# Patient Record
Sex: MALE | Race: BLACK OR AFRICAN AMERICAN | NOT HISPANIC OR LATINO | URBAN - METROPOLITAN AREA
[De-identification: names, ages, dates, MRNs, and addresses within clinical notes are randomized per-mention and may not be internally consistent; named-entity substitution may affect disease eponyms.]

---

## 2017-06-15 ENCOUNTER — EMERGENCY (EMERGENCY)
Facility: HOSPITAL | Age: 62
LOS: 0 days | Discharge: HOME | End: 2017-06-15
Attending: EMERGENCY MEDICINE
Payer: COMMERCIAL

## 2017-06-15 VITALS
HEART RATE: 117 BPM | OXYGEN SATURATION: 97 % | TEMPERATURE: 102 F | WEIGHT: 119.93 LBS | DIASTOLIC BLOOD PRESSURE: 89 MMHG | RESPIRATION RATE: 20 BRPM | SYSTOLIC BLOOD PRESSURE: 151 MMHG | HEIGHT: 71 IN

## 2017-06-15 VITALS
TEMPERATURE: 99 F | DIASTOLIC BLOOD PRESSURE: 78 MMHG | OXYGEN SATURATION: 98 % | RESPIRATION RATE: 20 BRPM | HEART RATE: 90 BPM | SYSTOLIC BLOOD PRESSURE: 140 MMHG

## 2017-06-15 DIAGNOSIS — J18.9 PNEUMONIA, UNSPECIFIED ORGANISM: ICD-10-CM

## 2017-06-15 DIAGNOSIS — R53.1 WEAKNESS: ICD-10-CM

## 2017-06-15 DIAGNOSIS — R05 COUGH: ICD-10-CM

## 2017-06-15 LAB
ALBUMIN SERPL ELPH-MCNC: 2.6 G/DL — LOW (ref 3.3–5)
ALP SERPL-CCNC: 75 U/L — SIGNIFICANT CHANGE UP (ref 40–120)
ALT FLD-CCNC: 79 U/L — HIGH (ref 12–78)
ANION GAP SERPL CALC-SCNC: 8 MMOL/L — SIGNIFICANT CHANGE UP (ref 5–17)
APPEARANCE UR: CLEAR — SIGNIFICANT CHANGE UP
APTT BLD: 30.1 SEC — SIGNIFICANT CHANGE UP (ref 27.5–37.4)
AST SERPL-CCNC: 135 U/L — HIGH (ref 15–37)
BASOPHILS # BLD AUTO: 0.1 K/UL — SIGNIFICANT CHANGE UP (ref 0–0.2)
BASOPHILS NFR BLD AUTO: 2.5 % — HIGH (ref 0–2)
BILIRUB SERPL-MCNC: 0.4 MG/DL — SIGNIFICANT CHANGE UP (ref 0.2–1.2)
BILIRUB UR-MCNC: NEGATIVE — SIGNIFICANT CHANGE UP
BUN SERPL-MCNC: 18 MG/DL — SIGNIFICANT CHANGE UP (ref 7–23)
CALCIUM SERPL-MCNC: 8.6 MG/DL — SIGNIFICANT CHANGE UP (ref 8.5–10.1)
CHLORIDE SERPL-SCNC: 98 MMOL/L — SIGNIFICANT CHANGE UP (ref 96–108)
CK MB BLD-MCNC: <0.4 % — SIGNIFICANT CHANGE UP (ref 0–3.5)
CK MB CFR SERPL CALC: <0.5 NG/ML — SIGNIFICANT CHANGE UP (ref 0.5–3.6)
CK SERPL-CCNC: 134 U/L — SIGNIFICANT CHANGE UP (ref 26–308)
CO2 SERPL-SCNC: 29 MMOL/L — SIGNIFICANT CHANGE UP (ref 22–31)
COLOR SPEC: YELLOW — SIGNIFICANT CHANGE UP
CREAT SERPL-MCNC: 1.32 MG/DL — HIGH (ref 0.5–1.3)
DIFF PNL FLD: NEGATIVE — SIGNIFICANT CHANGE UP
EOSINOPHIL # BLD AUTO: 0 K/UL — SIGNIFICANT CHANGE UP (ref 0–0.5)
EOSINOPHIL NFR BLD AUTO: 0.3 % — SIGNIFICANT CHANGE UP (ref 0–6)
GLUCOSE SERPL-MCNC: 105 MG/DL — HIGH (ref 70–99)
GLUCOSE UR QL: NEGATIVE MG/DL — SIGNIFICANT CHANGE UP
HCT VFR BLD CALC: 40 % — SIGNIFICANT CHANGE UP (ref 39–50)
HGB BLD-MCNC: 13.8 G/DL — SIGNIFICANT CHANGE UP (ref 13–17)
INR BLD: 1.11 RATIO — SIGNIFICANT CHANGE UP (ref 0.88–1.16)
KETONES UR-MCNC: NEGATIVE — SIGNIFICANT CHANGE UP
LACTATE SERPL-SCNC: 1.7 MMOL/L — SIGNIFICANT CHANGE UP (ref 0.7–2)
LEUKOCYTE ESTERASE UR-ACNC: NEGATIVE — SIGNIFICANT CHANGE UP
LYMPHOCYTES # BLD AUTO: 0.9 K/UL — LOW (ref 1–3.3)
LYMPHOCYTES # BLD AUTO: 15.4 % — SIGNIFICANT CHANGE UP (ref 13–44)
MCHC RBC-ENTMCNC: 29.6 PG — SIGNIFICANT CHANGE UP (ref 27–34)
MCHC RBC-ENTMCNC: 34.4 GM/DL — SIGNIFICANT CHANGE UP (ref 32–36)
MCV RBC AUTO: 85.9 FL — SIGNIFICANT CHANGE UP (ref 80–100)
MONOCYTES # BLD AUTO: 0.8 K/UL — SIGNIFICANT CHANGE UP (ref 0–0.9)
MONOCYTES NFR BLD AUTO: 13.8 % — SIGNIFICANT CHANGE UP (ref 2–14)
NEUTROPHILS # BLD AUTO: 3.8 K/UL — SIGNIFICANT CHANGE UP (ref 1.8–7.4)
NEUTROPHILS NFR BLD AUTO: 68 % — SIGNIFICANT CHANGE UP (ref 43–77)
NITRITE UR-MCNC: NEGATIVE — SIGNIFICANT CHANGE UP
PH UR: 6 — SIGNIFICANT CHANGE UP (ref 5–8)
PLATELET # BLD AUTO: 224 K/UL — SIGNIFICANT CHANGE UP (ref 150–400)
POTASSIUM SERPL-MCNC: 3.9 MMOL/L — SIGNIFICANT CHANGE UP (ref 3.5–5.3)
POTASSIUM SERPL-SCNC: 3.9 MMOL/L — SIGNIFICANT CHANGE UP (ref 3.5–5.3)
PROT SERPL-MCNC: 7.7 GM/DL — SIGNIFICANT CHANGE UP (ref 6–8.3)
PROT UR-MCNC: 15 MG/DL
PROTHROM AB SERPL-ACNC: 12.1 SEC — SIGNIFICANT CHANGE UP (ref 9.8–12.7)
RBC # BLD: 4.66 M/UL — SIGNIFICANT CHANGE UP (ref 4.2–5.8)
RBC # FLD: 11.4 % — SIGNIFICANT CHANGE UP (ref 11–15)
SODIUM SERPL-SCNC: 135 MMOL/L — SIGNIFICANT CHANGE UP (ref 135–145)
SP GR SPEC: 1 — LOW (ref 1.01–1.02)
TROPONIN I SERPL-MCNC: <.015 NG/ML — SIGNIFICANT CHANGE UP (ref 0.01–0.04)
UROBILINOGEN FLD QL: NEGATIVE MG/DL — SIGNIFICANT CHANGE UP
WBC # BLD: 5.6 K/UL — SIGNIFICANT CHANGE UP (ref 3.8–10.5)
WBC # FLD AUTO: 5.6 K/UL — SIGNIFICANT CHANGE UP (ref 3.8–10.5)
WBC UR QL: SIGNIFICANT CHANGE UP

## 2017-06-15 PROCEDURE — 99285 EMERGENCY DEPT VISIT HI MDM: CPT | Mod: 25

## 2017-06-15 PROCEDURE — 71010: CPT | Mod: 26

## 2017-06-15 PROCEDURE — 70450 CT HEAD/BRAIN W/O DYE: CPT | Mod: 26

## 2017-06-15 RX ORDER — CEFTRIAXONE 500 MG/1
2 INJECTION, POWDER, FOR SOLUTION INTRAMUSCULAR; INTRAVENOUS ONCE
Qty: 0 | Refills: 0 | Status: COMPLETED | OUTPATIENT
Start: 2017-06-15 | End: 2017-06-15

## 2017-06-15 RX ORDER — AZITHROMYCIN 500 MG/1
500 TABLET, FILM COATED ORAL ONCE
Qty: 0 | Refills: 0 | Status: COMPLETED | OUTPATIENT
Start: 2017-06-15 | End: 2017-06-15

## 2017-06-15 RX ORDER — ACETAMINOPHEN 500 MG
975 TABLET ORAL ONCE
Qty: 0 | Refills: 0 | Status: COMPLETED | OUTPATIENT
Start: 2017-06-15 | End: 2017-06-15

## 2017-06-15 RX ORDER — SODIUM CHLORIDE 9 MG/ML
2000 INJECTION INTRAMUSCULAR; INTRAVENOUS; SUBCUTANEOUS ONCE
Qty: 0 | Refills: 0 | Status: COMPLETED | OUTPATIENT
Start: 2017-06-15 | End: 2017-06-15

## 2017-06-15 RX ADMIN — SODIUM CHLORIDE 2000 MILLILITER(S): 9 INJECTION INTRAMUSCULAR; INTRAVENOUS; SUBCUTANEOUS at 01:47

## 2017-06-15 RX ADMIN — AZITHROMYCIN 255 MILLIGRAM(S): 500 TABLET, FILM COATED ORAL at 06:53

## 2017-06-15 RX ADMIN — Medication 975 MILLIGRAM(S): at 02:00

## 2017-06-15 RX ADMIN — CEFTRIAXONE 100 GRAM(S): 500 INJECTION, POWDER, FOR SOLUTION INTRAMUSCULAR; INTRAVENOUS at 04:45

## 2017-06-15 NOTE — ED PROVIDER NOTE - CARE PLAN
Principal Discharge DX:	Pneumonia  Secondary Diagnosis:	Weakness  Secondary Diagnosis:	Left against medical advice

## 2017-06-15 NOTE — ED PROVIDER NOTE - PHYSICAL EXAMINATION
Gen: Alert, speaking in complete sentences;  Head: NC, AT, EOMI, normal lids/conjunctiva;  ENT: normal hearing, patent oropharynx, MMM;  Neck: supple, no tenderness/meningismus/JVD, Trachea midline,FROM;  Pulm: Bilateral clear BS, no wheeze/stridor/retractions;  CV: tachycardic, no M/R/G, +dist pulses;  Abd: soft, NT/ND, +BS, no guarding/rebound tenderness;  Mskel: no edema/erythema/cyanosis;  Skin: no rash/pallor;  Neuro: AAOx3, no gross sensory/motor deficits, CN 2-12 intact

## 2017-06-15 NOTE — ED PROVIDER NOTE - MEDICAL DECISION MAKING DETAILS
Pt w PNA, given abx in ED.  CT notable for mult old infarcts, concerning for cause of weakness.  Discussed w pt need for further eval and admission.  Pt refused.   The patient has decided to leave against medical advice.  The patient is AAOx3, not intoxicated, and displays normal decision making ability. We discussed all risks, benefits, and alternatives to the progression of treatment and the potential dangers of leaving including but not limited to permanent disability, injury, and death.  The patient was instructed that they are welcome to change their decision to leave against medical advice and return to the emergency department at any time and for any reason in order to allow us to render care.

## 2017-06-16 LAB
CULTURE RESULTS: NO GROWTH — SIGNIFICANT CHANGE UP
SPECIMEN SOURCE: SIGNIFICANT CHANGE UP

## 2017-06-20 LAB
CULTURE RESULTS: SIGNIFICANT CHANGE UP
CULTURE RESULTS: SIGNIFICANT CHANGE UP
SPECIMEN SOURCE: SIGNIFICANT CHANGE UP
SPECIMEN SOURCE: SIGNIFICANT CHANGE UP

## 2019-10-17 PROBLEM — Z00.00 ENCOUNTER FOR PREVENTIVE HEALTH EXAMINATION: Status: ACTIVE | Noted: 2019-10-17

## 2019-10-18 ENCOUNTER — APPOINTMENT (OUTPATIENT)
Dept: UROLOGY | Facility: CLINIC | Age: 64
End: 2019-10-18

## 2021-11-29 ENCOUNTER — EMERGENCY (EMERGENCY)
Facility: HOSPITAL | Age: 66
LOS: 0 days | Discharge: HOME | End: 2021-11-29
Attending: EMERGENCY MEDICINE
Payer: MEDICARE

## 2021-11-29 VITALS
RESPIRATION RATE: 19 BRPM | OXYGEN SATURATION: 96 % | SYSTOLIC BLOOD PRESSURE: 115 MMHG | WEIGHT: 139.99 LBS | DIASTOLIC BLOOD PRESSURE: 74 MMHG | TEMPERATURE: 98 F | HEIGHT: 71 IN | HEART RATE: 100 BPM

## 2021-11-29 VITALS
HEART RATE: 97 BPM | OXYGEN SATURATION: 97 % | SYSTOLIC BLOOD PRESSURE: 128 MMHG | TEMPERATURE: 98 F | RESPIRATION RATE: 18 BRPM | DIASTOLIC BLOOD PRESSURE: 85 MMHG

## 2021-11-29 DIAGNOSIS — E78.5 HYPERLIPIDEMIA, UNSPECIFIED: ICD-10-CM

## 2021-11-29 DIAGNOSIS — R42 DIZZINESS AND GIDDINESS: ICD-10-CM

## 2021-11-29 DIAGNOSIS — Z20.822 CONTACT WITH AND (SUSPECTED) EXPOSURE TO COVID-19: ICD-10-CM

## 2021-11-29 DIAGNOSIS — I10 ESSENTIAL (PRIMARY) HYPERTENSION: ICD-10-CM

## 2021-11-29 DIAGNOSIS — F17.210 NICOTINE DEPENDENCE, CIGARETTES, UNCOMPLICATED: ICD-10-CM

## 2021-11-29 DIAGNOSIS — R11.2 NAUSEA WITH VOMITING, UNSPECIFIED: ICD-10-CM
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
## 2021-11-29 LAB
ALBUMIN SERPL ELPH-MCNC: 2.9 G/DL — LOW (ref 3.3–5)
ALP SERPL-CCNC: 83 U/L — SIGNIFICANT CHANGE UP (ref 40–120)
ALT FLD-CCNC: 27 U/L — SIGNIFICANT CHANGE UP (ref 12–78)
ANION GAP SERPL CALC-SCNC: 4 MMOL/L — LOW (ref 5–17)
APTT BLD: 26.9 SEC — LOW (ref 27.5–35.5)
AST SERPL-CCNC: 12 U/L — LOW (ref 15–37)
BASOPHILS # BLD AUTO: 0.02 K/UL — SIGNIFICANT CHANGE UP (ref 0–0.2)
BASOPHILS NFR BLD AUTO: 0.4 % — SIGNIFICANT CHANGE UP (ref 0–2)
BILIRUB SERPL-MCNC: 0.4 MG/DL — SIGNIFICANT CHANGE UP (ref 0.2–1.2)
BUN SERPL-MCNC: 13 MG/DL — SIGNIFICANT CHANGE UP (ref 7–23)
CALCIUM SERPL-MCNC: 8.8 MG/DL — SIGNIFICANT CHANGE UP (ref 8.5–10.1)
CHLORIDE SERPL-SCNC: 111 MMOL/L — HIGH (ref 96–108)
CO2 SERPL-SCNC: 30 MMOL/L — SIGNIFICANT CHANGE UP (ref 22–31)
CREAT SERPL-MCNC: 1.21 MG/DL — SIGNIFICANT CHANGE UP (ref 0.5–1.3)
EOSINOPHIL # BLD AUTO: 0.01 K/UL — SIGNIFICANT CHANGE UP (ref 0–0.5)
EOSINOPHIL NFR BLD AUTO: 0.2 % — SIGNIFICANT CHANGE UP (ref 0–6)
FLUAV AG NPH QL: SIGNIFICANT CHANGE UP
FLUBV AG NPH QL: SIGNIFICANT CHANGE UP
GLUCOSE SERPL-MCNC: 137 MG/DL — HIGH (ref 70–99)
HCT VFR BLD CALC: 39.6 % — SIGNIFICANT CHANGE UP (ref 39–50)
HGB BLD-MCNC: 12.6 G/DL — LOW (ref 13–17)
IMM GRANULOCYTES NFR BLD AUTO: 0.4 % — SIGNIFICANT CHANGE UP (ref 0–1.5)
INR BLD: 1.17 RATIO — HIGH (ref 0.88–1.16)
LYMPHOCYTES # BLD AUTO: 1.24 K/UL — SIGNIFICANT CHANGE UP (ref 1–3.3)
LYMPHOCYTES # BLD AUTO: 22.1 % — SIGNIFICANT CHANGE UP (ref 13–44)
MCHC RBC-ENTMCNC: 27.9 PG — SIGNIFICANT CHANGE UP (ref 27–34)
MCHC RBC-ENTMCNC: 31.8 GM/DL — LOW (ref 32–36)
MCV RBC AUTO: 87.8 FL — SIGNIFICANT CHANGE UP (ref 80–100)
MONOCYTES # BLD AUTO: 0.25 K/UL — SIGNIFICANT CHANGE UP (ref 0–0.9)
MONOCYTES NFR BLD AUTO: 4.5 % — SIGNIFICANT CHANGE UP (ref 2–14)
NEUTROPHILS # BLD AUTO: 4.06 K/UL — SIGNIFICANT CHANGE UP (ref 1.8–7.4)
NEUTROPHILS NFR BLD AUTO: 72.4 % — SIGNIFICANT CHANGE UP (ref 43–77)
NRBC # BLD: 0 /100 WBCS — SIGNIFICANT CHANGE UP (ref 0–0)
PLATELET # BLD AUTO: 215 K/UL — SIGNIFICANT CHANGE UP (ref 150–400)
POTASSIUM SERPL-MCNC: 3.7 MMOL/L — SIGNIFICANT CHANGE UP (ref 3.5–5.3)
POTASSIUM SERPL-SCNC: 3.7 MMOL/L — SIGNIFICANT CHANGE UP (ref 3.5–5.3)
PROT SERPL-MCNC: 6.9 GM/DL — SIGNIFICANT CHANGE UP (ref 6–8.3)
PROTHROM AB SERPL-ACNC: 13.5 SEC — SIGNIFICANT CHANGE UP (ref 10.6–13.6)
RBC # BLD: 4.51 M/UL — SIGNIFICANT CHANGE UP (ref 4.2–5.8)
RBC # FLD: 13.1 % — SIGNIFICANT CHANGE UP (ref 10.3–14.5)
SARS-COV-2 RNA SPEC QL NAA+PROBE: SIGNIFICANT CHANGE UP
SODIUM SERPL-SCNC: 145 MMOL/L — SIGNIFICANT CHANGE UP (ref 135–145)
TROPONIN I, HIGH SENSITIVITY RESULT: 7.5 NG/L — SIGNIFICANT CHANGE UP
WBC # BLD: 5.6 K/UL — SIGNIFICANT CHANGE UP (ref 3.8–10.5)
WBC # FLD AUTO: 5.6 K/UL — SIGNIFICANT CHANGE UP (ref 3.8–10.5)

## 2021-11-29 PROCEDURE — 99285 EMERGENCY DEPT VISIT HI MDM: CPT

## 2021-11-29 PROCEDURE — 70450 CT HEAD/BRAIN W/O DYE: CPT | Mod: 26,MA

## 2021-11-29 PROCEDURE — 93010 ELECTROCARDIOGRAM REPORT: CPT

## 2021-11-29 PROCEDURE — 71045 X-RAY EXAM CHEST 1 VIEW: CPT | Mod: 26

## 2021-11-29 RX ORDER — METOCLOPRAMIDE HCL 10 MG
10 TABLET ORAL ONCE
Refills: 0 | Status: COMPLETED | OUTPATIENT
Start: 2021-11-29 | End: 2021-11-29

## 2021-11-29 RX ORDER — ONDANSETRON 8 MG/1
1 TABLET, FILM COATED ORAL
Qty: 6 | Refills: 0
Start: 2021-11-29 | End: 2021-12-01

## 2021-11-29 RX ORDER — MECLIZINE HCL 12.5 MG
1 TABLET ORAL
Qty: 15 | Refills: 0
Start: 2021-11-29 | End: 2021-12-03

## 2021-11-29 RX ORDER — MECLIZINE HCL 12.5 MG
50 TABLET ORAL ONCE
Refills: 0 | Status: COMPLETED | OUTPATIENT
Start: 2021-11-29 | End: 2021-11-29

## 2021-11-29 RX ADMIN — Medication 10 MILLIGRAM(S): at 13:58

## 2021-11-29 RX ADMIN — Medication 50 MILLIGRAM(S): at 13:58

## 2021-11-29 NOTE — ED PROVIDER NOTE - CARE PROVIDER_API CALL
Vidhi Meraz  FAMILY MEDICINE  [phone] [place] [address]  Phone: [phone]  Fax: [phone]  Follow Up Time: 1-3 Days

## 2021-11-29 NOTE — ED PROVIDER NOTE - MUSCULOSKELETAL, MLM
Spine appears normal, range of motion is not limited, no muscle or joint tenderness, no sign of trauma

## 2021-11-29 NOTE — ED PROVIDER NOTE - NSICDXPASTMEDICALHX_GEN_ALL_CORE_FT
PAST MEDICAL HISTORY:  HLD (hyperlipidemia)     HTN, age 0-18     TBI (traumatic brain injury)

## 2021-11-29 NOTE — ED ADULT NURSE NOTE - CHIEF COMPLAINT
Type of surgery: Laparoscopic right inguinal hernia repair: possible bilateral  Possible open CPT code 67507  Inguinal hernia of right side without obstruction or gangrene [K40.90]  - Primary   Location of surgery: [facility]  Date and time of surgery: 06/28/2019 / 10:00   Surgeon: Feng  Pre-Op Appt Date: 06/20/2019  Post-Op Appt Date: 07/11/2019   Packet sent out: Yes  Pre-cert/Authorization completed:  No prior auth per [employer].   Date: 06/17/2019    Sent to  and was received. 06/18/2019  
The patient is a 66y Male complaining of dizziness.

## 2021-11-29 NOTE — ED PROVIDER NOTE - OBJECTIVE STATEMENT
66 year old male with PMH of TBI, HTN, and HLD presents to ED for an episode of dizziness, nausea, and vomiting this morning. Per daughter patient had breakfast and a cigarette, afterwards pt fell down to the floor. Pt denies LOC. No change of mental status noted, pt with legally blind status bilateral eyes. Pt denies cough, fever, chills, chest pain, and SOB.

## 2021-11-29 NOTE — ED PROVIDER NOTE - NEUROLOGICAL, MLM
AAO x1, limited visual exam, able to follow commands CN 2-12 intact, unable to do finger and nose, but able to perform repetitive hand movements without issue no notable nystagmus.

## 2021-11-29 NOTE — ED PROVIDER NOTE - CLINICAL SUMMARY MEDICAL DECISION MAKING FREE TEXT BOX
Patient with dizziness and fall. Will evaluate for any potential head injury, labs, fluids, Meclizine, and CT scan ordered. If able to ambulate after medication will d/c if not will admit for further care, PMD Dr. Barrios. Patient with dizziness and fall. Will evaluate for any potential head injury, labs, fluids, Meclizine, and CT scan ordered. If able to ambulate after medication will d/c if not will admit for further care, PMD Dr. Barrios will dc with follow up. Otherwise pt able to walk without issue in ED - feeling improved symptomatically.

## 2021-11-29 NOTE — ED PROVIDER NOTE - PATIENT PORTAL LINK FT
You can access the FollowMyHealth Patient Portal offered by [facility] by registering at the following website: [email or web address]. By joining [facility]’s FollowMyHealth portal, you will also be able to view your health information using other applications (apps) compatible with our system.

## 2021-11-29 NOTE — ED ADULT NURSE NOTE - OBJECTIVE STATEMENT
66 year old male hx HTN, HLD, and TBI as per daughter comes in to ER after sustaining fall this morning. As per daughter patient was found on the floor and vomited. Patient denies any pain or discomfort, unsure if he hit head.

## 2023-07-14 ENCOUNTER — INPATIENT (INPATIENT)
Facility: HOSPITAL | Age: 68
LOS: 40 days | Discharge: NURSING, REHAB OR LONG-TERM CARE | End: 2023-08-24
Attending: INTERNAL MEDICINE | Admitting: INTERNAL MEDICINE
Payer: MEDICARE

## 2023-07-14 VITALS
DIASTOLIC BLOOD PRESSURE: 70 MMHG | TEMPERATURE: 98 F | HEIGHT: 71 IN | WEIGHT: 139.99 LBS | SYSTOLIC BLOOD PRESSURE: 122 MMHG

## 2023-07-14 DIAGNOSIS — E87.5 HYPERKALEMIA: ICD-10-CM

## 2023-07-14 DIAGNOSIS — E78.5 HYPERLIPIDEMIA, UNSPECIFIED: ICD-10-CM

## 2023-07-14 DIAGNOSIS — M62.82 RHABDOMYOLYSIS: ICD-10-CM

## 2023-07-14 DIAGNOSIS — N17.9 ACUTE KIDNEY FAILURE, UNSPECIFIED: ICD-10-CM

## 2023-07-14 DIAGNOSIS — F03.90 UNSPECIFIED DEMENTIA, UNSPECIFIED SEVERITY, WITHOUT BEHAVIORAL DISTURBANCE, PSYCHOTIC DISTURBANCE, MOOD DISTURBANCE, AND ANXIETY: ICD-10-CM

## 2023-07-14 DIAGNOSIS — R26.2 DIFFICULTY IN WALKING, NOT ELSEWHERE CLASSIFIED: ICD-10-CM

## 2023-07-14 DIAGNOSIS — I10 ESSENTIAL (PRIMARY) HYPERTENSION: ICD-10-CM

## 2023-07-14 PROBLEM — S06.9X9A UNSPECIFIED INTRACRANIAL INJURY WITH LOSS OF CONSCIOUSNESS OF UNSPECIFIED DURATION, INITIAL ENCOUNTER: Chronic | Status: ACTIVE | Noted: 2021-11-29

## 2023-07-14 LAB
ALBUMIN SERPL ELPH-MCNC: 3.1 G/DL — LOW (ref 3.3–5)
ALP SERPL-CCNC: 61 U/L — SIGNIFICANT CHANGE UP (ref 40–120)
ALT FLD-CCNC: 23 U/L — SIGNIFICANT CHANGE UP (ref 12–78)
ANION GAP SERPL CALC-SCNC: 8 MMOL/L — SIGNIFICANT CHANGE UP (ref 5–17)
ANION GAP SERPL CALC-SCNC: 9 MMOL/L — SIGNIFICANT CHANGE UP (ref 5–17)
APPEARANCE UR: ABNORMAL
AST SERPL-CCNC: 35 U/L — SIGNIFICANT CHANGE UP (ref 15–37)
BACTERIA # UR AUTO: ABNORMAL
BASOPHILS # BLD AUTO: 0.01 K/UL — SIGNIFICANT CHANGE UP (ref 0–0.2)
BASOPHILS NFR BLD AUTO: 0.1 % — SIGNIFICANT CHANGE UP (ref 0–2)
BILIRUB SERPL-MCNC: 0.4 MG/DL — SIGNIFICANT CHANGE UP (ref 0.2–1.2)
BILIRUB UR-MCNC: NEGATIVE — SIGNIFICANT CHANGE UP
BUN SERPL-MCNC: 34 MG/DL — HIGH (ref 7–23)
BUN SERPL-MCNC: 35 MG/DL — HIGH (ref 7–23)
CALCIUM SERPL-MCNC: 8.4 MG/DL — LOW (ref 8.5–10.1)
CALCIUM SERPL-MCNC: 9.4 MG/DL — SIGNIFICANT CHANGE UP (ref 8.5–10.1)
CHLORIDE SERPL-SCNC: 102 MMOL/L — SIGNIFICANT CHANGE UP (ref 96–108)
CHLORIDE SERPL-SCNC: 104 MMOL/L — SIGNIFICANT CHANGE UP (ref 96–108)
CK SERPL-CCNC: 965 U/L — HIGH (ref 26–308)
CO2 SERPL-SCNC: 28 MMOL/L — SIGNIFICANT CHANGE UP (ref 22–31)
CO2 SERPL-SCNC: 28 MMOL/L — SIGNIFICANT CHANGE UP (ref 22–31)
COLOR SPEC: YELLOW — SIGNIFICANT CHANGE UP
CREAT SERPL-MCNC: 2.54 MG/DL — HIGH (ref 0.5–1.3)
CREAT SERPL-MCNC: 2.82 MG/DL — HIGH (ref 0.5–1.3)
DIFF PNL FLD: ABNORMAL
EGFR: 23 ML/MIN/1.73M2 — LOW
EGFR: 27 ML/MIN/1.73M2 — LOW
EOSINOPHIL # BLD AUTO: 0 K/UL — SIGNIFICANT CHANGE UP (ref 0–0.5)
EOSINOPHIL NFR BLD AUTO: 0 % — SIGNIFICANT CHANGE UP (ref 0–6)
EPI CELLS # UR: SIGNIFICANT CHANGE UP
GLUCOSE SERPL-MCNC: 110 MG/DL — HIGH (ref 70–99)
GLUCOSE SERPL-MCNC: 73 MG/DL — SIGNIFICANT CHANGE UP (ref 70–99)
GLUCOSE UR QL: 100 MG/DL
GRAN CASTS # UR COMP ASSIST: ABNORMAL /LPF
HCT VFR BLD CALC: 42.4 % — SIGNIFICANT CHANGE UP (ref 39–50)
HGB BLD-MCNC: 13.5 G/DL — SIGNIFICANT CHANGE UP (ref 13–17)
IMM GRANULOCYTES NFR BLD AUTO: 0.5 % — SIGNIFICANT CHANGE UP (ref 0–0.9)
KETONES UR-MCNC: ABNORMAL
LEUKOCYTE ESTERASE UR-ACNC: NEGATIVE — SIGNIFICANT CHANGE UP
LYMPHOCYTES # BLD AUTO: 1.35 K/UL — SIGNIFICANT CHANGE UP (ref 1–3.3)
LYMPHOCYTES # BLD AUTO: 18.3 % — SIGNIFICANT CHANGE UP (ref 13–44)
MCHC RBC-ENTMCNC: 30.4 PG — SIGNIFICANT CHANGE UP (ref 27–34)
MCHC RBC-ENTMCNC: 31.8 G/DL — LOW (ref 32–36)
MCV RBC AUTO: 95.5 FL — SIGNIFICANT CHANGE UP (ref 80–100)
MONOCYTES # BLD AUTO: 0.53 K/UL — SIGNIFICANT CHANGE UP (ref 0–0.9)
MONOCYTES NFR BLD AUTO: 7.2 % — SIGNIFICANT CHANGE UP (ref 2–14)
NEUTROPHILS # BLD AUTO: 5.46 K/UL — SIGNIFICANT CHANGE UP (ref 1.8–7.4)
NEUTROPHILS NFR BLD AUTO: 73.9 % — SIGNIFICANT CHANGE UP (ref 43–77)
NITRITE UR-MCNC: NEGATIVE — SIGNIFICANT CHANGE UP
NRBC # BLD: 0 /100 WBCS — SIGNIFICANT CHANGE UP (ref 0–0)
PH UR: 5 — SIGNIFICANT CHANGE UP (ref 5–8)
PLATELET # BLD AUTO: 129 K/UL — LOW (ref 150–400)
POTASSIUM SERPL-MCNC: 4 MMOL/L — SIGNIFICANT CHANGE UP (ref 3.5–5.3)
POTASSIUM SERPL-MCNC: 6 MMOL/L — HIGH (ref 3.5–5.3)
POTASSIUM SERPL-SCNC: 4 MMOL/L — SIGNIFICANT CHANGE UP (ref 3.5–5.3)
POTASSIUM SERPL-SCNC: 6 MMOL/L — HIGH (ref 3.5–5.3)
PROT SERPL-MCNC: 7.7 GM/DL — SIGNIFICANT CHANGE UP (ref 6–8.3)
PROT UR-MCNC: 100 MG/DL
RBC # BLD: 4.44 M/UL — SIGNIFICANT CHANGE UP (ref 4.2–5.8)
RBC # FLD: 13.3 % — SIGNIFICANT CHANGE UP (ref 10.3–14.5)
RBC CASTS # UR COMP ASSIST: ABNORMAL /HPF (ref 0–4)
SODIUM SERPL-SCNC: 139 MMOL/L — SIGNIFICANT CHANGE UP (ref 135–145)
SODIUM SERPL-SCNC: 140 MMOL/L — SIGNIFICANT CHANGE UP (ref 135–145)
SP GR SPEC: 1.02 — SIGNIFICANT CHANGE UP (ref 1.01–1.02)
UROBILINOGEN FLD QL: 1 MG/DL
WBC # BLD: 7.39 K/UL — SIGNIFICANT CHANGE UP (ref 3.8–10.5)
WBC # FLD AUTO: 7.39 K/UL — SIGNIFICANT CHANGE UP (ref 3.8–10.5)
WBC UR QL: SIGNIFICANT CHANGE UP

## 2023-07-14 PROCEDURE — 72192 CT PELVIS W/O DYE: CPT | Mod: 26,MA

## 2023-07-14 PROCEDURE — 99285 EMERGENCY DEPT VISIT HI MDM: CPT

## 2023-07-14 PROCEDURE — 99223 1ST HOSP IP/OBS HIGH 75: CPT

## 2023-07-14 PROCEDURE — 70450 CT HEAD/BRAIN W/O DYE: CPT | Mod: 26,MA

## 2023-07-14 PROCEDURE — 72131 CT LUMBAR SPINE W/O DYE: CPT | Mod: 26,MA

## 2023-07-14 PROCEDURE — 72125 CT NECK SPINE W/O DYE: CPT | Mod: 26,MA

## 2023-07-14 PROCEDURE — 71045 X-RAY EXAM CHEST 1 VIEW: CPT | Mod: 26

## 2023-07-14 PROCEDURE — 93010 ELECTROCARDIOGRAM REPORT: CPT

## 2023-07-14 RX ORDER — QUETIAPINE FUMARATE 200 MG/1
100 TABLET, FILM COATED ORAL AT BEDTIME
Refills: 0 | Status: DISCONTINUED | OUTPATIENT
Start: 2023-07-14 | End: 2023-08-24

## 2023-07-14 RX ORDER — TRAZODONE HCL 50 MG
50 TABLET ORAL AT BEDTIME
Refills: 0 | Status: DISCONTINUED | OUTPATIENT
Start: 2023-07-14 | End: 2023-08-24

## 2023-07-14 RX ORDER — AMLODIPINE BESYLATE 2.5 MG/1
5 TABLET ORAL DAILY
Refills: 0 | Status: DISCONTINUED | OUTPATIENT
Start: 2023-07-14 | End: 2023-07-20

## 2023-07-14 RX ORDER — LIDOCAINE 4 G/100G
1 CREAM TOPICAL ONCE
Refills: 0 | Status: COMPLETED | OUTPATIENT
Start: 2023-07-14 | End: 2023-07-14

## 2023-07-14 RX ORDER — SODIUM CHLORIDE 9 MG/ML
1000 INJECTION INTRAMUSCULAR; INTRAVENOUS; SUBCUTANEOUS ONCE
Refills: 0 | Status: COMPLETED | OUTPATIENT
Start: 2023-07-14 | End: 2023-07-14

## 2023-07-14 RX ORDER — SODIUM CHLORIDE 9 MG/ML
1000 INJECTION, SOLUTION INTRAVENOUS
Refills: 0 | Status: DISCONTINUED | OUTPATIENT
Start: 2023-07-14 | End: 2023-07-15

## 2023-07-14 RX ORDER — ACETAMINOPHEN 500 MG
1000 TABLET ORAL ONCE
Refills: 0 | Status: COMPLETED | OUTPATIENT
Start: 2023-07-14 | End: 2023-07-14

## 2023-07-14 RX ORDER — DIVALPROEX SODIUM 500 MG/1
250 TABLET, DELAYED RELEASE ORAL THREE TIMES A DAY
Refills: 0 | Status: DISCONTINUED | OUTPATIENT
Start: 2023-07-14 | End: 2023-07-29

## 2023-07-14 RX ORDER — INSULIN HUMAN 100 [IU]/ML
6 INJECTION, SOLUTION SUBCUTANEOUS ONCE
Refills: 0 | Status: COMPLETED | OUTPATIENT
Start: 2023-07-14 | End: 2023-07-14

## 2023-07-14 RX ORDER — SODIUM ZIRCONIUM CYCLOSILICATE 10 G/10G
5 POWDER, FOR SUSPENSION ORAL ONCE
Refills: 0 | Status: COMPLETED | OUTPATIENT
Start: 2023-07-14 | End: 2023-07-14

## 2023-07-14 RX ORDER — DEXTROSE 50 % IN WATER 50 %
50 SYRINGE (ML) INTRAVENOUS ONCE
Refills: 0 | Status: COMPLETED | OUTPATIENT
Start: 2023-07-14 | End: 2023-07-14

## 2023-07-14 RX ORDER — ATORVASTATIN CALCIUM 80 MG/1
80 TABLET, FILM COATED ORAL AT BEDTIME
Refills: 0 | Status: DISCONTINUED | OUTPATIENT
Start: 2023-07-14 | End: 2023-07-31

## 2023-07-14 RX ORDER — ACETAMINOPHEN 500 MG
650 TABLET ORAL EVERY 6 HOURS
Refills: 0 | Status: DISCONTINUED | OUTPATIENT
Start: 2023-07-14 | End: 2023-08-08

## 2023-07-14 RX ORDER — LANOLIN ALCOHOL/MO/W.PET/CERES
3 CREAM (GRAM) TOPICAL AT BEDTIME
Refills: 0 | Status: DISCONTINUED | OUTPATIENT
Start: 2023-07-14 | End: 2023-08-24

## 2023-07-14 RX ADMIN — LIDOCAINE 1 PATCH: 4 CREAM TOPICAL at 19:30

## 2023-07-14 RX ADMIN — Medication 1000 MILLIGRAM(S): at 14:01

## 2023-07-14 RX ADMIN — QUETIAPINE FUMARATE 100 MILLIGRAM(S): 200 TABLET, FILM COATED ORAL at 22:12

## 2023-07-14 RX ADMIN — SODIUM CHLORIDE 1000 MILLILITER(S): 9 INJECTION INTRAMUSCULAR; INTRAVENOUS; SUBCUTANEOUS at 15:22

## 2023-07-14 RX ADMIN — SODIUM CHLORIDE 100 MILLILITER(S): 9 INJECTION, SOLUTION INTRAVENOUS at 18:51

## 2023-07-14 RX ADMIN — Medication 400 MILLIGRAM(S): at 13:46

## 2023-07-14 RX ADMIN — INSULIN HUMAN 6 UNIT(S): 100 INJECTION, SOLUTION SUBCUTANEOUS at 16:16

## 2023-07-14 RX ADMIN — LIDOCAINE 1 PATCH: 4 CREAM TOPICAL at 13:46

## 2023-07-14 RX ADMIN — SODIUM ZIRCONIUM CYCLOSILICATE 5 GRAM(S): 10 POWDER, FOR SUSPENSION ORAL at 16:17

## 2023-07-14 RX ADMIN — DIVALPROEX SODIUM 250 MILLIGRAM(S): 500 TABLET, DELAYED RELEASE ORAL at 22:12

## 2023-07-14 RX ADMIN — Medication 50 MILLIGRAM(S): at 22:12

## 2023-07-14 RX ADMIN — Medication 50 MILLILITER(S): at 16:18

## 2023-07-14 RX ADMIN — ATORVASTATIN CALCIUM 80 MILLIGRAM(S): 80 TABLET, FILM COATED ORAL at 22:12

## 2023-07-14 NOTE — H&P ADULT - PROBLEM SELECTOR PLAN 2
K 6, no EKG changes. Likely in the setting of VIKAS, rhabdo, ACEI use  Cocktail for hyperkalemia ---> insulin/dextrose, Lokelma  Repeat BMP, if elevated, will give another dose of lokelma  Anticipate improvement  Monitor renal function and serum K  Hold ACEI

## 2023-07-14 NOTE — ED PROVIDER NOTE - CLINICAL SUMMARY MEDICAL DECISION MAKING FREE TEXT BOX
68M pmhx HTN, HLD, dementia and prior hx of TBI, who presents with low back pain and difficulty walking after unwitnessed fall at home -daughter at bedside reports that patient was found on floor next to his bed, awake and alert. Patient reports he was attempting to get up to go to the bathroom when he 'slipped' and fell. He denies head injury or LOC. He reports low back pain.     On exam, low back and sacral TTP and right hip ttp, pt appears uncomfortable, no motor/sensory deficits though somewhat limited motor exam due to pain, will obtain cbc/ cmp to rule out precipitating anemia or metabolic derangements that may have contributed to fall, sinus tachycardia may be pain/anxiety related, cardiac monitor- recheck after analgesia, check UA rule out underlying infcn, check ck level rule out rhabdo, reassess, possible admission if not ambulatory.

## 2023-07-14 NOTE — H&P ADULT - PROBLEM SELECTOR PLAN 1
Cr 2.82, likely VIKAS, recent decrease in oral intake  Hold ACEI  IV fluid  Closely monitor renal function, avoid nephrotoxic substances  Slightly tachycardic--> likely due to dehydration--> IV fluid, check TSH, free thyroxine with AM lab

## 2023-07-14 NOTE — ED ADULT NURSE NOTE - OBJECTIVE STATEMENT
68M PMHx TBI, HTN, Dementia presents to the ED with daughter for multiples falls, daughter states that he

## 2023-07-14 NOTE — ED PROVIDER NOTE - CARE PLAN
1 Principal Discharge DX:	Hyperkalemia  Secondary Diagnosis:	VIKAS (acute kidney injury)  Secondary Diagnosis:	Back pain

## 2023-07-14 NOTE — H&P ADULT - HISTORY OF PRESENT ILLNESS
Talked to patient's daughter Stacy Graves ( [phone] over the phone  68 years old male with h/o HTN, HLD, TBI resulting in dementia present to ED ? fall. As per daughter, patient was found on the floor. Patient is a poor history. Patient reported he felt weak and fell onto the floor. Denied any head trauma or LOC. No fever, chills. Patient denied any pain.  Slightly tachycardic, afebrile, sat well at RA. EKG with sinus tachy. No leukocytosis, plt 129, K 6, Cr 2.82, glucose 110, . CXR with no focal consolidation. CT head with no acute pathology. C/L spine with no fracture. CT pelsis with no acute displace fracture. Questionable mild widening of the bilateral sacroiliac joints with subchondral sclerosis and questionable erosions along the iliac side, right greater than left      SH: former smoker

## 2023-07-14 NOTE — H&P ADULT - PROBLEM SELECTOR PLAN 4
CT head  ( I personally review) with no acute pathology. C/L spine with no fracture. CT pelsis with no acute displace fracture. Questionable mild widening of the bilateral sacroiliac joints with subchondral sclerosis and questionable erosions along the iliac side, right greater than left  PT consult

## 2023-07-14 NOTE — ED PROVIDER NOTE - OBJECTIVE STATEMENT
68M pmhx HTN, HLD, dementia and prior hx of TBI, who presents with low back pain and difficulty walking after unwitnessed fall at home -daughter at bedside reports that patient was found on floor next to his bed, awake and alert. Patient reports he was attempting to get up to go to the bathroom when he 'slipped' and fell. He denies head injury or LOC. He reports low back pain. He denies abd pain, chest pain. Unclear if pt is adequate historian as daughter states he does have significant memory difficulties. He also had a fall 2 weeks ago that was witnessed. No recent illness. Patient typically ambulates by holding onto things, he has walker ordered but has not yet received it. 68M pmhx HTN, HLD, dementia and prior hx of TBI, who presents with low back pain and difficulty walking after unwitnessed fall at home -daughter at bedside reports that patient was found on floor next to his bed, awake and alert. Patient reports he was attempting to get up to go to the bathroom when he 'slipped' and fell. He denies head injury or LOC. He reports low back pain. He denies abd pain, chest pain. Unclear if pt is adequate historian as daughter states he does have significant memory difficulties. He also had a fall 2 weeks ago that was witnessed. No recent illness. Patient typically ambulates by holding onto things, he has walker ordered but has not yet received it.    daughter HCP - Stacy Yang [phone]

## 2023-07-14 NOTE — ED ADULT TRIAGE NOTE - CHIEF COMPLAINT QUOTE
Patient BIB EMS from home with complaints of lower back and r foot post fall . as per daughter she found pt on the floor , unknown of LOC. PMx: TBI. Dementia, stroke,

## 2023-07-14 NOTE — ED PROVIDER NOTE - PHYSICAL EXAMINATION
Gen: AOX2, NAD  Head: NCAT  ENT: Airway patent, moist mucous membranes, nasal passageways clear   Cardiac: mild tachycardia, no murmurs   Respiratory: Lungs CTA B/L  Gastrointestinal: Abdomen soft, nontender, nondistended, no rebound, no guarding  MSK: No gross abnormalities, FROM of all four extremities, mild nonpitting b/l low ankle edema, +TTP low back/sacral area, and + TTP Right hip, pelvis stable, no chest wall ttp, no midline cervical or thoracic ttp.   HEME: Extremities warm and well perfused   Skin: No rashes, no lesions  Neuro: No gross neurologic deficits, CN II-XII intact, no facial asymmetry, no dysarthria,  strength equal in all four extremities, unable to assess gait due to back pain

## 2023-07-14 NOTE — PHYSICAL THERAPY INITIAL EVALUATION ADULT - GAIT PATTERN USED, PT EVAL
[facility] - [facility]    Progress Note    Boy Temi Males Patient Status:  [place]    2019 MRN [id number]   Location [facility]  3SE-N Attending Adrian Mendes MD   Hosp Day # 1 PCP No primary care provider on file.      Subjective: REITCPERCENT    No results found for: CREATSERUM, BUN, NA, K, CL, CO2, GLU, CA, ALB, ALKPHO, TP, AST, ALT, PTT, INR, PTP, T4F, TSH, TSHREFLEX, JENISE, LIP, GGT, PSA, DDIMER, ESRML, ESRPF, CRP, BNP, MG, PHOS, TROP, CK, CKMB, DARIUS, RPR, B12, ETOH, POCGLU    Lab 2-point gait

## 2023-07-14 NOTE — ED ADULT NURSE NOTE - RESPIRATORY ASSESSMENT
POD # 7    Chief Complaint: confused      Subjective:  Mr. Brayden Crespo is a 79y.o. year old * male S/P underwent extended right hemicolectomy . Today he has no fever or chills. No abdominal pain. No new complaints. Passed flatus. & has liquid BM. No blood in stool. Tolerating soft diet. Review of Systems:   Constitutional:  no fever,  no chills,  no sweats, No weakness, No fatigue, No decreased activity. Respiratory: No shortness of breath, No cough, No sputum production, No hemoptysis, No wheezing, No cyanosis. Cardiovascular: No chest pain, No palpitations, No bradycardia, No tachycardia, No peripheral edema, No syncope. Gastrointestinal: No nausea,  No vomiting, No diarrhea, No constipation, No heartburn, No abdominal pain. Genitourinary: No dysuria, No hematuria, No change in urine stream, No urethral discharge, No lesions. Musculoskeletal: No back pain, No neck pain, No joint pain, No muscle pain, No claudication, No decreased range of motion, No trauma. Integumentary: No rash, No pruritus, No abrasions. Neurologic: Alert and oriented X4, No abnormal balance, No headache, No confusion, No numbness, No tingling. Psychiatric: No anxiety, No depression, No rupinder. Physical Exam:     Vitals & Measurements:     Wt Readings from Last 3 Encounters:   09/09/22 47.6 kg (105 lb)   07/27/22 52.2 kg (115 lb)   08/01/22 49 kg (108 lb)     Temp Readings from Last 3 Encounters:   09/22/22 97.7 °F (36.5 °C)   09/09/22 97.7 °F (36.5 °C)   08/04/22 97.5 °F (36.4 °C)     BP Readings from Last 3 Encounters:   09/22/22 (!) 152/73   09/09/22 100/60   08/04/22 (!) 148/77     Pulse Readings from Last 3 Encounters:   09/22/22 82   09/09/22 71   08/04/22 70      Ht Readings from Last 3 Encounters:   09/20/22 5' 7.72\" (1.72 m)   09/09/22 5' 7.72\" (1.72 m)   07/27/22 5' 7\" (1.702 m)      Date 09/21/22 0700 - 09/22/22 0659 09/22/22 0700 - 09/23/22 0659   Shift 5208-1756 5659-0011 24 Hour Total 8417-9726 6947-2855 24 Hour Total   INTAKE   P.O. 420  420        P. O. 420  420      I.V.    650  650     Volume (0.45% sodium chloride infusion)    650  650   Shift Total 420  420 650  650   OUTPUT   Urine 600 200 800        Urine Occurrence(s)    1 x  1 x     Urine Output (mL) (External Urinary Catheter 09/20/22) 600 200 800      Drains 1000 50 1050        Output (ml) (Fecal Management) 1000 50 1050      Shift Total 2096 119 4015      NET -1180 -250 -1430 650  650   Weight (kg)               General: well appearing, no acute distress  Respiratory: normal chest wall expansion, CTA B, no r/r/w, no rubs  Cardiovascular: RRR, no m/r/g, Normal S1 and S2  Abdomen: Soft, non tender, non-distended, normal bowel sounds in all quadrants, no hepatosplenomegaly, no tympany. Incision scar: dressing Intact. Genitourinary: No inguinal hernia, normal external gentalia, Testis & scrotum normal, no renal angle tenderness  Musculoskeletal: normal ROM in upper and lower extremities  Integumentary: warm, dry, and pink, with no rash, purpura, or petechia  Neurological:Cranial Nerves II-XII grossly intact, No sensory or motor deficit  Psychiatric: Cooperative with normal mood, affect, and cognition    Laboratory Values:   No results found for this or any previous visit (from the past 24 hour(s)). Radiology:   XR CHEST PORT   Final Result   1. Emphysema. 2. Right lung persistent density, nonspecific. DUPLEX UPPER EXT VENOUS RIGHT   Final Result      XR CHEST PORT   Final Result   No significant change. XR CHEST PORT   Final Result      Unchanged, trace fluid along the horizontal fissure. Mild LEFT basilar   atelectasis. XR ABD (KUB)   Final Result   1. Unchanged few borderline dilated loops of small bowel likely representing   ileus, though difficult to exclude evolving small bowel obstruction. Continued   radiographic follow-up should be considered. XR CHEST PORT   Final Result   1.  Unchanged small bilateral pleural effusions, including fluid loculated in the   right minor fissure. 2. Emphysema. XR ABD PORT  1 V   Final Result   Unremarkable bowel gas pattern. DUPLEX LOWER EXT VENOUS BILAT   Final Result      XR CHEST PORT   Final Result   Bilateral pleural effusions. XR CHEST PORT   Final Result   Enteric tube is in good position. No acute process on portable chest.         XR ABD (KUB)   Final Result   1. Satisfactory positioning of nasogastric tube         XR CHEST PORT   Final Result      Enteric tube has been removed. No evidence of pulmonary hemorrhage or   pneumothorax. XR CHEST PORT   Final Result   Enteric tube is in in the right lung base bronchus. At the time of this   interpretation, enteric tube had been removed. Assessment:  Transverse colon cancer  S/P Extended right hemicolectomy, POD 7    Plan:    Admission  Diet: soft diet  IV fluids per Nephrology  SCD  IS  Pain medications  Antibiotics  Nausea medication  Appreciate Pulmonary & Nephrology consult. 10. Plan discussed with patient and family and answered all their questions. 11. Transfer to [facility] if cleared by Pulmonary & Nephrology     Thank you for allowing me to participate in the care of this patient. - - -

## 2023-07-14 NOTE — H&P ADULT - ASSESSMENT
Talked to patient's daughter Stacy Graves ( [phone] over the phone  68 years old male with h/o HTN, HLD, TBI resulting in dementia present to ED ? fall. As per daughter, patient was found on the floor. Patient is a poor history ( per daughter, patient will make up different history) . Patient reported he felt weak and fell onto the floor. Denied any head trauma or LOC. No fever, chills. Patient denied any pain.  Slightly tachycardic, afebrile, sat well at RA. EKG with sinus tachy. No leukocytosis, plt 129, K 6, Cr 2.82, glucose 110, . CXR with no focal consolidation. CT head  ( I personally review) with no acute pathology. C/L spine with no fracture. CT pelsis with no acute displace fracture. Questionable mild widening of the bilateral sacroiliac joints with subchondral sclerosis and questionable erosions along the iliac side, right greater than left

## 2023-07-14 NOTE — H&P ADULT - NSHPPHYSICALEXAM_GEN_ALL_CORE
CONSTITUTIONAL: alert and cooperative, no acute distress.   EYES: PERRL,  no scleral icterus  ENT: Mucosa and tongue slightly dry  NECK: Neck supple, trachea midline, non-tender  CARDIAC: Normal S1 and S2. Regular rate and rhythms. No murmurs. No Pedal edema. Peripheral pulses intact  LUNGS: Equal air entry both lungs. No rales, rhonchi, wheezing. Normal respiratory effort.   ABDOMEN: Soft, nondistended, nontender. No guarding or rebound tenderness. No hepatomegaly or splenomegaly. Bowel sound normal.  MUSCULOSKELETAL: Normocephalic, atraumatic. Spine normal without deformity or tenderness, no CVA tenderness. No significant deformity or joint abnormality  NEUROLOGICAL: No gross sensory deficits. Slight drift in bilateral LE   SKIN: no lesions or eruptions. Normal turgor  PSYCHIATRIC: A&O x 1-2,  demented

## 2023-07-14 NOTE — ED PROVIDER NOTE - NS ED ROS FT
ROS somewhat limited by hx dementia   Gen: No fever, normal appetite  Resp: No cough or trouble breathing  Cardiovascular: No chest pain   Gastroenteric: No nausea/vomiting, or abd pain   :  No change in urine output; no dysuria  MS: + low back pain   Neuro: No headache; no abnormal movements  Remainder negative, except as per the HPI

## 2023-07-14 NOTE — ED ADULT NURSE NOTE - NSFALLHARMRISKINTERV_ED_ALL_ED
Assistance OOB with selected safe patient handling equipment if applicable/Assistance with ambulation/Communicate risk of Fall with Harm to all staff, patient, and family/Monitor gait and stability/Monitor for mental status changes and reorient to person, place, and time, as needed/Move patient closer to nursing station/within visual sight of ED staff/Provide patient with walking aids/Provide visual cue: red socks, yellow wristband, yellow gown, etc/Reinforce activity limits and safety measures with patient and family/Toileting schedule using arm’s reach rule for commode and bathroom/Use of alarms - bed, stretcher, chair and/or video monitoring/Bed in lowest position, wheels locked, appropriate side rails in place/Call bell, personal items and telephone in reach/Instruct patient to call for assistance before getting out of bed/chair/stretcher/Non-slip footwear applied when patient is off stretcher/[place] to call system/Physically safe environment - no spills, clutter or unnecessary equipment/Purposeful Proactive Rounding/Room/bathroom lighting operational, light cord in reach

## 2023-07-15 LAB
ALBUMIN SERPL ELPH-MCNC: 2.7 G/DL — LOW (ref 3.3–5)
ALP SERPL-CCNC: 60 U/L — SIGNIFICANT CHANGE UP (ref 40–120)
ALT FLD-CCNC: 27 U/L — SIGNIFICANT CHANGE UP (ref 12–78)
ANION GAP SERPL CALC-SCNC: 8 MMOL/L — SIGNIFICANT CHANGE UP (ref 5–17)
AST SERPL-CCNC: 58 U/L — HIGH (ref 15–37)
BILIRUB SERPL-MCNC: 0.4 MG/DL — SIGNIFICANT CHANGE UP (ref 0.2–1.2)
BUN SERPL-MCNC: 26 MG/DL — HIGH (ref 7–23)
CALCIUM SERPL-MCNC: 8.7 MG/DL — SIGNIFICANT CHANGE UP (ref 8.5–10.1)
CHLORIDE SERPL-SCNC: 105 MMOL/L — SIGNIFICANT CHANGE UP (ref 96–108)
CK SERPL-CCNC: 2857 U/L — HIGH (ref 26–308)
CO2 SERPL-SCNC: 28 MMOL/L — SIGNIFICANT CHANGE UP (ref 22–31)
CREAT SERPL-MCNC: 1.93 MG/DL — HIGH (ref 0.5–1.3)
EGFR: 37 ML/MIN/1.73M2 — LOW
GLUCOSE SERPL-MCNC: 84 MG/DL — SIGNIFICANT CHANGE UP (ref 70–99)
HCT VFR BLD CALC: 41.5 % — SIGNIFICANT CHANGE UP (ref 39–50)
HCV AB S/CO SERPL IA: 0.1 S/CO — SIGNIFICANT CHANGE UP (ref 0–0.99)
HCV AB SERPL-IMP: SIGNIFICANT CHANGE UP
HGB BLD-MCNC: 13.2 G/DL — SIGNIFICANT CHANGE UP (ref 13–17)
MAGNESIUM SERPL-MCNC: 2.3 MG/DL — SIGNIFICANT CHANGE UP (ref 1.6–2.6)
MCHC RBC-ENTMCNC: 30.1 PG — SIGNIFICANT CHANGE UP (ref 27–34)
MCHC RBC-ENTMCNC: 31.8 G/DL — LOW (ref 32–36)
MCV RBC AUTO: 94.5 FL — SIGNIFICANT CHANGE UP (ref 80–100)
NRBC # BLD: 0 /100 WBCS — SIGNIFICANT CHANGE UP (ref 0–0)
PHOSPHATE SERPL-MCNC: 3 MG/DL — SIGNIFICANT CHANGE UP (ref 2.5–4.5)
PLATELET # BLD AUTO: 107 K/UL — LOW (ref 150–400)
POTASSIUM SERPL-MCNC: 4.6 MMOL/L — SIGNIFICANT CHANGE UP (ref 3.5–5.3)
POTASSIUM SERPL-SCNC: 4.6 MMOL/L — SIGNIFICANT CHANGE UP (ref 3.5–5.3)
PROT SERPL-MCNC: 6.9 GM/DL — SIGNIFICANT CHANGE UP (ref 6–8.3)
RBC # BLD: 4.39 M/UL — SIGNIFICANT CHANGE UP (ref 4.2–5.8)
RBC # FLD: 13.2 % — SIGNIFICANT CHANGE UP (ref 10.3–14.5)
SODIUM SERPL-SCNC: 141 MMOL/L — SIGNIFICANT CHANGE UP (ref 135–145)
T4 FREE SERPL-MCNC: 1 NG/DL — SIGNIFICANT CHANGE UP (ref 0.9–1.8)
TSH SERPL-MCNC: 4.43 UIU/ML — HIGH (ref 0.36–3.74)
WBC # BLD: 7.52 K/UL — SIGNIFICANT CHANGE UP (ref 3.8–10.5)
WBC # FLD AUTO: 7.52 K/UL — SIGNIFICANT CHANGE UP (ref 3.8–10.5)

## 2023-07-15 PROCEDURE — 99232 SBSQ HOSP IP/OBS MODERATE 35: CPT

## 2023-07-15 RX ORDER — SODIUM CHLORIDE 9 MG/ML
1000 INJECTION INTRAMUSCULAR; INTRAVENOUS; SUBCUTANEOUS
Refills: 0 | Status: DISCONTINUED | OUTPATIENT
Start: 2023-07-15 | End: 2023-07-17

## 2023-07-15 RX ADMIN — DIVALPROEX SODIUM 250 MILLIGRAM(S): 500 TABLET, DELAYED RELEASE ORAL at 05:35

## 2023-07-15 RX ADMIN — SODIUM CHLORIDE 125 MILLILITER(S): 9 INJECTION INTRAMUSCULAR; INTRAVENOUS; SUBCUTANEOUS at 12:22

## 2023-07-15 RX ADMIN — SODIUM CHLORIDE 100 MILLILITER(S): 9 INJECTION, SOLUTION INTRAVENOUS at 05:36

## 2023-07-15 RX ADMIN — LIDOCAINE 1 PATCH: 4 CREAM TOPICAL at 01:35

## 2023-07-15 RX ADMIN — QUETIAPINE FUMARATE 100 MILLIGRAM(S): 200 TABLET, FILM COATED ORAL at 21:23

## 2023-07-15 RX ADMIN — SODIUM CHLORIDE 125 MILLILITER(S): 9 INJECTION INTRAMUSCULAR; INTRAVENOUS; SUBCUTANEOUS at 21:23

## 2023-07-15 RX ADMIN — DIVALPROEX SODIUM 250 MILLIGRAM(S): 500 TABLET, DELAYED RELEASE ORAL at 21:23

## 2023-07-15 RX ADMIN — AMLODIPINE BESYLATE 5 MILLIGRAM(S): 2.5 TABLET ORAL at 05:35

## 2023-07-15 RX ADMIN — ATORVASTATIN CALCIUM 80 MILLIGRAM(S): 80 TABLET, FILM COATED ORAL at 21:23

## 2023-07-15 RX ADMIN — DIVALPROEX SODIUM 250 MILLIGRAM(S): 500 TABLET, DELAYED RELEASE ORAL at 13:34

## 2023-07-15 RX ADMIN — Medication 3 MILLIGRAM(S): at 21:23

## 2023-07-15 RX ADMIN — Medication 50 MILLIGRAM(S): at 21:23

## 2023-07-15 NOTE — PROGRESS NOTE ADULT - SUBJECTIVE AND OBJECTIVE BOX
Patient seen and examined  no complaints  cpk uptrending  K normalized  PT eval appreciated for rehab  Review of Systems:  General:denies fever chills, headache, weakness  HEENT: denies blurry vision,diffculty swallowing, difficulty hearing, tinnitus  Cardiovascular: denies chest pain  ,palpitations  Pulmonary:denies shortness of breath, cough, wheezing, hemoptysis  Gastrointestinal: denies abdominal pain, constipation, diarrhea,nausea , vomiting, hematochezia  : denies hematuria, dysuria, or incontinence  Neurological: denies weakness, numbness , tingling, dizziness, tremors  MSK: denies muscle pain, difficulty ambulating, swelling, back pain  skin: denies skin rash, itching, burning, or  skin lesions  Psychiatrical: denies mood disturbances, anxierty, feeling depressed, depression , or difficulty sleeping    Objective:  Vitals  T(C): 37.2 (07-15-23 @ 04:55), Max: 37.2 (07-15-23 @ 04:55)  HR: 91 (07-15-23 @ 04:55) (91 - 111)  BP: 153/103 (07-15-23 @ 04:55) (122/70 - 153/103)  RR: 17 (07-15-23 @ 04:55) (16 - 19)  SpO2: 98% (07-15-23 @ 04:55) (97% - 98%)    Physical Exam:  General: comfortable, no acute distress  HEENT: Atraumatic, no LAD, trachea midline, PERRLA  Cardiovascular: normal s1s2, no murmurs, gallops or fricition rubs  Pulmonary: clear to ausculation Bilaterally, no wheezing , rhonchi  Gastrointestinal: soft non tender non distended, no masses felt, no organomegally  Muscloskeletal: no lower extremity edema, intact bilateral lower extremity pulses  Neurological: CN II-12 intact. No focal weakness  Psychiatrical: normal mood, cooperative  SKIN: no rash, lesions or ulcers    Labs:                          13.2   7.52  )-----------( 107      ( 15 Jul 2023 05:40 )             41.5     07-15    141  |  105  |  26<H>  ----------------------------<  84  4.6   |  28  |  1.93<H>    Ca    8.7      15 Jul 2023 05:40  Phos  3.0     07-15  Mg     2.3     07-15    TPro  6.9  /  Alb  2.7<L>  /  TBili  0.4  /  DBili  x   /  AST  58<H>  /  ALT  27  /  AlkPhos  60  07-15    LIVER FUNCTIONS - ( 15 Jul 2023 05:40 )  Alb: 2.7 g/dL / Pro: 6.9 gm/dL / ALK PHOS: 60 U/L / ALT: 27 U/L / AST: 58 U/L / GGT: x                 Active Medications  MEDICATIONS  (STANDING):  amLODIPine   Tablet 5 milliGRAM(s) Oral daily  atorvastatin 80 milliGRAM(s) Oral at bedtime  divalproex  milliGRAM(s) Oral three times a day  QUEtiapine 100 milliGRAM(s) Oral at bedtime  sodium chloride 0.9%. 1000 milliLiter(s) (125 mL/Hr) IV Continuous <Continuous>  traZODone 50 milliGRAM(s) Oral at bedtime    MEDICATIONS  (PRN):  acetaminophen     Tablet .. 650 milliGRAM(s) Oral every 6 hours PRN Mild Pain (1 - 3), Moderate Pain (4 - 6)  melatonin 3 milliGRAM(s) Oral at bedtime PRN Insomnia

## 2023-07-15 NOTE — PATIENT PROFILE ADULT - FALL HARM RISK - FACTORS
Impaired gait/IV and/or equipment tethered to patient/Medication side effects/Other medical problems/Weakness

## 2023-07-15 NOTE — PATIENT PROFILE ADULT - FALL HARM RISK - HARM RISK INTERVENTIONS
Assistance with ambulation/Assistance OOB with selected safe patient handling equipment/Communicate Risk of Fall with Harm to all staff/Discuss with provider need for PT consult/Monitor gait and stability/Reinforce activity limits and safety measures with patient and family/Review medications for side effects contributing to fall risk/Sit up slowly, dangle for a short time, stand at bedside before walking/Tailored Fall Risk Interventions/Toileting schedule using arm’s reach rule for commode and bathroom/Use of alarms - bed, chair and/or voice tab/Visual Cue: Yellow wristband and red socks/Bed in lowest position, wheels locked, appropriate side rails in place/Call bell, personal items and telephone in reach/Instruct patient to call for assistance before getting out of bed or chair/Non-slip footwear when patient is out of bed/[place] to call system/Physically safe environment - no spills, clutter or unnecessary equipment/Purposeful Proactive Rounding/Room/bathroom lighting operational, light cord in reach

## 2023-07-15 NOTE — PROGRESS NOTE ADULT - ASSESSMENT
Talked to patient's daughter Stacy Graves ( [phone] over the phone  68 years old male with h/o HTN, HLD, TBI resulting in dementia present to ED ? fall. As per daughter, patient was found on the floor. Patient is a poor history ( per daughter, patient will make up different history) . Patient reported he felt weak and fell onto the floor. Denied any head trauma or LOC. No fever, chills. Patient denied any pain.  Slightly tachycardic, afebrile, sat well at RA. EKG with sinus tachy. No leukocytosis, plt 129, K 6, Cr 2.82, glucose 110, . CXR with no focal consolidation. CT head  ( I personally review) with no acute pathology. C/L spine with no fracture. CT pelsis with no acute displace fracture. Questionable mild widening of the bilateral sacroiliac joints with subchondral sclerosis and questionable erosions along the iliac side, right greater than left

## 2023-07-16 LAB
ANION GAP SERPL CALC-SCNC: 5 MMOL/L — SIGNIFICANT CHANGE UP (ref 5–17)
BASOPHILS # BLD AUTO: 0.02 K/UL — SIGNIFICANT CHANGE UP (ref 0–0.2)
BASOPHILS NFR BLD AUTO: 0.3 % — SIGNIFICANT CHANGE UP (ref 0–2)
BUN SERPL-MCNC: 18 MG/DL — SIGNIFICANT CHANGE UP (ref 7–23)
CALCIUM SERPL-MCNC: 8.5 MG/DL — SIGNIFICANT CHANGE UP (ref 8.5–10.1)
CHLORIDE SERPL-SCNC: 111 MMOL/L — HIGH (ref 96–108)
CO2 SERPL-SCNC: 30 MMOL/L — SIGNIFICANT CHANGE UP (ref 22–31)
CREAT SERPL-MCNC: 1.64 MG/DL — HIGH (ref 0.5–1.3)
EGFR: 45 ML/MIN/1.73M2 — LOW
EOSINOPHIL # BLD AUTO: 0 K/UL — SIGNIFICANT CHANGE UP (ref 0–0.5)
EOSINOPHIL NFR BLD AUTO: 0 % — SIGNIFICANT CHANGE UP (ref 0–6)
GLUCOSE SERPL-MCNC: 94 MG/DL — SIGNIFICANT CHANGE UP (ref 70–99)
HCT VFR BLD CALC: 38.5 % — LOW (ref 39–50)
HGB BLD-MCNC: 12.3 G/DL — LOW (ref 13–17)
IMM GRANULOCYTES NFR BLD AUTO: 0.4 % — SIGNIFICANT CHANGE UP (ref 0–0.9)
LYMPHOCYTES # BLD AUTO: 2.46 K/UL — SIGNIFICANT CHANGE UP (ref 1–3.3)
LYMPHOCYTES # BLD AUTO: 33.2 % — SIGNIFICANT CHANGE UP (ref 13–44)
MAGNESIUM SERPL-MCNC: 2.2 MG/DL — SIGNIFICANT CHANGE UP (ref 1.6–2.6)
MCHC RBC-ENTMCNC: 30.1 PG — SIGNIFICANT CHANGE UP (ref 27–34)
MCHC RBC-ENTMCNC: 31.9 G/DL — LOW (ref 32–36)
MCV RBC AUTO: 94.1 FL — SIGNIFICANT CHANGE UP (ref 80–100)
MONOCYTES # BLD AUTO: 0.93 K/UL — HIGH (ref 0–0.9)
MONOCYTES NFR BLD AUTO: 12.5 % — SIGNIFICANT CHANGE UP (ref 2–14)
NEUTROPHILS # BLD AUTO: 3.98 K/UL — SIGNIFICANT CHANGE UP (ref 1.8–7.4)
NEUTROPHILS NFR BLD AUTO: 53.6 % — SIGNIFICANT CHANGE UP (ref 43–77)
NRBC # BLD: 0 /100 WBCS — SIGNIFICANT CHANGE UP (ref 0–0)
PHOSPHATE SERPL-MCNC: 2.6 MG/DL — SIGNIFICANT CHANGE UP (ref 2.5–4.5)
PLATELET # BLD AUTO: 94 K/UL — LOW (ref 150–400)
POTASSIUM SERPL-MCNC: 4.7 MMOL/L — SIGNIFICANT CHANGE UP (ref 3.5–5.3)
POTASSIUM SERPL-SCNC: 4.7 MMOL/L — SIGNIFICANT CHANGE UP (ref 3.5–5.3)
RBC # BLD: 4.09 M/UL — LOW (ref 4.2–5.8)
RBC # FLD: 13.6 % — SIGNIFICANT CHANGE UP (ref 10.3–14.5)
SODIUM SERPL-SCNC: 146 MMOL/L — HIGH (ref 135–145)
WBC # BLD: 7.42 K/UL — SIGNIFICANT CHANGE UP (ref 3.8–10.5)
WBC # FLD AUTO: 7.42 K/UL — SIGNIFICANT CHANGE UP (ref 3.8–10.5)

## 2023-07-16 PROCEDURE — 99232 SBSQ HOSP IP/OBS MODERATE 35: CPT

## 2023-07-16 RX ADMIN — SODIUM CHLORIDE 125 MILLILITER(S): 9 INJECTION INTRAMUSCULAR; INTRAVENOUS; SUBCUTANEOUS at 19:28

## 2023-07-16 RX ADMIN — AMLODIPINE BESYLATE 5 MILLIGRAM(S): 2.5 TABLET ORAL at 05:29

## 2023-07-16 RX ADMIN — SODIUM CHLORIDE 125 MILLILITER(S): 9 INJECTION INTRAMUSCULAR; INTRAVENOUS; SUBCUTANEOUS at 23:34

## 2023-07-16 RX ADMIN — DIVALPROEX SODIUM 250 MILLIGRAM(S): 500 TABLET, DELAYED RELEASE ORAL at 05:27

## 2023-07-16 RX ADMIN — Medication 3 MILLIGRAM(S): at 21:25

## 2023-07-16 RX ADMIN — ATORVASTATIN CALCIUM 80 MILLIGRAM(S): 80 TABLET, FILM COATED ORAL at 21:26

## 2023-07-16 RX ADMIN — SODIUM CHLORIDE 125 MILLILITER(S): 9 INJECTION INTRAMUSCULAR; INTRAVENOUS; SUBCUTANEOUS at 16:13

## 2023-07-16 RX ADMIN — DIVALPROEX SODIUM 250 MILLIGRAM(S): 500 TABLET, DELAYED RELEASE ORAL at 16:12

## 2023-07-16 RX ADMIN — QUETIAPINE FUMARATE 100 MILLIGRAM(S): 200 TABLET, FILM COATED ORAL at 21:25

## 2023-07-16 RX ADMIN — SODIUM CHLORIDE 125 MILLILITER(S): 9 INJECTION INTRAMUSCULAR; INTRAVENOUS; SUBCUTANEOUS at 05:29

## 2023-07-16 RX ADMIN — DIVALPROEX SODIUM 250 MILLIGRAM(S): 500 TABLET, DELAYED RELEASE ORAL at 21:25

## 2023-07-16 RX ADMIN — Medication 50 MILLIGRAM(S): at 21:26

## 2023-07-16 NOTE — PROGRESS NOTE ADULT - ASSESSMENT
Talked to patient's daughter Stacy Graves ( [phone] over the phone  68 years old male with h/o HTN, HLD, TBI resulting in dementia present to ED ? fall. As per daughter, patient was found on the floor. Patient is a poor history ( per daughter, patient will make up different history) . Patient reported he felt weak and fell onto the floor. Denied any head trauma or LOC. No fever, chills. Patient denied any pain.  Slightly tachycardic, afebrile, sat well at RA. EKG with sinus tachy. No leukocytosis, plt 129, K 6, Cr 2.82, glucose 110, . CXR with no focal consolidation. CT head  ( I personally review) with no acute pathology. C/L spine with no fracture. CT pelsis with no acute displace fracture. Questionable mild widening of the bilateral sacroiliac joints with subchondral sclerosis and questionable erosions along the iliac side, right greater than left

## 2023-07-16 NOTE — PROGRESS NOTE ADULT - SUBJECTIVE AND OBJECTIVE BOX
Patient seen and examined  nonverbal  labs better  for FREDY  Review of Systems:  General:denies fever chills, headache, weakness  HEENT: denies blurry vision,diffculty swallowing, difficulty hearing, tinnitus  Cardiovascular: denies chest pain  ,palpitations  Pulmonary:denies shortness of breath, cough, wheezing, hemoptysis  Gastrointestinal: denies abdominal pain, constipation, diarrhea,nausea , vomiting, hematochezia  : denies hematuria, dysuria, or incontinence  Neurological: denies weakness, numbness , tingling, dizziness, tremors  MSK: denies muscle pain, difficulty ambulating, swelling, back pain  skin: denies skin rash, itching, burning, or  skin lesions  Psychiatrical: denies mood disturbances, anxierty, feeling depressed, depression , or difficulty sleeping    Objective:  Vitals  T(C): 37.2 (07-16-23 @ 05:13), Max: 37.2 (07-16-23 @ 05:13)  HR: 115 (07-16-23 @ 05:13) (108 - 115)  BP: 130/86 (07-16-23 @ 05:13) (104/71 - 130/86)  RR: 18 (07-16-23 @ 05:13) (17 - 18)  SpO2: 99% (07-16-23 @ 05:13) (99% - 99%)    Physical Exam:  General: comfortable, no acute distress  HEENT: Atraumatic, no LAD, trachea midline, PERRLA  Cardiovascular: normal s1s2, no murmurs, gallops or fricition rubs  Pulmonary: clear to ausculation Bilaterally, no wheezing , rhonchi  Gastrointestinal: soft non tender non distended, no masses felt, no organomegally  Muscloskeletal: no lower extremity edema, intact bilateral lower extremity pulses  Neurological: CN II-12 intact. unable to follow commands  Psychiatrical: normal mood, cooperative  SKIN: no rash, lesions or ulcers    Labs:                          12.3   7.42  )-----------( 94       ( 16 Jul 2023 05:40 )             38.5     07-16    146<H>  |  111<H>  |  18  ----------------------------<  94  4.7   |  30  |  1.64<H>    Ca    8.5      16 Jul 2023 05:40  Phos  2.6     07-16  Mg     2.2     07-16    TPro  6.9  /  Alb  2.7<L>  /  TBili  0.4  /  DBili  x   /  AST  58<H>  /  ALT  27  /  AlkPhos  60  07-15    LIVER FUNCTIONS - ( 15 Jul 2023 05:40 )  Alb: 2.7 g/dL / Pro: 6.9 gm/dL / ALK PHOS: 60 U/L / ALT: 27 U/L / AST: 58 U/L / GGT: x                 Active Medications  MEDICATIONS  (STANDING):  amLODIPine   Tablet 5 milliGRAM(s) Oral daily  atorvastatin 80 milliGRAM(s) Oral at bedtime  divalproex  milliGRAM(s) Oral three times a day  QUEtiapine 100 milliGRAM(s) Oral at bedtime  sodium chloride 0.9%. 1000 milliLiter(s) (125 mL/Hr) IV Continuous <Continuous>  traZODone 50 milliGRAM(s) Oral at bedtime    MEDICATIONS  (PRN):  acetaminophen     Tablet .. 650 milliGRAM(s) Oral every 6 hours PRN Mild Pain (1 - 3), Moderate Pain (4 - 6)  melatonin 3 milliGRAM(s) Oral at bedtime PRN Insomnia

## 2023-07-17 LAB
ANION GAP SERPL CALC-SCNC: 1 MMOL/L — LOW (ref 5–17)
BUN SERPL-MCNC: 12 MG/DL — SIGNIFICANT CHANGE UP (ref 7–23)
CALCIUM SERPL-MCNC: 8.5 MG/DL — SIGNIFICANT CHANGE UP (ref 8.5–10.1)
CHLORIDE SERPL-SCNC: 110 MMOL/L — HIGH (ref 96–108)
CK SERPL-CCNC: 967 U/L — HIGH (ref 26–308)
CO2 SERPL-SCNC: 32 MMOL/L — HIGH (ref 22–31)
CREAT SERPL-MCNC: 1.43 MG/DL — HIGH (ref 0.5–1.3)
EGFR: 53 ML/MIN/1.73M2 — LOW
GLUCOSE SERPL-MCNC: 90 MG/DL — SIGNIFICANT CHANGE UP (ref 70–99)
HCT VFR BLD CALC: 38.9 % — LOW (ref 39–50)
HGB BLD-MCNC: 12.3 G/DL — LOW (ref 13–17)
MCHC RBC-ENTMCNC: 30.1 PG — SIGNIFICANT CHANGE UP (ref 27–34)
MCHC RBC-ENTMCNC: 31.6 G/DL — LOW (ref 32–36)
MCV RBC AUTO: 95.3 FL — SIGNIFICANT CHANGE UP (ref 80–100)
NRBC # BLD: 0 /100 WBCS — SIGNIFICANT CHANGE UP (ref 0–0)
PLATELET # BLD AUTO: 95 K/UL — LOW (ref 150–400)
POTASSIUM SERPL-MCNC: 4.2 MMOL/L — SIGNIFICANT CHANGE UP (ref 3.5–5.3)
POTASSIUM SERPL-SCNC: 4.2 MMOL/L — SIGNIFICANT CHANGE UP (ref 3.5–5.3)
RAPID RVP RESULT: SIGNIFICANT CHANGE UP
RBC # BLD: 4.08 M/UL — LOW (ref 4.2–5.8)
RBC # FLD: 13.2 % — SIGNIFICANT CHANGE UP (ref 10.3–14.5)
SARS-COV-2 RNA SPEC QL NAA+PROBE: SIGNIFICANT CHANGE UP
SODIUM SERPL-SCNC: 143 MMOL/L — SIGNIFICANT CHANGE UP (ref 135–145)
WBC # BLD: 6.64 K/UL — SIGNIFICANT CHANGE UP (ref 3.8–10.5)
WBC # FLD AUTO: 6.64 K/UL — SIGNIFICANT CHANGE UP (ref 3.8–10.5)

## 2023-07-17 PROCEDURE — 99232 SBSQ HOSP IP/OBS MODERATE 35: CPT

## 2023-07-17 RX ORDER — CEFTRIAXONE 500 MG/1
INJECTION, POWDER, FOR SOLUTION INTRAMUSCULAR; INTRAVENOUS
Refills: 0 | Status: COMPLETED | OUTPATIENT
Start: 2023-07-17 | End: 2023-07-25

## 2023-07-17 RX ORDER — CEFTRIAXONE 500 MG/1
1000 INJECTION, POWDER, FOR SOLUTION INTRAMUSCULAR; INTRAVENOUS EVERY 24 HOURS
Refills: 0 | Status: COMPLETED | OUTPATIENT
Start: 2023-07-18 | End: 2023-07-24

## 2023-07-17 RX ORDER — CEFTRIAXONE 500 MG/1
1000 INJECTION, POWDER, FOR SOLUTION INTRAMUSCULAR; INTRAVENOUS ONCE
Refills: 0 | Status: COMPLETED | OUTPATIENT
Start: 2023-07-17 | End: 2023-07-17

## 2023-07-17 RX ADMIN — DIVALPROEX SODIUM 250 MILLIGRAM(S): 500 TABLET, DELAYED RELEASE ORAL at 17:01

## 2023-07-17 RX ADMIN — Medication 650 MILLIGRAM(S): at 07:34

## 2023-07-17 RX ADMIN — DIVALPROEX SODIUM 250 MILLIGRAM(S): 500 TABLET, DELAYED RELEASE ORAL at 05:01

## 2023-07-17 RX ADMIN — SODIUM CHLORIDE 125 MILLILITER(S): 9 INJECTION INTRAMUSCULAR; INTRAVENOUS; SUBCUTANEOUS at 05:02

## 2023-07-17 RX ADMIN — CEFTRIAXONE 100 MILLIGRAM(S): 500 INJECTION, POWDER, FOR SOLUTION INTRAMUSCULAR; INTRAVENOUS at 11:41

## 2023-07-17 RX ADMIN — QUETIAPINE FUMARATE 100 MILLIGRAM(S): 200 TABLET, FILM COATED ORAL at 22:11

## 2023-07-17 RX ADMIN — ATORVASTATIN CALCIUM 80 MILLIGRAM(S): 80 TABLET, FILM COATED ORAL at 22:12

## 2023-07-17 RX ADMIN — DIVALPROEX SODIUM 250 MILLIGRAM(S): 500 TABLET, DELAYED RELEASE ORAL at 22:12

## 2023-07-17 RX ADMIN — AMLODIPINE BESYLATE 5 MILLIGRAM(S): 2.5 TABLET ORAL at 05:01

## 2023-07-17 RX ADMIN — SODIUM CHLORIDE 125 MILLILITER(S): 9 INJECTION INTRAMUSCULAR; INTRAVENOUS; SUBCUTANEOUS at 11:46

## 2023-07-17 RX ADMIN — Medication 50 MILLIGRAM(S): at 22:11

## 2023-07-17 NOTE — PROGRESS NOTE ADULT - ASSESSMENT
68 years old male with h/o HTN, HLD, TBI resulting in dementia present to ED ? fall. As per daughter, patient was found on the floor. Patient is a poor history ( per daughter, patient will make up different history) . Patient reported he felt weak and fell onto the floor.  ER course with tachycardia, sat well at RA. EKG with sinus tachy. No leukocytosis, plt 129, K 6, Cr 2.82, glucose 110, . CXR with no focal consolidation. CT head  with no acute pathology. C/L spine with no fracture. CT pelsis with no acute displace fracture. Questionable mild widening of the bilateral sacroiliac joints with subchondral sclerosis and questionable erosions along the iliac side, right greater than left. Patient admitted for possible UTI and rhabdomylosis.

## 2023-07-17 NOTE — DIETITIAN INITIAL EVALUATION ADULT - PROBLEM SELECTOR PLAN 4
Refilled per protocol.   CT head  ( I personally review) with no acute pathology. C/L spine with no fracture. CT pelsis with no acute displace fracture. Questionable mild widening of the bilateral sacroiliac joints with subchondral sclerosis and questionable erosions along the iliac side, right greater than left  PT consult

## 2023-07-17 NOTE — DIETITIAN NUTRITION RISK NOTIFICATION - TREATMENT: THE FOLLOWING DIET HAS BEEN RECOMMENDED
Diet, Easy to Chew:   DASH/TLC {Sodium & Cholesterol Restricted}  Supplement Feeding Modality:  Oral  Ensure Plus High Protein Cans or Servings Per Day:  1       Frequency:  Daily (07-17-23 @ 11:55) [Pending Verification By Attending]  Diet, DASH/TLC:   Sodium & Cholesterol Restricted (07-14-23 @ 16:10) [Active]

## 2023-07-17 NOTE — DIETITIAN INITIAL EVALUATION ADULT - OTHER INFO
Unable to interview pt due to cognitive impairment; pt c minimal verbal responses. Pt admitted s/p fall; hx of TBI resulting in dementia. Per CNA pt c poor PO intake. No reports of any N/V/C/D or chew/swallowing difficulty; will change consistency to Easy to Chew for ease of eating. Height changed in header based on previous admissions & observation by JUSTO

## 2023-07-17 NOTE — DIETITIAN INITIAL EVALUATION ADULT - NAME AND PHONE
You can access the FollowMyHealth Patient Portal offered by [facility] by registering at the following website: [email or web address]. By joining [employer]’s FollowMyHealth portal, you will also be able to view your health information using other applications (apps) compatible with our system.
Sara Zaragoza, MS, RD CDN

## 2023-07-17 NOTE — DIETITIAN INITIAL EVALUATION ADULT - DIET TYPE
DASH/TLC (sodium and cholesterol restricted diet)/supplement (specify) DASH/TLC (sodium and cholesterol restricted diet)/supplement (specify)/easy to chew

## 2023-07-17 NOTE — DIETITIAN INITIAL EVALUATION ADULT - PERTINENT LABORATORY DATA
07-16    146<H>  |  111<H>  |  18  ----------------------------<  94  4.7   |  30  |  1.64<H>    Ca    8.5      16 Jul 2023 05:40  Phos  2.6     07-16  Mg     2.2     07-16

## 2023-07-17 NOTE — PROGRESS NOTE ADULT - SUBJECTIVE AND OBJECTIVE BOX
Patient seen and examined  clinically same  eating diet  alert  follows commands  upper extremities 3/5  lower 2/5  on IVF for rhabdo  low grade temp noted 99  for FREDY    Review of Systems:  General:denies fever chills, headache, weakness  HEENT: denies blurry vision,diffculty swallowing, difficulty hearing, tinnitus  Cardiovascular: denies chest pain  ,palpitations  Pulmonary:denies shortness of breath, cough, wheezing, hemoptysis  Gastrointestinal: denies abdominal pain, constipation, diarrhea,nausea , vomiting, hematochezia  : denies hematuria, dysuria, or incontinence  Neurological: denies weakness, numbness , tingling, dizziness, tremors  MSK: denies muscle pain, difficulty ambulating, swelling, back pain  skin: denies skin rash, itching, burning, or  skin lesions  Psychiatrical: denies mood disturbances, anxierty, feeling depressed, depression , or difficulty sleeping    Objective:  Vitals  T(C): 37.2 (07-17-23 @ 05:08), Max: 37.2 (07-17-23 @ 05:08)  HR: 75 (07-17-23 @ 05:08) (75 - 115)  BP: 130/88 (07-17-23 @ 07:37) (130/88 - 156/100)  RR: 18 (07-17-23 @ 05:08) (18 - 18)  SpO2: 98% (07-17-23 @ 05:08) (98% - 99%)    Physical Exam:  General: comfortable, no acute distress  HEENT: Atraumatic, no LAD, trachea midline, PERRLA  Cardiovascular: normal s1s2, no murmurs, gallops or fricition rubs  Pulmonary: clear to ausculation Bilaterally, no wheezing , rhonchi  Gastrointestinal: soft non tender non distended, no masses felt, no organomegally  Muscloskeletal: no lower extremity edema, intact bilateral lower extremity pulses  Neurological: CN II-12 intact. No focal weakness  Psychiatrical: normal mood, cooperative  SKIN: no rash, lesions or ulcers    Labs:                          12.3   7.42  )-----------( 94       ( 16 Jul 2023 05:40 )             38.5     07-16    146<H>  |  111<H>  |  18  ----------------------------<  94  4.7   |  30  |  1.64<H>    Ca    8.5      16 Jul 2023 05:40  Phos  2.6     07-16  Mg     2.2     07-16              Active Medications  MEDICATIONS  (STANDING):  amLODIPine   Tablet 5 milliGRAM(s) Oral daily  atorvastatin 80 milliGRAM(s) Oral at bedtime  cefTRIAXone   IVPB      divalproex  milliGRAM(s) Oral three times a day  QUEtiapine 100 milliGRAM(s) Oral at bedtime  sodium chloride 0.9%. 1000 milliLiter(s) (125 mL/Hr) IV Continuous <Continuous>  traZODone 50 milliGRAM(s) Oral at bedtime    MEDICATIONS  (PRN):  acetaminophen     Tablet .. 650 milliGRAM(s) Oral every 6 hours PRN Mild Pain (1 - 3), Moderate Pain (4 - 6)  melatonin 3 milliGRAM(s) Oral at bedtime PRN Insomnia

## 2023-07-18 PROCEDURE — 71250 CT THORAX DX C-: CPT | Mod: 26

## 2023-07-18 PROCEDURE — 99232 SBSQ HOSP IP/OBS MODERATE 35: CPT

## 2023-07-18 RX ADMIN — Medication 50 MILLIGRAM(S): at 22:33

## 2023-07-18 RX ADMIN — ATORVASTATIN CALCIUM 80 MILLIGRAM(S): 80 TABLET, FILM COATED ORAL at 22:33

## 2023-07-18 RX ADMIN — QUETIAPINE FUMARATE 100 MILLIGRAM(S): 200 TABLET, FILM COATED ORAL at 22:33

## 2023-07-18 RX ADMIN — DIVALPROEX SODIUM 250 MILLIGRAM(S): 500 TABLET, DELAYED RELEASE ORAL at 05:07

## 2023-07-18 RX ADMIN — AMLODIPINE BESYLATE 5 MILLIGRAM(S): 2.5 TABLET ORAL at 05:07

## 2023-07-18 RX ADMIN — DIVALPROEX SODIUM 250 MILLIGRAM(S): 500 TABLET, DELAYED RELEASE ORAL at 14:58

## 2023-07-18 RX ADMIN — DIVALPROEX SODIUM 250 MILLIGRAM(S): 500 TABLET, DELAYED RELEASE ORAL at 22:34

## 2023-07-18 RX ADMIN — CEFTRIAXONE 100 MILLIGRAM(S): 500 INJECTION, POWDER, FOR SOLUTION INTRAMUSCULAR; INTRAVENOUS at 10:00

## 2023-07-18 NOTE — PROGRESS NOTE ADULT - SUBJECTIVE AND OBJECTIVE BOX
patient seen and examined  no overnight events  dc planning  low grade temp  tachy noted  pleasantly confused  started on empiric abx f  Review of Systems:  General:denies fever chills, headache, weakness  HEENT: denies blurry vision,diffculty swallowing, difficulty hearing, tinnitus  Cardiovascular: denies chest pain  ,palpitations  Pulmonary:denies shortness of breath, cough, wheezing, hemoptysis  Gastrointestinal: denies abdominal pain, constipation, diarrhea,nausea , vomiting, hematochezia  : denies hematuria, dysuria, or incontinence  Neurological: denies weakness, numbness , tingling, dizziness, tremors  MSK: denies muscle pain, difficulty ambulating, swelling, back pain  skin: denies skin rash, itching, burning, or  skin lesions  Psychiatrical: denies mood disturbances, anxierty, feeling depressed, depression , or difficulty sleeping    Objective:  Vitals  T(C): 36.4 (07-18-23 @ 05:26), Max: 37.5 (07-17-23 @ 23:45)  HR: 114 (07-18-23 @ 05:26) (89 - 115)  BP: 123/86 (07-18-23 @ 05:26) (108/79 - 138/90)  RR: 18 (07-18-23 @ 05:26) (17 - 19)  SpO2: 93% (07-18-23 @ 05:26) (93% - 98%)    Physical Exam:  General: comfortable, no acute distress  HEENT: Atraumatic, no LAD, trachea midline, PERRLA  Cardiovascular: normal s1s2, no murmurs, gallops or fricition rubs  Pulmonary: clear to ausculation Bilaterally, no wheezing , rhonchi  Gastrointestinal: soft non tender non distended, no masses felt, no organomegally  Muscloskeletal: no lower extremity edema, intact bilateral lower extremity pulses  Neurological: CN II-12 intact. 3/5 upper 2/5 lower  Psychiatrical: normal mood, cooperative  SKIN: no rash, lesions or ulcers    Labs:                          12.3   6.64  )-----------( 95       ( 17 Jul 2023 11:15 )             38.9     07-17    143  |  110<H>  |  12  ----------------------------<  90  4.2   |  32<H>  |  1.43<H>    Ca    8.5      17 Jul 2023 11:15              Active Medications  MEDICATIONS  (STANDING):  amLODIPine   Tablet 5 milliGRAM(s) Oral daily  atorvastatin 80 milliGRAM(s) Oral at bedtime  cefTRIAXone   IVPB 1000 milliGRAM(s) IV Intermittent every 24 hours  cefTRIAXone   IVPB      divalproex  milliGRAM(s) Oral three times a day  QUEtiapine 100 milliGRAM(s) Oral at bedtime  traZODone 50 milliGRAM(s) Oral at bedtime    MEDICATIONS  (PRN):  acetaminophen     Tablet .. 650 milliGRAM(s) Oral every 6 hours PRN Mild Pain (1 - 3), Moderate Pain (4 - 6)  melatonin 3 milliGRAM(s) Oral at bedtime PRN Insomnia

## 2023-07-19 LAB
ALBUMIN SERPL ELPH-MCNC: 2.2 G/DL — LOW (ref 3.3–5)
ALP SERPL-CCNC: 59 U/L — SIGNIFICANT CHANGE UP (ref 40–120)
ALT FLD-CCNC: 34 U/L — SIGNIFICANT CHANGE UP (ref 12–78)
ANION GAP SERPL CALC-SCNC: 4 MMOL/L — LOW (ref 5–17)
AST SERPL-CCNC: 46 U/L — HIGH (ref 15–37)
BASOPHILS # BLD AUTO: 0.01 K/UL — SIGNIFICANT CHANGE UP (ref 0–0.2)
BASOPHILS NFR BLD AUTO: 0.2 % — SIGNIFICANT CHANGE UP (ref 0–2)
BILIRUB SERPL-MCNC: 0.5 MG/DL — SIGNIFICANT CHANGE UP (ref 0.2–1.2)
BUN SERPL-MCNC: 21 MG/DL — SIGNIFICANT CHANGE UP (ref 7–23)
CALCIUM SERPL-MCNC: 8.5 MG/DL — SIGNIFICANT CHANGE UP (ref 8.5–10.1)
CHLORIDE SERPL-SCNC: 105 MMOL/L — SIGNIFICANT CHANGE UP (ref 96–108)
CO2 SERPL-SCNC: 33 MMOL/L — HIGH (ref 22–31)
CREAT SERPL-MCNC: 1.42 MG/DL — HIGH (ref 0.5–1.3)
EGFR: 53 ML/MIN/1.73M2 — LOW
EOSINOPHIL # BLD AUTO: 0.01 K/UL — SIGNIFICANT CHANGE UP (ref 0–0.5)
EOSINOPHIL NFR BLD AUTO: 0.2 % — SIGNIFICANT CHANGE UP (ref 0–6)
GLUCOSE SERPL-MCNC: 88 MG/DL — SIGNIFICANT CHANGE UP (ref 70–99)
HCT VFR BLD CALC: 39.5 % — SIGNIFICANT CHANGE UP (ref 39–50)
HGB BLD-MCNC: 13.2 G/DL — SIGNIFICANT CHANGE UP (ref 13–17)
IMM GRANULOCYTES NFR BLD AUTO: 0.6 % — SIGNIFICANT CHANGE UP (ref 0–0.9)
LYMPHOCYTES # BLD AUTO: 1.92 K/UL — SIGNIFICANT CHANGE UP (ref 1–3.3)
LYMPHOCYTES # BLD AUTO: 29.9 % — SIGNIFICANT CHANGE UP (ref 13–44)
MCHC RBC-ENTMCNC: 30.8 PG — SIGNIFICANT CHANGE UP (ref 27–34)
MCHC RBC-ENTMCNC: 33.4 G/DL — SIGNIFICANT CHANGE UP (ref 32–36)
MCV RBC AUTO: 92.1 FL — SIGNIFICANT CHANGE UP (ref 80–100)
MONOCYTES # BLD AUTO: 1.04 K/UL — HIGH (ref 0–0.9)
MONOCYTES NFR BLD AUTO: 16.2 % — HIGH (ref 2–14)
NEUTROPHILS # BLD AUTO: 3.4 K/UL — SIGNIFICANT CHANGE UP (ref 1.8–7.4)
NEUTROPHILS NFR BLD AUTO: 52.9 % — SIGNIFICANT CHANGE UP (ref 43–77)
NRBC # BLD: 0 /100 WBCS — SIGNIFICANT CHANGE UP (ref 0–0)
PLATELET # BLD AUTO: 103 K/UL — LOW (ref 150–400)
POTASSIUM SERPL-MCNC: 3.9 MMOL/L — SIGNIFICANT CHANGE UP (ref 3.5–5.3)
POTASSIUM SERPL-SCNC: 3.9 MMOL/L — SIGNIFICANT CHANGE UP (ref 3.5–5.3)
PROT SERPL-MCNC: 6.2 GM/DL — SIGNIFICANT CHANGE UP (ref 6–8.3)
RBC # BLD: 4.29 M/UL — SIGNIFICANT CHANGE UP (ref 4.2–5.8)
RBC # FLD: 13 % — SIGNIFICANT CHANGE UP (ref 10.3–14.5)
SODIUM SERPL-SCNC: 142 MMOL/L — SIGNIFICANT CHANGE UP (ref 135–145)
WBC # BLD: 6.42 K/UL — SIGNIFICANT CHANGE UP (ref 3.8–10.5)
WBC # FLD AUTO: 6.42 K/UL — SIGNIFICANT CHANGE UP (ref 3.8–10.5)

## 2023-07-19 PROCEDURE — 71045 X-RAY EXAM CHEST 1 VIEW: CPT | Mod: 26

## 2023-07-19 PROCEDURE — 78582 LUNG VENTILAT&PERFUS IMAGING: CPT | Mod: 26

## 2023-07-19 PROCEDURE — 99233 SBSQ HOSP IP/OBS HIGH 50: CPT

## 2023-07-19 RX ADMIN — DIVALPROEX SODIUM 250 MILLIGRAM(S): 500 TABLET, DELAYED RELEASE ORAL at 06:23

## 2023-07-19 RX ADMIN — Medication 50 MILLIGRAM(S): at 21:13

## 2023-07-19 RX ADMIN — DIVALPROEX SODIUM 250 MILLIGRAM(S): 500 TABLET, DELAYED RELEASE ORAL at 15:44

## 2023-07-19 RX ADMIN — ATORVASTATIN CALCIUM 80 MILLIGRAM(S): 80 TABLET, FILM COATED ORAL at 21:13

## 2023-07-19 RX ADMIN — DIVALPROEX SODIUM 250 MILLIGRAM(S): 500 TABLET, DELAYED RELEASE ORAL at 21:14

## 2023-07-19 RX ADMIN — AMLODIPINE BESYLATE 5 MILLIGRAM(S): 2.5 TABLET ORAL at 06:22

## 2023-07-19 RX ADMIN — CEFTRIAXONE 100 MILLIGRAM(S): 500 INJECTION, POWDER, FOR SOLUTION INTRAMUSCULAR; INTRAVENOUS at 09:56

## 2023-07-19 RX ADMIN — QUETIAPINE FUMARATE 100 MILLIGRAM(S): 200 TABLET, FILM COATED ORAL at 21:13

## 2023-07-19 NOTE — PROGRESS NOTE ADULT - SUBJECTIVE AND OBJECTIVE BOX
Patient is a 68y old  Male who presents with a chief complaint of VIKAS, rhabdomyolysis (18 Jul 2023 09:54)      OVERNIGHT EVENTS:  Patients seen and examined at bedside this morning. No acute events overnight.    REVIEW OF SYSTEMS: denies chest pain/SOB, diaphoresis, no F/C, cough, dizziness, headache, blurry vision, nausea, vomiting, abdominal pain. All others review of systems negative     MEDICATIONS  (STANDING):  amLODIPine   Tablet 5 milliGRAM(s) Oral daily  atorvastatin 80 milliGRAM(s) Oral at bedtime  cefTRIAXone   IVPB 1000 milliGRAM(s) IV Intermittent every 24 hours  cefTRIAXone   IVPB      divalproex  milliGRAM(s) Oral three times a day  QUEtiapine 100 milliGRAM(s) Oral at bedtime  traZODone 50 milliGRAM(s) Oral at bedtime    MEDICATIONS  (PRN):  acetaminophen     Tablet .. 650 milliGRAM(s) Oral every 6 hours PRN Mild Pain (1 - 3), Moderate Pain (4 - 6)  melatonin 3 milliGRAM(s) Oral at bedtime PRN Insomnia      Allergies    No Known Allergies    Intolerances        T(F): 98.4 (07-19-23 @ 16:50), Max: 98.6 (07-19-23 @ 12:21)  HR: 107 (07-19-23 @ 16:50) (68 - 128)  BP: 121/82 (07-19-23 @ 16:50) (107/76 - 121/82)  RR: 16 (07-19-23 @ 16:50) (16 - 20)  SpO2: 99% (07-19-23 @ 16:50) (95% - 100%)  Wt(kg): --    PHYSICAL EXAM:  GENERAL: NAD  HEAD:  Atraumatic, Normocephalic  EYES: PERRLA, conjunctiva and sclera clear  ENMT: Moist mucous membranes  NECK: Supple, No JVD, Normal thyroid  NERVOUS SYSTEM:  Alert & Awake  CHEST/LUNG: Clear to percussion bilaterally;   HEART: Regular rate and rhythm;   ABDOMEN: Soft, Nontender, Nondistended; Bowel sounds present  EXTREMITIES:  no edema BL LE  SKIN: moist    LABS:                        13.2   6.42  )-----------( 103      ( 19 Jul 2023 06:28 )             39.5     07-19    142  |  105  |  21  ----------------------------<  88  3.9   |  33<H>  |  1.42<H>    Ca    8.5      19 Jul 2023 06:28    TPro  6.2  /  Alb  2.2<L>  /  TBili  0.5  /  DBili  x   /  AST  46<H>  /  ALT  34  /  AlkPhos  59  07-19      Urinalysis Basic - ( 19 Jul 2023 06:28 )    Color: x / Appearance: x / SG: x / pH: x  Gluc: 88 mg/dL / Ketone: x  / Bili: x / Urobili: x   Blood: x / Protein: x / Nitrite: x   Leuk Esterase: x / RBC: x / WBC x   Sq Epi: x / Non Sq Epi: x / Bacteria: x      Cultures;   CAPILLARY BLOOD GLUCOSE        Lipid panel:           RADIOLOGY & ADDITIONAL TESTS:    Imaging Personally Reviewed:  [x ] YES      Consultant(s) Notes Reviewed:  [x ] YES     Care Discussed with [x ] Consultants [X ] Patient [ ] Family  [x ]    [x ]  Other; RN

## 2023-07-19 NOTE — PROGRESS NOTE ADULT - ASSESSMENT
68 years old male with h/o HTN, HLD, TBI resulting in dementia present to ED ? fall. As per daughter, patient was found on the floor. Patient is a poor history ( per daughter, patient will make up different history) . Patient reported he felt weak and fell onto the floor.  ER course with tachycardia, sat well at RA. EKG with sinus tachy. No leukocytosis, plt 129, K 6, Cr 2.82, glucose 110, . CXR with no focal consolidation. CT head  with no acute pathology. C/L spine with no fracture. CT pelsis with no acute displace fracture. Questionable mild widening of the bilateral sacroiliac joints with subchondral sclerosis and questionable erosions along the iliac side, right greater than left. Patient admitted for possible UTI and rhabdomylosis.    Moderate protein-calorie malnutrition.  Diet Easy to Chew-  DASH/TLC {Sodium & Cholesterol Restricted}  Ensure Plus High Protein Cans or Servings Per Day:  1       Frequency:  Daily    VIKAS (acute kidney injury).   ·  Plan: Cr 2.82, likely VIKAS, recent decrease in oral intake : now 1.6  TSH noted  IV fluid,    Hyperkalemia. : K 6, no EKG changes. Likely in the setting of VIKAS, rhabdo, ACEI use  Hold ACEI.    Rhabdomyolysis. : Likely due to fall  IV fluid  Monitor CK level and renal function.    Impaired ambulation.   ·  Plan: CT head  ( I personally review) with no acute pathology. C/L spine with no fracture. CT pelsis with no acute displace fracture. Questionable mild widening of the bilateral sacroiliac joints with subchondral sclerosis and questionable erosions along the iliac side, right greater than left  PT consult : rehab.    Dementia. : due to TBI  continue divalproex 250mg DR tid, quetiapine 100mg hs, trazodone 50mg hs.    Benign essential HTN.   ·  Plan: monitor BP and titrate BP med  ACEI on hold due to VIKAS.    Hyperlipidemia: continue statin.

## 2023-07-20 LAB
ANION GAP SERPL CALC-SCNC: 3 MMOL/L — LOW (ref 5–17)
BUN SERPL-MCNC: 24 MG/DL — HIGH (ref 7–23)
CALCIUM SERPL-MCNC: 8.6 MG/DL — SIGNIFICANT CHANGE UP (ref 8.5–10.1)
CHLORIDE SERPL-SCNC: 104 MMOL/L — SIGNIFICANT CHANGE UP (ref 96–108)
CK SERPL-CCNC: 710 U/L — HIGH (ref 26–308)
CO2 SERPL-SCNC: 35 MMOL/L — HIGH (ref 22–31)
CREAT SERPL-MCNC: 1.42 MG/DL — HIGH (ref 0.5–1.3)
EGFR: 53 ML/MIN/1.73M2 — LOW
GLUCOSE SERPL-MCNC: 90 MG/DL — SIGNIFICANT CHANGE UP (ref 70–99)
POTASSIUM SERPL-MCNC: 4.3 MMOL/L — SIGNIFICANT CHANGE UP (ref 3.5–5.3)
POTASSIUM SERPL-SCNC: 4.3 MMOL/L — SIGNIFICANT CHANGE UP (ref 3.5–5.3)
SODIUM SERPL-SCNC: 142 MMOL/L — SIGNIFICANT CHANGE UP (ref 135–145)

## 2023-07-20 PROCEDURE — 99233 SBSQ HOSP IP/OBS HIGH 50: CPT

## 2023-07-20 RX ORDER — SODIUM CHLORIDE 9 MG/ML
1000 INJECTION, SOLUTION INTRAVENOUS
Refills: 0 | Status: DISCONTINUED | OUTPATIENT
Start: 2023-07-20 | End: 2023-07-24

## 2023-07-20 RX ORDER — OXYCODONE HYDROCHLORIDE 5 MG/1
2.5 TABLET ORAL EVERY 6 HOURS
Refills: 0 | Status: DISCONTINUED | OUTPATIENT
Start: 2023-07-20 | End: 2023-07-25

## 2023-07-20 RX ORDER — METOPROLOL TARTRATE 50 MG
12.5 TABLET ORAL
Refills: 0 | Status: DISCONTINUED | OUTPATIENT
Start: 2023-07-20 | End: 2023-07-24

## 2023-07-20 RX ORDER — GABAPENTIN 400 MG/1
100 CAPSULE ORAL THREE TIMES A DAY
Refills: 0 | Status: DISCONTINUED | OUTPATIENT
Start: 2023-07-20 | End: 2023-07-26

## 2023-07-20 RX ORDER — LIDOCAINE 4 G/100G
1 CREAM TOPICAL EVERY 24 HOURS
Refills: 0 | Status: DISCONTINUED | OUTPATIENT
Start: 2023-07-20 | End: 2023-08-24

## 2023-07-20 RX ADMIN — AMLODIPINE BESYLATE 5 MILLIGRAM(S): 2.5 TABLET ORAL at 05:07

## 2023-07-20 RX ADMIN — GABAPENTIN 100 MILLIGRAM(S): 400 CAPSULE ORAL at 12:02

## 2023-07-20 RX ADMIN — Medication 50 MILLIGRAM(S): at 21:54

## 2023-07-20 RX ADMIN — GABAPENTIN 100 MILLIGRAM(S): 400 CAPSULE ORAL at 14:10

## 2023-07-20 RX ADMIN — GABAPENTIN 100 MILLIGRAM(S): 400 CAPSULE ORAL at 21:54

## 2023-07-20 RX ADMIN — LIDOCAINE 1 PATCH: 4 CREAM TOPICAL at 12:02

## 2023-07-20 RX ADMIN — Medication 12.5 MILLIGRAM(S): at 12:02

## 2023-07-20 RX ADMIN — LIDOCAINE 1 PATCH: 4 CREAM TOPICAL at 19:56

## 2023-07-20 RX ADMIN — CEFTRIAXONE 100 MILLIGRAM(S): 500 INJECTION, POWDER, FOR SOLUTION INTRAMUSCULAR; INTRAVENOUS at 12:01

## 2023-07-20 RX ADMIN — ATORVASTATIN CALCIUM 80 MILLIGRAM(S): 80 TABLET, FILM COATED ORAL at 21:54

## 2023-07-20 RX ADMIN — OXYCODONE HYDROCHLORIDE 2.5 MILLIGRAM(S): 5 TABLET ORAL at 12:03

## 2023-07-20 RX ADMIN — DIVALPROEX SODIUM 250 MILLIGRAM(S): 500 TABLET, DELAYED RELEASE ORAL at 14:10

## 2023-07-20 RX ADMIN — SODIUM CHLORIDE 60 MILLILITER(S): 9 INJECTION, SOLUTION INTRAVENOUS at 12:17

## 2023-07-20 RX ADMIN — DIVALPROEX SODIUM 250 MILLIGRAM(S): 500 TABLET, DELAYED RELEASE ORAL at 21:54

## 2023-07-20 RX ADMIN — DIVALPROEX SODIUM 250 MILLIGRAM(S): 500 TABLET, DELAYED RELEASE ORAL at 05:06

## 2023-07-20 RX ADMIN — QUETIAPINE FUMARATE 100 MILLIGRAM(S): 200 TABLET, FILM COATED ORAL at 21:55

## 2023-07-20 NOTE — PROGRESS NOTE ADULT - SUBJECTIVE AND OBJECTIVE BOX
Patient is a 68y old  Male who presents with a chief complaint of VIKAS, rhabdomyolysis (19 Jul 2023 18:01)      OVERNIGHT EVENTS:  Patients seen and examined at bedside this morning. No acute events overnight.    REVIEW OF SYSTEMS: denies chest pain/SOB, diaphoresis, no F/C, cough, dizziness, headache, blurry vision, nausea, vomiting, abdominal pain. All others review of systems negative     MEDICATIONS  (STANDING):  atorvastatin 80 milliGRAM(s) Oral at bedtime  cefTRIAXone   IVPB 1000 milliGRAM(s) IV Intermittent every 24 hours  cefTRIAXone   IVPB      dextrose 5% + sodium chloride 0.45%. 1000 milliLiter(s) (60 mL/Hr) IV Continuous <Continuous>  divalproex  milliGRAM(s) Oral three times a day  gabapentin 100 milliGRAM(s) Oral three times a day  lactated ringers. 1000 milliLiter(s) (60 mL/Hr) IV Continuous <Continuous>  lidocaine   4% Patch 1 Patch Transdermal every 24 hours  metoprolol tartrate 12.5 milliGRAM(s) Oral two times a day  QUEtiapine 100 milliGRAM(s) Oral at bedtime  traZODone 50 milliGRAM(s) Oral at bedtime    MEDICATIONS  (PRN):  acetaminophen     Tablet .. 650 milliGRAM(s) Oral every 6 hours PRN Mild Pain (1 - 3), Moderate Pain (4 - 6)  melatonin 3 milliGRAM(s) Oral at bedtime PRN Insomnia  oxyCODONE    IR 2.5 milliGRAM(s) Oral every 6 hours PRN Moderate Pain (4 - 6)      Allergies    No Known Allergies    Intolerances        T(F): 98 (07-20-23 @ 17:06), Max: 98.6 (07-20-23 @ 12:09)  HR: 109 (07-20-23 @ 17:06) (109 - 129)  BP: 102/76 (07-20-23 @ 17:06) (102/76 - 138/84)  RR: 17 (07-20-23 @ 17:06) (17 - 19)  SpO2: 97% (07-20-23 @ 17:06) (94% - 97%)  Wt(kg): --    PHYSICAL EXAM:  GENERAL: NAD  HEAD:  Atraumatic, Normocephalic  EYES: PERRLA, conjunctiva and sclera clear  ENMT: Moist mucous membranes  NECK: Supple, No JVD, Normal thyroid  NERVOUS SYSTEM:  Alert & Awake  CHEST/LUNG: Clear to percussion bilaterally;   HEART: Regular rate and rhythm;   ABDOMEN: Soft, Nontender, Nondistended; Bowel sounds present  EXTREMITIES:  no edema BL LE  SKIN: moist    LABS:                        13.2   6.42  )-----------( 103      ( 19 Jul 2023 06:28 )             39.5     07-20    142  |  104  |  24<H>  ----------------------------<  90  4.3   |  35<H>  |  1.42<H>    Ca    8.6      20 Jul 2023 07:47    TPro  6.2  /  Alb  2.2<L>  /  TBili  0.5  /  DBili  x   /  AST  46<H>  /  ALT  34  /  AlkPhos  59  07-19      Urinalysis Basic - ( 20 Jul 2023 07:47 )    Color: x / Appearance: x / SG: x / pH: x  Gluc: 90 mg/dL / Ketone: x  / Bili: x / Urobili: x   Blood: x / Protein: x / Nitrite: x   Leuk Esterase: x / RBC: x / WBC x   Sq Epi: x / Non Sq Epi: x / Bacteria: x      Cultures;   CAPILLARY BLOOD GLUCOSE        Lipid panel:     CARDIAC MARKERS ( 20 Jul 2023 07:47 )  x     / x     / 710 U/L / x     / x            RADIOLOGY & ADDITIONAL TESTS:    Imaging Personally Reviewed:  [x ] YES      Consultant(s) Notes Reviewed:  [x ] YES     Care Discussed with [x ] Consultants [X ] Patient [ ] Family  [x ]    [x ]  Other; RN

## 2023-07-21 LAB
ANION GAP SERPL CALC-SCNC: 1 MMOL/L — LOW (ref 5–17)
BUN SERPL-MCNC: 22 MG/DL — SIGNIFICANT CHANGE UP (ref 7–23)
CALCIUM SERPL-MCNC: 8.3 MG/DL — LOW (ref 8.5–10.1)
CHLORIDE SERPL-SCNC: 106 MMOL/L — SIGNIFICANT CHANGE UP (ref 96–108)
CO2 SERPL-SCNC: 34 MMOL/L — HIGH (ref 22–31)
CREAT SERPL-MCNC: 1.23 MG/DL — SIGNIFICANT CHANGE UP (ref 0.5–1.3)
EGFR: 64 ML/MIN/1.73M2 — SIGNIFICANT CHANGE UP
GLUCOSE SERPL-MCNC: 93 MG/DL — SIGNIFICANT CHANGE UP (ref 70–99)
HCT VFR BLD CALC: 35.6 % — LOW (ref 39–50)
HGB BLD-MCNC: 11.8 G/DL — LOW (ref 13–17)
MCHC RBC-ENTMCNC: 30.5 PG — SIGNIFICANT CHANGE UP (ref 27–34)
MCHC RBC-ENTMCNC: 33.1 G/DL — SIGNIFICANT CHANGE UP (ref 32–36)
MCV RBC AUTO: 92 FL — SIGNIFICANT CHANGE UP (ref 80–100)
NRBC # BLD: 0 /100 WBCS — SIGNIFICANT CHANGE UP (ref 0–0)
PLATELET # BLD AUTO: 111 K/UL — LOW (ref 150–400)
POTASSIUM SERPL-MCNC: 3.9 MMOL/L — SIGNIFICANT CHANGE UP (ref 3.5–5.3)
POTASSIUM SERPL-SCNC: 3.9 MMOL/L — SIGNIFICANT CHANGE UP (ref 3.5–5.3)
RBC # BLD: 3.87 M/UL — LOW (ref 4.2–5.8)
RBC # FLD: 13.2 % — SIGNIFICANT CHANGE UP (ref 10.3–14.5)
SODIUM SERPL-SCNC: 141 MMOL/L — SIGNIFICANT CHANGE UP (ref 135–145)
WBC # BLD: 5.88 K/UL — SIGNIFICANT CHANGE UP (ref 3.8–10.5)
WBC # FLD AUTO: 5.88 K/UL — SIGNIFICANT CHANGE UP (ref 3.8–10.5)

## 2023-07-21 PROCEDURE — 99233 SBSQ HOSP IP/OBS HIGH 50: CPT

## 2023-07-21 RX ORDER — DIVALPROEX SODIUM 500 MG/1
1 TABLET, DELAYED RELEASE ORAL
Refills: 0 | DISCHARGE

## 2023-07-21 RX ORDER — ROSUVASTATIN CALCIUM 5 MG/1
1 TABLET ORAL
Refills: 0 | DISCHARGE

## 2023-07-21 RX ORDER — QUETIAPINE FUMARATE 200 MG/1
1 TABLET, FILM COATED ORAL
Refills: 0 | DISCHARGE

## 2023-07-21 RX ADMIN — QUETIAPINE FUMARATE 100 MILLIGRAM(S): 200 TABLET, FILM COATED ORAL at 22:41

## 2023-07-21 RX ADMIN — LIDOCAINE 1 PATCH: 4 CREAM TOPICAL at 11:43

## 2023-07-21 RX ADMIN — GABAPENTIN 100 MILLIGRAM(S): 400 CAPSULE ORAL at 22:40

## 2023-07-21 RX ADMIN — GABAPENTIN 100 MILLIGRAM(S): 400 CAPSULE ORAL at 06:18

## 2023-07-21 RX ADMIN — LIDOCAINE 1 PATCH: 4 CREAM TOPICAL at 00:01

## 2023-07-21 RX ADMIN — LIDOCAINE 1 PATCH: 4 CREAM TOPICAL at 23:48

## 2023-07-21 RX ADMIN — LIDOCAINE 1 PATCH: 4 CREAM TOPICAL at 19:36

## 2023-07-21 RX ADMIN — ATORVASTATIN CALCIUM 80 MILLIGRAM(S): 80 TABLET, FILM COATED ORAL at 22:41

## 2023-07-21 RX ADMIN — DIVALPROEX SODIUM 250 MILLIGRAM(S): 500 TABLET, DELAYED RELEASE ORAL at 06:18

## 2023-07-21 RX ADMIN — Medication 12.5 MILLIGRAM(S): at 06:18

## 2023-07-21 RX ADMIN — Medication 50 MILLIGRAM(S): at 22:40

## 2023-07-21 RX ADMIN — CEFTRIAXONE 100 MILLIGRAM(S): 500 INJECTION, POWDER, FOR SOLUTION INTRAMUSCULAR; INTRAVENOUS at 11:39

## 2023-07-21 RX ADMIN — GABAPENTIN 100 MILLIGRAM(S): 400 CAPSULE ORAL at 14:54

## 2023-07-21 RX ADMIN — DIVALPROEX SODIUM 250 MILLIGRAM(S): 500 TABLET, DELAYED RELEASE ORAL at 14:55

## 2023-07-21 RX ADMIN — DIVALPROEX SODIUM 250 MILLIGRAM(S): 500 TABLET, DELAYED RELEASE ORAL at 22:40

## 2023-07-21 NOTE — PROGRESS NOTE ADULT - SUBJECTIVE AND OBJECTIVE BOX
Patient is a 68y old  Male who presents with a chief complaint of VIKAS, rhabdomyolysis (20 Jul 2023 18:26)      OVERNIGHT EVENTS:  Patients seen and examined at bedside this morning. No acute events overnight.    REVIEW OF SYSTEMS: denies chest pain/SOB, diaphoresis, no F/C, cough, dizziness, headache, blurry vision, nausea, vomiting, abdominal pain. All others review of systems negative     MEDICATIONS  (STANDING):  atorvastatin 80 milliGRAM(s) Oral at bedtime  cefTRIAXone   IVPB 1000 milliGRAM(s) IV Intermittent every 24 hours  cefTRIAXone   IVPB      dextrose 5% + sodium chloride 0.45%. 1000 milliLiter(s) (60 mL/Hr) IV Continuous <Continuous>  divalproex  milliGRAM(s) Oral three times a day  gabapentin 100 milliGRAM(s) Oral three times a day  lactated ringers. 1000 milliLiter(s) (60 mL/Hr) IV Continuous <Continuous>  lidocaine   4% Patch 1 Patch Transdermal every 24 hours  metoprolol tartrate 12.5 milliGRAM(s) Oral two times a day  QUEtiapine 100 milliGRAM(s) Oral at bedtime  traZODone 50 milliGRAM(s) Oral at bedtime    MEDICATIONS  (PRN):  acetaminophen     Tablet .. 650 milliGRAM(s) Oral every 6 hours PRN Mild Pain (1 - 3), Moderate Pain (4 - 6)  melatonin 3 milliGRAM(s) Oral at bedtime PRN Insomnia  oxyCODONE    IR 2.5 milliGRAM(s) Oral every 6 hours PRN Moderate Pain (4 - 6)      Allergies    No Known Allergies    Intolerances        T(F): 97.6 (07-21-23 @ 12:23), Max: 99.1 (07-21-23 @ 00:50)  HR: 100 (07-21-23 @ 12:23) (100 - 116)  BP: 116/80 (07-21-23 @ 12:23) (102/76 - 133/87)  RR: 18 (07-21-23 @ 12:23) (17 - 19)  SpO2: 96% (07-21-23 @ 12:23) (95% - 99%)  Wt(kg): --    PHYSICAL EXAM:  GENERAL: NAD  HEAD:  Atraumatic, Normocephalic  EYES: PERRLA, conjunctiva and sclera clear  ENMT: Moist mucous membranes  NECK: Supple, No JVD, Normal thyroid  NERVOUS SYSTEM:  Alert & Awake  CHEST/LUNG: Clear to percussion bilaterally;   HEART: Regular rate and rhythm;   ABDOMEN: Soft, Nontender, Nondistended; Bowel sounds present  EXTREMITIES:  no edema BL LE  SKIN: moist    LABS:                        11.8   5.88  )-----------( 111      ( 21 Jul 2023 08:05 )             35.6     07-21    141  |  106  |  22  ----------------------------<  93  3.9   |  34<H>  |  1.23    Ca    8.3<L>      21 Jul 2023 08:05        Urinalysis Basic - ( 21 Jul 2023 08:05 )    Color: x / Appearance: x / SG: x / pH: x  Gluc: 93 mg/dL / Ketone: x  / Bili: x / Urobili: x   Blood: x / Protein: x / Nitrite: x   Leuk Esterase: x / RBC: x / WBC x   Sq Epi: x / Non Sq Epi: x / Bacteria: x      Cultures;   CAPILLARY BLOOD GLUCOSE        Lipid panel:     CARDIAC MARKERS ( 20 Jul 2023 07:47 )  x     / x     / 710 U/L / x     / x            RADIOLOGY & ADDITIONAL TESTS:    Imaging Personally Reviewed:  [x ] YES      Consultant(s) Notes Reviewed:  [x ] YES     Care Discussed with [x ] Consultants [X ] Patient [ ] Family  [x ]    [x ]  Other; RN

## 2023-07-22 PROCEDURE — 99232 SBSQ HOSP IP/OBS MODERATE 35: CPT

## 2023-07-22 RX ADMIN — Medication 12.5 MILLIGRAM(S): at 17:27

## 2023-07-22 RX ADMIN — DIVALPROEX SODIUM 250 MILLIGRAM(S): 500 TABLET, DELAYED RELEASE ORAL at 06:22

## 2023-07-22 RX ADMIN — GABAPENTIN 100 MILLIGRAM(S): 400 CAPSULE ORAL at 22:21

## 2023-07-22 RX ADMIN — ATORVASTATIN CALCIUM 80 MILLIGRAM(S): 80 TABLET, FILM COATED ORAL at 22:23

## 2023-07-22 RX ADMIN — DIVALPROEX SODIUM 250 MILLIGRAM(S): 500 TABLET, DELAYED RELEASE ORAL at 22:21

## 2023-07-22 RX ADMIN — LIDOCAINE 1 PATCH: 4 CREAM TOPICAL at 23:05

## 2023-07-22 RX ADMIN — CEFTRIAXONE 100 MILLIGRAM(S): 500 INJECTION, POWDER, FOR SOLUTION INTRAMUSCULAR; INTRAVENOUS at 10:59

## 2023-07-22 RX ADMIN — LIDOCAINE 1 PATCH: 4 CREAM TOPICAL at 21:30

## 2023-07-22 RX ADMIN — LIDOCAINE 1 PATCH: 4 CREAM TOPICAL at 11:29

## 2023-07-22 RX ADMIN — DIVALPROEX SODIUM 250 MILLIGRAM(S): 500 TABLET, DELAYED RELEASE ORAL at 13:20

## 2023-07-22 RX ADMIN — QUETIAPINE FUMARATE 100 MILLIGRAM(S): 200 TABLET, FILM COATED ORAL at 22:22

## 2023-07-22 RX ADMIN — GABAPENTIN 100 MILLIGRAM(S): 400 CAPSULE ORAL at 06:22

## 2023-07-22 RX ADMIN — Medication 50 MILLIGRAM(S): at 22:23

## 2023-07-22 RX ADMIN — Medication 12.5 MILLIGRAM(S): at 06:20

## 2023-07-22 RX ADMIN — GABAPENTIN 100 MILLIGRAM(S): 400 CAPSULE ORAL at 13:20

## 2023-07-22 NOTE — PROGRESS NOTE ADULT - SUBJECTIVE AND OBJECTIVE BOX
Patient is a 68y old  Male who presents with a chief complaint of VIKAS, rhabdomyolysis (21 Jul 2023 14:42)      OVERNIGHT EVENTS:  Patients seen and examined at bedside this morning. No acute events overnight.    REVIEW OF SYSTEMS: denies chest pain/SOB, diaphoresis, no F/C, cough, dizziness, headache, blurry vision, nausea, vomiting, abdominal pain. All others review of systems negative     MEDICATIONS  (STANDING):  atorvastatin 80 milliGRAM(s) Oral at bedtime  cefTRIAXone   IVPB 1000 milliGRAM(s) IV Intermittent every 24 hours  cefTRIAXone   IVPB      dextrose 5% + sodium chloride 0.45%. 1000 milliLiter(s) (60 mL/Hr) IV Continuous <Continuous>  divalproex  milliGRAM(s) Oral three times a day  gabapentin 100 milliGRAM(s) Oral three times a day  lactated ringers. 1000 milliLiter(s) (60 mL/Hr) IV Continuous <Continuous>  lidocaine   4% Patch 1 Patch Transdermal every 24 hours  metoprolol tartrate 12.5 milliGRAM(s) Oral two times a day  QUEtiapine 100 milliGRAM(s) Oral at bedtime  traZODone 50 milliGRAM(s) Oral at bedtime    MEDICATIONS  (PRN):  acetaminophen     Tablet .. 650 milliGRAM(s) Oral every 6 hours PRN Mild Pain (1 - 3), Moderate Pain (4 - 6)  melatonin 3 milliGRAM(s) Oral at bedtime PRN Insomnia  oxyCODONE    IR 2.5 milliGRAM(s) Oral every 6 hours PRN Moderate Pain (4 - 6)      Allergies    No Known Allergies    Intolerances        T(F): 98.6 (07-22-23 @ 11:18), Max: 98.6 (07-22-23 @ 11:18)  HR: 120 (07-22-23 @ 11:18) (99 - 120)  BP: 147/87 (07-22-23 @ 11:18) (104/73 - 147/87)  RR: 18 (07-22-23 @ 11:18) (18 - 18)  SpO2: 97% (07-22-23 @ 11:18) (97% - 98%)  Wt(kg): --    PHYSICAL EXAM:  GENERAL: NAD  HEAD:  Atraumatic, Normocephalic  EYES: PERRLA, conjunctiva and sclera clear  ENMT: Moist mucous membranes  NECK: Supple, No JVD, Normal thyroid  NERVOUS SYSTEM:  Alert & Awake  CHEST/LUNG: Clear to percussion bilaterally;   HEART: Regular rate and rhythm;   ABDOMEN: Soft, Nontender, Nondistended; Bowel sounds present  EXTREMITIES:  no edema BL LE  SKIN: moist    LABS:                        11.8   5.88  )-----------( 111      ( 21 Jul 2023 08:05 )             35.6     07-21    141  |  106  |  22  ----------------------------<  93  3.9   |  34<H>  |  1.23    Ca    8.3<L>      21 Jul 2023 08:05        Urinalysis Basic - ( 21 Jul 2023 08:05 )    Color: x / Appearance: x / SG: x / pH: x  Gluc: 93 mg/dL / Ketone: x  / Bili: x / Urobili: x   Blood: x / Protein: x / Nitrite: x   Leuk Esterase: x / RBC: x / WBC x   Sq Epi: x / Non Sq Epi: x / Bacteria: x      Cultures;   CAPILLARY BLOOD GLUCOSE        Lipid panel:           RADIOLOGY & ADDITIONAL TESTS:    Imaging Personally Reviewed:  [x ] YES      Consultant(s) Notes Reviewed:  [x ] YES     Care Discussed with [x ] Consultants [X ] Patient [ ] Family  [x ]    [x ]  Other; RN

## 2023-07-23 PROCEDURE — 99233 SBSQ HOSP IP/OBS HIGH 50: CPT

## 2023-07-23 RX ADMIN — QUETIAPINE FUMARATE 100 MILLIGRAM(S): 200 TABLET, FILM COATED ORAL at 21:49

## 2023-07-23 RX ADMIN — Medication 12.5 MILLIGRAM(S): at 05:49

## 2023-07-23 RX ADMIN — Medication 12.5 MILLIGRAM(S): at 17:55

## 2023-07-23 RX ADMIN — ATORVASTATIN CALCIUM 80 MILLIGRAM(S): 80 TABLET, FILM COATED ORAL at 21:49

## 2023-07-23 RX ADMIN — GABAPENTIN 100 MILLIGRAM(S): 400 CAPSULE ORAL at 21:49

## 2023-07-23 RX ADMIN — DIVALPROEX SODIUM 250 MILLIGRAM(S): 500 TABLET, DELAYED RELEASE ORAL at 21:49

## 2023-07-23 RX ADMIN — CEFTRIAXONE 100 MILLIGRAM(S): 500 INJECTION, POWDER, FOR SOLUTION INTRAMUSCULAR; INTRAVENOUS at 10:47

## 2023-07-23 RX ADMIN — DIVALPROEX SODIUM 250 MILLIGRAM(S): 500 TABLET, DELAYED RELEASE ORAL at 05:50

## 2023-07-23 RX ADMIN — LIDOCAINE 1 PATCH: 4 CREAM TOPICAL at 10:46

## 2023-07-23 RX ADMIN — DIVALPROEX SODIUM 250 MILLIGRAM(S): 500 TABLET, DELAYED RELEASE ORAL at 15:05

## 2023-07-23 RX ADMIN — LIDOCAINE 1 PATCH: 4 CREAM TOPICAL at 21:50

## 2023-07-23 RX ADMIN — GABAPENTIN 100 MILLIGRAM(S): 400 CAPSULE ORAL at 05:49

## 2023-07-23 RX ADMIN — GABAPENTIN 100 MILLIGRAM(S): 400 CAPSULE ORAL at 15:05

## 2023-07-23 RX ADMIN — Medication 50 MILLIGRAM(S): at 21:49

## 2023-07-23 NOTE — PROGRESS NOTE ADULT - SUBJECTIVE AND OBJECTIVE BOX
Patient is a 68y old  Male who presents with a chief complaint of VIKAS, rhabdomyolysis (22 Jul 2023 15:09)      OVERNIGHT EVENTS:  Patients seen and examined at bedside this morning. No acute events overnight.    REVIEW OF SYSTEMS: denies chest pain/SOB, diaphoresis, no F/C, cough, dizziness, headache, blurry vision, nausea, vomiting, abdominal pain. All others review of systems negative     MEDICATIONS  (STANDING):  atorvastatin 80 milliGRAM(s) Oral at bedtime  cefTRIAXone   IVPB 1000 milliGRAM(s) IV Intermittent every 24 hours  cefTRIAXone   IVPB      dextrose 5% + sodium chloride 0.45%. 1000 milliLiter(s) (60 mL/Hr) IV Continuous <Continuous>  divalproex  milliGRAM(s) Oral three times a day  gabapentin 100 milliGRAM(s) Oral three times a day  lactated ringers. 1000 milliLiter(s) (60 mL/Hr) IV Continuous <Continuous>  lidocaine   4% Patch 1 Patch Transdermal every 24 hours  metoprolol tartrate 12.5 milliGRAM(s) Oral two times a day  QUEtiapine 100 milliGRAM(s) Oral at bedtime  traZODone 50 milliGRAM(s) Oral at bedtime    MEDICATIONS  (PRN):  acetaminophen     Tablet .. 650 milliGRAM(s) Oral every 6 hours PRN Mild Pain (1 - 3), Moderate Pain (4 - 6)  melatonin 3 milliGRAM(s) Oral at bedtime PRN Insomnia  oxyCODONE    IR 2.5 milliGRAM(s) Oral every 6 hours PRN Moderate Pain (4 - 6)      Allergies    No Known Allergies    Intolerances        T(F): 98.5 (07-23-23 @ 05:28), Max: 98.6 (07-22-23 @ 11:18)  HR: 115 (07-23-23 @ 05:28) (94 - 120)  BP: 123/82 (07-23-23 @ 05:28) (113/79 - 147/87)  RR: 18 (07-23-23 @ 05:28) (18 - 18)  SpO2: 97% (07-23-23 @ 05:28) (97% - 97%)  Wt(kg): --    PHYSICAL EXAM:  GENERAL: NAD  HEAD:  Atraumatic, Normocephalic  EYES: PERRLA, conjunctiva and sclera clear  ENMT: Moist mucous membranes  NECK: Supple, No JVD, Normal thyroid  NERVOUS SYSTEM:  Alert & Awake  CHEST/LUNG: Clear to percussion bilaterally;   HEART: Regular rate and rhythm;   ABDOMEN: Soft, Nontender, Nondistended; Bowel sounds present  EXTREMITIES:  no edema BL LE  SKIN: moist    LABS:              Cultures;   CAPILLARY BLOOD GLUCOSE        Lipid panel:           RADIOLOGY & ADDITIONAL TESTS:    Imaging Personally Reviewed:  [x ] YES      Consultant(s) Notes Reviewed:  [x ] YES     Care Discussed with [x ] Consultants [X ] Patient [ ] Family  [x ]    [x ]  Other; RN

## 2023-07-24 ENCOUNTER — TRANSCRIPTION ENCOUNTER (OUTPATIENT)
Age: 68
End: 2023-07-24

## 2023-07-24 LAB
ANION GAP SERPL CALC-SCNC: 3 MMOL/L — LOW (ref 5–17)
BUN SERPL-MCNC: 16 MG/DL — SIGNIFICANT CHANGE UP (ref 7–23)
CALCIUM SERPL-MCNC: 8.2 MG/DL — LOW (ref 8.5–10.1)
CHLORIDE SERPL-SCNC: 103 MMOL/L — SIGNIFICANT CHANGE UP (ref 96–108)
CO2 SERPL-SCNC: 34 MMOL/L — HIGH (ref 22–31)
CREAT SERPL-MCNC: 1.11 MG/DL — SIGNIFICANT CHANGE UP (ref 0.5–1.3)
EGFR: 72 ML/MIN/1.73M2 — SIGNIFICANT CHANGE UP
GLUCOSE SERPL-MCNC: 102 MG/DL — HIGH (ref 70–99)
HCT VFR BLD CALC: 37.6 % — LOW (ref 39–50)
HGB BLD-MCNC: 12.3 G/DL — LOW (ref 13–17)
MCHC RBC-ENTMCNC: 30 PG — SIGNIFICANT CHANGE UP (ref 27–34)
MCHC RBC-ENTMCNC: 32.7 G/DL — SIGNIFICANT CHANGE UP (ref 32–36)
MCV RBC AUTO: 91.7 FL — SIGNIFICANT CHANGE UP (ref 80–100)
NRBC # BLD: 0 /100 WBCS — SIGNIFICANT CHANGE UP (ref 0–0)
PLATELET # BLD AUTO: 183 K/UL — SIGNIFICANT CHANGE UP (ref 150–400)
POTASSIUM SERPL-MCNC: 4.2 MMOL/L — SIGNIFICANT CHANGE UP (ref 3.5–5.3)
POTASSIUM SERPL-SCNC: 4.2 MMOL/L — SIGNIFICANT CHANGE UP (ref 3.5–5.3)
RBC # BLD: 4.1 M/UL — LOW (ref 4.2–5.8)
RBC # FLD: 12.8 % — SIGNIFICANT CHANGE UP (ref 10.3–14.5)
SODIUM SERPL-SCNC: 140 MMOL/L — SIGNIFICANT CHANGE UP (ref 135–145)
WBC # BLD: 9.66 K/UL — SIGNIFICANT CHANGE UP (ref 3.8–10.5)
WBC # FLD AUTO: 9.66 K/UL — SIGNIFICANT CHANGE UP (ref 3.8–10.5)

## 2023-07-24 PROCEDURE — 99233 SBSQ HOSP IP/OBS HIGH 50: CPT

## 2023-07-24 RX ORDER — METOPROLOL TARTRATE 50 MG
12.5 TABLET ORAL ONCE
Refills: 0 | Status: COMPLETED | OUTPATIENT
Start: 2023-07-24 | End: 2023-07-24

## 2023-07-24 RX ORDER — METOPROLOL TARTRATE 50 MG
25 TABLET ORAL
Refills: 0 | Status: DISCONTINUED | OUTPATIENT
Start: 2023-07-24 | End: 2023-07-29

## 2023-07-24 RX ADMIN — QUETIAPINE FUMARATE 100 MILLIGRAM(S): 200 TABLET, FILM COATED ORAL at 21:59

## 2023-07-24 RX ADMIN — DIVALPROEX SODIUM 250 MILLIGRAM(S): 500 TABLET, DELAYED RELEASE ORAL at 05:47

## 2023-07-24 RX ADMIN — ATORVASTATIN CALCIUM 80 MILLIGRAM(S): 80 TABLET, FILM COATED ORAL at 21:59

## 2023-07-24 RX ADMIN — DIVALPROEX SODIUM 250 MILLIGRAM(S): 500 TABLET, DELAYED RELEASE ORAL at 22:01

## 2023-07-24 RX ADMIN — Medication 650 MILLIGRAM(S): at 23:36

## 2023-07-24 RX ADMIN — DIVALPROEX SODIUM 250 MILLIGRAM(S): 500 TABLET, DELAYED RELEASE ORAL at 14:25

## 2023-07-24 RX ADMIN — Medication 12.5 MILLIGRAM(S): at 05:48

## 2023-07-24 RX ADMIN — GABAPENTIN 100 MILLIGRAM(S): 400 CAPSULE ORAL at 22:00

## 2023-07-24 RX ADMIN — Medication 25 MILLIGRAM(S): at 17:42

## 2023-07-24 RX ADMIN — Medication 3 MILLIGRAM(S): at 23:36

## 2023-07-24 RX ADMIN — GABAPENTIN 100 MILLIGRAM(S): 400 CAPSULE ORAL at 05:48

## 2023-07-24 RX ADMIN — GABAPENTIN 100 MILLIGRAM(S): 400 CAPSULE ORAL at 14:30

## 2023-07-24 RX ADMIN — Medication 50 MILLIGRAM(S): at 22:00

## 2023-07-24 RX ADMIN — CEFTRIAXONE 100 MILLIGRAM(S): 500 INJECTION, POWDER, FOR SOLUTION INTRAMUSCULAR; INTRAVENOUS at 10:24

## 2023-07-24 RX ADMIN — Medication 12.5 MILLIGRAM(S): at 12:24

## 2023-07-24 NOTE — DISCHARGE NOTE PROVIDER - DETAILS OF MALNUTRITION DIAGNOSIS/DIAGNOSES
This patient has been assessed with a concern for Malnutrition and was treated during this hospitalization for the following Nutrition diagnosis/diagnoses:     -  07/17/2023: Moderate protein-calorie malnutrition

## 2023-07-24 NOTE — PROGRESS NOTE ADULT - ASSESSMENT
68 years old male with h/o HTN, HLD, TBI resulting in dementia present to ED ? fall. As per daughter, patient was found on the floor. Patient is a poor history ( per daughter, patient will make up different history) . Patient reported he felt weak and fell onto the floor.  ER course with tachycardia, sat well at RA. EKG with sinus tachy. No leukocytosis, plt 129, K 6, Cr 2.82, glucose 110, . CXR with no focal consolidation. CT head  with no acute pathology. C/L spine with no fracture. CT pelsis with no acute displace fracture. Questionable mild widening of the bilateral sacroiliac joints with subchondral sclerosis and questionable erosions along the iliac side, right greater than left. Patient admitted for possible UTI and rhabdomylosis.    Moderate protein-calorie malnutrition.  Diet Easy to Chew-  DASH/TLC {Sodium & Cholesterol Restricted}  Ensure Plus High Protein Cans or Servings Per Day:  1       Frequency:  Daily    VIKAS (acute kidney injury).   ·  Plan: Cr 2.82, likely VIKAS, recent decrease in oral intake : now 1.6  TSH noted  IV fluid,    Hyperkalemia. : K 6, no EKG changes. Likely in the setting of VIKAS, rhabdo, ACEI use  Hold ACEI.    Rhabdomyolysis. : Likely due to fall  IV fluid  Monitor CK level and renal function.    Impaired ambulation.   ·  Plan: CT head  ( I personally review) with no acute pathology. C/L spine with no fracture. CT pelsis with no acute displace fracture. Questionable mild widening of the bilateral sacroiliac joints with subchondral sclerosis and questionable erosions along the iliac side, right greater than left  PT consult : rehab.    Dementia. : due to TBI  continue divalproex 250mg DR tid, quetiapine 100mg hs, trazodone 50mg hs.    Benign essential HTN.   ·  Plan: monitor BP and titrate BP med  ACEI on hold due to VIKAS.    Hyperlipidemia: continue statin. 68 years old male with h/o HTN, HLD, TBI resulting in dementia present to ED ? fall. As per daughter, patient was found on the floor. Patient is a poor history ( per daughter, patient will make up different history) . Patient reported he felt weak and fell onto the floor.  ER course with tachycardia, sat well at RA. EKG with sinus tachy. No leukocytosis, plt 129, K 6, Cr 2.82, glucose 110, . CXR with no focal consolidation. CT head  with no acute pathology. C/L spine with no fracture. CT pelsis with no acute displace fracture. Questionable mild widening of the bilateral sacroiliac joints with subchondral sclerosis and questionable erosions along the iliac side, right greater than left. Patient admitted for possible UTI and rhabdomylosis.    Moderate protein-calorie malnutrition. functional quadriplegia, debility  Diet Easy to Chew-  DASH/TLC {Sodium & Cholesterol Restricted}  Ensure Plus High Protein Cans or Servings Per Day:  1       Frequency:  Daily    VIKAS (acute kidney injury).   ·  Plan: Cr 2.82, likely VIKAS, recent decrease in oral intake : now 1.6  TSH noted  IV fluid,    Hyperkalemia. : K 6, no EKG changes. Likely in the setting of VIKAS, rhabdo, ACEI use  Hold ACEI.    Rhabdomyolysis. : Likely due to fall  IV fluid  Monitor CK level and renal function.    Impaired ambulation.   ·  Plan: CT head  ( I personally review) with no acute pathology. C/L spine with no fracture. CT pelsis with no acute displace fracture. Questionable mild widening of the bilateral sacroiliac joints with subchondral sclerosis and questionable erosions along the iliac side, right greater than left  PT consult : rehab.    Dementia. : due to TBI  continue divalproex 250mg DR tid, quetiapine 100mg hs, trazodone 50mg hs.    Benign essential HTN.   ·  Plan: monitor BP and titrate BP med  ACEI on hold due to VIKAS.    Hyperlipidemia: continue statin.

## 2023-07-24 NOTE — DISCHARGE NOTE PROVIDER - CARE PROVIDER_API CALL
[facility],   Phone: (   )    -  Fax: (   )    -  Follow Up Time:    [facility],   Phone: (   )    -  Fax: (   )    -  Follow Up Time:     [facility] at the [facility],   Phone: (   )    -  Fax: (   )    -  Follow Up Time:

## 2023-07-24 NOTE — DISCHARGE NOTE PROVIDER - ATTENDING DISCHARGE PHYSICAL EXAMINATION:
Vital Signs Last 24 Hrs  T(C): 36.4 (24 Jul 2023 05:27), Max: 37.4 (23 Jul 2023 17:09)  T(F): 97.6 (24 Jul 2023 05:27), Max: 99.3 (23 Jul 2023 17:09)  HR: 115 (24 Jul 2023 05:27) (109 - 116)  BP: 124/71 (24 Jul 2023 05:27) (119/69 - 124/87)  BP(mean): --  RR: 18 (24 Jul 2023 05:27) (18 - 18)  SpO2: 95% (24 Jul 2023 05:27) (94% - 97%)    Parameters below as of 23 Jul 2023 17:09  Patient On (Oxygen Delivery Method): room air            GENERAL: NAD  HEAD:  Atraumatic, Normocephalic  EYES: EOMI, PERRLA, conjunctiva and sclera clear  ENMT: No tonsillar erythema, exudates, or enlargement; Moist mucous membranes, Good dentition, No lesions  NECK: Supple, No JVD, Normal thyroid  NERVOUS SYSTEM:  Alert   CHEST/LUNG: Clear to percussion bilaterally;   HEART: Regular rate and rhythm;   ABDOMEN: Soft, Nontender, Nondistended; Bowel sounds present  EXTREMITIES:  2+ Peripheral Pulses, No clubbing, cyanosis, or edema  SKIN: No rashes or lesions

## 2023-07-24 NOTE — DISCHARGE NOTE PROVIDER - NSDCMRMEDTOKEN_GEN_ALL_CORE_FT
amLODIPine 5 mg oral tablet: 1 tab(s) orally once a day  divalproex sodium 250 mg oral delayed release tablet: 1 tab(s) orally every 8 hours  QUEtiapine 100 mg oral tablet: 1 tab(s) orally once a day  ramipril 1.25 mg oral capsule: 1 cap(s) orally once a day  rosuvastatin 40 mg oral capsule: 1 cap(s) orally once a day  traZODone 50 mg oral tablet: orally 1-2 tablets orally once a day at bedtime   acetaminophen 325 mg oral tablet: 2 tab(s) orally every 6 hours As needed Temp greater or equal to 38C (100.4F)  acetaminophen 325 mg oral tablet: 2 tab(s) orally every 6 hours As needed Mild Pain (1 - 3)  apixaban 5 mg oral tablet: 1 tab(s) orally every 12 hours  dilTIAZem 240 mg/24 hours oral capsule, extended release: 1 cap(s) orally once a day  divalproex sodium 250 mg oral delayed release tablet: 1 tab(s) orally every 8 hours  melatonin 3 mg oral tablet: 1 tab(s) orally once a day (at bedtime) As needed Insomnia  metoprolol succinate 200 mg oral tablet, extended release: 1 tab(s) orally once a day  QUEtiapine 100 mg oral tablet: 1 tab(s) orally once a day  rosuvastatin 40 mg oral capsule: 1 cap(s) orally once a day  traZODone 50 mg oral tablet: 50 milligram(s) orally once a day (at bedtime) 1-2 tablets orally once a day at bedtime

## 2023-07-24 NOTE — DISCHARGE NOTE PROVIDER - PROVIDER TOKENS
FREE:[LAST:[[facility]],PHONE:[(   )    -],FAX:[(   )    -]] FREE:[LAST:[[facility]],PHONE:[(   )    -],FAX:[(   )    -]],FREE:[[facility]PHONE:[(   )    -],FAX:[(   )    -]]

## 2023-07-24 NOTE — DISCHARGE NOTE PROVIDER - HOSPITAL COURSE
68 years old male with h/o HTN, HLD, dementia/TBI resulting in dementia present to ED ? fall. As per daughter, patient was found on the floor. Patient is a poor history ( per daughter, patient will make up different history) . Patient reported he felt weak and fell onto the floor. ER course with tachycardia, sat well at RA. EKG with sinus tachy. No leukocytosis, plt 129, K 6, Cr 2.82, glucose 110, . CXR with no focal consolidation. CT head  with no acute pathology. C/L spine with no fracture. CT pelvis with no acute displace fracture. Questionable mild widening of the bilateral sacroiliac joints with subchondral sclerosis and questionable erosions along the iliac side, right greater than left. Patient admitted for possible UTI and rhabdomylosis Likely due to fall. CPK trending down w/ivf. no EKG changes. Likely in the setting of VIKAS, rhabdo, ACEI use, now hyper-k resolved. Moderate protein-calorie malnutrition. Diet Easy to Chew-DASH/TLC {Sodium & Cholesterol Restricted} Ensure Plus High Protein Cans. functional quadriplegia. stable for d/c to rehab.        67 yo M w/ history of HTN, HLD, TBI with resulting dementia presented status post fall and was admitted w/ rhabdomyolysis, VIKAS and sinus tachycardia.  CT head  with no acute pathology. C/L spine with no fracture. CT pelvis with no acute displace fracture. Pt's hospital course was complicated by RLE DVT. aspiration pneumonia that was treated w/ Zosyn and was prolonged w/ persistent fevers that have resolved and ongoing sinus tachycardia. Per cardio this may be his baseline heart rate in setting od autonomic dysfunction. VIKAS and rahbdo have resolved. He has become contracted and no longer able to ambulate. US showed an acute occlusive DVT affecting the right femoral vein with the proximal extent of the thrombus in the right common femoral vein. Pt was treated w/ Lovenox that has been changed to Elilquis.      69 yo M w/ history of HTN, HLD, TBI with resulting dementia presented status post fall and was admitted w/ rhabdomyolysis, VIKAS and sinus tachycardia.  CT head  with no acute pathology. C/L spine with no fracture. CT pelvis with no acute displace fracture. Pt's hospital course was complicated by RLE DVT. aspiration pneumonia that was treated with anticoagulation and Zosyn and was prolonged w/ persistent fevers that have resolved and ongoing sinus tachycardia. Per cardio this may be his baseline heart rate in setting od autonomic dysfunction. VIKAS and rahbdo have resolved. He has become contracted and has no longer able to ambulate. US showed an acute occlusive DVT affecting the right femoral vein with the proximal extent of the thrombus in the right common femoral vein.     69 yo M w/ history of HTN, HLD, TBI with resulting dementia presented status post fall and was admitted w/ rhabdomyolysis, VIKAS and sinus tachycardia. Pt's hospital course was complicated by RLE DVT & aspiration pneumonia has been prolonged w/ persistent fevers that have resolved and ongoing sinus tachycardia. He has become contracted and no longer able to ambulate.     Recurrent fevers  -likely from recurrent lung micro-aspirations. no more fever now. no more antibiotics.     Aspiration Pneumonia / b/l Pleural Effusion, right sided mucous plugging, b/l atelectasis  -  s/p Zosyn    - follow up CT on 8/8 showed a large left chest wall subcutaneous hematoma versus dependent edema but  there was no obvious ecchymosis of L chest wall at that time as per prior notes and H/H was and still remains stable     Chronic sinus Tachycardia  - HR remains elevated w/ patient in sinus tach on tele  - per cardio this may be his baseline in setting od autonomic dysfunction in setting on TBI  - Cont BB and CCB. patient's tachycardia is chronic.   - VQ scan: low probability   - Echo showed EF 60-65% w/ grade I diastolic dysfunction, trace mitral valve regurgitation      Impaired ambulation / LE contractures / Functional Quadriplegia   - due to prolonged bedrest w/ underlying dementia   - CT head not suggestive of acute infarct   - cannot do MR Head / neck as pt is contracted     Severe protein calorie malnutrition  - albumin 1.5 w/ resultant anasarca  - c/w PO diet       RLE DVT  - US showed an acute occlusive DVT affecting the right femoral vein with the proximal extent of the thrombus in the right common femoral vein  - Cont Elilquis      Hx of Traumatic brain injury / Dementia   - c/w Valproic acid, Seroquel and Trazadone     VIKAS  - resolved    Acute Rhabdomyolysis  - resolved    HTN  - c/w Metoprolol and Cardizem     Moderate PCM: Cont current diet     Seen and examined by me today. Vitals stable.   DC time spent by me face to face excluding billable procedures 38 mins

## 2023-07-24 NOTE — DISCHARGE NOTE PROVIDER - NSDCCPCAREPLAN_GEN_ALL_CORE_FT
PRINCIPAL DISCHARGE DIAGNOSIS  Diagnosis: Hyperkalemia  Assessment and Plan of Treatment: 68 years old male with h/o HTN, HLD, dementia/TBI resulting in dementia present to ED ? fall. As per daughter, patient was found on the floor. Patient is a poor history ( per daughter, patient will make up different history) . Patient reported he felt weak and fell onto the floor. ER course with tachycardia, sat well at RA. EKG with sinus tachy. No leukocytosis, plt 129, K 6, Cr 2.82, glucose 110, . CXR with no focal consolidation. CT head  with no acute pathology. C/L spine with no fracture. CT pelvis with no acute displace fracture. Questionable mild widening of the bilateral sacroiliac joints with subchondral sclerosis and questionable erosions along the iliac side, right greater than left. Patient admitted for possible UTI and rhabdomylosis Likely due to fall. CPK trending down w/ivf. no EKG changes. Likely in the setting of VIKAS, rhabdo, ACEI use, now hyper-k resolved. Moderate protein-calorie malnutrition. Diet Easy to Chew-DASH/TLC {Sodium & Cholesterol Restricted} Ensure Plus High Protein Cans. stable for d/c to rehab.         SECONDARY DISCHARGE DIAGNOSES  Diagnosis: VIKAS (acute kidney injury)  Assessment and Plan of Treatment:     Diagnosis: Back pain  Assessment and Plan of Treatment:

## 2023-07-24 NOTE — PROGRESS NOTE ADULT - SUBJECTIVE AND OBJECTIVE BOX
Patient is a 68y old  Male who presents with a chief complaint of VIKAS, rhabdomyolysis (23 Jul 2023 10:21)      OVERNIGHT EVENTS:  Patients seen and examined at bedside this morning. No acute events overnight.    REVIEW OF SYSTEMS: denies chest pain/SOB, diaphoresis, no F/C, cough, dizziness, headache, blurry vision, nausea, vomiting, abdominal pain. All others review of systems negative     MEDICATIONS  (STANDING):  atorvastatin 80 milliGRAM(s) Oral at bedtime  cefTRIAXone   IVPB 1000 milliGRAM(s) IV Intermittent every 24 hours  cefTRIAXone   IVPB      dextrose 5% + sodium chloride 0.45%. 1000 milliLiter(s) (60 mL/Hr) IV Continuous <Continuous>  divalproex  milliGRAM(s) Oral three times a day  gabapentin 100 milliGRAM(s) Oral three times a day  lactated ringers. 1000 milliLiter(s) (60 mL/Hr) IV Continuous <Continuous>  lidocaine   4% Patch 1 Patch Transdermal every 24 hours  metoprolol tartrate 25 milliGRAM(s) Oral two times a day  metoprolol tartrate 12.5 milliGRAM(s) Oral once  QUEtiapine 100 milliGRAM(s) Oral at bedtime  traZODone 50 milliGRAM(s) Oral at bedtime    MEDICATIONS  (PRN):  acetaminophen     Tablet .. 650 milliGRAM(s) Oral every 6 hours PRN Mild Pain (1 - 3), Moderate Pain (4 - 6)  melatonin 3 milliGRAM(s) Oral at bedtime PRN Insomnia  oxyCODONE    IR 2.5 milliGRAM(s) Oral every 6 hours PRN Moderate Pain (4 - 6)      Allergies    No Known Allergies    Intolerances        T(F): 97.6 (07-24-23 @ 05:27), Max: 99.3 (07-23-23 @ 17:09)  HR: 115 (07-24-23 @ 05:27) (109 - 116)  BP: 124/71 (07-24-23 @ 05:27) (119/69 - 124/87)  RR: 18 (07-24-23 @ 05:27) (18 - 18)  SpO2: 95% (07-24-23 @ 05:27) (94% - 97%)  Wt(kg): --    PHYSICAL EXAM:  GENERAL: NAD  HEAD:  Atraumatic, Normocephalic  EYES: PERRLA, conjunctiva and sclera clear  ENMT: Moist mucous membranes  NECK: Supple, No JVD, Normal thyroid  NERVOUS SYSTEM:  Alert & Awake  CHEST/LUNG: Clear to percussion bilaterally;   HEART: Regular rate and rhythm;   ABDOMEN: Soft, Nontender, Nondistended; Bowel sounds present  EXTREMITIES:  no edema BL LE  SKIN: moist    LABS:                        12.3   9.66  )-----------( 183      ( 24 Jul 2023 06:33 )             37.6     07-24    140  |  103  |  16  ----------------------------<  102<H>  4.2   |  34<H>  |  1.11    Ca    8.2<L>      24 Jul 2023 06:33        Urinalysis Basic - ( 24 Jul 2023 06:33 )    Color: x / Appearance: x / SG: x / pH: x  Gluc: 102 mg/dL / Ketone: x  / Bili: x / Urobili: x   Blood: x / Protein: x / Nitrite: x   Leuk Esterase: x / RBC: x / WBC x   Sq Epi: x / Non Sq Epi: x / Bacteria: x      Cultures;   CAPILLARY BLOOD GLUCOSE        Lipid panel:           RADIOLOGY & ADDITIONAL TESTS:    Imaging Personally Reviewed:  [x ] YES      Consultant(s) Notes Reviewed:  [x ] YES     Care Discussed with [x ] Consultants [X ] Patient [ ] Family  [x ]    [x ]  Other; RN Patient is a 68y old  Male who presents with a chief complaint of VIKAS, rhabdomyolysis (23 Jul 2023 10:21)      OVERNIGHT EVENTS:  Patients seen and examined at bedside this morning. No acute events overnight.    REVIEW OF SYSTEMS: poor hx    MEDICATIONS  (STANDING):  atorvastatin 80 milliGRAM(s) Oral at bedtime  cefTRIAXone   IVPB 1000 milliGRAM(s) IV Intermittent every 24 hours  cefTRIAXone   IVPB      dextrose 5% + sodium chloride 0.45%. 1000 milliLiter(s) (60 mL/Hr) IV Continuous <Continuous>  divalproex  milliGRAM(s) Oral three times a day  gabapentin 100 milliGRAM(s) Oral three times a day  lactated ringers. 1000 milliLiter(s) (60 mL/Hr) IV Continuous <Continuous>  lidocaine   4% Patch 1 Patch Transdermal every 24 hours  metoprolol tartrate 25 milliGRAM(s) Oral two times a day  metoprolol tartrate 12.5 milliGRAM(s) Oral once  QUEtiapine 100 milliGRAM(s) Oral at bedtime  traZODone 50 milliGRAM(s) Oral at bedtime    MEDICATIONS  (PRN):  acetaminophen     Tablet .. 650 milliGRAM(s) Oral every 6 hours PRN Mild Pain (1 - 3), Moderate Pain (4 - 6)  melatonin 3 milliGRAM(s) Oral at bedtime PRN Insomnia  oxyCODONE    IR 2.5 milliGRAM(s) Oral every 6 hours PRN Moderate Pain (4 - 6)      Allergies    No Known Allergies    Intolerances        T(F): 97.6 (07-24-23 @ 05:27), Max: 99.3 (07-23-23 @ 17:09)  HR: 115 (07-24-23 @ 05:27) (109 - 116)  BP: 124/71 (07-24-23 @ 05:27) (119/69 - 124/87)  RR: 18 (07-24-23 @ 05:27) (18 - 18)  SpO2: 95% (07-24-23 @ 05:27) (94% - 97%)  Wt(kg): --    PHYSICAL EXAM:  GENERAL: NAD  HEAD:  Atraumatic, Normocephalic  EYES: PERRLA, conjunctiva and sclera clear  ENMT: Moist mucous membranes  NECK: Supple, No JVD, Normal thyroid  NERVOUS SYSTEM:  Alert & Awake  CHEST/LUNG: Clear to percussion bilaterally;   HEART: Regular rate and rhythm;   ABDOMEN: Soft, Nontender, Nondistended; Bowel sounds present  EXTREMITIES:  no edema BL LE  SKIN: moist    LABS:                        12.3   9.66  )-----------( 183      ( 24 Jul 2023 06:33 )             37.6     07-24    140  |  103  |  16  ----------------------------<  102<H>  4.2   |  34<H>  |  1.11    Ca    8.2<L>      24 Jul 2023 06:33        Urinalysis Basic - ( 24 Jul 2023 06:33 )    Color: x / Appearance: x / SG: x / pH: x  Gluc: 102 mg/dL / Ketone: x  / Bili: x / Urobili: x   Blood: x / Protein: x / Nitrite: x   Leuk Esterase: x / RBC: x / WBC x   Sq Epi: x / Non Sq Epi: x / Bacteria: x      Cultures;   CAPILLARY BLOOD GLUCOSE        Lipid panel:           RADIOLOGY & ADDITIONAL TESTS:    Imaging Personally Reviewed:  [x ] YES      Consultant(s) Notes Reviewed:  [x ] YES     Care Discussed with [x ] Consultants [X ] Patient [ ] Family  [x ]    [x ]  Other; RN

## 2023-07-25 LAB
ANION GAP SERPL CALC-SCNC: 7 MMOL/L — SIGNIFICANT CHANGE UP (ref 5–17)
BUN SERPL-MCNC: 20 MG/DL — SIGNIFICANT CHANGE UP (ref 7–23)
CALCIUM SERPL-MCNC: 8.2 MG/DL — LOW (ref 8.5–10.1)
CHLORIDE SERPL-SCNC: 96 MMOL/L — SIGNIFICANT CHANGE UP (ref 96–108)
CK SERPL-CCNC: 522 U/L — HIGH (ref 26–308)
CO2 SERPL-SCNC: 31 MMOL/L — SIGNIFICANT CHANGE UP (ref 22–31)
CREAT SERPL-MCNC: 1.25 MG/DL — SIGNIFICANT CHANGE UP (ref 0.5–1.3)
EGFR: 62 ML/MIN/1.73M2 — SIGNIFICANT CHANGE UP
GLUCOSE SERPL-MCNC: 106 MG/DL — HIGH (ref 70–99)
POTASSIUM SERPL-MCNC: 4.3 MMOL/L — SIGNIFICANT CHANGE UP (ref 3.5–5.3)
POTASSIUM SERPL-SCNC: 4.3 MMOL/L — SIGNIFICANT CHANGE UP (ref 3.5–5.3)
SODIUM SERPL-SCNC: 134 MMOL/L — LOW (ref 135–145)

## 2023-07-25 PROCEDURE — 99233 SBSQ HOSP IP/OBS HIGH 50: CPT

## 2023-07-25 PROCEDURE — 99223 1ST HOSP IP/OBS HIGH 75: CPT

## 2023-07-25 RX ORDER — OXYCODONE HYDROCHLORIDE 5 MG/1
5 TABLET ORAL EVERY 4 HOURS
Refills: 0 | Status: DISCONTINUED | OUTPATIENT
Start: 2023-07-25 | End: 2023-08-01

## 2023-07-25 RX ORDER — OXYCODONE HYDROCHLORIDE 5 MG/1
10 TABLET ORAL EVERY 4 HOURS
Refills: 0 | Status: DISCONTINUED | OUTPATIENT
Start: 2023-07-25 | End: 2023-07-28

## 2023-07-25 RX ORDER — OXYCODONE HYDROCHLORIDE 5 MG/1
10 TABLET ORAL EVERY 12 HOURS
Refills: 0 | Status: DISCONTINUED | OUTPATIENT
Start: 2023-07-25 | End: 2023-08-01

## 2023-07-25 RX ADMIN — OXYCODONE HYDROCHLORIDE 10 MILLIGRAM(S): 5 TABLET ORAL at 13:17

## 2023-07-25 RX ADMIN — DIVALPROEX SODIUM 250 MILLIGRAM(S): 500 TABLET, DELAYED RELEASE ORAL at 05:59

## 2023-07-25 RX ADMIN — GABAPENTIN 100 MILLIGRAM(S): 400 CAPSULE ORAL at 13:17

## 2023-07-25 RX ADMIN — DIVALPROEX SODIUM 250 MILLIGRAM(S): 500 TABLET, DELAYED RELEASE ORAL at 13:16

## 2023-07-25 RX ADMIN — DIVALPROEX SODIUM 250 MILLIGRAM(S): 500 TABLET, DELAYED RELEASE ORAL at 21:33

## 2023-07-25 RX ADMIN — GABAPENTIN 100 MILLIGRAM(S): 400 CAPSULE ORAL at 21:33

## 2023-07-25 RX ADMIN — ATORVASTATIN CALCIUM 80 MILLIGRAM(S): 80 TABLET, FILM COATED ORAL at 21:33

## 2023-07-25 RX ADMIN — OXYCODONE HYDROCHLORIDE 10 MILLIGRAM(S): 5 TABLET ORAL at 14:10

## 2023-07-25 RX ADMIN — Medication 650 MILLIGRAM(S): at 00:06

## 2023-07-25 RX ADMIN — Medication 25 MILLIGRAM(S): at 17:31

## 2023-07-25 RX ADMIN — QUETIAPINE FUMARATE 100 MILLIGRAM(S): 200 TABLET, FILM COATED ORAL at 21:33

## 2023-07-25 RX ADMIN — Medication 25 MILLIGRAM(S): at 06:00

## 2023-07-25 RX ADMIN — GABAPENTIN 100 MILLIGRAM(S): 400 CAPSULE ORAL at 06:00

## 2023-07-25 RX ADMIN — OXYCODONE HYDROCHLORIDE 10 MILLIGRAM(S): 5 TABLET ORAL at 17:29

## 2023-07-25 RX ADMIN — Medication 50 MILLIGRAM(S): at 21:33

## 2023-07-25 RX ADMIN — OXYCODONE HYDROCHLORIDE 10 MILLIGRAM(S): 5 TABLET ORAL at 18:20

## 2023-07-25 NOTE — PHYSICAL THERAPY INITIAL EVALUATION ADULT - BED MOBILITY TRAINING, PT EVAL
To be able to perform bed mobility including supine to sit, sit to supine Independently in order to facility safe transfers by 2-4 weeks
Pt will improve bed mobility to Independent within 3-4 weeks.

## 2023-07-25 NOTE — PHYSICAL THERAPY INITIAL EVALUATION ADULT - RANGE OF MOTION EXAMINATION, REHAB EVAL
no ROM deficits were identified
TANNER([facility]); BLE limited due to pain and contractures.

## 2023-07-25 NOTE — PHYSICAL THERAPY INITIAL EVALUATION ADULT - STRENGTHENING, PT EVAL
To be able to improve B UE/LE strength to 1/2 degree stronger to facilitate functional mobility, improve standing tolerance and be able to negotiate obstacles without difficulties by 4-6 weeks
Pt will increase muscle strength to 4+/5 to improve transfers and ambulation within 3-4 weeks.

## 2023-07-25 NOTE — CONSULT NOTE ADULT - ASSESSMENT
68M with TBI resulting in dementia p/w unresponsiveness    sinus tachycardia  -pt has been tachycardic since admission  -may be his baseline in setting od autonomic dysfunction in setting on TBI  -while rate control may improve vitals, it would likely mask any underlying pathology that may be ongoing  -would ideally treat the underlying cause  -cont w/u per primary team  -pt does not appear to be in any distress, is not complaining of anything  -BP is stable   -cont care per primary team

## 2023-07-25 NOTE — PHYSICAL THERAPY INITIAL EVALUATION ADULT - IMPAIRMENTS CONTRIBUTING IMPAIRED BED MOBILITY, REHAB EVAL
impaired balance/cognition/decreased flexibility/abnormal muscle tone/pain/impaired postural control/decreased ROM/decreased strength

## 2023-07-25 NOTE — PHYSICAL THERAPY INITIAL EVALUATION ADULT - CRITERIA FOR SKILLED THERAPEUTIC INTERVENTIONS
impairments found/functional limitations in following categories/risk reduction/prevention/rehab potential/therapy frequency/predicted duration of therapy intervention/anticipated discharge recommendation
subacute rehab/impairments found/functional limitations in following categories/risk reduction/prevention/rehab potential/therapy frequency/predicted duration of therapy intervention/anticipated discharge recommendation

## 2023-07-25 NOTE — PHYSICAL THERAPY INITIAL EVALUATION ADULT - DID THE PATIENT HAVE SURGERY?
n/a
This is the hx of JERRY a 69 y/o male patient who was admitted to [facility] due to complications of Hypokalemia, VIKAS, Back affecting medical condition and with subsequent affection on functional mobility./n/a

## 2023-07-25 NOTE — PHYSICAL THERAPY INITIAL EVALUATION ADULT - GENERAL OBSERVATIONS, REHAB EVAL
Chart (EMR) reviewed. As per cardiology, pt is sinus tachycardic since admission and asymptomatic. Received sidelying to right side in fetal position c HOB elevated, NAD. +heplock. Noted c generalized tremors. Denies pain or discomfort at rest. No labor breathing noted.
Pt encountered on supine, AxOx1, confused, cooperative, + cardiac monitor,

## 2023-07-25 NOTE — PHYSICAL THERAPY INITIAL EVALUATION ADULT - IMPAIRMENTS FOUND, PT EVAL
gait, locomotion, and balance/integumentary integrity/joint integrity and mobility/muscle strength/posture/ROM
aerobic capacity/endurance/gait, locomotion, and balance/joint integrity and mobility/muscle strength/neuromotor development and sensory integration/poor safety awareness/posture/ROM

## 2023-07-25 NOTE — CONSULT NOTE ADULT - SUBJECTIVE AND OBJECTIVE BOX
ROJAS HE  [id number]    Date of Consult:  7/25/23  CHIEF COMPLAINT:  found unresponsive  HISTORY OF PRESENT ILLNESS:  68M with TBI resulting in dementia p/w unresponsiveness.  patient was found on the floor. Per chart notes, Patient reported he felt weak and fell onto the floor. Denied any head trauma or LOC. No fever, chills. Patient denied any pain. pt has been here, w/orked up for underlying acute issues. pt is comfortable appearing. pt is awake and alert, answering some questions, no complaints. pt with sinus tachycardia. pt has been tachy since his admission on 7/14/23.        Allergies    No Known Allergies    Intolerances    	    MEDICATIONS:  metoprolol tartrate 25 milliGRAM(s) Oral two times a day        acetaminophen     Tablet .. 650 milliGRAM(s) Oral every 6 hours PRN  divalproex  milliGRAM(s) Oral three times a day  gabapentin 100 milliGRAM(s) Oral three times a day  melatonin 3 milliGRAM(s) Oral at bedtime PRN  oxyCODONE    IR 5 milliGRAM(s) Oral every 4 hours PRN  oxyCODONE    IR 10 milliGRAM(s) Oral every 4 hours PRN  oxyCODONE  ER Tablet 10 milliGRAM(s) Oral every 12 hours  QUEtiapine 100 milliGRAM(s) Oral at bedtime  traZODone 50 milliGRAM(s) Oral at bedtime      atorvastatin 80 milliGRAM(s) Oral at bedtime    lidocaine   4% Patch 1 Patch Transdermal every 24 hours      PAST MEDICAL & SURGICAL HISTORY:  TBI (traumatic brain injury)      HLD (hyperlipidemia)      HTN, age 0-18          FAMILY HISTORY:      SOCIAL HISTORY:    pt denies      REVIEW OF SYSTEMS:  See HPI. Otherwise, 10 point ROS done and otherwise negative.    PHYSICAL EXAM:  T(C): 37.3 (07-25-23 @ 11:15), Max: 37.8 (07-24-23 @ 23:40)  HR: 127 (07-25-23 @ 11:15) (115 - 127)  BP: 112/80 (07-25-23 @ 11:15) (112/80 - 158/94)  RR: 16 (07-25-23 @ 11:15) (16 - 18)  SpO2: 96% (07-25-23 @ 11:15) (96% - 97%)  Wt(kg): --  I&O's Summary      Appearance: Normal	  HEENT:   Normal oral mucosa, PERRL, EOMI	  Lymphatic: No lymphadenopathy  Cardiovascular: tachycardic, regular, Normal S1 S2, No JVD, No murmurs, No edema  Respiratory: Lungs clear to auscultation	  Psychiatry: calm  Gastrointestinal:  Soft, Non-tender, + BS	  Skin: No rashes, No ecchymoses, No cyanosis	  Neurologic: unable to assess  Extremities: Normal range of motion, No clubbing, cyanosis       LABS:	 	    CBC Full  -  ( 24 Jul 2023 06:33 )  WBC Count : 9.66 K/uL  Hemoglobin : 12.3 g/dL  Hematocrit : 37.6 %  Platelet Count - Automated : 183 K/uL  Mean Cell Volume : 91.7 fl  Mean Cell Hemoglobin : 30.0 pg  Mean Cell Hemoglobin Concentration : 32.7 g/dL  Auto Neutrophil # : x  Auto Lymphocyte # : x  Auto Monocyte # : x  Auto Eosinophil # : x  Auto Basophil # : x  Auto Neutrophil % : x  Auto Lymphocyte % : x  Auto Monocyte % : x  Auto Eosinophil % : x  Auto Basophil % : x    07-25    134<L>  |  96  |  20  ----------------------------<  106<H>  4.3   |  31  |  1.25  07-24    140  |  103  |  16  ----------------------------<  102<H>  4.2   |  34<H>  |  1.11    Ca    8.2<L>      25 Jul 2023 06:38  Ca    8.2<L>      24 Jul 2023 06:33        proBNP:   Lipid Profile:   HgA1c:   TSH:       CARDIAC MARKERS:            TELEMETRY: 	    ECG:  	  RADIOLOGY:  OTHER: 	    PREVIOUS DIAGNOSTIC TESTING:    [ ] Echocardiogram:  [ ]  Catheterization:  [ ] Stress Test:  	  	  ASSESSMENT/PLAN: 	    Gabo Moreno MD

## 2023-07-25 NOTE — PHYSICAL THERAPY INITIAL EVALUATION ADULT - BALANCE DISTURBANCE, IDENTIFIED IMPAIRMENT CONTRIBUTE, REHAB EVAL
abnormal muscle tone/pain/impaired postural control/decreased ROM/decreased strength
impaired postural control/decreased strength

## 2023-07-25 NOTE — PHYSICAL THERAPY INITIAL EVALUATION ADULT - PERTINENT HX OF CURRENT PROBLEM, REHAB EVAL
Patient is a 69 y/o male initially admitted to [facility] due to hyperkalemia, VIKAS, back pain.
This is the hx of MARY. a 69 y/o male patient who was admitted to [facility] due to complications of Hypokalemia, VIKAS, Back affecting medical condition and with subsequent affection on functional mobility. CT cervical, lumbar, Brain 7/14/23: (-) Acute hemorrhage, (-) Fx and acute displaced fracture. X-ray chest 7/14/23: (-) acute finding.

## 2023-07-25 NOTE — PHYSICAL THERAPY INITIAL EVALUATION ADULT - MANUAL MUSCLE TESTING RESULTS, REHAB EVAL
Patient is returning call.   
B UE 3/5, B LE 3-/5/grossly assessed due to
BUE grossly graded 3-/5; BLE grossly graded 2+/5/grossly assessed due to

## 2023-07-25 NOTE — PROGRESS NOTE ADULT - SUBJECTIVE AND OBJECTIVE BOX
Patient is a 68y old  Male who presents with a chief complaint of VIKAS, rhabdomyolysis (24 Jul 2023 10:46)      OVERNIGHT EVENTS:  Patients seen and examined at bedside this morning. No acute events overnight.    REVIEW OF SYSTEMS: denies chest pain/SOB, diaphoresis, no F/C, cough, dizziness, headache, blurry vision, nausea, vomiting, abdominal pain. All others review of systems negative     MEDICATIONS  (STANDING):  atorvastatin 80 milliGRAM(s) Oral at bedtime  divalproex  milliGRAM(s) Oral three times a day  gabapentin 100 milliGRAM(s) Oral three times a day  lidocaine   4% Patch 1 Patch Transdermal every 24 hours  metoprolol tartrate 25 milliGRAM(s) Oral two times a day  QUEtiapine 100 milliGRAM(s) Oral at bedtime  traZODone 50 milliGRAM(s) Oral at bedtime    MEDICATIONS  (PRN):  acetaminophen     Tablet .. 650 milliGRAM(s) Oral every 6 hours PRN Mild Pain (1 - 3), Moderate Pain (4 - 6)  melatonin 3 milliGRAM(s) Oral at bedtime PRN Insomnia  oxyCODONE    IR 2.5 milliGRAM(s) Oral every 6 hours PRN Moderate Pain (4 - 6)      Allergies    No Known Allergies    Intolerances        T(F): 99.1 (07-25-23 @ 11:15), Max: 100.1 (07-24-23 @ 23:40)  HR: 127 (07-25-23 @ 11:15) (115 - 127)  BP: 112/80 (07-25-23 @ 11:15) (112/80 - 158/94)  RR: 16 (07-25-23 @ 11:15) (16 - 18)  SpO2: 96% (07-25-23 @ 11:15) (96% - 97%)  Wt(kg): --    PHYSICAL EXAM:  GENERAL: NAD  HEAD:  Atraumatic, Normocephalic  EYES: PERRLA, conjunctiva and sclera clear  ENMT: Moist mucous membranes  NECK: Supple, No JVD, Normal thyroid  NERVOUS SYSTEM:  Alert & Awake  CHEST/LUNG: Clear to percussion bilaterally;   HEART: Regular rate and rhythm;   ABDOMEN: Soft, Nontender, Nondistended; Bowel sounds present  EXTREMITIES:  no edema BL LE  SKIN: moist    LABS:                        12.3   9.66  )-----------( 183      ( 24 Jul 2023 06:33 )             37.6     07-25    134<L>  |  96  |  20  ----------------------------<  106<H>  4.3   |  31  |  1.25    Ca    8.2<L>      25 Jul 2023 06:38        Urinalysis Basic - ( 25 Jul 2023 06:38 )    Color: x / Appearance: x / SG: x / pH: x  Gluc: 106 mg/dL / Ketone: x  / Bili: x / Urobili: x   Blood: x / Protein: x / Nitrite: x   Leuk Esterase: x / RBC: x / WBC x   Sq Epi: x / Non Sq Epi: x / Bacteria: x      Cultures;   CAPILLARY BLOOD GLUCOSE        Lipid panel:     CARDIAC MARKERS ( 25 Jul 2023 06:38 )  x     / x     / 522 U/L / x     / x            RADIOLOGY & ADDITIONAL TESTS:    Imaging Personally Reviewed:  [x ] YES      Consultant(s) Notes Reviewed:  [x ] YES     Care Discussed with [x ] Consultants [X ] Patient [ ] Family  [x ]    [x ]  Other; RN

## 2023-07-25 NOTE — PHYSICAL THERAPY INITIAL EVALUATION ADULT - ADDITIONAL COMMENTS
as per daughter, pt lives at home with her, they have a ramp access to access 1st floor where he lives. pt was walking with supervision with no AD. pt's daughter and her granddaughter take care of him.
as per daughter, pt lives at home with her, they have a ramp access to access 1st floor where he lives. pt was walking with supervision with no AD. pt's daughter and her granddaughter take care of him.

## 2023-07-25 NOTE — PROGRESS NOTE ADULT - ASSESSMENT
68 years old male with h/o HTN, HLD, TBI resulting in dementia present to ED ? fall. As per daughter, patient was found on the floor. Patient is a poor history ( per daughter, patient will make up different history) . Patient reported he felt weak and fell onto the floor.  ER course with tachycardia, sat well at RA. EKG with sinus tachy. No leukocytosis, plt 129, K 6, Cr 2.82, glucose 110, . CXR with no focal consolidation. CT head  with no acute pathology. C/L spine with no fracture. CT pelsis with no acute displace fracture. Questionable mild widening of the bilateral sacroiliac joints with subchondral sclerosis and questionable erosions along the iliac side, right greater than left. Patient admitted for possible UTI and rhabdomylosis.    Moderate protein-calorie malnutrition. functional quadriplegia, debility  Diet Easy to Chew-  DASH/TLC {Sodium & Cholesterol Restricted}  Ensure Plus High Protein Cans or Servings Per Day:  1       Frequency:  Daily    VIKAS (acute kidney injury).   ·  Plan: Cr 2.82, likely VIKAS, recent decrease in oral intake : now 1.6  TSH noted  IV fluid,    Hyperkalemia. : K 6, no EKG changes. Likely in the setting of VIKAS, rhabdo, ACEI use  Hold ACEI.    Rhabdomyolysis. : Likely due to fall  IV fluid  Monitor CK level and renal function.    Impaired ambulation.   ·  Plan: CT head  ( I personally review) with no acute pathology. C/L spine with no fracture. CT pelsis with no acute displace fracture. Questionable mild widening of the bilateral sacroiliac joints with subchondral sclerosis and questionable erosions along the iliac side, right greater than left  PT consult : rehab.    Dementia. : due to TBI  continue divalproex 250mg DR tid, quetiapine 100mg hs, trazodone 50mg hs.    Benign essential HTN.   ·  Plan: monitor BP and titrate BP med  ACEI on hold due to VIKAS.    Hyperlipidemia: continue statin.

## 2023-07-25 NOTE — CHART NOTE - NSCHARTNOTEFT_GEN_A_CORE
Per chart pt with PMH: HTN, HLD, TBI resulting in dementia, presents s/p ?fall, found with VIKAS and rhabdomyolysis. Discharge planning for [facility]. Pt awake in bed being tended to by RN at time of visit.      Factors impacting intake: [x] none [ ] nausea  [ ] vomiting [ ] diarrhea [ ] constipation  [ ]chewing problems [ ] swallowing issues  [ ] other:     Diet Prescription: Diet, Minced and Moist (07-18-23 @ 09:58) [Active]    Intake: % as per flow sheets    Current Weight: (07/20) 65.2kg  % Weight Change: no recent weights to trend    No noted edema as per flow sheets.     Pertinent Medications: MEDICATIONS  (STANDING):  atorvastatin 80 milliGRAM(s) Oral at bedtime  divalproex  milliGRAM(s) Oral three times a day  gabapentin 100 milliGRAM(s) Oral three times a day  lidocaine   4% Patch 1 Patch Transdermal every 24 hours  metoprolol tartrate 25 milliGRAM(s) Oral two times a day  oxyCODONE  ER Tablet 10 milliGRAM(s) Oral every 12 hours  QUEtiapine 100 milliGRAM(s) Oral at bedtime  traZODone 50 milliGRAM(s) Oral at bedtime    MEDICATIONS  (PRN):  acetaminophen     Tablet .. 650 milliGRAM(s) Oral every 6 hours PRN Mild Pain (1 - 3), Moderate Pain (4 - 6)  melatonin 3 milliGRAM(s) Oral at bedtime PRN Insomnia  oxyCODONE    IR 5 milliGRAM(s) Oral every 4 hours PRN Moderate Pain (4 - 6)  oxyCODONE    IR 10 milliGRAM(s) Oral every 4 hours PRN Severe Pain (7 - 10)    Pertinent Labs: 07-25 Na134 mmol/L<L> Glu 106 mg/dL<H> K+ 4.3 mmol/L Cr  1.25 mg/dL BUN 20 mg/dL 07-19 Alb 2.2 g/dL<L>      Skin: stage II right buttocks pressure injury as per flow sheets    Estimated Needs:   [x] no change since previous assessment: 07/17  [ ] recalculated:     Previous Nutrition Diagnosis:   [x] Malnutrition	Moderate Malnutrition in context of chronic illness  Etiology	Inadequate energy/protein intake related to TBI resulting in dementia  Signs/Symptoms	Physical findings of mild fat depletion & muscle wasting as noted 07/17  Goal/Expected Outcome	Pt to consume >50% meals/supplements during LOS (not consistently met)      Nutrition Diagnosis is [x] ongoing  [ ] resolved [ ] not applicable     New Nutrition Diagnosis: [x] not applicable      Interventions:   Recommend  [x] Continue current diet as ordered  [ ] Change Diet To:  [x] Nutrition Supplement: Add Ensure Plus High Protein x 2/day (provides 700 kcal, 40 g protein)   [ ] Nutrition Support  [x] Other: Continue to provide assistance and encouragement with PO intake     Monitoring and Evaluation:   [ ] PO intake [ x ] Tolerance to diet prescription [ x ] weights [ x ] labs[ x ] follow up per protocol  [ ] other:

## 2023-07-25 NOTE — PHYSICAL THERAPY INITIAL EVALUATION ADULT - PLANNED THERAPY INTERVENTIONS, PT EVAL
bed mobility training/strengthening
balance training/bed mobility training/gait training/strengthening/transfer training

## 2023-07-26 LAB
ALBUMIN SERPL ELPH-MCNC: 1.6 G/DL — LOW (ref 3.3–5)
ALP SERPL-CCNC: 56 U/L — SIGNIFICANT CHANGE UP (ref 40–120)
ALT FLD-CCNC: 34 U/L — SIGNIFICANT CHANGE UP (ref 12–78)
ANION GAP SERPL CALC-SCNC: 4 MMOL/L — LOW (ref 5–17)
ANISOCYTOSIS BLD QL: SLIGHT — SIGNIFICANT CHANGE UP
APPEARANCE UR: CLEAR — SIGNIFICANT CHANGE UP
AST SERPL-CCNC: 60 U/L — HIGH (ref 15–37)
BACTERIA # UR AUTO: ABNORMAL
BASOPHILS # BLD AUTO: 0 K/UL — SIGNIFICANT CHANGE UP (ref 0–0.2)
BASOPHILS NFR BLD AUTO: 0 % — SIGNIFICANT CHANGE UP (ref 0–2)
BILIRUB SERPL-MCNC: 0.4 MG/DL — SIGNIFICANT CHANGE UP (ref 0.2–1.2)
BILIRUB UR-MCNC: NEGATIVE — SIGNIFICANT CHANGE UP
BUN SERPL-MCNC: 32 MG/DL — HIGH (ref 7–23)
CALCIUM SERPL-MCNC: 8.1 MG/DL — LOW (ref 8.5–10.1)
CHLORIDE SERPL-SCNC: 97 MMOL/L — SIGNIFICANT CHANGE UP (ref 96–108)
CO2 SERPL-SCNC: 34 MMOL/L — HIGH (ref 22–31)
COLOR SPEC: YELLOW — SIGNIFICANT CHANGE UP
CREAT SERPL-MCNC: 1.53 MG/DL — HIGH (ref 0.5–1.3)
DIFF PNL FLD: ABNORMAL
EGFR: 49 ML/MIN/1.73M2 — LOW
EOSINOPHIL # BLD AUTO: 0 K/UL — SIGNIFICANT CHANGE UP (ref 0–0.5)
EOSINOPHIL NFR BLD AUTO: 0 % — SIGNIFICANT CHANGE UP (ref 0–6)
EPI CELLS # UR: NEGATIVE — SIGNIFICANT CHANGE UP
GLUCOSE SERPL-MCNC: 101 MG/DL — HIGH (ref 70–99)
GLUCOSE UR QL: NEGATIVE MG/DL — SIGNIFICANT CHANGE UP
HCT VFR BLD CALC: 33.7 % — LOW (ref 39–50)
HGB BLD-MCNC: 11.2 G/DL — LOW (ref 13–17)
KETONES UR-MCNC: NEGATIVE — SIGNIFICANT CHANGE UP
LEUKOCYTE ESTERASE UR-ACNC: NEGATIVE — SIGNIFICANT CHANGE UP
LYMPHOCYTES # BLD AUTO: 15 % — SIGNIFICANT CHANGE UP (ref 13–44)
LYMPHOCYTES # BLD AUTO: 2.49 K/UL — SIGNIFICANT CHANGE UP (ref 1–3.3)
MANUAL SMEAR VERIFICATION: SIGNIFICANT CHANGE UP
MCHC RBC-ENTMCNC: 30.8 PG — SIGNIFICANT CHANGE UP (ref 27–34)
MCHC RBC-ENTMCNC: 33.2 G/DL — SIGNIFICANT CHANGE UP (ref 32–36)
MCV RBC AUTO: 92.6 FL — SIGNIFICANT CHANGE UP (ref 80–100)
MONOCYTES # BLD AUTO: 2.66 K/UL — HIGH (ref 0–0.9)
MONOCYTES NFR BLD AUTO: 16 % — HIGH (ref 2–14)
NEUTROPHILS # BLD AUTO: 11.31 K/UL — HIGH (ref 1.8–7.4)
NEUTROPHILS NFR BLD AUTO: 58 % — SIGNIFICANT CHANGE UP (ref 43–77)
NEUTS BAND # BLD: 10 % — HIGH (ref 0–8)
NITRITE UR-MCNC: NEGATIVE — SIGNIFICANT CHANGE UP
NRBC # BLD: 1 /100 — HIGH (ref 0–0)
NRBC # BLD: SIGNIFICANT CHANGE UP /100 WBCS (ref 0–0)
PH UR: 5 — SIGNIFICANT CHANGE UP (ref 5–8)
PLAT MORPH BLD: NORMAL — SIGNIFICANT CHANGE UP
PLATELET # BLD AUTO: 185 K/UL — SIGNIFICANT CHANGE UP (ref 150–400)
POTASSIUM SERPL-MCNC: 5 MMOL/L — SIGNIFICANT CHANGE UP (ref 3.5–5.3)
POTASSIUM SERPL-SCNC: 5 MMOL/L — SIGNIFICANT CHANGE UP (ref 3.5–5.3)
PROT SERPL-MCNC: 5.3 GM/DL — LOW (ref 6–8.3)
PROT UR-MCNC: 30 MG/DL
RBC # BLD: 3.64 M/UL — LOW (ref 4.2–5.8)
RBC # FLD: 13.3 % — SIGNIFICANT CHANGE UP (ref 10.3–14.5)
RBC BLD AUTO: ABNORMAL
RBC CASTS # UR COMP ASSIST: NEGATIVE /HPF — SIGNIFICANT CHANGE UP (ref 0–4)
SODIUM SERPL-SCNC: 135 MMOL/L — SIGNIFICANT CHANGE UP (ref 135–145)
SP GR SPEC: 1.01 — SIGNIFICANT CHANGE UP (ref 1.01–1.02)
UROBILINOGEN FLD QL: NEGATIVE MG/DL — SIGNIFICANT CHANGE UP
VARIANT LYMPHS # BLD: 1 % — SIGNIFICANT CHANGE UP (ref 0–6)
WBC # BLD: 16.63 K/UL — HIGH (ref 3.8–10.5)
WBC # FLD AUTO: 16.63 K/UL — HIGH (ref 3.8–10.5)
WBC UR QL: NEGATIVE — SIGNIFICANT CHANGE UP

## 2023-07-26 PROCEDURE — 99233 SBSQ HOSP IP/OBS HIGH 50: CPT

## 2023-07-26 PROCEDURE — 71260 CT THORAX DX C+: CPT | Mod: 26

## 2023-07-26 PROCEDURE — 93970 EXTREMITY STUDY: CPT | Mod: 26

## 2023-07-26 PROCEDURE — 74177 CT ABD & PELVIS W/CONTRAST: CPT | Mod: 26

## 2023-07-26 RX ORDER — PIPERACILLIN AND TAZOBACTAM 4; .5 G/20ML; G/20ML
3.38 INJECTION, POWDER, LYOPHILIZED, FOR SOLUTION INTRAVENOUS EVERY 8 HOURS
Refills: 0 | Status: COMPLETED | OUTPATIENT
Start: 2023-07-26 | End: 2023-08-02

## 2023-07-26 RX ORDER — ENOXAPARIN SODIUM 100 MG/ML
60 INJECTION SUBCUTANEOUS EVERY 12 HOURS
Refills: 0 | Status: DISCONTINUED | OUTPATIENT
Start: 2023-07-26 | End: 2023-08-22

## 2023-07-26 RX ORDER — SODIUM CHLORIDE 9 MG/ML
1000 INJECTION, SOLUTION INTRAVENOUS
Refills: 0 | Status: DISCONTINUED | OUTPATIENT
Start: 2023-07-26 | End: 2023-07-27

## 2023-07-26 RX ORDER — PIPERACILLIN AND TAZOBACTAM 4; .5 G/20ML; G/20ML
3.38 INJECTION, POWDER, LYOPHILIZED, FOR SOLUTION INTRAVENOUS ONCE
Refills: 0 | Status: COMPLETED | OUTPATIENT
Start: 2023-07-26 | End: 2023-07-26

## 2023-07-26 RX ORDER — ACETAMINOPHEN 500 MG
1000 TABLET ORAL ONCE
Refills: 0 | Status: COMPLETED | OUTPATIENT
Start: 2023-07-26 | End: 2023-07-26

## 2023-07-26 RX ORDER — ENOXAPARIN SODIUM 100 MG/ML
40 INJECTION SUBCUTANEOUS EVERY 24 HOURS
Refills: 0 | Status: DISCONTINUED | OUTPATIENT
Start: 2023-07-26 | End: 2023-07-26

## 2023-07-26 RX ADMIN — Medication 50 MILLIGRAM(S): at 22:08

## 2023-07-26 RX ADMIN — OXYCODONE HYDROCHLORIDE 10 MILLIGRAM(S): 5 TABLET ORAL at 00:40

## 2023-07-26 RX ADMIN — Medication 400 MILLIGRAM(S): at 23:47

## 2023-07-26 RX ADMIN — OXYCODONE HYDROCHLORIDE 10 MILLIGRAM(S): 5 TABLET ORAL at 00:04

## 2023-07-26 RX ADMIN — SODIUM CHLORIDE 80 MILLILITER(S): 9 INJECTION, SOLUTION INTRAVENOUS at 13:32

## 2023-07-26 RX ADMIN — Medication 25 MILLIGRAM(S): at 18:50

## 2023-07-26 RX ADMIN — LIDOCAINE 1 PATCH: 4 CREAM TOPICAL at 16:22

## 2023-07-26 RX ADMIN — Medication 400 MILLIGRAM(S): at 13:37

## 2023-07-26 RX ADMIN — ATORVASTATIN CALCIUM 80 MILLIGRAM(S): 80 TABLET, FILM COATED ORAL at 22:08

## 2023-07-26 RX ADMIN — OXYCODONE HYDROCHLORIDE 10 MILLIGRAM(S): 5 TABLET ORAL at 06:28

## 2023-07-26 RX ADMIN — OXYCODONE HYDROCHLORIDE 10 MILLIGRAM(S): 5 TABLET ORAL at 06:17

## 2023-07-26 RX ADMIN — PIPERACILLIN AND TAZOBACTAM 25 GRAM(S): 4; .5 INJECTION, POWDER, LYOPHILIZED, FOR SOLUTION INTRAVENOUS at 16:21

## 2023-07-26 RX ADMIN — QUETIAPINE FUMARATE 100 MILLIGRAM(S): 200 TABLET, FILM COATED ORAL at 22:08

## 2023-07-26 RX ADMIN — SODIUM CHLORIDE 80 MILLILITER(S): 9 INJECTION, SOLUTION INTRAVENOUS at 19:32

## 2023-07-26 RX ADMIN — ENOXAPARIN SODIUM 60 MILLIGRAM(S): 100 INJECTION SUBCUTANEOUS at 18:50

## 2023-07-26 RX ADMIN — PIPERACILLIN AND TAZOBACTAM 200 GRAM(S): 4; .5 INJECTION, POWDER, LYOPHILIZED, FOR SOLUTION INTRAVENOUS at 11:06

## 2023-07-26 RX ADMIN — DIVALPROEX SODIUM 250 MILLIGRAM(S): 500 TABLET, DELAYED RELEASE ORAL at 06:17

## 2023-07-26 RX ADMIN — ENOXAPARIN SODIUM 40 MILLIGRAM(S): 100 INJECTION SUBCUTANEOUS at 11:07

## 2023-07-26 RX ADMIN — DIVALPROEX SODIUM 250 MILLIGRAM(S): 500 TABLET, DELAYED RELEASE ORAL at 22:08

## 2023-07-26 RX ADMIN — GABAPENTIN 100 MILLIGRAM(S): 400 CAPSULE ORAL at 06:19

## 2023-07-26 RX ADMIN — OXYCODONE HYDROCHLORIDE 10 MILLIGRAM(S): 5 TABLET ORAL at 18:50

## 2023-07-26 RX ADMIN — Medication 1000 MILLIGRAM(S): at 14:30

## 2023-07-26 NOTE — PROGRESS NOTE ADULT - SUBJECTIVE AND OBJECTIVE BOX
Patient is a 68y old  Male who presents with s/p fall     PAST MEDICAL & SURGICAL HISTORY:  TBI (traumatic brain injury)    HLD (hyperlipidemia)    HTN    INTERVAL HISTORY: in no acute distress   	  MEDICATIONS:  MEDICATIONS  (STANDING):  atorvastatin 80 milliGRAM(s) Oral at bedtime  divalproex  milliGRAM(s) Oral three times a day  enoxaparin Injectable 40 milliGRAM(s) SubCutaneous every 24 hours  lidocaine   4% Patch 1 Patch Transdermal every 24 hours  metoprolol tartrate 25 milliGRAM(s) Oral two times a day  oxyCODONE  ER Tablet 10 milliGRAM(s) Oral every 12 hours  piperacillin/tazobactam IVPB.- 3.375 Gram(s) IV Intermittent once  piperacillin/tazobactam IVPB.. 3.375 Gram(s) IV Intermittent every 8 hours  QUEtiapine 100 milliGRAM(s) Oral at bedtime  sodium chloride 0.45%. 1000 milliLiter(s) (80 mL/Hr) IV Continuous <Continuous>  traZODone 50 milliGRAM(s) Oral at bedtime    MEDICATIONS  (PRN):  acetaminophen     Tablet .. 650 milliGRAM(s) Oral every 6 hours PRN Mild Pain (1 - 3), Moderate Pain (4 - 6)  melatonin 3 milliGRAM(s) Oral at bedtime PRN Insomnia  oxyCODONE    IR 10 milliGRAM(s) Oral every 4 hours PRN Severe Pain (7 - 10)  oxyCODONE    IR 5 milliGRAM(s) Oral every 4 hours PRN Moderate Pain (4 - 6)    Vitals:  T(F): 101.3 (07-26-23 @ 13:18), Max: 101.3 (07-26-23 @ 13:18)  HR: 136 (07-26-23 @ 13:18) (85 - 150)  BP: 101/69 (07-26-23 @ 13:18) (101/69 - 128/85)  RR: 136 (07-26-23 @ 13:18) (18 - 136)  SpO2: 98% (07-26-23 @ 13:18) (94% - 100%)    PHYSICAL EXAM:  Neuro: Awake, responsive  CV: S1 S2 RRR  Lungs: CTABL  GI: Soft, BS +, ND, NT  Extremities: No edema    TELEMETRY: sinus tachycardia     RADIOLOGY: < from: CT Abdomen and Pelvis w/ IV Cont (07.26.23 @ 12:26) >  CHEST:  LUNGS AND LARGE AIRWAYS: Patent central airways. There is new mild linear   and platelike atelectasis in the lower lobes. There is mucous plugging in   the bronchus intermedius and right lower lobe segmental airways.. No   confluent airspace opacity is identified.  PLEURA: No pleural effusion.  VESSELS: Within normal limits.  HEART: Heart size is normal. No pericardial effusion.  MEDIASTINUM AND TEJAL: No lymphadenopathy.  CHEST WALL AND LOWER NECK: Within normal limits.    ABDOMEN AND PELVIS:  LIVER: Within normal limits.  BILE DUCTS: Normal caliber.  GALLBLADDER: Within normal limits.  SPLEEN: Within normal limits.  PANCREAS: Within normal limits.  ADRENALS: Within normal limits.  KIDNEYS/URETERS: Within normal limits.    BLADDER: Within normal limits.  REPRODUCTIVE ORGANS: The prostate is prominent.    BOWEL: Scattered cecal diverticula. No bowel obstruction. Appendix is   normal.  PERITONEUM: No ascites.  VESSELS: Moderate atherosclerotic disease of the nonaneurysmal abdominal   aorta.  RETROPERITONEUM/LYMPH NODES: No lymphadenopathy. Minimal fluid   infiltration of the presacral space.  ABDOMINAL WALL: Within normal limits.  BONES: Within normal limits.    IMPRESSION: New right lower lobe mucus plugging. New bilateral lower lobe   linear and platelike atelectasis.    < end of copied text >    LABS:	 	    25 Jul 2023 06:38    134    |  96     |  20     ----------------------------<  106    4.3     |  31     |  1.25   24 Jul 2023 06:33    140    |  103    |  16     ----------------------------<  102    4.2     |  34     |  1.11     Ca    8.2        25 Jul 2023 06:38                        12.3   9.66  )-----------( 183      ( 24 Jul 2023 06:33 )             37.6                  Patient is a 68y old  Male who presents with s/p fall     PAST MEDICAL & SURGICAL HISTORY:  TBI (traumatic brain injury)    HLD (hyperlipidemia)    HTN    INTERVAL HISTORY: in no acute distress   	  MEDICATIONS:  MEDICATIONS  (STANDING):  atorvastatin 80 milliGRAM(s) Oral at bedtime  divalproex  milliGRAM(s) Oral three times a day  enoxaparin Injectable 40 milliGRAM(s) SubCutaneous every 24 hours  lidocaine   4% Patch 1 Patch Transdermal every 24 hours  metoprolol tartrate 25 milliGRAM(s) Oral two times a day  oxyCODONE  ER Tablet 10 milliGRAM(s) Oral every 12 hours  piperacillin/tazobactam IVPB.- 3.375 Gram(s) IV Intermittent once  piperacillin/tazobactam IVPB.. 3.375 Gram(s) IV Intermittent every 8 hours  QUEtiapine 100 milliGRAM(s) Oral at bedtime  sodium chloride 0.45%. 1000 milliLiter(s) (80 mL/Hr) IV Continuous <Continuous>  traZODone 50 milliGRAM(s) Oral at bedtime    MEDICATIONS  (PRN):  acetaminophen     Tablet .. 650 milliGRAM(s) Oral every 6 hours PRN Mild Pain (1 - 3), Moderate Pain (4 - 6)  melatonin 3 milliGRAM(s) Oral at bedtime PRN Insomnia  oxyCODONE    IR 10 milliGRAM(s) Oral every 4 hours PRN Severe Pain (7 - 10)  oxyCODONE    IR 5 milliGRAM(s) Oral every 4 hours PRN Moderate Pain (4 - 6)    Vitals:  T(F): 101.3 (07-26-23 @ 13:18), Max: 101.3 (07-26-23 @ 13:18)  HR: 136 (07-26-23 @ 13:18) (85 - 150)  BP: 101/69 (07-26-23 @ 13:18) (101/69 - 128/85)  RR: 136 (07-26-23 @ 13:18) (18 - 136)  SpO2: 98% (07-26-23 @ 13:18) (94% - 100%)    PHYSICAL EXAM:  Neuro: lethargic  CV: S1 S2 RRR  Lungs: diminished to bases   GI: Soft, BS +, ND, NT  Extremities: No edema    TELEMETRY: sinus tachycardia     RADIOLOGY: < from: CT Abdomen and Pelvis w/ IV Cont (07.26.23 @ 12:26) >  CHEST:  LUNGS AND LARGE AIRWAYS: Patent central airways. There is new mild linear   and platelike atelectasis in the lower lobes. There is mucous plugging in   the bronchus intermedius and right lower lobe segmental airways.. No   confluent airspace opacity is identified.  PLEURA: No pleural effusion.  VESSELS: Within normal limits.  HEART: Heart size is normal. No pericardial effusion.  MEDIASTINUM AND TEJAL: No lymphadenopathy.  CHEST WALL AND LOWER NECK: Within normal limits.    ABDOMEN AND PELVIS:  LIVER: Within normal limits.  BILE DUCTS: Normal caliber.  GALLBLADDER: Within normal limits.  SPLEEN: Within normal limits.  PANCREAS: Within normal limits.  ADRENALS: Within normal limits.  KIDNEYS/URETERS: Within normal limits.    BLADDER: Within normal limits.  REPRODUCTIVE ORGANS: The prostate is prominent.    BOWEL: Scattered cecal diverticula. No bowel obstruction. Appendix is   normal.  PERITONEUM: No ascites.  VESSELS: Moderate atherosclerotic disease of the nonaneurysmal abdominal   aorta.  RETROPERITONEUM/LYMPH NODES: No lymphadenopathy. Minimal fluid   infiltration of the presacral space.  ABDOMINAL WALL: Within normal limits.  BONES: Within normal limits.    IMPRESSION: New right lower lobe mucus plugging. New bilateral lower lobe   linear and platelike atelectasis.    < end of copied text >    < from: US Duplex Venous Lower Ext Complete, Bilateral (07.26.23 @ 12:14) >  IMPRESSION: There is acute occlusive DVT affecting the right femoral vein   with the proximal extent of the thrombus in the right common femoral vein.    < end of copied text >    LABS:	 	    25 Jul 2023 06:38    134    |  96     |  20     ----------------------------<  106    4.3     |  31     |  1.25   24 Jul 2023 06:33    140    |  103    |  16     ----------------------------<  102    4.2     |  34     |  1.11     Ca    8.2        25 Jul 2023 06:38                        12.3   9.66  )-----------( 183      ( 24 Jul 2023 06:33 )             37.6                  Patient is a 68y old  Male who presents with s/p fall     PAST MEDICAL & SURGICAL HISTORY:  TBI (traumatic brain injury)    HLD (hyperlipidemia)    HTN    INTERVAL HISTORY: in no acute distress, lethargic   	  MEDICATIONS:  MEDICATIONS  (STANDING):  atorvastatin 80 milliGRAM(s) Oral at bedtime  divalproex  milliGRAM(s) Oral three times a day  enoxaparin Injectable 40 milliGRAM(s) SubCutaneous every 24 hours  lidocaine   4% Patch 1 Patch Transdermal every 24 hours  metoprolol tartrate 25 milliGRAM(s) Oral two times a day  oxyCODONE  ER Tablet 10 milliGRAM(s) Oral every 12 hours  piperacillin/tazobactam IVPB.- 3.375 Gram(s) IV Intermittent once  piperacillin/tazobactam IVPB.. 3.375 Gram(s) IV Intermittent every 8 hours  QUEtiapine 100 milliGRAM(s) Oral at bedtime  sodium chloride 0.45%. 1000 milliLiter(s) (80 mL/Hr) IV Continuous <Continuous>  traZODone 50 milliGRAM(s) Oral at bedtime    MEDICATIONS  (PRN):  acetaminophen     Tablet .. 650 milliGRAM(s) Oral every 6 hours PRN Mild Pain (1 - 3), Moderate Pain (4 - 6)  melatonin 3 milliGRAM(s) Oral at bedtime PRN Insomnia  oxyCODONE    IR 10 milliGRAM(s) Oral every 4 hours PRN Severe Pain (7 - 10)  oxyCODONE    IR 5 milliGRAM(s) Oral every 4 hours PRN Moderate Pain (4 - 6)    Vitals:  T(F): 101.3 (07-26-23 @ 13:18), Max: 101.3 (07-26-23 @ 13:18)  HR: 136 (07-26-23 @ 13:18) (85 - 150)  BP: 101/69 (07-26-23 @ 13:18) (101/69 - 128/85)  RR: 136 (07-26-23 @ 13:18) (18 - 136)  SpO2: 98% (07-26-23 @ 13:18) (94% - 100%)    PHYSICAL EXAM:  Neuro: lethargic  CV: S1 S2 RRR  Lungs: diminished to bases   GI: Soft, BS +, ND, NT  Extremities: No edema    TELEMETRY: sinus tachycardia     RADIOLOGY: < from: CT Abdomen and Pelvis w/ IV Cont (07.26.23 @ 12:26) >  CHEST:  LUNGS AND LARGE AIRWAYS: Patent central airways. There is new mild linear   and platelike atelectasis in the lower lobes. There is mucous plugging in   the bronchus intermedius and right lower lobe segmental airways.. No   confluent airspace opacity is identified.  PLEURA: No pleural effusion.  VESSELS: Within normal limits.  HEART: Heart size is normal. No pericardial effusion.  MEDIASTINUM AND TEJAL: No lymphadenopathy.  CHEST WALL AND LOWER NECK: Within normal limits.    ABDOMEN AND PELVIS:  LIVER: Within normal limits.  BILE DUCTS: Normal caliber.  GALLBLADDER: Within normal limits.  SPLEEN: Within normal limits.  PANCREAS: Within normal limits.  ADRENALS: Within normal limits.  KIDNEYS/URETERS: Within normal limits.    BLADDER: Within normal limits.  REPRODUCTIVE ORGANS: The prostate is prominent.    BOWEL: Scattered cecal diverticula. No bowel obstruction. Appendix is   normal.  PERITONEUM: No ascites.  VESSELS: Moderate atherosclerotic disease of the nonaneurysmal abdominal   aorta.  RETROPERITONEUM/LYMPH NODES: No lymphadenopathy. Minimal fluid   infiltration of the presacral space.  ABDOMINAL WALL: Within normal limits.  BONES: Within normal limits.    IMPRESSION: New right lower lobe mucus plugging. New bilateral lower lobe   linear and platelike atelectasis.    < end of copied text >    < from: US Duplex Venous Lower Ext Complete, Bilateral (07.26.23 @ 12:14) >  IMPRESSION: There is acute occlusive DVT affecting the right femoral vein   with the proximal extent of the thrombus in the right common femoral vein.    < end of copied text >    LABS:	 	    25 Jul 2023 06:38    134    |  96     |  20     ----------------------------<  106    4.3     |  31     |  1.25   24 Jul 2023 06:33    140    |  103    |  16     ----------------------------<  102    4.2     |  34     |  1.11     Ca    8.2        25 Jul 2023 06:38                        12.3   9.66  )-----------( 183      ( 24 Jul 2023 06:33 )             37.6                  normal (ped)...

## 2023-07-26 NOTE — PROGRESS NOTE ADULT - ASSESSMENT
68 years old male with h/o HTN, HLD, TBI resulting in dementia present to ED ? fall. As per daughter, patient was found on the floor. Patient is a poor history ( per daughter, patient will make up different history) . Patient reported he felt weak and fell onto the floor.  ER course with tachycardia, sat well at RA. EKG with sinus tachy. No leukocytosis, plt 129, K 6, Cr 2.82, glucose 110, . CXR with no focal consolidation. CT head  with no acute pathology. C/L spine with no fracture. CT pelsis with no acute displace fracture. Questionable mild widening of the bilateral sacroiliac joints with subchondral sclerosis and questionable erosions along the iliac side, right greater than left. Patient admitted for possible UTI and rhabdomylosis.    Low grade fever- likely aspiration pna; will obtain CT C/A/P, ID, BCX, UCX, IV Zosyn. monitor vitals    VIKAS (acute kidney injury).   ·  Plan: Cr 2.82, likely VIKAS, recent decrease in oral intake : now 1.6  TSH noted  IV fluid,    Hyperkalemia. : K 6, no EKG changes. Likely in the setting of VIKAS, rhabdo, ACEI use. Hold ACEI.    Rhabdomyolysis. : Likely due to fall  IV fluid  Monitor CK level and renal function.    Sinus tachycardia - per cardio -may be his baseline in setting od autonomic dysfunction in setting on TBI    Functional quadriplegia- seen by neuro, d/c gabapentin     Moderate protein-calorie malnutrition. functional quadriplegia, debility  Diet Easy to Chew-  DASH/TLC {Sodium & Cholesterol Restricted}  Ensure Plus High Protein Cans or Servings Per Day:  1       Frequency:  Daily    Impaired ambulation.   ·  Plan: CT head  ( I personally review) with no acute pathology. C/L spine with no fracture. CT pelsis with no acute displace fracture. Questionable mild widening of the bilateral sacroiliac joints with subchondral sclerosis and questionable erosions along the iliac side, right greater than left  PT consult : rehab.    Dementia. : due to TBI  continue divalproex 250mg DR tid, quetiapine 100mg hs, trazodone 50mg hs.    Benign essential HTN.   ·  Plan: monitor BP and titrate BP med  ACEI on hold due to VIKAS.    Hyperlipidemia: continue statin.    Dtr updated.

## 2023-07-26 NOTE — PROVIDER CONTACT NOTE (OTHER) - ASSESSMENT
Patient resting in bed unable to make needs known. Pt appears lethargic, respond to painful stimuli. Tachy on the monitor.

## 2023-07-26 NOTE — PROGRESS NOTE ADULT - ASSESSMENT
68M with TBI resulting in dementia p/w unresponsiveness, found on     sinus tachycardia  -pt has been tachycardic since admission  -may have some level of baseline tachycardia in setting od autonomic dysfunction in setting on TBI  -while rate control may improve vitals, it would likely mask any underlying pathology that may be ongoing  -would ideally treat the underlying cause, pt now with Temp of 101.2  -cont w/u per primary team, ABx  -pt does not appear to be in any distress, is not complaining of anything  -Tsh 4.4 with normal free T4 68M with TBI resulting in dementia p/w unresponsiveness, found on the floor     sinus tachycardia  -pt has been tachycardic since admission  -may have some level of baseline tachycardia in setting od autonomic dysfunction in setting on TBI  -while rate control may improve vitals, it would likely mask any underlying pathology that may be ongoing  -would ideally treat the underlying cause, pt now with fever, Leukocytosis   -cont w/u per primary team, ABx  -also found to have DVT, on full dose Lovenox now, ? r/o PE, ( VQ scan from 7/19 with very low probability for PE)  -Tsh 4.4 with normal free T4

## 2023-07-26 NOTE — PROGRESS NOTE ADULT - SUBJECTIVE AND OBJECTIVE BOX
Patient is a 68y old  Male who presents with a chief complaint of VIKAS, rhabdomyolysis (26 Jul 2023 11:13)      OVERNIGHT EVENTS:  Patients seen and examined at bedside this morning. No acute events overnight.    REVIEW OF SYSTEMS: poor hx    MEDICATIONS  (STANDING):  atorvastatin 80 milliGRAM(s) Oral at bedtime  divalproex  milliGRAM(s) Oral three times a day  enoxaparin Injectable 40 milliGRAM(s) SubCutaneous every 24 hours  gabapentin 100 milliGRAM(s) Oral three times a day  lidocaine   4% Patch 1 Patch Transdermal every 24 hours  metoprolol tartrate 25 milliGRAM(s) Oral two times a day  oxyCODONE  ER Tablet 10 milliGRAM(s) Oral every 12 hours  piperacillin/tazobactam IVPB.- 3.375 Gram(s) IV Intermittent once  piperacillin/tazobactam IVPB.. 3.375 Gram(s) IV Intermittent every 8 hours  QUEtiapine 100 milliGRAM(s) Oral at bedtime  traZODone 50 milliGRAM(s) Oral at bedtime    MEDICATIONS  (PRN):  acetaminophen     Tablet .. 650 milliGRAM(s) Oral every 6 hours PRN Mild Pain (1 - 3), Moderate Pain (4 - 6)  melatonin 3 milliGRAM(s) Oral at bedtime PRN Insomnia  oxyCODONE    IR 10 milliGRAM(s) Oral every 4 hours PRN Severe Pain (7 - 10)  oxyCODONE    IR 5 milliGRAM(s) Oral every 4 hours PRN Moderate Pain (4 - 6)      Allergies    No Known Allergies    Intolerances        T(F): 99.3 (07-26-23 @ 10:30), Max: 100.1 (07-25-23 @ 17:27)  HR: 150 (07-26-23 @ 10:30) (85 - 150)  BP: 128/85 (07-26-23 @ 10:30) (104/64 - 128/85)  RR: 18 (07-26-23 @ 10:30) (18 - 18)  SpO2: 94% (07-26-23 @ 10:30) (93% - 100%)  Wt(kg): --    PHYSICAL EXAM:  GENERAL: NAD  HEAD:  Atraumatic, Normocephalic  EYES: PERRLA, conjunctiva and sclera clear  ENMT: Moist mucous membranes  NECK: Supple, No JVD, Normal thyroid  NERVOUS SYSTEM:  Alert & Awake, Moves UEs spontanesously LEs atrophy some contracture  CHEST/LUNG: Clear to percussion bilaterally;   HEART: Regular rate and rhythm;   ABDOMEN: Soft, Nontender, Nondistended; Bowel sounds present  EXTREMITIES:  no edema BL LE  SKIN: moist    LABS:    07-25    134<L>  |  96  |  20  ----------------------------<  106<H>  4.3   |  31  |  1.25    Ca    8.2<L>      25 Jul 2023 06:38        Urinalysis Basic - ( 25 Jul 2023 06:38 )    Color: x / Appearance: x / SG: x / pH: x  Gluc: 106 mg/dL / Ketone: x  / Bili: x / Urobili: x   Blood: x / Protein: x / Nitrite: x   Leuk Esterase: x / RBC: x / WBC x   Sq Epi: x / Non Sq Epi: x / Bacteria: x      Cultures;   CAPILLARY BLOOD GLUCOSE        Lipid panel:     CARDIAC MARKERS ( 25 Jul 2023 06:38 )  x     / x     / 522 U/L / x     / x            RADIOLOGY & ADDITIONAL TESTS:    Imaging Personally Reviewed:  [x ] YES      Consultant(s) Notes Reviewed:  [x ] YES     Care Discussed with [x ] Consultants [X ] Patient [ ] Family  [x ]    [x ]  Other; RN

## 2023-07-26 NOTE — CONSULT NOTE ADULT - ASSESSMENT
delia 68 years old male with h/o HTN, HLD, TBI dementia here after a fall On exam lethargic orineted x 0 general weakness LE atrophy some hyperrefleia  CTH, C and L reviewed     Impression: generalized weakness. TME      limit polypharmacy  consider holding gabapentin  given limited study consider repeat CTH  outpt EMG  GOCs  -B12, folate, TSH   -In order to enhance patient's overall well-being and clinical course, please try avoiding benzodiazepines, anticholinergics, and antihistamines (Can cause worsening confusion/delirium). Additionally, continue reorientation, supportive care, maintaining regular sleep/wake cycle, and optimizing nutritional/medical factors.

## 2023-07-26 NOTE — CONSULT NOTE ADULT - SUBJECTIVE AND OBJECTIVE BOX
Neurology consult    ROJAS [id number]     Patient is a 68y old  Male who presents with a chief complaint of VIKAS, rhabdomyolysis (25 Jul 2023 14:26)      HPI:  Talked to patient's daughter Stacy Graves ( [phone] over the phone  68 years old male with h/o HTN, HLD, TBI resulting in dementia present to ED ? fall. As per daughter, patient was found on the floor. Patient is a poor history. Patient reported he felt weak and fell onto the floor. Denied any head trauma or LOC. No fever, chills. Patient denied any pain.  Slightly tachycardic, afebrile, sat well at RA. EKG with sinus tachy. No leukocytosis, plt 129, K 6, Cr 2.82, glucose 110, . CXR with no focal consolidation. CT head with no acute pathology. C/L spine with no fracture. CT pelsis with no acute displace fracture. Questionable mild widening of the bilateral sacroiliac joints with subchondral sclerosis and questionable erosions along the iliac side, right greater than left      SH: former smoker (14 Jul 2023 16:21)          MEDICATIONS    acetaminophen     Tablet .. 650 milliGRAM(s) Oral every 6 hours PRN  atorvastatin 80 milliGRAM(s) Oral at bedtime  divalproex  milliGRAM(s) Oral three times a day  enoxaparin Injectable 40 milliGRAM(s) SubCutaneous every 24 hours  gabapentin 100 milliGRAM(s) Oral three times a day  lidocaine   4% Patch 1 Patch Transdermal every 24 hours  melatonin 3 milliGRAM(s) Oral at bedtime PRN  metoprolol tartrate 25 milliGRAM(s) Oral two times a day  oxyCODONE    IR 5 milliGRAM(s) Oral every 4 hours PRN  oxyCODONE    IR 10 milliGRAM(s) Oral every 4 hours PRN  oxyCODONE  ER Tablet 10 milliGRAM(s) Oral every 12 hours  piperacillin/tazobactam IVPB.- 3.375 Gram(s) IV Intermittent once  piperacillin/tazobactam IVPB.. 3.375 Gram(s) IV Intermittent every 8 hours  QUEtiapine 100 milliGRAM(s) Oral at bedtime  traZODone 50 milliGRAM(s) Oral at bedtime      PMH: TBI (traumatic brain injury)    HLD (hyperlipidemia)    HTN, age 0-18         PSH:     Family history: No history of dementia, strokes, or seizures   FAMILY HISTORY:      SOCIAL HISTORY:  No history of tobacco or alcohol use     Allergies    No Known Allergies    Intolerances            Vital Signs Last 24 Hrs  T(C): 37.4 (26 Jul 2023 10:30), Max: 37.8 (25 Jul 2023 17:27)  T(F): 99.3 (26 Jul 2023 10:30), Max: 100.1 (25 Jul 2023 17:27)  HR: 150 (26 Jul 2023 10:30) (85 - 150)  BP: 128/85 (26 Jul 2023 10:30) (104/64 - 128/85)  BP(mean): --  RR: 18 (26 Jul 2023 10:30) (16 - 18)  SpO2: 94% (26 Jul 2023 10:30) (93% - 100%)    Parameters below as of 26 Jul 2023 10:30  Patient On (Oxygen Delivery Method): nasal cannula, 2L          On Neurological Examination:    Mental Status -lethargic opens eys does not follow commands   Cranial Nerves - PERRL, EOMI, VFF, V1-V3 intact, no gross facial asymmetry, tongue/uvula midline    Motor Exam -   Right upper  Left upper  Right lower  Left lower      nml bulk/tone    Sensory    Intact to light touch and pinprick bilaterally    Coord: FTN intact bilaterally     Gait -  normal      Reflexes UES hyperreflexia patellar trace Left ankle clonus at times right mute       LABS:    Urinalysis Basic - ( 25 Jul 2023 06:38 )    Color: x / Appearance: x / SG: x / pH: x  Gluc: 106 mg/dL / Ketone: x  / Bili: x / Urobili: x   Blood: x / Protein: x / Nitrite: x   Leuk Esterase: x / RBC: x / WBC x   Sq Epi: x / Non Sq Epi: x / Bacteria: x      07-25    134<L>  |  96  |  20  ----------------------------<  106<H>  4.3   |  31  |  1.25    Ca    8.2<L>      25 Jul 2023 06:38        Hemoglobin A1C:             RADIOLOGY  CT   MRI:                   Neurology consult    ROJAS [id number]     Patient is a 68y old  Male who presents with a chief complaint of VIKAS, rhabdomyolysis (25 Jul 2023 14:26)      HPI:  Talked to patient's daughter Stacy Graves ( [phone] over the phone  68 years old male with h/o HTN, HLD, TBI resulting in dementia present to ED ? fall. As per daughter, patient was found on the floor. Patient is a poor history. Patient reported he felt weak and fell onto the floor. Denied any head trauma or LOC. No fever, chills. Patient denied any pain.  Slightly tachycardic, afebrile, sat well at RA. EKG with sinus tachy. No leukocytosis, plt 129, K 6, Cr 2.82, glucose 110, . CXR with no focal consolidation. CT head with no acute pathology. C/L spine with no fracture. CT pelsis with no acute displace fracture. Questionable mild widening of the bilateral sacroiliac joints with subchondral sclerosis and questionable erosions along the iliac side, right greater than left      SH: former smoker (14 Jul 2023 16:21)          MEDICATIONS    acetaminophen     Tablet .. 650 milliGRAM(s) Oral every 6 hours PRN  atorvastatin 80 milliGRAM(s) Oral at bedtime  divalproex  milliGRAM(s) Oral three times a day  enoxaparin Injectable 40 milliGRAM(s) SubCutaneous every 24 hours  gabapentin 100 milliGRAM(s) Oral three times a day  lidocaine   4% Patch 1 Patch Transdermal every 24 hours  melatonin 3 milliGRAM(s) Oral at bedtime PRN  metoprolol tartrate 25 milliGRAM(s) Oral two times a day  oxyCODONE    IR 5 milliGRAM(s) Oral every 4 hours PRN  oxyCODONE    IR 10 milliGRAM(s) Oral every 4 hours PRN  oxyCODONE  ER Tablet 10 milliGRAM(s) Oral every 12 hours  piperacillin/tazobactam IVPB.- 3.375 Gram(s) IV Intermittent once  piperacillin/tazobactam IVPB.. 3.375 Gram(s) IV Intermittent every 8 hours  QUEtiapine 100 milliGRAM(s) Oral at bedtime  traZODone 50 milliGRAM(s) Oral at bedtime      PMH: TBI (traumatic brain injury)    HLD (hyperlipidemia)    HTN, age 0-18         PSH:     Family history: No history of dementia, strokes, or seizures   FAMILY HISTORY:      SOCIAL HISTORY:  No history of tobacco or alcohol use     Allergies    No Known Allergies    Intolerances            Vital Signs Last 24 Hrs  T(C): 37.4 (26 Jul 2023 10:30), Max: 37.8 (25 Jul 2023 17:27)  T(F): 99.3 (26 Jul 2023 10:30), Max: 100.1 (25 Jul 2023 17:27)  HR: 150 (26 Jul 2023 10:30) (85 - 150)  BP: 128/85 (26 Jul 2023 10:30) (104/64 - 128/85)  BP(mean): --  RR: 18 (26 Jul 2023 10:30) (16 - 18)  SpO2: 94% (26 Jul 2023 10:30) (93% - 100%)    Parameters below as of 26 Jul 2023 10:30  Patient On (Oxygen Delivery Method): nasal cannula, 2L          On Neurological Examination:    Mental Status -lethargic opens eys does not follow commands   Cranial Nerves - PERRL, EOMI, VFF, V1-V3 intact, no gross facial asymmetry,    Motor Exam -   Moves UEs spontanesously LEs atrophy some contracture    Sensory    Intact to light touch and pinprick bilaterally      Reflexes UES hyperreflexia patellar trace Left ankle clonus at times right mute       LABS:    Urinalysis Basic - ( 25 Jul 2023 06:38 )    Color: x / Appearance: x / SG: x / pH: x  Gluc: 106 mg/dL / Ketone: x  / Bili: x / Urobili: x   Blood: x / Protein: x / Nitrite: x   Leuk Esterase: x / RBC: x / WBC x   Sq Epi: x / Non Sq Epi: x / Bacteria: x      07-25    134<L>  |  96  |  20  ----------------------------<  106<H>  4.3   |  31  |  1.25    Ca    8.2<L>      25 Jul 2023 06:38        Hemoglobin A1C:             RADIOLOGY  < from: CT Head No Cont (07.14.23 @ 14:35) >  IMPRESSION:    CT head: Markedly motion degraded exam. No gross evidence of acute   intracranial hemorrhage or mass effect.    CT cervical spine: No evidence for acute displaced fracture or   malalignment.    CT lumbar spine: No evidence for acute displaced fracture or malalignment.    --- End of Report ---          < end of copied text >

## 2023-07-27 DIAGNOSIS — A41.9 SEPSIS, UNSPECIFIED ORGANISM: ICD-10-CM

## 2023-07-27 LAB
ALBUMIN SERPL ELPH-MCNC: 1.7 G/DL — LOW (ref 3.3–5)
ALP SERPL-CCNC: 59 U/L — SIGNIFICANT CHANGE UP (ref 40–120)
ALT FLD-CCNC: 39 U/L — SIGNIFICANT CHANGE UP (ref 12–78)
AMMONIA BLD-MCNC: 22 UMOL/L — SIGNIFICANT CHANGE UP (ref 11–32)
ANION GAP SERPL CALC-SCNC: 5 MMOL/L — SIGNIFICANT CHANGE UP (ref 5–17)
ANION GAP SERPL CALC-SCNC: 7 MMOL/L — SIGNIFICANT CHANGE UP (ref 5–17)
AST SERPL-CCNC: 74 U/L — HIGH (ref 15–37)
BASOPHILS # BLD AUTO: 0.06 K/UL — SIGNIFICANT CHANGE UP (ref 0–0.2)
BASOPHILS NFR BLD AUTO: 0.3 % — SIGNIFICANT CHANGE UP (ref 0–2)
BILIRUB SERPL-MCNC: 0.4 MG/DL — SIGNIFICANT CHANGE UP (ref 0.2–1.2)
BUN SERPL-MCNC: 31 MG/DL — HIGH (ref 7–23)
BUN SERPL-MCNC: 34 MG/DL — HIGH (ref 7–23)
CALCIUM SERPL-MCNC: 8.2 MG/DL — LOW (ref 8.5–10.1)
CALCIUM SERPL-MCNC: 8.7 MG/DL — SIGNIFICANT CHANGE UP (ref 8.5–10.1)
CHLORIDE SERPL-SCNC: 95 MMOL/L — LOW (ref 96–108)
CHLORIDE SERPL-SCNC: 97 MMOL/L — SIGNIFICANT CHANGE UP (ref 96–108)
CK SERPL-CCNC: 1477 U/L — HIGH (ref 26–308)
CO2 SERPL-SCNC: 32 MMOL/L — HIGH (ref 22–31)
CO2 SERPL-SCNC: 34 MMOL/L — HIGH (ref 22–31)
CREAT SERPL-MCNC: 1.38 MG/DL — HIGH (ref 0.5–1.3)
CREAT SERPL-MCNC: 1.69 MG/DL — HIGH (ref 0.5–1.3)
EGFR: 43 ML/MIN/1.73M2 — LOW
EGFR: 55 ML/MIN/1.73M2 — LOW
EOSINOPHIL # BLD AUTO: 0 K/UL — SIGNIFICANT CHANGE UP (ref 0–0.5)
EOSINOPHIL NFR BLD AUTO: 0 % — SIGNIFICANT CHANGE UP (ref 0–6)
GLUCOSE SERPL-MCNC: 163 MG/DL — HIGH (ref 70–99)
GLUCOSE SERPL-MCNC: 97 MG/DL — SIGNIFICANT CHANGE UP (ref 70–99)
HCT VFR BLD CALC: 36.2 % — LOW (ref 39–50)
HGB BLD-MCNC: 11.8 G/DL — LOW (ref 13–17)
IMM GRANULOCYTES NFR BLD AUTO: 1.9 % — HIGH (ref 0–0.9)
LYMPHOCYTES # BLD AUTO: 11.4 % — LOW (ref 13–44)
LYMPHOCYTES # BLD AUTO: 2.13 K/UL — SIGNIFICANT CHANGE UP (ref 1–3.3)
MCHC RBC-ENTMCNC: 30.6 PG — SIGNIFICANT CHANGE UP (ref 27–34)
MCHC RBC-ENTMCNC: 32.6 G/DL — SIGNIFICANT CHANGE UP (ref 32–36)
MCV RBC AUTO: 93.8 FL — SIGNIFICANT CHANGE UP (ref 80–100)
MONOCYTES # BLD AUTO: 3.3 K/UL — HIGH (ref 0–0.9)
MONOCYTES NFR BLD AUTO: 17.7 % — HIGH (ref 2–14)
NEUTROPHILS # BLD AUTO: 12.77 K/UL — HIGH (ref 1.8–7.4)
NEUTROPHILS NFR BLD AUTO: 68.7 % — SIGNIFICANT CHANGE UP (ref 43–77)
NRBC # BLD: 0 /100 WBCS — SIGNIFICANT CHANGE UP (ref 0–0)
PLATELET # BLD AUTO: 233 K/UL — SIGNIFICANT CHANGE UP (ref 150–400)
POTASSIUM SERPL-MCNC: 4.4 MMOL/L — SIGNIFICANT CHANGE UP (ref 3.5–5.3)
POTASSIUM SERPL-MCNC: 5.6 MMOL/L — HIGH (ref 3.5–5.3)
POTASSIUM SERPL-SCNC: 4.4 MMOL/L — SIGNIFICANT CHANGE UP (ref 3.5–5.3)
POTASSIUM SERPL-SCNC: 5.6 MMOL/L — HIGH (ref 3.5–5.3)
PROT SERPL-MCNC: 6 GM/DL — SIGNIFICANT CHANGE UP (ref 6–8.3)
RAPID RVP RESULT: SIGNIFICANT CHANGE UP
RBC # BLD: 3.86 M/UL — LOW (ref 4.2–5.8)
RBC # FLD: 13.4 % — SIGNIFICANT CHANGE UP (ref 10.3–14.5)
SARS-COV-2 RNA SPEC QL NAA+PROBE: SIGNIFICANT CHANGE UP
SODIUM SERPL-SCNC: 134 MMOL/L — LOW (ref 135–145)
SODIUM SERPL-SCNC: 136 MMOL/L — SIGNIFICANT CHANGE UP (ref 135–145)
VALPROATE SERPL-MCNC: 71 UG/ML — SIGNIFICANT CHANGE UP (ref 50–100)
WBC # BLD: 18.61 K/UL — HIGH (ref 3.8–10.5)
WBC # FLD AUTO: 18.61 K/UL — HIGH (ref 3.8–10.5)

## 2023-07-27 PROCEDURE — 99222 1ST HOSP IP/OBS MODERATE 55: CPT

## 2023-07-27 PROCEDURE — 93010 ELECTROCARDIOGRAM REPORT: CPT

## 2023-07-27 PROCEDURE — 99232 SBSQ HOSP IP/OBS MODERATE 35: CPT

## 2023-07-27 PROCEDURE — 99233 SBSQ HOSP IP/OBS HIGH 50: CPT

## 2023-07-27 RX ORDER — SODIUM CHLORIDE 9 MG/ML
1000 INJECTION INTRAMUSCULAR; INTRAVENOUS; SUBCUTANEOUS
Refills: 0 | Status: DISCONTINUED | OUTPATIENT
Start: 2023-07-27 | End: 2023-08-03

## 2023-07-27 RX ORDER — SODIUM ZIRCONIUM CYCLOSILICATE 10 G/10G
5 POWDER, FOR SUSPENSION ORAL ONCE
Refills: 0 | Status: COMPLETED | OUTPATIENT
Start: 2023-07-27 | End: 2023-07-27

## 2023-07-27 RX ADMIN — Medication 650 MILLIGRAM(S): at 13:35

## 2023-07-27 RX ADMIN — SODIUM CHLORIDE 80 MILLILITER(S): 9 INJECTION, SOLUTION INTRAVENOUS at 07:49

## 2023-07-27 RX ADMIN — Medication 25 MILLIGRAM(S): at 18:04

## 2023-07-27 RX ADMIN — OXYCODONE HYDROCHLORIDE 10 MILLIGRAM(S): 5 TABLET ORAL at 17:56

## 2023-07-27 RX ADMIN — ENOXAPARIN SODIUM 60 MILLIGRAM(S): 100 INJECTION SUBCUTANEOUS at 17:56

## 2023-07-27 RX ADMIN — PIPERACILLIN AND TAZOBACTAM 25 GRAM(S): 4; .5 INJECTION, POWDER, LYOPHILIZED, FOR SOLUTION INTRAVENOUS at 01:07

## 2023-07-27 RX ADMIN — LIDOCAINE 1 PATCH: 4 CREAM TOPICAL at 22:18

## 2023-07-27 RX ADMIN — SODIUM CHLORIDE 80 MILLILITER(S): 9 INJECTION, SOLUTION INTRAVENOUS at 14:52

## 2023-07-27 RX ADMIN — Medication 650 MILLIGRAM(S): at 12:35

## 2023-07-27 RX ADMIN — ATORVASTATIN CALCIUM 80 MILLIGRAM(S): 80 TABLET, FILM COATED ORAL at 21:34

## 2023-07-27 RX ADMIN — SODIUM ZIRCONIUM CYCLOSILICATE 5 GRAM(S): 10 POWDER, FOR SUSPENSION ORAL at 12:23

## 2023-07-27 RX ADMIN — PIPERACILLIN AND TAZOBACTAM 25 GRAM(S): 4; .5 INJECTION, POWDER, LYOPHILIZED, FOR SOLUTION INTRAVENOUS at 21:33

## 2023-07-27 RX ADMIN — SODIUM CHLORIDE 75 MILLILITER(S): 9 INJECTION INTRAMUSCULAR; INTRAVENOUS; SUBCUTANEOUS at 18:11

## 2023-07-27 RX ADMIN — PIPERACILLIN AND TAZOBACTAM 25 GRAM(S): 4; .5 INJECTION, POWDER, LYOPHILIZED, FOR SOLUTION INTRAVENOUS at 08:38

## 2023-07-27 RX ADMIN — ENOXAPARIN SODIUM 60 MILLIGRAM(S): 100 INJECTION SUBCUTANEOUS at 06:26

## 2023-07-27 RX ADMIN — PIPERACILLIN AND TAZOBACTAM 25 GRAM(S): 4; .5 INJECTION, POWDER, LYOPHILIZED, FOR SOLUTION INTRAVENOUS at 14:34

## 2023-07-27 RX ADMIN — Medication 25 MILLIGRAM(S): at 06:35

## 2023-07-27 RX ADMIN — LIDOCAINE 1 PATCH: 4 CREAM TOPICAL at 05:29

## 2023-07-27 RX ADMIN — Medication 1000 MILLIGRAM(S): at 00:35

## 2023-07-27 RX ADMIN — LIDOCAINE 1 PATCH: 4 CREAM TOPICAL at 12:23

## 2023-07-27 RX ADMIN — DIVALPROEX SODIUM 250 MILLIGRAM(S): 500 TABLET, DELAYED RELEASE ORAL at 06:35

## 2023-07-27 RX ADMIN — Medication 50 MILLIGRAM(S): at 21:34

## 2023-07-27 RX ADMIN — QUETIAPINE FUMARATE 100 MILLIGRAM(S): 200 TABLET, FILM COATED ORAL at 21:34

## 2023-07-27 RX ADMIN — OXYCODONE HYDROCHLORIDE 10 MILLIGRAM(S): 5 TABLET ORAL at 06:35

## 2023-07-27 RX ADMIN — OXYCODONE HYDROCHLORIDE 10 MILLIGRAM(S): 5 TABLET ORAL at 07:24

## 2023-07-27 RX ADMIN — DIVALPROEX SODIUM 250 MILLIGRAM(S): 500 TABLET, DELAYED RELEASE ORAL at 21:34

## 2023-07-27 RX ADMIN — SODIUM CHLORIDE 80 MILLILITER(S): 9 INJECTION, SOLUTION INTRAVENOUS at 08:38

## 2023-07-27 RX ADMIN — DIVALPROEX SODIUM 250 MILLIGRAM(S): 500 TABLET, DELAYED RELEASE ORAL at 14:34

## 2023-07-27 RX ADMIN — SODIUM CHLORIDE 80 MILLILITER(S): 9 INJECTION, SOLUTION INTRAVENOUS at 01:03

## 2023-07-27 NOTE — PROGRESS NOTE ADULT - ASSESSMENT
68M with TBI resulting in dementia p/w unresponsiveness, found on the floor     Sinus tachycardia  -pt has been tachycardic since admission  -may have some level of baseline tachycardia in setting of autonomic dysfunction in setting on TBI  -while rate control may improve vitals, it would likely mask any underlying pathology that may be ongoing, possible infection???  -would ideally treat the underlying cause, pt now with recurrent  fever, Leukocytosis   -cont w/u per primary team, ABx  -also found to have DVT, on full dose Lovenox now, ? r/o PE, ( VQ scan from 7/19 with very low probability for PE)  -Tsh 4.4 with normal free T4  -Will monitor and see if HR improves after treatment of possible infection and mucus plugging. Recommend Pulm Consult 68M with TBI resulting in dementia p/w unresponsiveness, found on the floor     Sinus tachycardia  -pt has been tachycardic since admission  -may have some level of baseline tachycardia in setting of autonomic dysfunction in setting on TBI  -now found to have DVT, on full dose Lovenox now, Prev  low prob VQ scan from 7/19 noted.   also with possible sepsis  -would ideally treat the underlying cause,   -cont w/u per primary team, ABx

## 2023-07-27 NOTE — CHART NOTE - NSCHARTNOTEFT_GEN_A_CORE
CHIEF COMPLAINT: HR of 151 and a rectal temp of 101.6    HPI:    ROS: unable to obtain as pt non verbal, lethargic    EXAM:  Vital Signs Last 24 Hrs  T(C): 37.3 (2023 02:27), Max: 38.7 (2023 23:11)  T(F): 99.1 (2023 02:27), Max: 101.6 (2023 23:11)  HR: 140 (2023 02:27) (85 - 151)  BP: 103/67 (2023 23:11) (101/69 - 136/84)  RR: 16 (2023 23:11) (16 - 136)  SpO2: 100% (2023 23:11) (94% - 100%)    Parameters below as of 2023 23:11  Patient On (Oxygen Delivery Method): nasal cannula  O2 Flow (L/min): 2    PHYSICAL EXAM:  GENERAL: NAD, well-groomed, well-developed  HEAD:  Atraumatic, Normocephalic  EYES: EOMI, PERRLA, conjunctiva and sclera clear  ENMT: No tonsillar erythema, exudates, or enlargement; Moist mucous membranes, Good dentition, No lesions  NECK: Supple, No JVD, Normal thyroid  NERVOUS SYSTEM:  Alert & Oriented X3, Good concentration; Motor Strength 5/5 B/L upper and lower extremities; DTRs 2+ intact and symmetric  CHEST/LUNG: Clear to auscultation bilaterally; No rales, rhonchi, wheezing, or rubs  HEART: Regular rate and rhythm; No murmurs appreciated  ABDOMEN: Soft, Nontender, Nondistended; Bowel sounds present  MSK: ROM intact in all extremities, 5/5 strength in upper and lower extremities, B/L.  EXTREMITIES:  2+ Peripheral Pulses, No clubbing, cyanosis, or edema  LYMPH: No lymphadenopathy noted  SKIN: No rashes or lesions    LABS:                      11.2   16.63 )-----------( 185      ( 2023 13:30 )             33.7         135  |  97  |  32<H>  ----------------------------<  101<H>  5.0   |  34<H>  |  1.53<H>    Ca    8.1<L>      2023 13:30    TPro  5.3<L>  /  Alb  1.6<L>  /  TBili  0.4  /  DBili  x   /  AST  60<H>  /  ALT  34  /  AlkPhos  56      Urinalysis Basic - ( 2023 22:55 )    Color: Yellow / Appearance: Clear / S.010 / pH: x  Gluc: x / Ketone: Negative  / Bili: Negative / Urobili: Negative mg/dL   Blood: x / Protein: 30 mg/dL / Nitrite: Negative   Leuk Esterase: Negative / RBC: Negative /HPF / WBC Negative   Sq Epi: x / Non Sq Epi: x / Bacteria: Occasional    RVP ( @ 00:30) [facility]    RADIOLOGY  CT Cx with IV contrast 23 IMPRESSION: New right lower lobe mucus plugging. New bilateral lower lobe linear and platelike atelectasis.    US Duplex 23 IMPRESSION: There is acute occlusive DVT affecting the right femoral vein with the proximal extent of the thrombus in the right common femoral vein.    V/Q 23 IMPRESSION: Very low probability of pulmonary embolus.    ASSESSMENT & PLAN:        Total time spent to complete patient's bedside assessment, physical examination, review medical chart including labs & imaging, discuss medical plan of care with housestaff was more than 35 minutes CHIEF COMPLAINT: HR of 151 and a rectal temp of 101.6    HPI: This is a 69yo M h/o HTN, HLD, TBI with subsequent dementia presented to ED c/o weakness and fall. NCHCT negative for acute event. Admitted for tx of acute UTI and rhabdo s/p cftx. Hospital course notable for persistent tachycardia; cards c/s recommend tx underlying cause.  PT with very low probability V/Q on ; today duplex POSitive for acute occlusive DVT RFV started on tx dose lovenox. Also with fever spike this afternoon, pancultured, started on zosyn for aspiration pna (CT c/w mucous plugging, atelectasis).     Tonight notified by RN pt with increased tachycardia persistent to 150s with increased lethargy and mild tachypnea to RR 20-22 with rectal temp of 101.6F.    ROS: unable to obtain as pt non verbal, lethargic    EXAM:  Vital Signs Last 24 Hrs  T(C): 37.3 (2023 02:27), Max: 38.7 (2023 23:11)  T(F): 99.1 (2023 02:27), Max: 101.6 (2023 23:11)  HR: 140 (2023 02:27) (85 - 151)  BP: 103/67 (2023 23:11) (101/69 - 136/84)  RR: 16 (2023 23:11) (16 - 136)  SpO2: 100% (2023 23:11) (94% - 100%)    Parameters below as of 2023 23:11  Patient On (Oxygen Delivery Method): nasal cannula  O2 Flow (L/min): 2    PHYSICAL EXAM:  GENERAL: lethargic  HEAD:  ncat  NERVOUS SYSTEM:  reacts to painful stimuli  CHEST/LUNG: poor inspiratory effort with rhonchi  HEART: Regular, tachy  ABDOMEN: Soft, +bs  EXTREMITIES: no edema  : clear urine in mao tubing  SKIN: No rashes or lesions    LABS:                      11.2   16.63 )-----------( 185      ( 2023 13:30 )             33.7     07-    135  |  97  |  32<H>  ----------------------------<  101<H>  5.0   |  34<H>  |  1.53<H>    Ca    8.1<L>      2023 13:30    TPro  5.3<L>  /  Alb  1.6<L>  /  TBili  0.4  /  DBili  x   /  AST  60<H>  /  ALT  34  /  AlkPhos  56      Urinalysis Basic - ( 2023 22:55 )    Color: Yellow / Appearance: Clear / S.010 / pH: x  Gluc: x / Ketone: Negative  / Bili: Negative / Urobili: Negative mg/dL   Blood: x / Protein: 30 mg/dL / Nitrite: Negative   Leuk Esterase: Negative / RBC: Negative /HPF / WBC Negative   Sq Epi: x / Non Sq Epi: x / Bacteria: Occasional    RVP ( @ 00:30) [facility]    TELE: ST to 150s, no ectopy    RADIOLOGY  CT Cx with IV contrast 23 IMPRESSION: New right lower lobe mucus plugging. New bilateral lower lobe linear and platelike atelectasis.    US Duplex 23 IMPRESSION: There is acute occlusive DVT affecting the right femoral vein with the proximal extent of the thrombus in the right common femoral vein.    V/Q 23 IMPRESSION: Very low probability of pulmonary embolus.    ASSESSMENT & PLAN:  69yo M h/o TBI admitted for UTI and rhabdo, fever over the last day, persistent tachycardia.  Tonight with increased tachycardia and tachypnea i/s/o Tmax 101.6F.   - ofirmev and cooling measures stat for fever  - send RVP swab stat  - continue IVF NS @ 80cc/hr  - f/u blood & urine cx sent earlier today; continue current zosyn pending cx results  - will consider repeat v/q given VTE finding today (avoiding cta 2/2 VIKAS), although already on tx a/c  - start chest pt for mucous plugging  - continue to monitor    Total time spent to complete patient's bedside assessment, physical examination, review medical chart including labs & imaging, discuss medical plan of care with housestaff was more than 35 minutes

## 2023-07-27 NOTE — PROGRESS NOTE ADULT - ASSESSMENT
68 years old male with h/o HTN, HLD, TBI resulting in dementia present to ED ? fall. As per daughter, patient was found on the floor. Patient is a poor history ( per daughter, patient will make up different history) . Patient reported he felt weak and fell onto the floor.  ER course with tachycardia, sat well at RA. EKG with sinus tachy. No leukocytosis, plt 129, K 6, Cr 2.82, glucose 110, . CXR with no focal consolidation. CT head  with no acute pathology. C/L spine with no fracture. CT pelsis with no acute displace fracture. Questionable mild widening of the bilateral sacroiliac joints with subchondral sclerosis and questionable erosions along the iliac side, right greater than left. Patient admitted for possible UTI and rhabdomylosis.    Fever- likely aspiration pna; will obtain CT chest - mucus plug RLLD, BCX, UCX, IV Zosyn. monitor vitals. ID consulted- Dr. Shamir BEAN (acute kidney injury).   ·  Plan: Cr 2.82, likely VIKAS, recent decrease in oral intake : monitor Cr  TSH noted  IV fluid,    Hyperkalemia. : K 6, no EKG changes. Likely in the setting of VIKAS, rhabdo, ACEI use. Hold ACEI.    Rhabdomyolysis. : Likely due to fall  IV fluid  Monitor CK level and renal function.    Sinus tachycardia - per cardio -may be his baseline in setting od autonomic dysfunction in setting on TBI    Functional quadriplegia- seen by neuro, d/c gabapentin     Moderate protein-calorie malnutrition. functional quadriplegia, debility  Diet Easy to Chew-  DASH/TLC {Sodium & Cholesterol Restricted}  Ensure Plus High Protein Cans or Servings Per Day:  1       Frequency:  Daily    Impaired ambulation.   ·  Plan: CT head with no acute pathology. C/L spine with no fracture. CT pelsis with no acute displace fracture. Questionable mild widening of the bilateral sacroiliac joints with subchondral sclerosis and questionable erosions along the iliac side, right greater than left  PT consult : rehab.    Dementia. : due to TBI  continue divalproex 250mg DR tid, quetiapine 100mg hs, trazodone 50mg hs.    Benign essential HTN.   ·  Plan: monitor BP and titrate BP med  ACEI on hold due to VIKAS.    Hyperlipidemia: continue statin.

## 2023-07-27 NOTE — PROGRESS NOTE ADULT - SUBJECTIVE AND OBJECTIVE BOX
Patient is a 68y old  Male who presents with a chief complaint of VIKAS, rhabdomyolysis (27 Jul 2023 12:15)        SUBJECTIVE / OVERNIGHT EVENTS: ON tachycardia up to 140s likely in setting of infection. AM ecg confirm sinus tachycardia 125bpm, HR improved with IVF and started zosyn yesterday. Patient seen and examined at bedside this morning. Awake and intermttently answering questions, confused. Per nurse, mental status unchanged from yesterday     ROS: unable to assess    MEDICATIONS  (STANDING):  atorvastatin 80 milliGRAM(s) Oral at bedtime  divalproex  milliGRAM(s) Oral three times a day  enoxaparin Injectable 60 milliGRAM(s) SubCutaneous every 12 hours  lidocaine   4% Patch 1 Patch Transdermal every 24 hours  metoprolol tartrate 25 milliGRAM(s) Oral two times a day  oxyCODONE  ER Tablet 10 milliGRAM(s) Oral every 12 hours  piperacillin/tazobactam IVPB.. 3.375 Gram(s) IV Intermittent every 8 hours  QUEtiapine 100 milliGRAM(s) Oral at bedtime  traZODone 50 milliGRAM(s) Oral at bedtime    MEDICATIONS  (PRN):  acetaminophen     Tablet .. 650 milliGRAM(s) Oral every 6 hours PRN Mild Pain (1 - 3), Moderate Pain (4 - 6)  melatonin 3 milliGRAM(s) Oral at bedtime PRN Insomnia  oxyCODONE    IR 5 milliGRAM(s) Oral every 4 hours PRN Moderate Pain (4 - 6)  oxyCODONE    IR 10 milliGRAM(s) Oral every 4 hours PRN Severe Pain (7 - 10)      Vital Signs Last 24 Hrs  T(C): 37.3 (27 Jul 2023 10:40), Max: 38.7 (26 Jul 2023 23:11)  T(F): 99.2 (27 Jul 2023 10:40), Max: 101.6 (26 Jul 2023 23:11)  HR: 132 (27 Jul 2023 10:40) (120 - 151)  BP: 129/82 (27 Jul 2023 10:40) (102/75 - 136/84)  BP(mean): --  RR: 17 (27 Jul 2023 10:40) (16 - 18)  SpO2: 98% (27 Jul 2023 10:40) (96% - 100%)    Parameters below as of 27 Jul 2023 10:40  Patient On (Oxygen Delivery Method): nasal cannula  O2 Flow (L/min): 2    CAPILLARY BLOOD GLUCOSE        I&O's Summary    26 Jul 2023 07:01  -  27 Jul 2023 07:00  --------------------------------------------------------  IN: 960 mL / OUT: 600 mL / NET: 360 mL    27 Jul 2023 07:01  -  27 Jul 2023 14:58  --------------------------------------------------------  IN: 50 mL / OUT: 0 mL / NET: 50 mL        PHYSICAL EXAM  GENERAL: NAD, lying comfortably in bed   HEENT:  Atraumatic, Normocephalic, conjunctiva and sclera clear, no LAD  CHEST/LUNG: Clear to auscultation bilaterally anterior; No wheeze  HEART: tachycardia, S1 and S2 No murmurs, rubs, or gallops  ABDOMEN: Soft, Nontender, Nondistended; Bowel sounds present  EXTREMITIES:  No clubbing, cyanosis, or edema  NEURO: AAOx0-1, Moves UEs spontanesously LEs atrophy some contracture  SKIN: No rashes or lesions    LABS:                        11.8   18.61 )-----------( 233      ( 27 Jul 2023 06:10 )             36.2     07-27    136  |  97  |  34<H>  ----------------------------<  97  5.6<H>   |  34<H>  |  1.69<H>    Ca    8.7      27 Jul 2023 06:10    TPro  6.0  /  Alb  1.7<L>  /  TBili  0.4  /  DBili  x   /  AST  74<H>  /  ALT  39  /  AlkPhos  59  07-27      CARDIAC MARKERS ( 27 Jul 2023 06:10 )  x     / x     / 1477 U/L / x     / x          Urinalysis Basic - ( 27 Jul 2023 06:10 )    Color: x / Appearance: x / SG: x / pH: x  Gluc: 97 mg/dL / Ketone: x  / Bili: x / Urobili: x   Blood: x / Protein: x / Nitrite: x   Leuk Esterase: x / RBC: x / WBC x   Sq Epi: x / Non Sq Epi: x / Bacteria: x          RADIOLOGY & ADDITIONAL TESTS:      Labs Personally Reviewed  Imaging Personally Reviewed  Consultant(s) Notes Reviewed

## 2023-07-27 NOTE — PROGRESS NOTE ADULT - SUBJECTIVE AND OBJECTIVE BOX
Patient is a 68y old  Male who presents with a chief complaint of VIKAS, rhabdomyolysis (26 Jul 2023 14:52)      PAST MEDICAL & SURGICAL HISTORY:  TBI (traumatic brain injury)      HLD (hyperlipidemia)      HTN, age 0-18      INTERVAL HISTORY:  Patient in bed.    	  MEDICATIONS:  MEDICATIONS  (STANDING):  atorvastatin 80 milliGRAM(s) Oral at bedtime  divalproex  milliGRAM(s) Oral three times a day  enoxaparin Injectable 60 milliGRAM(s) SubCutaneous every 12 hours  lidocaine   4% Patch 1 Patch Transdermal every 24 hours  metoprolol tartrate 25 milliGRAM(s) Oral two times a day  oxyCODONE  ER Tablet 10 milliGRAM(s) Oral every 12 hours  piperacillin/tazobactam IVPB.. 3.375 Gram(s) IV Intermittent every 8 hours  QUEtiapine 100 milliGRAM(s) Oral at bedtime  sodium chloride 0.45%. 1000 milliLiter(s) (80 mL/Hr) IV Continuous <Continuous>  sodium zirconium cyclosilicate 5 Gram(s) Oral once  traZODone 50 milliGRAM(s) Oral at bedtime    MEDICATIONS  (PRN):  acetaminophen     Tablet .. 650 milliGRAM(s) Oral every 6 hours PRN Mild Pain (1 - 3), Moderate Pain (4 - 6)  melatonin 3 milliGRAM(s) Oral at bedtime PRN Insomnia  oxyCODONE    IR 5 milliGRAM(s) Oral every 4 hours PRN Moderate Pain (4 - 6)  oxyCODONE    IR 10 milliGRAM(s) Oral every 4 hours PRN Severe Pain (7 - 10)      Vitals:  T(F): 99.2 (07-27-23 @ 10:40), Max: 101.6 (07-26-23 @ 23:11)  HR: 132 (07-27-23 @ 10:40) (120 - 151)  BP: 129/82 (07-27-23 @ 10:40) (101/69 - 136/84)  RR: 17 (07-27-23 @ 10:40) (16 - 136)  SpO2: 98% (07-27-23 @ 10:40) (96% - 100%)  Wt(kg): --    07-26 @ 07:01  -  07-27 @ 07:00  --------------------------------------------------------  IN:    sodium chloride 0.45%: 960 mL  Total IN: 960 mL    OUT:    Voided (mL): 600 mL  Total OUT: 600 mL    Total NET: 360 mL    PHYSICAL EXAM:  Neuro: Awake, responsive  CV: S1 S2 RRR  Lungs: CTABL  GI: Soft, BS +, ND, NT  Extremities: No edema    TELEMETRY: ST  	    ECG:  	    RADIOLOGY:       DIAGNOSTIC TESTING:    [ ] Echocardiogram:  [ ] Cardiac Catheterization:  [ ] Stress Test:      LABS:	 	    CARDIAC MARKERS:          27 Jul 2023 06:10    136    |  97     |  34     ----------------------------<  97     5.6     |  34     |  1.69   26 Jul 2023 13:30    135    |  97     |  32     ----------------------------<  101    5.0     |  34     |  1.53   25 Jul 2023 06:38    134    |  96     |  20     ----------------------------<  106    4.3     |  31     |  1.25     Ca    8.7        27 Jul 2023 06:10    TPro  6.0    /  Alb  1.7    /  TBili  0.4    /  DBili  x      /  AST  74     /  ALT  39     /  AlkPhos  59     27 Jul 2023 06:10                          11.8   18.61 )-----------( 233      ( 27 Jul 2023 06:10 )             36.2 ,                       11.2   16.63 )-----------( 185      ( 26 Jul 2023 13:30 )             33.7   proBNP:   Lipid Profile:   HgA1c:   TSH:                Patient is a 68y old  Male who presents with a chief complaint of VIKAS, rhabdomyolysis (26 Jul 2023 14:52)      PAST MEDICAL & SURGICAL HISTORY:  TBI (traumatic brain injury)      HLD (hyperlipidemia)      HTN, age 0-18      INTERVAL HISTORY:  Patient in bed.    	  MEDICATIONS:  MEDICATIONS  (STANDING):  atorvastatin 80 milliGRAM(s) Oral at bedtime  divalproex  milliGRAM(s) Oral three times a day  enoxaparin Injectable 60 milliGRAM(s) SubCutaneous every 12 hours  lidocaine   4% Patch 1 Patch Transdermal every 24 hours  metoprolol tartrate 25 milliGRAM(s) Oral two times a day  oxyCODONE  ER Tablet 10 milliGRAM(s) Oral every 12 hours  piperacillin/tazobactam IVPB.. 3.375 Gram(s) IV Intermittent every 8 hours  QUEtiapine 100 milliGRAM(s) Oral at bedtime  sodium chloride 0.45%. 1000 milliLiter(s) (80 mL/Hr) IV Continuous <Continuous>  sodium zirconium cyclosilicate 5 Gram(s) Oral once  traZODone 50 milliGRAM(s) Oral at bedtime    MEDICATIONS  (PRN):  acetaminophen     Tablet .. 650 milliGRAM(s) Oral every 6 hours PRN Mild Pain (1 - 3), Moderate Pain (4 - 6)  melatonin 3 milliGRAM(s) Oral at bedtime PRN Insomnia  oxyCODONE    IR 5 milliGRAM(s) Oral every 4 hours PRN Moderate Pain (4 - 6)  oxyCODONE    IR 10 milliGRAM(s) Oral every 4 hours PRN Severe Pain (7 - 10)      Vitals:  T(F): 99.2 (07-27-23 @ 10:40), Max: 101.6 (07-26-23 @ 23:11)  HR: 132 (07-27-23 @ 10:40) (120 - 151)  BP: 129/82 (07-27-23 @ 10:40) (101/69 - 136/84)  RR: 17 (07-27-23 @ 10:40) (16 - 136)  SpO2: 98% (07-27-23 @ 10:40) (96% - 100%)  Wt(kg): --    07-26 @ 07:01  -  07-27 @ 07:00  --------------------------------------------------------  IN:    sodium chloride 0.45%: 960 mL  Total IN: 960 mL    OUT:    Voided (mL): 600 mL  Total OUT: 600 mL    Total NET: 360 mL    PHYSICAL EXAM:  Neuro: Awake, responsive  CV: S1 S2 RRR  Lungs: CTABL  GI: Soft, BS +, ND, NT  Extremities: No edema    TELEMETRY: ST  	     RADIOLOGY:   < from: CT Chest w/ IV Cont (07.26.23 @ 12:26) >  FINDINGS:  CHEST:  LUNGS AND LARGE AIRWAYS: Patent central airways. There is new mild linear   and platelike atelectasis in the lower lobes. There is mucous plugging in   the bronchus intermedius and right lower lobe segmental airways.. No   confluent airspace opacity is identified.  PLEURA: No pleural effusion.  VESSELS: Within normal limits.  HEART: Heart size is normal. No pericardial effusion.  MEDIASTINUM AND TEJAL: No lymphadenopathy.  CHEST WALL AND LOWER NECK: Within normal limits.    ABDOMEN AND PELVIS:  LIVER: Within normal limits.  BILE DUCTS: Normal caliber.  GALLBLADDER: Within normal limits.  SPLEEN: Within normal limits.  PANCREAS: Within normal limits.  ADRENALS: Within normal limits.  KIDNEYS/URETERS: Within normal limits.    BLADDER: Within normal limits.  REPRODUCTIVE ORGANS: The prostate is prominent.    BOWEL: Scattered cecal diverticula. No bowel obstruction. Appendix is   normal.  PERITONEUM: No ascites.  VESSELS: Moderate atherosclerotic disease of the nonaneurysmal abdominal   aorta.  RETROPERITONEUM/LYMPH NODES: No lymphadenopathy. Minimal fluid   infiltration of the presacral space.  ABDOMINAL WALL: Within normal limits.  BONES: Within normal limits.    IMPRESSION: New right lower lobe mucus plugging. New bilateral lower lobe   linear and platelike atelectasis.      < end of copied text >    DIAGNOSTIC TESTING:    [x ] Echocardiogram: Pending    [ ] Cardiac Catheterization:  [ ] Stress Test:      LABS:	 	    CARDIAC MARKERS:      27 Jul 2023 06:10    136    |  97     |  34     ----------------------------<  97     5.6     |  34     |  1.69   26 Jul 2023 13:30    135    |  97     |  32     ----------------------------<  101    5.0     |  34     |  1.53   25 Jul 2023 06:38    134    |  96     |  20     ----------------------------<  106    4.3     |  31     |  1.25     Ca    8.7        27 Jul 2023 06:10    TPro  6.0    /  Alb  1.7    /  TBili  0.4    /  DBili  x      /  AST  74     /  ALT  39     /  AlkPhos  59     27 Jul 2023 06:10                          11.8   18.61 )-----------( 233      ( 27 Jul 2023 06:10 )             36.2 ,                       11.2   16.63 )-----------( 185      ( 26 Jul 2023 13:30 )             33.7   proBNP:   Lipid Profile:   HgA1c:   TSH:

## 2023-07-27 NOTE — CONSULT NOTE ADULT - ASSESSMENT
I doubt that tachycardia essentially since admission was on an infectious basis. Suspect the fever last night represents a superimposed event, with aspiration being most likely. It will be interesting to see whether tachycardia resolves, or not.   Certainly at risk for aspiration given poor mental status superimposed on baseline TBI.  No clear or convincing pneumonia on CT, does have plugging  VQ without PE, does have DVT but I do not think latter accounts for escalating leukocytosis.   RVP's neg  Elevated CK, presumed rhabomyolysis- why still fluctuating and elevated after ~1 week No troponin's sent but most recent ECG only reported as sinus tachycardia.   U/A without significant pyuria now or on admission  Abdominal exam and skin exam limited by patient's behavior, but no gross abdominal tenderness, skin/soft tissue infection, phlebitis    Suggestions--  Aspriation precautions as able  Continue Zosyn  MRSA nares PCR  Await culture data    I will be away returning to [facility] on 08/01/2023.  Dr. Severino Potts and JOANNA Guzman are covering the service at [facility] in my absence. For issues please reach out to them on MS Teams or call [phone] for assistance. Thank you.    Joel Adamson MD  Attending Physician  [facility]  Division of Infectious Diseases  [phone]

## 2023-07-27 NOTE — CONSULT NOTE ADULT - SUBJECTIVE AND OBJECTIVE BOX
[facility]  Division of Infectious Diseases  [phone]    ROJAS HE  68y, Male  [id number]    HPI--  Patient is a nonhistorian. As per H&P HPI:  Talked to patient's daughter Stacy Graves ( [phone] over the phone  68 years old male with h/o HTN, HLD, TBI resulting in dementia present to ED ? fall. As per daughter, patient was found on the floor. Patient is a poor history. Patient reported he felt weak and fell onto the floor. Denied any head trauma or LOC. No fever, chills. Patient denied any pain.  Slightly tachycardic, afebrile, sat well at RA. EKG with sinus tachy. No leukocytosis, plt 129, K 6, Cr 2.82, glucose 110, . CXR with no focal consolidation. CT head with no acute pathology. C/L spine with no fracture. CT pelsis with no acute displace fracture. Questionable mild widening of the bilateral sacroiliac joints with subchondral sclerosis and questionable erosions along the iliac side, right greater than left  SH: former smoker (14 Jul 2023 16:21)    Chart reviewed and case reviewed with Dr. Valdes. Patient has had persistent tachycardia, without clear explanation, and cardiology has suggested to look for underlying reasons for the tachycardia not on a cardiac basis. Patient has had a progressive decline in performance status in the hospital, with poor handling of secretions and oral intake of late. Imaging ordered by Dr. Valdes largely unrevealing. Patient started on antibiotics empirically by Dr. Valdes yesterday. Patient had been afebrile but overnight had fever to 101.6F.       PMH/PSH--  TBI (traumatic brain injury)    HLD (hyperlipidemia)    HTN, age 0-18        Allergies--  No Known Allergies      Medications--  Antibiotics: piperacillin/tazobactam IVPB.. 3.375 Gram(s) IV Intermittent every 8 hours    Immunologic:   Other: acetaminophen     Tablet .. PRN  atorvastatin  divalproex   enoxaparin Injectable  lidocaine   4% Patch  melatonin PRN  metoprolol tartrate  oxyCODONE    IR PRN  oxyCODONE    IR PRN  oxyCODONE  ER Tablet  QUEtiapine  sodium chloride 0.9%.  traZODone    Antimicrobials last 90 days per EMR: MEDICATIONS  (STANDING):  cefTRIAXone   IVPB   100 mL/Hr IV Intermittent (07-17-23 @ 11:41)    cefTRIAXone   IVPB   100 mL/Hr IV Intermittent (07-24-23 @ 10:24)   100 mL/Hr IV Intermittent (07-23-23 @ 10:47)   100 mL/Hr IV Intermittent (07-22-23 @ 10:59)   100 mL/Hr IV Intermittent (07-21-23 @ 11:39)   100 mL/Hr IV Intermittent (07-20-23 @ 12:01)   100 mL/Hr IV Intermittent (07-19-23 @ 09:56)   100 mL/Hr IV Intermittent (07-18-23 @ 10:00)    piperacillin/tazobactam IVPB.   200 mL/Hr IV Intermittent (07-26-23 @ 11:06)    piperacillin/tazobactam IVPB.-   25 mL/Hr IV Intermittent (07-26-23 @ 16:21)    piperacillin/tazobactam IVPB..   25 mL/Hr IV Intermittent (07-27-23 @ 14:34)   25 mL/Hr IV Intermittent (07-27-23 @ 08:38)   25 mL/Hr IV Intermittent (07-27-23 @ 01:07)        Social History--  EtOH: none known.  Tobacco: none known.  Drug use: none known.     Family/Marital History--  No known sick contact. Not relevant to present concern      Review of Systems:  Review of systems unable due to dementia.    Physical Exam--  Vital Signs: T(F): 98.7 (07-27-23 @ 17:03), Max: 101.6 (07-26-23 @ 23:11)  HR: 116 (07-27-23 @ 17:03)  BP: 108/71 (07-27-23 @ 17:03)  RR: 16 (07-27-23 @ 17:03)  SpO2: 99% (07-27-23 @ 17:03)  Wt(kg): --  General: Chronically ill-appearing Male in no acute distress.  HEENT: AT/NC. Pupils/EOM unable secondary to patient compliance. Oropharynx/dentition unable secondary to patient compliance.   Neck: Increased tone not frankly rigid. No sense of mass.  Nodes: None palpable.  Lungs: Poor effort diminished BS B  Heart: Tachy with RR  Abdomen: Actively resists exam, striking me. Bowel sounds present. Soft. Nondistended. Nontender.  Extremities: No cyanosis or clubbing. No edema. MIldy contracted  Skin: Warm. Dry. Good turgor.   Psychiatric: Does not answer questions or respond to commands appropriately      Laboratory & Imaging Data--  CBC                        11.8   18.61 )-----------( 233      ( 27 Jul 2023 06:10 )             36.2     WBC trend  WBC Count: 18.61 K/uL (07-27-23 @ 06:10)  WBC Count: 16.63 K/uL (07-26-23 @ 13:30)  WBC Count: 9.66 K/uL (07-24-23 @ 06:33)  WBC Count: 5.88 K/uL (07-21-23 @ 08:05)  WBC Count: 6.42 K/uL (07-19-23 @ 06:28)  WBC Count: 6.64 K/uL (07-17-23 @ 11:15)  WBC Count: 7.42 K/uL (07-16-23 @ 05:40)  WBC Count: 7.52 K/uL (07-15-23 @ 05:40)  WBC Count: 7.39 K/uL (07-14-23 @ 13:19)      Chemistries  07-27    134<L>  |  95<L>  |  31<H>  ----------------------------<  163<H>  4.4   |  32<H>  |  1.38<H>    Creatinine Trend  1.38 mg/dL (07-27-23 @ 13:42)  1.69 mg/dL (07-27-23 @ 06:10)  1.53 mg/dL (07-26-23 @ 13:30)  1.25 mg/dL (07-25-23 @ 06:38)  1.11 mg/dL (07-24-23 @ 06:33)  1.23 mg/dL (07-21-23 @ 08:05)      Ca    8.2<L>      27 Jul 2023 13:42    LFT Trend  Total Bilirubin  0.4 mg/dL (07-27-23 @ 06:10)  0.4 mg/dL (07-26-23 @ 13:30)    Direct Bilirubin    Indirect Bilirubin    AKP  59 U/L (07-27-23 @ 06:10)  56 U/L (07-26-23 @ 13:30)    AST  74 U/L (07-27-23 @ 06:10)  60 U/L (07-26-23 @ 13:30)    ALT  39 U/L (07-27-23 @ 06:10)  34 U/L (07-26-23 @ 13:30)    Creatine Kinase, Serum: 1477 U/L (07.27.23 @ 06:10)  Creatine Kinase, Serum: 522 U/L (07.25.23 @ 06:38)  Creatine Kinase, Serum: 710 U/L (07.20.23 @ 07:47)  Creatine Kinase, Serum: 967 U/L (07.17.23 @ 11:15)  Creatine Kinase, Serum: 2857 U/L (07.15.23 @ 05:40)  Creatine Kinase, Serum: 965 U/L (07.14.23 @ 13:19)    Urinalysis + Microscopic Examination (07.26.23 @ 22:55)    pH Urine: 5.0   Urine Appearance: Clear   Color: Yellow   Specific Gravity: 1.010   Protein, Urine: 30 mg/dL   Glucose Qualitative, Urine: Negative mg/dL   Ketone - Urine: Negative   Blood, Urine: Moderate   Bilirubin: Negative   Urobilinogen: Negative mg/dL   Leukocyte Esterase Concentration: Negative   Nitrite: Negative   White Blood Cell - Urine: Negative   Red Blood Cell - Urine: Negative /HPF   Bacteria: Occasional   Squamous Epithelial Cells: Negative      Culture Data  None    < from: CT Abdomen and Pelvis w/ IV Cont (07.26.23 @ 12:26) >  ACC: [id number] EXAM:  CT ABDOMEN AND PELVIS IC   ORDERED BY: TORIN VALDES   ACC: [id number] EXAM:  CT CHEST IC   ORDERED BY: TORIN VALDES   PROCEDURE DATE:  07/26/2023    INTERPRETATION:  CLINICAL INFORMATION: Fever.  COMPARISON: Noncontrast CT of the thorax July 18, 2023.    CONTRAST/COMPLICATIONS:  IV Contrast: IV contrast documented in unlinked concurrent exam   (accession [id number]), Omnipaque 350 (accession [id number])  95 cc   administered   05 cc discarded  Oral Contrast: NONE  Complications: None reported at time of study completion    PROCEDURE:  CT of the Chest, Abdomen and Pelvis was performed.  Sagittal and coronal reformats were performed.    FINDINGS:  CHEST:  LUNGS AND LARGE AIRWAYS: Patent central airways. There is new mild linear   and platelike atelectasis in the lower lobes. There is mucous plugging in   the bronchus intermedius and right lower lobe segmental airways.. No   confluent airspace opacity is identified.  PLEURA: No pleural effusion.  VESSELS: Within normal limits.  HEART: Heart size is normal. No pericardial effusion.  MEDIASTINUM AND TEJAL: No lymphadenopathy.  CHEST WALL AND LOWER NECK: Within normal limits.  ABDOMEN AND PELVIS:  LIVER: Within normal limits.  BILE DUCTS: Normal caliber.  GALLBLADDER: Within normal limits.  SPLEEN: Within normal limits.  PANCREAS: Within normal limits.  ADRENALS: Within normal limits.  KIDNEYS/URETERS: Within normal limits.  BLADDER: Within normal limits.  REPRODUCTIVE ORGANS: The prostate is prominent.  BOWEL: Scattered cecal diverticula. No bowel obstruction. Appendix is   normal.  PERITONEUM: No ascites.  VESSELS: Moderate atherosclerotic disease of the nonaneurysmal abdominal   aorta.  RETROPERITONEUM/LYMPH NODES: No lymphadenopathy. Minimal fluid   infiltration of the presacral space.  ABDOMINAL WALL: Within normal limits.  BONES: Within normal limits.    IMPRESSION: New right lower lobe mucus plugging. New bilateral lower lobe   linear and platelike atelectasis.  --- End of Report ---    NELI WILKERSON MD; Attending Radiologist  This document has been electronically signed. Jul 26 2023  1:39PM  < end of copied text >    < from: CT Lumbar Spine No Cont (07.14.23 @ 14:35) >  IMPRESSION:    CT head: Markedly motion degraded exam. No gross evidence of acute   intracranial hemorrhage or mass effect.    CT cervical spine: No evidence for acute displaced fracture or   malalignment.    CT lumbar spine: No evidence for acute displaced fracture or malalignment.      < end of copied text >    < from: NM Pulmonary Ventilation/Perfusion Scan (07.19.23 @ 11:20) >  IMPRESSION: Very low probability of pulmonary embolus.      < end of copied text >    < from: US Duplex Venous Lower Ext Complete, Bilateral (07.26.23 @ 12:14) >    IMPRESSION: There is acute occlusive DVT affecting the right femoral vein   with the proximal extent of the thrombus in the right common femoral vein.    < end of copied text >

## 2023-07-28 LAB
CULTURE RESULTS: SIGNIFICANT CHANGE UP
MRSA PCR RESULT.: SIGNIFICANT CHANGE UP
S AUREUS DNA NOSE QL NAA+PROBE: SIGNIFICANT CHANGE UP
SPECIMEN SOURCE: SIGNIFICANT CHANGE UP

## 2023-07-28 PROCEDURE — 99232 SBSQ HOSP IP/OBS MODERATE 35: CPT

## 2023-07-28 PROCEDURE — 99233 SBSQ HOSP IP/OBS HIGH 50: CPT

## 2023-07-28 RX ORDER — ACETAMINOPHEN 500 MG
1000 TABLET ORAL ONCE
Refills: 0 | Status: COMPLETED | OUTPATIENT
Start: 2023-07-28 | End: 2023-07-29

## 2023-07-28 RX ORDER — ACETAMINOPHEN 500 MG
1000 TABLET ORAL ONCE
Refills: 0 | Status: COMPLETED | OUTPATIENT
Start: 2023-07-28 | End: 2023-07-28

## 2023-07-28 RX ORDER — METOPROLOL TARTRATE 50 MG
5 TABLET ORAL ONCE
Refills: 0 | Status: COMPLETED | OUTPATIENT
Start: 2023-07-28 | End: 2023-07-28

## 2023-07-28 RX ADMIN — DIVALPROEX SODIUM 250 MILLIGRAM(S): 500 TABLET, DELAYED RELEASE ORAL at 06:14

## 2023-07-28 RX ADMIN — Medication 5 MILLIGRAM(S): at 14:58

## 2023-07-28 RX ADMIN — PIPERACILLIN AND TAZOBACTAM 25 GRAM(S): 4; .5 INJECTION, POWDER, LYOPHILIZED, FOR SOLUTION INTRAVENOUS at 14:58

## 2023-07-28 RX ADMIN — LIDOCAINE 1 PATCH: 4 CREAM TOPICAL at 17:59

## 2023-07-28 RX ADMIN — LIDOCAINE 1 PATCH: 4 CREAM TOPICAL at 12:21

## 2023-07-28 RX ADMIN — PIPERACILLIN AND TAZOBACTAM 25 GRAM(S): 4; .5 INJECTION, POWDER, LYOPHILIZED, FOR SOLUTION INTRAVENOUS at 21:51

## 2023-07-28 RX ADMIN — OXYCODONE HYDROCHLORIDE 10 MILLIGRAM(S): 5 TABLET ORAL at 17:49

## 2023-07-28 RX ADMIN — ENOXAPARIN SODIUM 60 MILLIGRAM(S): 100 INJECTION SUBCUTANEOUS at 06:14

## 2023-07-28 RX ADMIN — Medication 25 MILLIGRAM(S): at 17:49

## 2023-07-28 RX ADMIN — Medication 25 MILLIGRAM(S): at 06:14

## 2023-07-28 RX ADMIN — OXYCODONE HYDROCHLORIDE 10 MILLIGRAM(S): 5 TABLET ORAL at 07:13

## 2023-07-28 RX ADMIN — QUETIAPINE FUMARATE 100 MILLIGRAM(S): 200 TABLET, FILM COATED ORAL at 21:35

## 2023-07-28 RX ADMIN — DIVALPROEX SODIUM 250 MILLIGRAM(S): 500 TABLET, DELAYED RELEASE ORAL at 14:58

## 2023-07-28 RX ADMIN — LIDOCAINE 1 PATCH: 4 CREAM TOPICAL at 00:23

## 2023-07-28 RX ADMIN — OXYCODONE HYDROCHLORIDE 10 MILLIGRAM(S): 5 TABLET ORAL at 12:43

## 2023-07-28 RX ADMIN — OXYCODONE HYDROCHLORIDE 10 MILLIGRAM(S): 5 TABLET ORAL at 06:13

## 2023-07-28 RX ADMIN — ENOXAPARIN SODIUM 60 MILLIGRAM(S): 100 INJECTION SUBCUTANEOUS at 17:49

## 2023-07-28 RX ADMIN — Medication 1000 MILLIGRAM(S): at 02:05

## 2023-07-28 RX ADMIN — PIPERACILLIN AND TAZOBACTAM 25 GRAM(S): 4; .5 INJECTION, POWDER, LYOPHILIZED, FOR SOLUTION INTRAVENOUS at 06:14

## 2023-07-28 RX ADMIN — OXYCODONE HYDROCHLORIDE 10 MILLIGRAM(S): 5 TABLET ORAL at 13:30

## 2023-07-28 RX ADMIN — LIDOCAINE 1 PATCH: 4 CREAM TOPICAL at 23:58

## 2023-07-28 RX ADMIN — Medication 650 MILLIGRAM(S): at 15:30

## 2023-07-28 RX ADMIN — ATORVASTATIN CALCIUM 80 MILLIGRAM(S): 80 TABLET, FILM COATED ORAL at 21:35

## 2023-07-28 RX ADMIN — OXYCODONE HYDROCHLORIDE 10 MILLIGRAM(S): 5 TABLET ORAL at 18:12

## 2023-07-28 RX ADMIN — Medication 400 MILLIGRAM(S): at 01:35

## 2023-07-28 RX ADMIN — DIVALPROEX SODIUM 250 MILLIGRAM(S): 500 TABLET, DELAYED RELEASE ORAL at 21:35

## 2023-07-28 RX ADMIN — Medication 650 MILLIGRAM(S): at 14:57

## 2023-07-28 RX ADMIN — Medication 50 MILLIGRAM(S): at 21:35

## 2023-07-28 NOTE — PROGRESS NOTE ADULT - ASSESSMENT
I doubt that tachycardia essentially since admission was on an infectious basis. Suspect the fever last night represents a superimposed event, with aspiration being most likely. It will be interesting to see whether tachycardia resolves, or not.   Certainly at risk for aspiration given poor mental status superimposed on baseline TBI.  No clear or convincing pneumonia on CT, does have plugging  VQ without PE, does have DVT but I do not think latter accounts for escalating leukocytosis.   RVP's neg  Elevated CK, presumed rhabomyolysis- why still fluctuating and elevated after ~1 week No troponin's sent but most recent ECG only reported as sinus tachycardia.   U/A without significant pyuria now or on admission  Abdominal exam and skin exam limited by patient's behavior, but no gross abdominal tenderness, skin/soft tissue infection, phlebitis    7/28: afebrile >24 hrs, tachycardic, WBC increased 18.61, Cr elevated 1.38, BCs and UC with no growth to date, covid 19 test is negative, CT chest - new RLL mucus plug, new b/l LLs atelectasis, MRSA PCR not detected, will continue with Zosyn for now.   + Right femoral DVT - on Lovenox.     Suggestions--  Aspriation precautions as able  Continue Zosyn for now   MRSA nares PCR - negative   follow BCs - NGTD    will discuss with attending  I doubt that tachycardia essentially since admission was on an infectious basis. Suspect the fever last night represents a superimposed event, with aspiration being most likely. It will be interesting to see whether tachycardia resolves, or not.   Certainly at risk for aspiration given poor mental status superimposed on baseline TBI.  No clear or convincing pneumonia on CT, does have plugging  VQ without PE, does have DVT but I do not think latter accounts for escalating leukocytosis.   RVP's neg  Elevated CK, presumed rhabdomyolysis why still fluctuating and elevated after ~1 week No troponin's sent but most recent ECG only reported as sinus tachycardia.   U/A without significant pyuria now or on admission  Abdominal exam and skin exam limited by patient's behavior, but no gross abdominal tenderness, skin/soft tissue infection, phlebitis    7/28: afebrile >24 hrs, tachycardic, WBC increased 18.61, Cr elevated 1.38, BCs and UC with no growth to date, covid 19 test is negative, CT chest - new RLL mucus plug, new b/l LLs atelectasis, MRSA PCR not detected, will continue with Zosyn for now.   + Right femoral DVT - on Lovenox.     Suggestions--  Aspiration precautions as able  Continue Zosyn for now   MRSA nares PCR - negative   follow BCs - NGTD    discussed with attending

## 2023-07-28 NOTE — PROGRESS NOTE ADULT - SUBJECTIVE AND OBJECTIVE BOX
24H hour events:   pt resting  no acute events overnight    MEDICATIONS:  enoxaparin Injectable 60 milliGRAM(s) SubCutaneous every 12 hours  metoprolol tartrate 25 milliGRAM(s) Oral two times a day    piperacillin/tazobactam IVPB.. 3.375 Gram(s) IV Intermittent every 8 hours      acetaminophen     Tablet .. 650 milliGRAM(s) Oral every 6 hours PRN  divalproex  milliGRAM(s) Oral three times a day  melatonin 3 milliGRAM(s) Oral at bedtime PRN  oxyCODONE    IR 5 milliGRAM(s) Oral every 4 hours PRN  oxyCODONE    IR 10 milliGRAM(s) Oral every 4 hours PRN  oxyCODONE  ER Tablet 10 milliGRAM(s) Oral every 12 hours  QUEtiapine 100 milliGRAM(s) Oral at bedtime  traZODone 50 milliGRAM(s) Oral at bedtime      atorvastatin 80 milliGRAM(s) Oral at bedtime    lidocaine   4% Patch 1 Patch Transdermal every 24 hours  sodium chloride 0.9%. 1000 milliLiter(s) IV Continuous <Continuous>          PHYSICAL EXAM:  T(C): 36.4 (07-28-23 @ 04:53), Max: 37.7 (07-27-23 @ 16:29)  HR: 66 (07-28-23 @ 04:53) (66 - 132)  BP: 104/67 (07-28-23 @ 04:53) (104/67 - 129/82)  RR: 20 (07-28-23 @ 04:53) (16 - 20)  SpO2: 97% (07-28-23 @ 04:53) (96% - 99%)  Wt(kg): --  I&O's Summary    27 Jul 2023 07:01  -  28 Jul 2023 07:00  --------------------------------------------------------  IN: 50 mL / OUT: 200 mL / NET: -150 mL        Appearance: Normal	  HEENT:   Normal oral mucosa, PERRL, EOMI	  Lymphatic: No lymphadenopathy  Cardiovascular: Normal S1 S2, No JVD, No murmurs, No edema  Respiratory: Lungs clear to auscultation	  Psychiatry: unable to assess  Gastrointestinal:  Soft, Non-tender, + BS	  Skin: No rashes, No ecchymoses, No cyanosis	  Neurologic: unable to assess  Extremities: Normal range of motion, No clubbing, cyanosis   LABS:	 	    CBC Full  -  ( 27 Jul 2023 06:10 )  WBC Count : 18.61 K/uL  Hemoglobin : 11.8 g/dL  Hematocrit : 36.2 %  Platelet Count - Automated : 233 K/uL  Mean Cell Volume : 93.8 fl  Mean Cell Hemoglobin : 30.6 pg  Mean Cell Hemoglobin Concentration : 32.6 g/dL  Auto Neutrophil # : 12.77 K/uL  Auto Lymphocyte # : 2.13 K/uL  Auto Monocyte # : 3.30 K/uL  Auto Eosinophil # : 0.00 K/uL  Auto Basophil # : 0.06 K/uL  Auto Neutrophil % : 68.7 %  Auto Lymphocyte % : 11.4 %  Auto Monocyte % : 17.7 %  Auto Eosinophil % : 0.0 %  Auto Basophil % : 0.3 %    07-27    134<L>  |  95<L>  |  31<H>  ----------------------------<  163<H>  4.4   |  32<H>  |  1.38<H>  07-27    136  |  97  |  34<H>  ----------------------------<  97  5.6<H>   |  34<H>  |  1.69<H>    Ca    8.2<L>      27 Jul 2023 13:42  Ca    8.7      27 Jul 2023 06:10    TPro  6.0  /  Alb  1.7<L>  /  TBili  0.4  /  DBili  x   /  AST  74<H>  /  ALT  39  /  AlkPhos  59  07-27  TPro  5.3<L>  /  Alb  1.6<L>  /  TBili  0.4  /  DBili  x   /  AST  60<H>  /  ALT  34  /  AlkPhos  56  07-26      proBNP:   Lipid Profile:   HgA1c:   TSH:       CARDIAC MARKERS:            TELEMETRY: 	    ECG:  	  RADIOLOGY:  OTHER: 	    PREVIOUS DIAGNOSTIC TESTING:    [ ] Echocardiogram:  [ ]  Catheterization:  [ ] Stress Test:  	  	  ASSESSMENT/PLAN:

## 2023-07-28 NOTE — PROGRESS NOTE ADULT - SUBJECTIVE AND OBJECTIVE BOX
ROJAS HE  MRN-[id number]    Follow Up:  fever    Interval History: the pt was seen and examined earlier, no acute distress, awake and alert, not oriented. Pt is afebrile within last 24 hrs, tachycardic, NC, WBC increased 18.61.    PAST MEDICAL & SURGICAL HISTORY:  TBI (traumatic brain injury)      HLD (hyperlipidemia)      HTN, age 0-18          ROS:    [ x] Unobtainable because: dementia   [ ] All other systems negative    Constitutional: no fever, no chills  Head: no trauma  Eyes: no vision changes, no eye pain  ENT:  no sore throat, no rhinorrhea  Cardiovascular:  no chest pain, no palpitation  Respiratory:  no SOB, no cough  GI:  no abd pain, no vomiting, no diarrhea  urinary: no dysuria, no hematuria, no flank pain  musculoskeletal:  no joint pain, no joint swelling  skin:  no rash  neurology:  no headache, no seizure, no change in mental status  psych: no anxiety, no depression         Allergies  No Known Allergies        ANTIMICROBIALS:  piperacillin/tazobactam IVPB.. 3.375 every 8 hours      OTHER MEDS:  acetaminophen     Tablet .. 650 milliGRAM(s) Oral every 6 hours PRN  atorvastatin 80 milliGRAM(s) Oral at bedtime  divalproex  milliGRAM(s) Oral three times a day  enoxaparin Injectable 60 milliGRAM(s) SubCutaneous every 12 hours  lidocaine   4% Patch 1 Patch Transdermal every 24 hours  melatonin 3 milliGRAM(s) Oral at bedtime PRN  metoprolol tartrate 25 milliGRAM(s) Oral two times a day  oxyCODONE    IR 5 milliGRAM(s) Oral every 4 hours PRN  oxyCODONE    IR 10 milliGRAM(s) Oral every 4 hours PRN  oxyCODONE  ER Tablet 10 milliGRAM(s) Oral every 12 hours  QUEtiapine 100 milliGRAM(s) Oral at bedtime  sodium chloride 0.9%. 1000 milliLiter(s) IV Continuous <Continuous>  traZODone 50 milliGRAM(s) Oral at bedtime      Vital Signs Last 24 Hrs  T(C): 37 (28 Jul 2023 10:23), Max: 37.7 (27 Jul 2023 16:29)  T(F): 98.6 (28 Jul 2023 10:23), Max: 99.9 (27 Jul 2023 16:29)  HR: 139 (28 Jul 2023 10:23) (66 - 139)  BP: 133/76 (28 Jul 2023 10:23) (104/67 - 133/76)  BP(mean): --  RR: 20 (28 Jul 2023 10:23) (16 - 20)  SpO2: 96% (28 Jul 2023 10:23) (96% - 99%)    Parameters below as of 28 Jul 2023 10:23  Patient On (Oxygen Delivery Method): nasal cannula  O2 Flow (L/min): 2      Physical Exam:  General: Chronically ill-appearing Male in no acute distress.  HEENT: AT/NC. Pupils/EOM unable secondary to patient compliance. Oropharynx/dentition unable secondary to patient compliance. Missing teeth   Neck: Increased tone not frankly rigid. No sense of mass.  Nodes: None palpable.  Lungs: Poor effort diminished BS b/l   Heart: Tachycardic with RR  Abdomen: Bowel sounds present. Soft. Nondistended. Nontender.  Extremities: No cyanosis or clubbing. No edema. MIldy contracted  Skin: Warm. Dry. Good turgor.   Psychiatric: Does not answer questions or respond to commands appropriately      WBC Count: 18.61 K/uL (07-27 @ 06:10)  WBC Count: 16.63 K/uL (07-26 @ 13:30)  WBC Count: 9.66 K/uL (07-24 @ 06:33)                            11.8   18.61 )-----------( 233      ( 27 Jul 2023 06:10 )             36.2       07-27    134<L>  |  95<L>  |  31<H>  ----------------------------<  163<H>  4.4   |  32<H>  |  1.38<H>    Ca    8.2<L>      27 Jul 2023 13:42    TPro  6.0  /  Alb  1.7<L>  /  TBili  0.4  /  DBili  x   /  AST  74<H>  /  ALT  39  /  AlkPhos  59  07-27      Urinalysis Basic - ( 27 Jul 2023 13:42 )    Color: x / Appearance: x / SG: x / pH: x  Gluc: 163 mg/dL / Ketone: x  / Bili: x / Urobili: x   Blood: x / Protein: x / Nitrite: x   Leuk Esterase: x / RBC: x / WBC x   Sq Epi: x / Non Sq Epi: x / Bacteria: x        Creatinine Trend: 1.38<--, 1.69<--, 1.53<--, 1.25<--, 1.11<--, 1.23<--      MICROBIOLOGY:  v  Clean Catch Clean Catch (Midstream)  07-26-23   <10,000 CFU/mL Normal Urogenital Ivette  --  --      .Blood Blood-Peripheral  07-26-23   No growth at 24 hours  --  --      .Blood Blood-Peripheral  07-26-23   No growth at 24 hours  --  --    Rapid RVP Result: NotDetec (07-27 @ 00:30)    SARS-CoV-2: NotDetec (07-27-23 @ 00:30)  Rapid RVP Result: NotDetec (07-27-23 @ 00:30)    SARS-CoV-2: NotDetec (27 Jul 2023 00:30)  SARS-CoV-2: NotDetec (17 Jul 2023 11:44)    RADIOLOGY:    < from: US Duplex Venous Lower Ext Complete, Bilateral (07.26.23 @ 12:14) >    ACC: [id number] EXAM:  US DPLX LWR EXT VEINS COMPL BI   ORDERED BY: TORIN CUI     PROCEDURE DATE:  07/26/2023          INTERPRETATION:  CLINICAL INFORMATION: Trauma, fall, leg pain    COMPARISON: None available.    TECHNIQUE: Duplex sonography of the BILATERAL LOWER extremity veins with   color and spectral Doppler, with and without compression.    FINDINGS:    RIGHT:    There is acute occlusive DVT affecting the right femoral vein with its   proximal extent in the right common femoral vein.    Thrombus may also affect the right deep femoral vein.    The right popliteal vein appears free of clot.    No calf vein DVT is identified.    LEFT:  Normal compressibility of the LEFT common femoral, femoral and popliteal   veins.  Doppler examination shows normal spontaneous and phasic flow.  No LEFT calf vein thrombosis is detected.    IMPRESSION: There is acute occlusive DVT affecting the right femoral vein   with the proximal extent of the thrombus in the right common femoral vein.    ---End of Report ---            EMANUEL COLLINS MD; Attending Interventional Radiologist  This document has been electronically signed. Jul 26 2023  2:44PM    < end of copied text >

## 2023-07-28 NOTE — PROGRESS NOTE ADULT - ASSESSMENT
68 years old male with h/o HTN, HLD, TBI resulting in dementia present to ED ? fall. As per daughter, patient was found on the floor. Patient is a poor history ( per daughter, patient will make up different history) . Patient reported he felt weak and fell onto the floor.  ER course with tachycardia, sat well at RA. EKG with sinus tachy. No leukocytosis, plt 129, K 6, Cr 2.82, glucose 110, . CXR with no focal consolidation. CT head  with no acute pathology. C/L spine with no fracture. CT pelsis with no acute displace fracture. Questionable mild widening of the bilateral sacroiliac joints with subchondral sclerosis and questionable erosions along the iliac side, right greater than left. Patient admitted for possible UTI and rhabdomylosis.    Fever- likely aspiration pna; will obtain CT chest - mucus plug RLLD, BCX, UCX, IV Zosyn. monitor vitals. ID consulted- Dr. Barksdale    Acute renal injury over cri i(  Increase in serum creatinine of 0.3 mg/dL within 48 hours or 50% within 7 days Urine output of 0.5 mL/kg/hour for 6 hours)and with some pre renal component and cw gentle hydration and renal dose medication, avoid nephrotoxic and repeat la  VIKAS (acute kidney injury).   ·  Plan: Cr 2.82, likely VIKAS, recent decrease in oral intake : monitor Cr  TSH noted  IV fluid,    Hyperkalemia. : K 6, no EKG changes. Likely in the setting of VIKAS, rhabdo, ACEI use. Hold ACEI.        Rhabdomyolysis    - IV fluids, bolus and   bicarb differ    - monitor urine for PH   - monitor BMP for renal failure and electrolites abnormalities   - monitor CPK total   - urine toxicology not seen   All patients should be initially treated with vigorous fluid repletion until it is clear from sequential laboratory values that the plasma CK level is stable and not increasing. Patients who have a stable plasma CK level <5000 unit/L do not require intravenous fluid, since studies have shown that the risk of VIKAS is low among such patients . A  ? arterial pH exceeds 7.5, or if the serum bicarbonate exceeds 30 mEq/L. If the bicarbonate solution is discontinued, volume repletion should be continued with isotonic saline  appears Non-traumatic rhabdomyolysis   Unclear etiology   Possibly elevated 2/2 infection/fall   CLabs CMP AND CBC with diff  Repeat ESR, CRP  fluid as mentioned and red cpk.        Sinus tachycardia - per cardio -may be his baseline in setting od autonomic dysfunction in setting on TBI    Functional quadriplegia- seen by neuro, d/c gabapentin     Moderate protein-calorie malnutrition. functional quadriplegia, debility  Diet Easy to Chew-  DASH/TLC {Sodium & Cholesterol Restricted}  Ensure Plus High Protein Cans or Servings Per Day:  1       Frequency:  Daily    Impaired ambulation.   ·  Plan: CT head with no acute pathology. C/L spine with no fracture. CT pelsis with no acute displace fracture. Questionable mild widening of the bilateral sacroiliac joints with subchondral sclerosis and questionable erosions along the iliac side, right greater than left  PT consult : rehab.    Dementia. : due to TBI  continue divalproex 250mg DR tid, quetiapine 100mg hs, trazodone 50mg hs.    Benign essential HTN.   ·  Plan: monitor BP and titrate BP med  ACEI on hold due to VIKAS.    Hyperlipidemia: continue statin.

## 2023-07-29 LAB
ALBUMIN SERPL ELPH-MCNC: 1.8 G/DL — LOW (ref 3.3–5)
ALP SERPL-CCNC: 85 U/L — SIGNIFICANT CHANGE UP (ref 40–120)
ALT FLD-CCNC: 82 U/L — HIGH (ref 12–78)
ANION GAP SERPL CALC-SCNC: 2 MMOL/L — LOW (ref 5–17)
AST SERPL-CCNC: 174 U/L — HIGH (ref 15–37)
BILIRUB SERPL-MCNC: 0.6 MG/DL — SIGNIFICANT CHANGE UP (ref 0.2–1.2)
BUN SERPL-MCNC: 21 MG/DL — SIGNIFICANT CHANGE UP (ref 7–23)
CALCIUM SERPL-MCNC: 9.1 MG/DL — SIGNIFICANT CHANGE UP (ref 8.5–10.1)
CHLORIDE SERPL-SCNC: 100 MMOL/L — SIGNIFICANT CHANGE UP (ref 96–108)
CK SERPL-CCNC: 1599 U/L — HIGH (ref 26–308)
CO2 SERPL-SCNC: 35 MMOL/L — HIGH (ref 22–31)
CREAT SERPL-MCNC: 1.17 MG/DL — SIGNIFICANT CHANGE UP (ref 0.5–1.3)
EGFR: 67 ML/MIN/1.73M2 — SIGNIFICANT CHANGE UP
GLUCOSE SERPL-MCNC: 99 MG/DL — SIGNIFICANT CHANGE UP (ref 70–99)
HCT VFR BLD CALC: 32.4 % — LOW (ref 39–50)
HGB BLD-MCNC: 11.1 G/DL — LOW (ref 13–17)
MCHC RBC-ENTMCNC: 30.9 PG — SIGNIFICANT CHANGE UP (ref 27–34)
MCHC RBC-ENTMCNC: 34.3 G/DL — SIGNIFICANT CHANGE UP (ref 32–36)
MCV RBC AUTO: 90.3 FL — SIGNIFICANT CHANGE UP (ref 80–100)
NRBC # BLD: 0 /100 WBCS — SIGNIFICANT CHANGE UP (ref 0–0)
PLATELET # BLD AUTO: 273 K/UL — SIGNIFICANT CHANGE UP (ref 150–400)
POTASSIUM SERPL-MCNC: 4.1 MMOL/L — SIGNIFICANT CHANGE UP (ref 3.5–5.3)
POTASSIUM SERPL-SCNC: 4.1 MMOL/L — SIGNIFICANT CHANGE UP (ref 3.5–5.3)
PROT SERPL-MCNC: 6.2 GM/DL — SIGNIFICANT CHANGE UP (ref 6–8.3)
RBC # BLD: 3.59 M/UL — LOW (ref 4.2–5.8)
RBC # FLD: 13.2 % — SIGNIFICANT CHANGE UP (ref 10.3–14.5)
SODIUM SERPL-SCNC: 137 MMOL/L — SIGNIFICANT CHANGE UP (ref 135–145)
WBC # BLD: 13.35 K/UL — HIGH (ref 3.8–10.5)
WBC # FLD AUTO: 13.35 K/UL — HIGH (ref 3.8–10.5)

## 2023-07-29 PROCEDURE — 99232 SBSQ HOSP IP/OBS MODERATE 35: CPT

## 2023-07-29 RX ORDER — METOPROLOL TARTRATE 50 MG
5 TABLET ORAL ONCE
Refills: 0 | Status: COMPLETED | OUTPATIENT
Start: 2023-07-29 | End: 2023-07-29

## 2023-07-29 RX ORDER — METOPROLOL TARTRATE 50 MG
25 TABLET ORAL EVERY 8 HOURS
Refills: 0 | Status: DISCONTINUED | OUTPATIENT
Start: 2023-07-29 | End: 2023-08-03

## 2023-07-29 RX ORDER — ACETAMINOPHEN 500 MG
650 TABLET ORAL EVERY 6 HOURS
Refills: 0 | Status: DISCONTINUED | OUTPATIENT
Start: 2023-07-29 | End: 2023-08-24

## 2023-07-29 RX ORDER — ACETAMINOPHEN 500 MG
1000 TABLET ORAL ONCE
Refills: 0 | Status: COMPLETED | OUTPATIENT
Start: 2023-07-29 | End: 2023-07-29

## 2023-07-29 RX ORDER — ACETAMINOPHEN 500 MG
1000 TABLET ORAL ONCE
Refills: 0 | Status: COMPLETED | OUTPATIENT
Start: 2023-07-29 | End: 2023-08-01

## 2023-07-29 RX ORDER — VALPROIC ACID (AS SODIUM SALT) 250 MG/5ML
250 SOLUTION, ORAL ORAL THREE TIMES A DAY
Refills: 0 | Status: DISCONTINUED | OUTPATIENT
Start: 2023-07-29 | End: 2023-08-24

## 2023-07-29 RX ADMIN — PIPERACILLIN AND TAZOBACTAM 25 GRAM(S): 4; .5 INJECTION, POWDER, LYOPHILIZED, FOR SOLUTION INTRAVENOUS at 14:39

## 2023-07-29 RX ADMIN — Medication 1000 MILLIGRAM(S): at 11:39

## 2023-07-29 RX ADMIN — LIDOCAINE 1 PATCH: 4 CREAM TOPICAL at 20:16

## 2023-07-29 RX ADMIN — Medication 1000 MILLIGRAM(S): at 01:30

## 2023-07-29 RX ADMIN — QUETIAPINE FUMARATE 100 MILLIGRAM(S): 200 TABLET, FILM COATED ORAL at 22:31

## 2023-07-29 RX ADMIN — Medication 650 MILLIGRAM(S): at 20:17

## 2023-07-29 RX ADMIN — Medication 5 MILLIGRAM(S): at 03:34

## 2023-07-29 RX ADMIN — LIDOCAINE 1 PATCH: 4 CREAM TOPICAL at 10:39

## 2023-07-29 RX ADMIN — DIVALPROEX SODIUM 250 MILLIGRAM(S): 500 TABLET, DELAYED RELEASE ORAL at 05:27

## 2023-07-29 RX ADMIN — PIPERACILLIN AND TAZOBACTAM 25 GRAM(S): 4; .5 INJECTION, POWDER, LYOPHILIZED, FOR SOLUTION INTRAVENOUS at 22:31

## 2023-07-29 RX ADMIN — Medication 400 MILLIGRAM(S): at 00:22

## 2023-07-29 RX ADMIN — OXYCODONE HYDROCHLORIDE 10 MILLIGRAM(S): 5 TABLET ORAL at 05:27

## 2023-07-29 RX ADMIN — Medication 25 MILLIGRAM(S): at 05:27

## 2023-07-29 RX ADMIN — Medication 650 MILLIGRAM(S): at 18:19

## 2023-07-29 RX ADMIN — ATORVASTATIN CALCIUM 80 MILLIGRAM(S): 80 TABLET, FILM COATED ORAL at 22:31

## 2023-07-29 RX ADMIN — Medication 25 MILLIGRAM(S): at 17:01

## 2023-07-29 RX ADMIN — PIPERACILLIN AND TAZOBACTAM 25 GRAM(S): 4; .5 INJECTION, POWDER, LYOPHILIZED, FOR SOLUTION INTRAVENOUS at 05:28

## 2023-07-29 RX ADMIN — Medication 400 MILLIGRAM(S): at 10:39

## 2023-07-29 RX ADMIN — Medication 250 MILLIGRAM(S): at 22:31

## 2023-07-29 RX ADMIN — Medication 3 MILLIGRAM(S): at 22:31

## 2023-07-29 RX ADMIN — LIDOCAINE 1 PATCH: 4 CREAM TOPICAL at 21:13

## 2023-07-29 RX ADMIN — OXYCODONE HYDROCHLORIDE 10 MILLIGRAM(S): 5 TABLET ORAL at 17:01

## 2023-07-29 RX ADMIN — ENOXAPARIN SODIUM 60 MILLIGRAM(S): 100 INJECTION SUBCUTANEOUS at 17:01

## 2023-07-29 RX ADMIN — ENOXAPARIN SODIUM 60 MILLIGRAM(S): 100 INJECTION SUBCUTANEOUS at 05:27

## 2023-07-29 RX ADMIN — Medication 50 MILLIGRAM(S): at 22:31

## 2023-07-29 RX ADMIN — OXYCODONE HYDROCHLORIDE 10 MILLIGRAM(S): 5 TABLET ORAL at 18:01

## 2023-07-29 NOTE — PROGRESS NOTE ADULT - SUBJECTIVE AND OBJECTIVE BOX
Patient is a 68y old  Male who presents with a chief complaint of VIKAS, rhabdomyolysis (28 Jul 2023 15:23)      INTERVAL HPI/OVERNIGHT EVENTS:  not in acute distress quadroplegic base line TBBBBI    MEDICATIONS  (STANDING):  acetaminophen   IVPB .. 1000 milliGRAM(s) IV Intermittent once  atorvastatin 80 milliGRAM(s) Oral at bedtime  divalproex  milliGRAM(s) Oral three times a day  enoxaparin Injectable 60 milliGRAM(s) SubCutaneous every 12 hours  lidocaine   4% Patch 1 Patch Transdermal every 24 hours  metoprolol tartrate 25 milliGRAM(s) Oral two times a day  oxyCODONE  ER Tablet 10 milliGRAM(s) Oral every 12 hours  piperacillin/tazobactam IVPB.. 3.375 Gram(s) IV Intermittent every 8 hours  QUEtiapine 100 milliGRAM(s) Oral at bedtime  sodium chloride 0.9%. 1000 milliLiter(s) (75 mL/Hr) IV Continuous <Continuous>  traZODone 50 milliGRAM(s) Oral at bedtime    MEDICATIONS  (PRN):  acetaminophen     Tablet .. 650 milliGRAM(s) Oral every 6 hours PRN Mild Pain (1 - 3), Moderate Pain (4 - 6)  melatonin 3 milliGRAM(s) Oral at bedtime PRN Insomnia  oxyCODONE    IR 5 milliGRAM(s) Oral every 4 hours PRN Moderate Pain (4 - 6)  oxyCODONE    IR 10 milliGRAM(s) Oral every 4 hours PRN Severe Pain (7 - 10)      Allergies    No Known Allergies    Intolerances        REVIEW OF SYSTEMS:Base line TBi quadriplegic   CONSTITUTIONAL: No fever, weight loss, or fatigue  EYES: No eye pain, visual disturbances, or discharge  ENMT:  No difficulty hearing, tinnitus, vertigo; No sinus or throat pain  NECK: No pain or stiffness  BREASTS: No pain, masses, or nipple discharge  RESPIRATORY: No cough, wheezing, chills or hemoptysis; No shortness of breath  CARDIOVASCULAR: No chest pain, palpitations, dizziness, or leg swelling  GASTROINTESTINAL: No abdominal or epigastric pain. No nausea, vomiting, or hematemesis; No diarrhea or constipation. No melena or hematochezia.  GENITOURINARY: No dysuria, frequency, hematuria, or incontinence  NEUROLOGICAL: No headaches, memory loss, loss of strength, numbness, or tremors  SKIN: No itching, burning, rashes, or lesions   LYMPH NODES: No enlarged glands  ENDOCRINE: No heat or cold intolerance; No hair loss  MUSCULOSKELETAL: No joint pain or swelling; No muscle, back, or extremity pain  PSYCHIATRIC: No depression, anxiety, mood swings, or difficulty sleeping  HEME/LYMPH: No easy bruising, or bleeding gums  ALLERGY AND IMMUNOLOGIC: No hives or eczema    Vital Signs Last 24 Hrs  T(C): 37.4 (29 Jul 2023 05:21), Max: 38.2 (28 Jul 2023 23:41)  T(F): 99.4 (29 Jul 2023 05:21), Max: 100.8 (28 Jul 2023 23:41)  HR: 125 (29 Jul 2023 05:21) (114 - 140)  BP: 116/78 (29 Jul 2023 05:21) (116/78 - 140/90)  BP(mean): --  RR: 18 (29 Jul 2023 05:21) (17 - 20)  SpO2: 95% (29 Jul 2023 05:21) (95% - 100%)    Parameters below as of 29 Jul 2023 05:21  Patient On (Oxygen Delivery Method): nasal cannula  O2 Flow (L/min): 2      PHYSICAL EXAM:  GENERAL: NAD, well-groomed, well-developed  HEAD:  Atraumatic, Normocephalic  EYES: EOMI, PERRLA, conjunctiva and sclera clear  ENMT: No tonsillar erythema, exudates, or enlargement; Moist mucous membranes, Good dentition, No lesions  NECK: Supple, No JVD, Normal thyroid  NERVOUS SYSTEM:  baseline TBI quadriplegic ACHEST/LUNG: Clear to percussion bilaterally; No rales, rhonchi, wheezing, or rubs  HEART: Regular rate and rhythm; No murmurs, rubs, or gallops  ABDOMEN: Soft, Nontender, Nondistended; Bowel sounds present  EXTREMITIES: contracted   LYMPH: No lymphadenopathy noted  SKIN: No rashes or lesions    LABS:  LABS:  cret    07-27    134<L>  |  95<L>  |  31<H>  ----------------------------<  163<H>  4.4   |  32<H>  |  1.38<H>    Ca    8.2<L>      27 Jul 2023 13:42          07-27    134<L>  |  95<L>  |  31<H>  ----------------------------<  163<H>  4.4   |  32<H>  |  1.38<H>    Ca    8.2<L>      27 Jul 2023 13:42        Urinalysis Basic - ( 27 Jul 2023 13:42 )    Color: x / Appearance: x / SG: x / pH: x  Gluc: 163 mg/dL / Ketone: x  / Bili: x / Urobili: x   Blood: x / Protein: x / Nitrite: x   Leuk Esterase: x / RBC: x / WBC x   Sq Epi: x / Non Sq Epi: x / Bacteria: x      CAPILLARY BLOOD GLUCOSE          RADIOLOGY & ADDITIONAL TESTS:    Imaging Personally Reviewed:  [ X] YES  [ ] NO    Consultant(s) Notes Reviewed:  [ X] YES  [ ] NO    Care Discussed with Consultants/Other Providers [X ] YES  [ ] NO

## 2023-07-29 NOTE — PROGRESS NOTE ADULT - ASSESSMENT
68 years old male with h/o HTN, HLD, TBI resulting in dementia present to ED ? fall. As per daughter, patient was found on the floor. Patient is a poor history ( per daughter, patient will make up different history) . Patient reported he felt weak and fell onto the floor.  ER course with tachycardia, sat well at RA. EKG with sinus tachy. No leukocytosis, plt 129, K 6, Cr 2.82, glucose 110, . CXR with no focal consolidation. CT head  with no acute pathology. C/L spine with no fracture. CT pelsis with no acute displace fracture. Questionable mild widening of the bilateral sacroiliac joints with subchondral sclerosis and questionable erosions along the iliac side, right greater than left. Patient admitted for possible UTI and rhabdomylosis.    Fever- likely aspiration pna; will obtain CT chest - mucus plug RLLD, BCX, UCX, IV Zosyn. monitor vitals. ID consulted- Dr. Barksdale    Acute renal injury over cri i(  Increase in serum creatinine of 0.3 mg/dL within 48 hours or 50% within 7 days Urine output of 0.5 mL/kg/hour for 6 hours)and with some pre renal component and cw gentle hydration and renal dose medication, avoid nephrotoxic and repeat la  VIKAS (acute kidney injury).   ·  Plan: Cr 2.82, likely VIKAS, recent decrease in oral intake : monitor Cr  TSH noted  IV fluid,    Hyperkalemia. : K 6, no EKG changes. Likely in the setting of VIKAS, rhabdo, ACEI use. Hold ACEI.  labs in am         Rhabdomyolysis    - IV fluids, bolus and   bicarb differ    - monitor urine for PH   - monitor BMP for renal failure and electrolites abnormalities   - monitor CPK total   - urine toxicology not seen   All patients should be initially treated with vigorous fluid repletion until it is clear from sequential laboratory values that the plasma CK level is stable and not increasing. Patients who have a stable plasma CK level <5000 unit/L do not require intravenous fluid, since studies have shown that the risk of VIKAS is low among such patients . A  ? arterial pH exceeds 7.5, or if the serum bicarbonate exceeds 30 mEq/L. If the bicarbonate solution is discontinued, volume repletion should be continued with isotonic saline  appears Non-traumatic rhabdomyolysis   Unclear etiology   Possibly elevated 2/2 infection/fall   CLabs CMP AND CBC with diff  Repeat ESR, CRP  fluid as mentioned and red cpk.        Sinus tachycardia - per cardio -may be his baseline in setting od autonomic dysfunction in setting on TBI    Functional quadriplegia- seen by neuro, d/c gabapentin     Moderate protein-calorie malnutrition. functional quadriplegia, debility  Diet Easy to Chew-  DASH/TLC {Sodium & Cholesterol Restricted}  Ensure Plus High Protein Cans or Servings Per Day:  1       Frequency:  Daily    Impaired ambulation.   ·  Plan: CT head with no acute pathology. C/L spine with no fracture. CT pelsis with no acute displace fracture. Questionable mild widening of the bilateral sacroiliac joints with subchondral sclerosis and questionable erosions along the iliac side, right greater than left  PT consult : rehab.    Dementia. : due to TBI  continue divalproex 250mg DR tid, quetiapine 100mg hs, trazodone 50mg hs.    Benign essential HTN.   ·  Plan: monitor BP and titrate BP med  ACEI on hold due to VIKAS.    Hyperlipidemia: continue statin.   Speaking Coherently

## 2023-07-30 LAB
ALBUMIN SERPL ELPH-MCNC: 1.6 G/DL — LOW (ref 3.3–5)
ALP SERPL-CCNC: 89 U/L — SIGNIFICANT CHANGE UP (ref 40–120)
ALT FLD-CCNC: 75 U/L — SIGNIFICANT CHANGE UP (ref 12–78)
ANION GAP SERPL CALC-SCNC: 4 MMOL/L — LOW (ref 5–17)
AST SERPL-CCNC: 152 U/L — HIGH (ref 15–37)
BILIRUB SERPL-MCNC: 0.6 MG/DL — SIGNIFICANT CHANGE UP (ref 0.2–1.2)
BUN SERPL-MCNC: 21 MG/DL — SIGNIFICANT CHANGE UP (ref 7–23)
CALCIUM SERPL-MCNC: 8.9 MG/DL — SIGNIFICANT CHANGE UP (ref 8.5–10.1)
CHLORIDE SERPL-SCNC: 100 MMOL/L — SIGNIFICANT CHANGE UP (ref 96–108)
CO2 SERPL-SCNC: 34 MMOL/L — HIGH (ref 22–31)
CREAT SERPL-MCNC: 1.13 MG/DL — SIGNIFICANT CHANGE UP (ref 0.5–1.3)
EGFR: 70 ML/MIN/1.73M2 — SIGNIFICANT CHANGE UP
GLUCOSE SERPL-MCNC: 99 MG/DL — SIGNIFICANT CHANGE UP (ref 70–99)
HCT VFR BLD CALC: 33.3 % — LOW (ref 39–50)
HGB BLD-MCNC: 11.2 G/DL — LOW (ref 13–17)
MCHC RBC-ENTMCNC: 30.5 PG — SIGNIFICANT CHANGE UP (ref 27–34)
MCHC RBC-ENTMCNC: 33.6 G/DL — SIGNIFICANT CHANGE UP (ref 32–36)
MCV RBC AUTO: 90.7 FL — SIGNIFICANT CHANGE UP (ref 80–100)
NRBC # BLD: 0 /100 WBCS — SIGNIFICANT CHANGE UP (ref 0–0)
PLATELET # BLD AUTO: 337 K/UL — SIGNIFICANT CHANGE UP (ref 150–400)
POTASSIUM SERPL-MCNC: 4.6 MMOL/L — SIGNIFICANT CHANGE UP (ref 3.5–5.3)
POTASSIUM SERPL-SCNC: 4.6 MMOL/L — SIGNIFICANT CHANGE UP (ref 3.5–5.3)
PROT SERPL-MCNC: 6 GM/DL — SIGNIFICANT CHANGE UP (ref 6–8.3)
RBC # BLD: 3.67 M/UL — LOW (ref 4.2–5.8)
RBC # FLD: 13.5 % — SIGNIFICANT CHANGE UP (ref 10.3–14.5)
SODIUM SERPL-SCNC: 138 MMOL/L — SIGNIFICANT CHANGE UP (ref 135–145)
WBC # BLD: 15.5 K/UL — HIGH (ref 3.8–10.5)
WBC # FLD AUTO: 15.5 K/UL — HIGH (ref 3.8–10.5)

## 2023-07-30 PROCEDURE — 99232 SBSQ HOSP IP/OBS MODERATE 35: CPT

## 2023-07-30 RX ORDER — LEVALBUTEROL 1.25 MG/.5ML
0.63 SOLUTION, CONCENTRATE RESPIRATORY (INHALATION) EVERY 6 HOURS
Refills: 0 | Status: DISCONTINUED | OUTPATIENT
Start: 2023-07-30 | End: 2023-08-04

## 2023-07-30 RX ORDER — ACETYLCYSTEINE 200 MG/ML
4 VIAL (ML) MISCELLANEOUS
Refills: 0 | Status: DISCONTINUED | OUTPATIENT
Start: 2023-07-30 | End: 2023-08-04

## 2023-07-30 RX ADMIN — ATORVASTATIN CALCIUM 80 MILLIGRAM(S): 80 TABLET, FILM COATED ORAL at 21:24

## 2023-07-30 RX ADMIN — ENOXAPARIN SODIUM 60 MILLIGRAM(S): 100 INJECTION SUBCUTANEOUS at 05:40

## 2023-07-30 RX ADMIN — Medication 50 MILLIGRAM(S): at 21:24

## 2023-07-30 RX ADMIN — Medication 250 MILLIGRAM(S): at 05:40

## 2023-07-30 RX ADMIN — Medication 25 MILLIGRAM(S): at 05:40

## 2023-07-30 RX ADMIN — ENOXAPARIN SODIUM 60 MILLIGRAM(S): 100 INJECTION SUBCUTANEOUS at 17:23

## 2023-07-30 RX ADMIN — Medication 250 MILLIGRAM(S): at 13:09

## 2023-07-30 RX ADMIN — LIDOCAINE 1 PATCH: 4 CREAM TOPICAL at 12:11

## 2023-07-30 RX ADMIN — QUETIAPINE FUMARATE 100 MILLIGRAM(S): 200 TABLET, FILM COATED ORAL at 21:24

## 2023-07-30 RX ADMIN — OXYCODONE HYDROCHLORIDE 10 MILLIGRAM(S): 5 TABLET ORAL at 05:44

## 2023-07-30 RX ADMIN — PIPERACILLIN AND TAZOBACTAM 25 GRAM(S): 4; .5 INJECTION, POWDER, LYOPHILIZED, FOR SOLUTION INTRAVENOUS at 13:09

## 2023-07-30 RX ADMIN — Medication 25 MILLIGRAM(S): at 13:09

## 2023-07-30 RX ADMIN — OXYCODONE HYDROCHLORIDE 10 MILLIGRAM(S): 5 TABLET ORAL at 17:21

## 2023-07-30 RX ADMIN — Medication 25 MILLIGRAM(S): at 21:24

## 2023-07-30 RX ADMIN — OXYCODONE HYDROCHLORIDE 10 MILLIGRAM(S): 5 TABLET ORAL at 18:21

## 2023-07-30 RX ADMIN — LIDOCAINE 1 PATCH: 4 CREAM TOPICAL at 18:08

## 2023-07-30 RX ADMIN — Medication 250 MILLIGRAM(S): at 21:24

## 2023-07-30 RX ADMIN — LEVALBUTEROL 0.63 MILLIGRAM(S): 1.25 SOLUTION, CONCENTRATE RESPIRATORY (INHALATION) at 23:36

## 2023-07-30 RX ADMIN — PIPERACILLIN AND TAZOBACTAM 25 GRAM(S): 4; .5 INJECTION, POWDER, LYOPHILIZED, FOR SOLUTION INTRAVENOUS at 21:24

## 2023-07-30 RX ADMIN — PIPERACILLIN AND TAZOBACTAM 25 GRAM(S): 4; .5 INJECTION, POWDER, LYOPHILIZED, FOR SOLUTION INTRAVENOUS at 05:40

## 2023-07-30 NOTE — PROGRESS NOTE ADULT - SUBJECTIVE AND OBJECTIVE BOX
Patient is a 68y old  Male who presents with a chief complaint of VIKAS, rhabdomyolysis (30 Jul 2023 09:24)      INTERVAL HPI/OVERNIGHT EVENTS:  confuse and base line TBI    MEDICATIONS  (STANDING):  acetaminophen   IVPB .. 1000 milliGRAM(s) IV Intermittent once  atorvastatin 80 milliGRAM(s) Oral at bedtime  enoxaparin Injectable 60 milliGRAM(s) SubCutaneous every 12 hours  lidocaine   4% Patch 1 Patch Transdermal every 24 hours  metoprolol tartrate 25 milliGRAM(s) Oral every 8 hours  oxyCODONE  ER Tablet 10 milliGRAM(s) Oral every 12 hours  piperacillin/tazobactam IVPB.. 3.375 Gram(s) IV Intermittent every 8 hours  QUEtiapine 100 milliGRAM(s) Oral at bedtime  sodium chloride 0.9%. 1000 milliLiter(s) (75 mL/Hr) IV Continuous <Continuous>  traZODone 50 milliGRAM(s) Oral at bedtime  valproic  acid Syrup 250 milliGRAM(s) Oral three times a day    MEDICATIONS  (PRN):  acetaminophen     Tablet .. 650 milliGRAM(s) Oral every 6 hours PRN Temp greater or equal to 38C (100.4F)  acetaminophen     Tablet .. 650 milliGRAM(s) Oral every 6 hours PRN Mild Pain (1 - 3), Moderate Pain (4 - 6)  melatonin 3 milliGRAM(s) Oral at bedtime PRN Insomnia  oxyCODONE    IR 5 milliGRAM(s) Oral every 4 hours PRN Moderate Pain (4 - 6)  oxyCODONE    IR 10 milliGRAM(s) Oral every 4 hours PRN Severe Pain (7 - 10)      Allergies    No Known Allergies    Intolerances        REVIEW OF SYSTEMS: BASE LINE TBI    Vital Signs Last 24 Hrs  T(C): 37.6 (30 Jul 2023 04:55), Max: 38.4 (29 Jul 2023 10:42)  T(F): 99.6 (30 Jul 2023 04:55), Max: 101.1 (29 Jul 2023 10:42)  HR: 128 (30 Jul 2023 06:52) (128 - 142)  BP: 118/81 (30 Jul 2023 04:55) (118/81 - 156/96)  BP(mean): --  RR: 18 (30 Jul 2023 04:55) (18 - 19)  SpO2: 99% (30 Jul 2023 04:55) (93% - 99%)    Parameters below as of 30 Jul 2023 04:55  Patient On (Oxygen Delivery Method): room air        PHYSICAL EXAM:  GENERAL: NAD, well-groomed, well-developed  HEAD:  Atraumatic, Normocephalic  EYES: EOMI, PERRLA, conjunctiva and sclera clear  ENMT: No tonsillar erythema, exudates, or enlargement; Moist mucous membranes, Good dentition, No lesions  NECK: Supple, No JVD, Normal thyroid  NERVOUS SYSTEM:  Alert & Oriented X3, Good concentration; Motor Strength 5/5 B/L upper and lower extremities; DTRs 2+ intact and symmetric  CHEST/LUNG: Clear to percussion bilaterally; No rales, rhonchi, wheezing, or rubs  HEART: Regular rate and rhythm; No murmurs, rubs, or gallops  ABDOMEN: Soft, Nontender, Nondistended; Bowel sounds present  EXTREMITIES:  2+ Peripheral Pulses, No clubbing, cyanosis, or edema  LYMPH: No lymphadenopathy noted  SKIN: No rashes or lesions    LABS:                        11.2   15.50 )-----------( 337      ( 30 Jul 2023 07:50 )             33.3     07-30    138  |  100  |  21  ----------------------------<  99  4.6   |  34<H>  |  1.13    Ca    8.9      30 Jul 2023 07:50    TPro  6.0  /  Alb  1.6<L>  /  TBili  0.6  /  DBili  x   /  AST  152<H>  /  ALT  75  /  AlkPhos  89  07-30      Urinalysis Basic - ( 30 Jul 2023 07:50 )    Color: x / Appearance: x / SG: x / pH: x  Gluc: 99 mg/dL / Ketone: x  / Bili: x / Urobili: x   Blood: x / Protein: x / Nitrite: x   Leuk Esterase: x / RBC: x / WBC x   Sq Epi: x / Non Sq Epi: x / Bacteria: x      CAPILLARY BLOOD GLUCOSE          RADIOLOGY & ADDITIONAL TESTS:    Imaging Personally Reviewed:  [ X] YES  [ ] NO    Consultant(s) Notes Reviewed:  [ X] YES  [ ] NO    Care Discussed with Consultants/Other Providers [X ] YES  [ ] NO

## 2023-07-30 NOTE — PROGRESS NOTE ADULT - SUBJECTIVE AND OBJECTIVE BOX
Neurology Progress Note    S: Patient seen and examined. No new events overnight. patient denied CP, SOB, HA or pain.     Medication:  acetaminophen     Tablet .. 650 milliGRAM(s) Oral every 6 hours PRN  acetaminophen     Tablet .. 650 milliGRAM(s) Oral every 6 hours PRN  acetaminophen   IVPB .. 1000 milliGRAM(s) IV Intermittent once  atorvastatin 80 milliGRAM(s) Oral at bedtime  enoxaparin Injectable 60 milliGRAM(s) SubCutaneous every 12 hours  lidocaine   4% Patch 1 Patch Transdermal every 24 hours  melatonin 3 milliGRAM(s) Oral at bedtime PRN  metoprolol tartrate 25 milliGRAM(s) Oral every 8 hours  oxyCODONE    IR 10 milliGRAM(s) Oral every 4 hours PRN  oxyCODONE    IR 5 milliGRAM(s) Oral every 4 hours PRN  oxyCODONE  ER Tablet 10 milliGRAM(s) Oral every 12 hours  piperacillin/tazobactam IVPB.. 3.375 Gram(s) IV Intermittent every 8 hours  QUEtiapine 100 milliGRAM(s) Oral at bedtime  sodium chloride 0.9%. 1000 milliLiter(s) IV Continuous <Continuous>  traZODone 50 milliGRAM(s) Oral at bedtime  valproic  acid Syrup 250 milliGRAM(s) Oral three times a day      Vitals:  Vital Signs Last 24 Hrs  T(C): 37.6 (30 Jul 2023 04:55), Max: 38.4 (29 Jul 2023 10:42)  T(F): 99.6 (30 Jul 2023 04:55), Max: 101.1 (29 Jul 2023 10:42)  HR: 128 (30 Jul 2023 06:52) (128 - 142)  BP: 118/81 (30 Jul 2023 04:55) (118/81 - 156/96)  BP(mean): --  RR: 18 (30 Jul 2023 04:55) (18 - 19)  SpO2: 99% (30 Jul 2023 04:55) (93% - 99%)    Parameters below as of 30 Jul 2023 04:55  Patient On (Oxygen Delivery Method): room air        General Exam:   General Appearance: Appropriately dressed and in no acute distress       Head: Normocephalic, atraumatic and no dysmorphic features  Ear, Nose, and Throat: Moist mucous membranes  CVS: S1S2+  Resp: No SOB, no wheeze or rhonchi  Abd: soft NTND  Extremities: No edema, no cyanosis  Skin: No bruises, no rashes     Neurological Exam:    Mental Status -lethargic opens eys does not follow commands   Cranial Nerves - PERRL, EOMI, VFF, V1-V3 intact, no gross facial asymmetry,    Motor Exam -   Moves UEs spontanesously LEs atrophy some contracture    Sensory    Intact to light touch and pinprick bilaterally    Reflexes UES hyperreflexia patellar trace Left ankle clonus at times right mute  Coordination: unable   Gait: deferred     I personally reviewed the below data/images/labs:      CBC Full  -  ( 30 Jul 2023 07:50 )  WBC Count : 15.50 K/uL  RBC Count : 3.67 M/uL  Hemoglobin : 11.2 g/dL  Hematocrit : 33.3 %  Platelet Count - Automated : 337 K/uL  Mean Cell Volume : 90.7 fl  Mean Cell Hemoglobin : 30.5 pg  Mean Cell Hemoglobin Concentration : 33.6 g/dL  Auto Neutrophil # : x  Auto Lymphocyte # : x  Auto Monocyte # : x  Auto Eosinophil # : x  Auto Basophil # : x  Auto Neutrophil % : x  Auto Lymphocyte % : x  Auto Monocyte % : x  Auto Eosinophil % : x  Auto Basophil % : x    07-30    138  |  100  |  21  ----------------------------<  99  4.6   |  34<H>  |  1.13    Ca    8.9      30 Jul 2023 07:50    TPro  6.0  /  Alb  1.6<L>  /  TBili  0.6  /  DBili  x   /  AST  152<H>  /  ALT  75  /  AlkPhos  89  07-30    LIVER FUNCTIONS - ( 30 Jul 2023 07:50 )  Alb: 1.6 g/dL / Pro: 6.0 gm/dL / ALK PHOS: 89 U/L / ALT: 75 U/L / AST: 152 U/L / GGT: x             Urinalysis Basic - ( 30 Jul 2023 07:50 )    Color: x / Appearance: x / SG: x / pH: x  Gluc: 99 mg/dL / Ketone: x  / Bili: x / Urobili: x   Blood: x / Protein: x / Nitrite: x   Leuk Esterase: x / RBC: x / WBC x   Sq Epi: x / Non Sq Epi: x / Bacteria: x        < from: CT Head No Cont (07.14.23 @ 14:35) >  IMPRESSION:    CT head: Markedly motion degraded exam. No gross evidence of acute   intracranial hemorrhage or mass effect.    CT cervical spine: No evidence for acute displaced fracture or   malalignment.    CT lumbar spine: No evidence for acute displaced fracture or malalignment.    --- End of Report ---

## 2023-07-30 NOTE — PROVIDER CONTACT NOTE (OTHER) - ASSESSMENT
Temperature per rectal probe improved. HR Remains 140.  / Temperature per rectal probe improved to 99.5 from 100.9. HR Remains 140. /62.

## 2023-07-30 NOTE — PROVIDER CONTACT NOTE (OTHER) - ACTION/TREATMENT ORDERED:
No new orders at this time. No new orders at this time. Hold off on IV tylenol as fever improved with cooling blanket.

## 2023-07-30 NOTE — PROGRESS NOTE ADULT - ASSESSMENT
68 years old male with h/o HTN, HLD, TBI dementia here after a fall On exam lethargic orineted x 0 general weakness LE atrophy some hyperrefleia  CTH, C and L reviewed     Impression: generalized weakness. TME rhabdo  aspiration   uti     limit polypharmacy  consider holding gabapentin  given limited study consider repeat CTH  outpt EMG  GOCs  -B12, folate, TSH   -In order to enhance patient's overall well-being and clinical course, please try avoiding benzodiazepines, anticholinergics, and antihistamines (Can cause worsening confusion/delirium). Additionally, continue reorientation, supportive care, maintaining regular sleep/wake cycle, and optimizing nutritional/medical factors.

## 2023-07-30 NOTE — PROGRESS NOTE ADULT - ASSESSMENT
68 years old male with h/o HTN, HLD, TBI resulting in dementia present to ED ? fall. As per daughter, patient was found on the floor. Patient is a poor history ( per daughter, patient will make up different history) . Patient reported he felt weak and fell onto the floor.  ER course with tachycardia, sat well at RA. EKG with sinus tachy. No leukocytosis, plt 129, K 6, Cr 2.82, glucose 110, . CXR with no focal consolidation. CT head  with no acute pathology. C/L spine with no fracture. CT pelsis with no acute displace fracture. Questionable mild widening of the bilateral sacroiliac joints with subchondral sclerosis and questionable erosions along the iliac side, right greater than left. Patient admitted for possible UTI and rhabdomylosis.    Fever- likely aspiration pna; will obtain CT chest - mucus plug RLLD, BCX, UCX, IV Zosyn. monitor vitals. ID consulted- Dr. Barksdale    Acute renal injury over cri i(  Increase in serum creatinine of 0.3 mg/dL within 48 hours or 50% within 7 days Urine output of 0.5 mL/kg/hour for 6 hours)and with some pre renal component and cw gentle hydration and renal dose medication, avoid nephrotoxic and repeat la  VIKAS (acute kidney injury).   ·  Plan: Cr 2.82, likely VIKAS, recent decrease in oral intake : monitor Cr  TSH noted  IV fluid,    Hyperkalemia. : K 6, no EKG changes. Likely in the setting of VIKAS, rhabdo, ACEI use. Hold ACEI.        Rhabdomyolysis    - IV fluids, bolus and   bicarb differ    - monitor urine for PH   - monitor BMP for renal failure and electrolites abnormalities   - monitor CPK total   - urine toxicology not seen   All patients should be initially treated with vigorous fluid repletion until it is clear from sequential laboratory values that the plasma CK level is stable and not increasing. Patients who have a stable plasma CK level <5000 unit/L do not require intravenous fluid, since studies have shown that the risk of VIKAS is low among such patients . A  ? arterial pH exceeds 7.5, or if the serum bicarbonate exceeds 30 mEq/L. If the bicarbonate solution is discontinued, volume repletion should be continued with isotonic saline  appears Non-traumatic rhabdomyolysis   Unclear etiology   Possibly elevated 2/2 infection/fall   CLabs CMP AND CBC with diff  fluid as mentioned and red cpk.        Sinus tachycardia - per cardio -may be his baseline in setting od autonomic dysfunction in setting on TBI    Functional quadriplegia- seen by neuro, d/c gabapentin     Moderate protein-calorie malnutrition. functional quadriplegia, debility  Diet Easy to Chew-  DASH/TLC {Sodium & Cholesterol Restricted}  Ensure Plus High Protein Cans or Servings Per Day:  1       Frequency:  Daily    Impaired ambulation.   ·  Plan: CT head with no acute pathology. C/L spine with no fracture. CT pelsis with no acute displace fracture. Questionable mild widening of the bilateral sacroiliac joints with subchondral sclerosis and questionable erosions along the iliac side, right greater than left  PT consult : rehab.    Dementia. : due to TBI  continue divalproex 250mg DR tid, quetiapine 100mg hs, trazodone 50mg hs.    Benign essential HTN.   ·  Plan: monitor BP and titrate BP med  ACEI on hold due to VIKAS.    Hyperlipidemia: continue statin.

## 2023-07-31 LAB
BASE EXCESS BLDA CALC-SCNC: 7.5 MMOL/L — HIGH (ref -2–3)
BLOOD GAS COMMENTS ARTERIAL: SIGNIFICANT CHANGE UP
CK MB CFR SERPL CALC: 10.2 NG/ML — HIGH (ref 0.5–3.6)
CO2 BLDA-SCNC: 32 MMOL/L — HIGH (ref 19–24)
CULTURE RESULTS: SIGNIFICANT CHANGE UP
GAS PNL BLDA: SIGNIFICANT CHANGE UP
HCO3 BLDA-SCNC: 31 MMOL/L — HIGH (ref 21–28)
HOROWITZ INDEX BLDA+IHG-RTO: 21 — SIGNIFICANT CHANGE UP
PCO2 BLDA: 39 MMHG — SIGNIFICANT CHANGE UP (ref 32–46)
PH BLDA: 7.51 — HIGH (ref 7.35–7.45)
PO2 BLDA: 67 MMHG — LOW (ref 83–108)
SAO2 % BLDA: 94.3 % — SIGNIFICANT CHANGE UP (ref 94–98)
SPECIMEN SOURCE: SIGNIFICANT CHANGE UP
TROPONIN I, HIGH SENSITIVITY RESULT: 30.8 NG/L — SIGNIFICANT CHANGE UP

## 2023-07-31 PROCEDURE — 99232 SBSQ HOSP IP/OBS MODERATE 35: CPT

## 2023-07-31 RX ORDER — SODIUM CHLORIDE 9 MG/ML
1000 INJECTION INTRAMUSCULAR; INTRAVENOUS; SUBCUTANEOUS
Refills: 0 | Status: DISCONTINUED | OUTPATIENT
Start: 2023-07-31 | End: 2023-08-03

## 2023-07-31 RX ADMIN — OXYCODONE HYDROCHLORIDE 10 MILLIGRAM(S): 5 TABLET ORAL at 05:27

## 2023-07-31 RX ADMIN — Medication 4 MILLILITER(S): at 06:00

## 2023-07-31 RX ADMIN — OXYCODONE HYDROCHLORIDE 10 MILLIGRAM(S): 5 TABLET ORAL at 18:15

## 2023-07-31 RX ADMIN — LEVALBUTEROL 0.63 MILLIGRAM(S): 1.25 SOLUTION, CONCENTRATE RESPIRATORY (INHALATION) at 11:33

## 2023-07-31 RX ADMIN — Medication 4 MILLILITER(S): at 23:34

## 2023-07-31 RX ADMIN — Medication 4 MILLILITER(S): at 11:33

## 2023-07-31 RX ADMIN — PIPERACILLIN AND TAZOBACTAM 25 GRAM(S): 4; .5 INJECTION, POWDER, LYOPHILIZED, FOR SOLUTION INTRAVENOUS at 05:28

## 2023-07-31 RX ADMIN — LEVALBUTEROL 0.63 MILLIGRAM(S): 1.25 SOLUTION, CONCENTRATE RESPIRATORY (INHALATION) at 06:00

## 2023-07-31 RX ADMIN — Medication 250 MILLIGRAM(S): at 05:28

## 2023-07-31 RX ADMIN — LIDOCAINE 1 PATCH: 4 CREAM TOPICAL at 13:27

## 2023-07-31 RX ADMIN — QUETIAPINE FUMARATE 100 MILLIGRAM(S): 200 TABLET, FILM COATED ORAL at 22:17

## 2023-07-31 RX ADMIN — PIPERACILLIN AND TAZOBACTAM 25 GRAM(S): 4; .5 INJECTION, POWDER, LYOPHILIZED, FOR SOLUTION INTRAVENOUS at 13:27

## 2023-07-31 RX ADMIN — Medication 250 MILLIGRAM(S): at 13:27

## 2023-07-31 RX ADMIN — LEVALBUTEROL 0.63 MILLIGRAM(S): 1.25 SOLUTION, CONCENTRATE RESPIRATORY (INHALATION) at 17:10

## 2023-07-31 RX ADMIN — OXYCODONE HYDROCHLORIDE 10 MILLIGRAM(S): 5 TABLET ORAL at 19:15

## 2023-07-31 RX ADMIN — LIDOCAINE 1 PATCH: 4 CREAM TOPICAL at 19:15

## 2023-07-31 RX ADMIN — Medication 50 MILLIGRAM(S): at 22:17

## 2023-07-31 RX ADMIN — Medication 25 MILLIGRAM(S): at 22:18

## 2023-07-31 RX ADMIN — OXYCODONE HYDROCHLORIDE 10 MILLIGRAM(S): 5 TABLET ORAL at 06:25

## 2023-07-31 RX ADMIN — PIPERACILLIN AND TAZOBACTAM 25 GRAM(S): 4; .5 INJECTION, POWDER, LYOPHILIZED, FOR SOLUTION INTRAVENOUS at 22:17

## 2023-07-31 RX ADMIN — ENOXAPARIN SODIUM 60 MILLIGRAM(S): 100 INJECTION SUBCUTANEOUS at 18:15

## 2023-07-31 RX ADMIN — LEVALBUTEROL 0.63 MILLIGRAM(S): 1.25 SOLUTION, CONCENTRATE RESPIRATORY (INHALATION) at 23:33

## 2023-07-31 RX ADMIN — Medication 250 MILLIGRAM(S): at 22:17

## 2023-07-31 RX ADMIN — Medication 25 MILLIGRAM(S): at 13:27

## 2023-07-31 RX ADMIN — Medication 25 MILLIGRAM(S): at 05:28

## 2023-07-31 RX ADMIN — SODIUM CHLORIDE 75 MILLILITER(S): 9 INJECTION INTRAMUSCULAR; INTRAVENOUS; SUBCUTANEOUS at 08:35

## 2023-07-31 RX ADMIN — ENOXAPARIN SODIUM 60 MILLIGRAM(S): 100 INJECTION SUBCUTANEOUS at 05:28

## 2023-07-31 RX ADMIN — Medication 4 MILLILITER(S): at 17:10

## 2023-07-31 NOTE — PROGRESS NOTE ADULT - ASSESSMENT
I doubt that tachycardia essentially since admission was on an infectious basis. Suspect the fever last night represents a superimposed event, with aspiration being most likely. It will be interesting to see whether tachycardia resolves, or not.   Certainly at risk for aspiration given poor mental status superimposed on baseline TBI.  No clear or convincing pneumonia on CT, does have plugging  VQ without PE, does have DVT but I do not think latter accounts for escalating leukocytosis.   RVP's neg  Elevated CK, presumed rhabdomyolysis why still fluctuating and elevated after ~1 week No troponin's sent but most recent ECG only reported as sinus tachycardia.   U/A without significant pyuria now or on admission  Abdominal exam and skin exam limited by patient's behavior, but no gross abdominal tenderness, skin/soft tissue infection, phlebitis    7/28: afebrile >24 hrs, tachycardic, WBC increased 18.61, Cr elevated 1.38, BCs and UC with no growth to date, covid 19 test is negative, CT chest - new RLL mucus plug, new b/l LLs atelectasis, MRSA PCR not detected, will continue with Zosyn for now.   + Right femoral DVT - on Lovenox.   Attending addendum:  I have reviewed all pertinent clinical information and agree with the NP's note.   continue zosyn through the weekend   follow all cultured  trend wbc and fever curve   aspiration precautions   frequent suctioning if necessary     7/31: pt is more awake and alert, more interactive, confused, no fevers since 7/29, WBC elevated 15.5, Cr normalized, LFTs better, MRSA PCR not detected, BCs and UC with no growth to date. Pt completed a course of ceftriaxone, now on zosyn day #6, possibly will stop abx, will discuss with attending.       Suggestions--  Aspiration precautions as able  Continue Zosyn for now   MRSA nares PCR - negative   follow BCs - NGTD     I doubt that tachycardia essentially since admission was on an infectious basis. Suspect the fever last night represents a superimposed event, with aspiration being most likely. It will be interesting to see whether tachycardia resolves, or not.   Certainly at risk for aspiration given poor mental status superimposed on baseline TBI.  No clear or convincing pneumonia on CT, does have plugging  VQ without PE, does have DVT but I do not think latter accounts for escalating leukocytosis.   RVP's neg  Elevated CK, presumed rhabdomyolysis why still fluctuating and elevated after ~1 week No troponin's sent but most recent ECG only reported as sinus tachycardia.   U/A without significant pyuria now or on admission  Abdominal exam and skin exam limited by patient's behavior, but no gross abdominal tenderness, skin/soft tissue infection, phlebitis    7/28: afebrile >24 hrs, tachycardic, WBC increased 18.61, Cr elevated 1.38, BCs and UC with no growth to date, covid 19 test is negative, CT chest - new RLL mucus plug, new b/l LLs atelectasis, MRSA PCR not detected, will continue with Zosyn for now.   + Right femoral DVT - on Lovenox.   Attending addendum:  I have reviewed all pertinent clinical information and agree with the NP's note.   continue zosyn through the weekend   follow all cultured  trend wbc and fever curve   aspiration precautions   frequent suctioning if necessary     7/31: pt is more awake and alert, more interactive, confused, no fevers since 7/29, WBC elevated 15.5, Cr normalized, LFTs better, MRSA PCR not detected, BCs and UC with no growth to date. Pt completed a course of ceftriaxone, now on zosyn day #6.   Pt's CK is trending up, cardiac enzymes should be obtained.     Suggestions--  Aspiration precautions as able  Continue Zosyn for now   MRSA nares PCR - negative   follow BCs - NGTD  obtain cardiac enzymes  consider suspending statin    Discussed with Dr. Potts  will discuss with medicine team    I doubt that tachycardia essentially since admission was on an infectious basis. Suspect the fever last night represents a superimposed event, with aspiration being most likely. It will be interesting to see whether tachycardia resolves, or not.   Certainly at risk for aspiration given poor mental status superimposed on baseline TBI.  No clear or convincing pneumonia on CT, does have plugging  VQ without PE, does have DVT but I do not think latter accounts for escalating leukocytosis.   RVP's neg  Elevated CK, presumed rhabdomyolysis why still fluctuating and elevated after ~1 week No troponin's sent but most recent ECG only reported as sinus tachycardia.   U/A without significant pyuria now or on admission  Abdominal exam and skin exam limited by patient's behavior, but no gross abdominal tenderness, skin/soft tissue infection, phlebitis    7/28: afebrile >24 hrs, tachycardic, WBC increased 18.61, Cr elevated 1.38, BCs and UC with no growth to date, covid 19 test is negative, CT chest - new RLL mucus plug, new b/l LLs atelectasis, MRSA PCR not detected, will continue with Zosyn for now.   + Right femoral DVT - on Lovenox.   Attending addendum:  I have reviewed all pertinent clinical information and agree with the NP's note.   continue zosyn through the weekend   follow all cultured  trend wbc and fever curve   aspiration precautions   frequent suctioning if necessary     7/31: pt is more awake and alert, more interactive, confused, no fevers since 7/29, WBC elevated 15.5, Cr normalized, LFTs better, MRSA PCR not detected, BCs and UC with no growth to date. Pt completed a course of ceftriaxone, now on zosyn day #6.   Pt's CK is trending up, cardiac enzymes should be obtained.     Suggestions--  Aspiration precautions as able  Continue Zosyn for now   MRSA nares PCR - negative   follow BCs - NGTD  obtain cardiac enzymes  suspend statin    Discussed with Dr. Potts  will discuss with medicine team

## 2023-07-31 NOTE — PROGRESS NOTE ADULT - SUBJECTIVE AND OBJECTIVE BOX
Neurology Progress Note    S: Patient seen and examined. No new events overnight. patient denied CP, SOB, HA or pain.     MEDICATIONS:    acetaminophen     Tablet .. 650 milliGRAM(s) Oral every 6 hours PRN  acetaminophen     Tablet .. 650 milliGRAM(s) Oral every 6 hours PRN  acetaminophen   IVPB .. 1000 milliGRAM(s) IV Intermittent once  acetylcysteine 10%  Inhalation 4 milliLiter(s) Inhalation four times a day  atorvastatin 80 milliGRAM(s) Oral at bedtime  enoxaparin Injectable 60 milliGRAM(s) SubCutaneous every 12 hours  levalbuterol Inhalation 0.63 milliGRAM(s) Inhalation every 6 hours  lidocaine   4% Patch 1 Patch Transdermal every 24 hours  melatonin 3 milliGRAM(s) Oral at bedtime PRN  metoprolol tartrate 25 milliGRAM(s) Oral every 8 hours  oxyCODONE    IR 10 milliGRAM(s) Oral every 4 hours PRN  oxyCODONE    IR 5 milliGRAM(s) Oral every 4 hours PRN  oxyCODONE  ER Tablet 10 milliGRAM(s) Oral every 12 hours  piperacillin/tazobactam IVPB.. 3.375 Gram(s) IV Intermittent every 8 hours  QUEtiapine 100 milliGRAM(s) Oral at bedtime  sodium chloride 0.9%. 1000 milliLiter(s) IV Continuous <Continuous>  sodium chloride 0.9%. 1000 milliLiter(s) IV Continuous <Continuous>  traZODone 50 milliGRAM(s) Oral at bedtime  valproic  acid Syrup 250 milliGRAM(s) Oral three times a day      LABS:                          11.2   15.50 )-----------( 337      ( 30 Jul 2023 07:50 )             33.3     07-30    138  |  100  |  21  ----------------------------<  99  4.6   |  34<H>  |  1.13    Ca    8.9      30 Jul 2023 07:50    TPro  6.0  /  Alb  1.6<L>  /  TBili  0.6  /  DBili  x   /  AST  152<H>  /  ALT  75  /  AlkPhos  89  07-30    CAPILLARY BLOOD GLUCOSE          Urinalysis Basic - ( 30 Jul 2023 07:50 )    Color: x / Appearance: x / SG: x / pH: x  Gluc: 99 mg/dL / Ketone: x  / Bili: x / Urobili: x   Blood: x / Protein: x / Nitrite: x   Leuk Esterase: x / RBC: x / WBC x   Sq Epi: x / Non Sq Epi: x / Bacteria: x      I&O's Summary    30 Jul 2023 07:01  -  31 Jul 2023 07:00  --------------------------------------------------------  IN: 200 mL / OUT: 650 mL / NET: -450 mL      Vital Signs Last 24 Hrs  T(C): 37.6 (31 Jul 2023 10:11), Max: 37.8 (30 Jul 2023 22:30)  T(F): 99.7 (31 Jul 2023 10:11), Max: 100.2 (30 Jul 2023 22:30)  HR: 121 (31 Jul 2023 10:11) (121 - 141)  BP: 140/87 (31 Jul 2023 10:11) (115/90 - 140/87)  BP(mean): --  RR: 18 (31 Jul 2023 10:11) (18 - 19)  SpO2: 98% (31 Jul 2023 10:11) (96% - 98%)    Parameters below as of 31 Jul 2023 10:11  Patient On (Oxygen Delivery Method): room air        General Exam:   General Appearance: Appropriately dressed and in no acute distress       Head: Normocephalic, atraumatic and no dysmorphic features  Ear, Nose, and Throat: Moist mucous membranes  CVS: S1S2+  Resp: No SOB, no wheeze or rhonchi  Abd: soft NTND  Extremities: No edema, no cyanosis  Skin: No bruises, no rashes     Neurological Exam:    Mental Status -lethargic opens eys does not follow commands   Cranial Nerves - PERRL, EOMI, VFF, V1-V3 intact, no gross facial asymmetry,    Motor Exam -   Moves UEs spontanesously LEs atrophy some contracture    Sensory    Intact to light touch and pinprick bilaterally    Reflexes UES hyperreflexia patellar trace Left ankle clonus at times right mute  Coordination: unable   Gait: deferred     I personally reviewed the below data/images/labs:      CBC Full  -  ( 30 Jul 2023 07:50 )  WBC Count : 15.50 K/uL  RBC Count : 3.67 M/uL  Hemoglobin : 11.2 g/dL  Hematocrit : 33.3 %  Platelet Count - Automated : 337 K/uL  Mean Cell Volume : 90.7 fl  Mean Cell Hemoglobin : 30.5 pg  Mean Cell Hemoglobin Concentration : 33.6 g/dL  Auto Neutrophil # : x  Auto Lymphocyte # : x  Auto Monocyte # : x  Auto Eosinophil # : x  Auto Basophil # : x  Auto Neutrophil % : x  Auto Lymphocyte % : x  Auto Monocyte % : x  Auto Eosinophil % : x  Auto Basophil % : x    07-30    138  |  100  |  21  ----------------------------<  99  4.6   |  34<H>  |  1.13    Ca    8.9      30 Jul 2023 07:50    TPro  6.0  /  Alb  1.6<L>  /  TBili  0.6  /  DBili  x   /  AST  152<H>  /  ALT  75  /  AlkPhos  89  07-30    LIVER FUNCTIONS - ( 30 Jul 2023 07:50 )  Alb: 1.6 g/dL / Pro: 6.0 gm/dL / ALK PHOS: 89 U/L / ALT: 75 U/L / AST: 152 U/L / GGT: x             Urinalysis Basic - ( 30 Jul 2023 07:50 )    Color: x / Appearance: x / SG: x / pH: x  Gluc: 99 mg/dL / Ketone: x  / Bili: x / Urobili: x   Blood: x / Protein: x / Nitrite: x   Leuk Esterase: x / RBC: x / WBC x   Sq Epi: x / Non Sq Epi: x / Bacteria: x        < from: CT Head No Cont (07.14.23 @ 14:35) >  IMPRESSION:    CT head: Markedly motion degraded exam. No gross evidence of acute   intracranial hemorrhage or mass effect.    CT cervical spine: No evidence for acute displaced fracture or   malalignment.    CT lumbar spine: No evidence for acute displaced fracture or malalignment.    --- End of Report ---

## 2023-07-31 NOTE — PROGRESS NOTE ADULT - SUBJECTIVE AND OBJECTIVE BOX
Patient is a 68y old  Male who presents with a chief complaint of VIKAS, rhabdomyolysis (31 Jul 2023 12:07)      INTERVAL HPI/OVERNIGHT EVENTS:  confuse base line TBI     MEDICATIONS  (STANDING):  acetaminophen   IVPB .. 1000 milliGRAM(s) IV Intermittent once  acetylcysteine 10%  Inhalation 4 milliLiter(s) Inhalation four times a day  atorvastatin 80 milliGRAM(s) Oral at bedtime  enoxaparin Injectable 60 milliGRAM(s) SubCutaneous every 12 hours  levalbuterol Inhalation 0.63 milliGRAM(s) Inhalation every 6 hours  lidocaine   4% Patch 1 Patch Transdermal every 24 hours  metoprolol tartrate 25 milliGRAM(s) Oral every 8 hours  oxyCODONE  ER Tablet 10 milliGRAM(s) Oral every 12 hours  piperacillin/tazobactam IVPB.. 3.375 Gram(s) IV Intermittent every 8 hours  QUEtiapine 100 milliGRAM(s) Oral at bedtime  sodium chloride 0.9%. 1000 milliLiter(s) (75 mL/Hr) IV Continuous <Continuous>  sodium chloride 0.9%. 1000 milliLiter(s) (75 mL/Hr) IV Continuous <Continuous>  traZODone 50 milliGRAM(s) Oral at bedtime  valproic  acid Syrup 250 milliGRAM(s) Oral three times a day    MEDICATIONS  (PRN):  acetaminophen     Tablet .. 650 milliGRAM(s) Oral every 6 hours PRN Mild Pain (1 - 3), Moderate Pain (4 - 6)  acetaminophen     Tablet .. 650 milliGRAM(s) Oral every 6 hours PRN Temp greater or equal to 38C (100.4F)  melatonin 3 milliGRAM(s) Oral at bedtime PRN Insomnia  oxyCODONE    IR 5 milliGRAM(s) Oral every 4 hours PRN Moderate Pain (4 - 6)  oxyCODONE    IR 10 milliGRAM(s) Oral every 4 hours PRN Severe Pain (7 - 10)      Allergies    No Known Allergies    Intolerances        REVIEW OF SYSTEMS:  BASE line TBI   CONSTITUTIONAL: No fever, weight loss, or fatigue  EYES: No eye pain, visual disturbances, or discharge  ENMT:  No difficulty hearing, tinnitus, vertigo; No sinus or throat pain  NECK: No pain or stiffness  BREASTS: No pain, masses, or nipple discharge  RESPIRATORY: No cough, wheezing, chills or hemoptysis; No shortness of breath  CARDIOVASCULAR: No chest pain, palpitations, dizziness, or leg swelling  GASTROINTESTINAL: No abdominal or epigastric pain. No nausea, vomiting, or hematemesis; No diarrhea or constipation. No melena or hematochezia.  GENITOURINARY: No dysuria, frequency, hematuria, or incontinence  NEUROLOGICAL: No headaches, memory loss, loss of strength, numbness, or tremors  SKIN: No itching, burning, rashes, or lesions   LYMPH NODES: No enlarged glands  ENDOCRINE: No heat or cold intolerance; No hair loss  MUSCULOSKELETAL: No joint pain or swelling; No muscle, back, or extremity pain  PSYCHIATRIC: No depression, anxiety, mood swings, or difficulty sleeping  HEME/LYMPH: No easy bruising, or bleeding gums  ALLERGY AND IMMUNOLOGIC: No hives or eczema    Vital Signs Last 24 Hrs  T(C): 37.6 (31 Jul 2023 10:11), Max: 37.8 (30 Jul 2023 22:30)  T(F): 99.7 (31 Jul 2023 10:11), Max: 100.2 (30 Jul 2023 22:30)  HR: 133 (31 Jul 2023 11:33) (121 - 141)  BP: 140/87 (31 Jul 2023 10:11) (115/90 - 140/87)  BP(mean): --  RR: 18 (31 Jul 2023 10:11) (18 - 19)  SpO2: 98% (31 Jul 2023 10:11) (96% - 98%)    Parameters below as of 31 Jul 2023 11:33  Patient On (Oxygen Delivery Method): room air        PHYSICAL EXAM:  GENERAL: NAD, well-groomed, well-developed  HEAD:  Atraumatic, Normocephalic  EYES: EOMI, PERRLA, conjunctiva and sclera clear  ENMT: No tonsillar erythema, exudates, or enlargement; Moist mucous membranes, Good dentition, No lesions  NECK: Supple, No JVD, Normal thyroid  NERVOUS SYSTEM:  Alert CONFUSE AND contracted appears bed confined CHEST/LUNG: Clear to percussion bilaterally; No rales, rhonchi, wheezing, or rubs  HEART: Regular rate and rhythm; No murmurs, rubs, or gallops  ABDOMEN: Soft, Nontender, Nondistended; Bowel sounds present  EXTREMITIES:  contracted and no edema LYMPH: No lymphadenopathy noted  SKIN: No rashes or lesions    LABS:                        11.2   15.50 )-----------( 337      ( 30 Jul 2023 07:50 )             33.3     07-30    138  |  100  |  21  ----------------------------<  99  4.6   |  34<H>  |  1.13    Ca    8.9      30 Jul 2023 07:50    TPro  6.0  /  Alb  1.6<L>  /  TBili  0.6  /  DBili  x   /  AST  152<H>  /  ALT  75  /  AlkPhos  89  07-30      Urinalysis Basic - ( 30 Jul 2023 07:50 )    Color: x / Appearance: x / SG: x / pH: x  Gluc: 99 mg/dL / Ketone: x  / Bili: x / Urobili: x   Blood: x / Protein: x / Nitrite: x   Leuk Esterase: x / RBC: x / WBC x   Sq Epi: x / Non Sq Epi: x / Bacteria: x      CAPILLARY BLOOD GLUCOSE          RADIOLOGY & ADDITIONAL TESTS:    Imaging Personally Reviewed:  [ X] YES  [ ] NO    Consultant(s) Notes Reviewed:  [ X] YES  [ ] NO    Care Discussed with Consultants/Other Providers [X ] YES  [ ] NO

## 2023-07-31 NOTE — PROGRESS NOTE ADULT - ASSESSMENT
68 years old male with h/o HTN, HLD, TBI resulting in dementia present to ED ? fall. As per daughter, patient was found on the floor. Patient is a poor history ( per daughter, patient will make up different history) . Patient reported he felt weak and fell onto the floor.  ER course with tachycardia, sat well at RA. EKG with sinus tachy. No leukocytosis, plt 129, K 6, Cr 2.82, glucose 110, . CXR with no focal consolidation. CT head  with no acute pathology. C/L spine with no fracture. CT pelsis with no acute displace fracture. Questionable mild widening of the bilateral sacroiliac joints with subchondral sclerosis and questionable erosions along the iliac side, right greater than left. Patient admitted for possible UTI and rhabdomylosis.    Fever- likely aspiration pna;  CT chest - mucus plug RLLD, BCX, UCX, IV Zosyn. monitor vitals. ID consulted- Dr. Barksdale    Acute renal injury over cri i(  Increase in serum creatinine of 0.3 mg/dL within 48 hours or 50% within 7 days Urine output of 0.5 mL/kg/hour for 6 hours)and with some pre renal component and cw gentle hydration and renal dose medication, avoid nephrotoxic and repeat la  VIKAS (acute kidney injury).   ·  Plan: Cr 2.82, likely VIKAS, recent decrease in oral intake : monitor Cr  TSH noted  IV flui tend BUN and cratinine     Hyperkalemia. : K 6, no EKG changes. Likely in the setting of VIKAS, rhabdo, ACEI use. Hold ACEI.        Rhabdomyolysis    - IV fluids, bolus and   bicarb differ    - monitor urine for PH   - monitor BMP for renal failure and electrolites abnormalities   - monitor CPK total   - urine toxicology not seen   All patients should be initially treated with vigorous fluid repletion until it is clear from sequential laboratory values that the plasma CK level is stable and not increasing. Patients who have a stable plasma CK level <5000 unit/L do not require intravenous fluid, since studies have shown that the risk of VIKAS is low among such patients . A  ? arterial pH exceeds 7.5, or if the serum bicarbonate exceeds 30 mEq/L. If the bicarbonate solution is discontinued, volume repletion should be continued with isotonic saline  appears Non-traumatic rhabdomyolysis   Unclear etiology   Possibly elevated 2/2 infection/fall   CLabs CMP AND CBC with diff  fluid as mentioned and trend cpk.        Sinus tachycardia - per cardio -may be his baseline in setting od autonomic dysfunction in setting on TBI    Functional quadriplegia- seen by neuro, d/c gabapentin     Moderate protein-calorie malnutrition. functional quadriplegia, debility  Diet Easy to Chew-  DASH/TLC {Sodium & Cholesterol Restricted}  Ensure Plus High Protein Cans or Servings Per Day:  1       Frequency:  Daily    Impaired ambulation.   ·  Plan: CT head with no acute pathology. C/L spine with no fracture. CT pelsis with no acute displace fracture. Questionable mild widening of the bilateral sacroiliac joints with subchondral sclerosis and questionable erosions along the iliac side, right greater than left  PT consult : rehab.    Dementia. : due to TBI  continue divalproex 250mg DR tid, quetiapine 100mg hs, trazodone 50mg hs.    Benign essential HTN.   ·  Plan: monitor BP and titrate BP med  ACEI on hold due to VIKAS.    Hyperlipidemia: in setting of rhabdo and is discontinued

## 2023-07-31 NOTE — PROGRESS NOTE ADULT - SUBJECTIVE AND OBJECTIVE BOX
ROJAS HE  MRN-[id number]    Follow Up:  fevers, possible pna, DVT    Interval History: the pt was seen and examined earlier, no acute distress, awake, able to state his name and partial birthday, not place, year or president. When asked if the pt has any pain, pt stated NO. Pt is afebrile since 7/29, tachycardic, WBC 15.5.    PAST MEDICAL & SURGICAL HISTORY:  TBI (traumatic brain injury)      HLD (hyperlipidemia)      HTN, age 0-18          ROS:    [x ] Unobtainable because: confused   [ ] All other systems negative    Constitutional: no fever, no chills  Head: no trauma  Eyes: no vision changes, no eye pain  ENT:  no sore throat, no rhinorrhea  Cardiovascular:  no chest pain, no palpitation  Respiratory:  no SOB, no cough  GI:  no abd pain, no vomiting, no diarrhea  urinary: no dysuria, no hematuria, no flank pain  musculoskeletal:  no joint pain, no joint swelling  skin:  no rash  neurology:  no headache, no seizure, no change in mental status  psych: no anxiety, no depression         Allergies  No Known Allergies        ANTIMICROBIALS:  piperacillin/tazobactam IVPB.. 3.375 every 8 hours      OTHER MEDS:  acetaminophen     Tablet .. 650 milliGRAM(s) Oral every 6 hours PRN  acetaminophen     Tablet .. 650 milliGRAM(s) Oral every 6 hours PRN  acetaminophen   IVPB .. 1000 milliGRAM(s) IV Intermittent once  acetylcysteine 10%  Inhalation 4 milliLiter(s) Inhalation four times a day  atorvastatin 80 milliGRAM(s) Oral at bedtime  enoxaparin Injectable 60 milliGRAM(s) SubCutaneous every 12 hours  levalbuterol Inhalation 0.63 milliGRAM(s) Inhalation every 6 hours  lidocaine   4% Patch 1 Patch Transdermal every 24 hours  melatonin 3 milliGRAM(s) Oral at bedtime PRN  metoprolol tartrate 25 milliGRAM(s) Oral every 8 hours  oxyCODONE    IR 10 milliGRAM(s) Oral every 4 hours PRN  oxyCODONE    IR 5 milliGRAM(s) Oral every 4 hours PRN  oxyCODONE  ER Tablet 10 milliGRAM(s) Oral every 12 hours  QUEtiapine 100 milliGRAM(s) Oral at bedtime  sodium chloride 0.9%. 1000 milliLiter(s) IV Continuous <Continuous>  sodium chloride 0.9%. 1000 milliLiter(s) IV Continuous <Continuous>  traZODone 50 milliGRAM(s) Oral at bedtime  valproic  acid Syrup 250 milliGRAM(s) Oral three times a day      Vital Signs Last 24 Hrs  T(C): 37.6 (31 Jul 2023 10:11), Max: 37.8 (30 Jul 2023 22:30)  T(F): 99.7 (31 Jul 2023 10:11), Max: 100.2 (30 Jul 2023 22:30)  HR: 121 (31 Jul 2023 10:11) (121 - 141)  BP: 140/87 (31 Jul 2023 10:11) (115/90 - 140/87)  BP(mean): --  RR: 18 (31 Jul 2023 10:11) (18 - 19)  SpO2: 98% (31 Jul 2023 10:11) (96% - 98%)    Parameters below as of 31 Jul 2023 10:11  Patient On (Oxygen Delivery Method): room air        Physical Exam:  General: Chronically ill-appearing Male in no acute distress.  HEENT: AT/NC. Pupils/EOM unable secondary to patient compliance. Oropharynx/dentition unable secondary to patient compliance. Missing teeth   Neck: Increased tone not frankly rigid. No sense of mass.  Nodes: None palpable.  Lungs: Poor effort diminished BS b/l   Heart: Tachycardic with RR  Abdomen: Bowel sounds present. Soft. Nondistended. Nontender.  Extremities: No cyanosis or clubbing. No edema. MIldy contracted  Neuro: awake and alert, answers some questions, does not follow commands   Skin: Warm. Dry. Good turgor.   Psychiatric: calm behavior       WBC Count: 15.50 K/uL (07-30 @ 07:50)  WBC Count: 13.35 K/uL (07-29 @ 09:19)  WBC Count: 18.61 K/uL (07-27 @ 06:10)  WBC Count: 16.63 K/uL (07-26 @ 13:30)                            11.2   15.50 )-----------( 337      ( 30 Jul 2023 07:50 )             33.3       07-30    138  |  100  |  21  ----------------------------<  99  4.6   |  34<H>  |  1.13    Ca    8.9      30 Jul 2023 07:50    TPro  6.0  /  Alb  1.6<L>  /  TBili  0.6  /  DBili  x   /  AST  152<H>  /  ALT  75  /  AlkPhos  89  07-30      Urinalysis Basic - ( 30 Jul 2023 07:50 )    Color: x / Appearance: x / SG: x / pH: x  Gluc: 99 mg/dL / Ketone: x  / Bili: x / Urobili: x   Blood: x / Protein: x / Nitrite: x   Leuk Esterase: x / RBC: x / WBC x   Sq Epi: x / Non Sq Epi: x / Bacteria: x        Creatinine Trend: 1.13<--, 1.17<--, 1.38<--, 1.69<--, 1.53<--, 1.25<--      MICROBIOLOGY:  v  .Blood Blood-Peripheral  07-29-23   No growth at 24 hours  --  --      Clean Catch Clean Catch (Midstream)  07-26-23   <10,000 CFU/mL Normal Urogenital Ivette  --  --      .Blood Blood-Peripheral  07-26-23   No growth at 4 days  --  --      .Blood Blood-Peripheral  07-26-23   No growth at 4 days  --  --          Rapid RVP Result: NotDetec (07-27 @ 00:30)      SARS-CoV-2: NotDetec (07-27-23 @ 00:30)  Rapid RVP Result: NotDetec (07-27-23 @ 00:30)    SARS-CoV-2: NotDetec (27 Jul 2023 00:30)  SARS-CoV-2: NotDetec (17 Jul 2023 11:44)    RADIOLOGY:

## 2023-08-01 DIAGNOSIS — S06.9XAA UNSPECIFIED INTRACRANIAL INJURY WITH LOSS OF CONSCIOUSNESS STATUS UNKNOWN, INITIAL ENCOUNTER: ICD-10-CM

## 2023-08-01 LAB
ALBUMIN SERPL ELPH-MCNC: 1.5 G/DL — LOW (ref 3.3–5)
ALP SERPL-CCNC: 96 U/L — SIGNIFICANT CHANGE UP (ref 40–120)
ALT FLD-CCNC: 75 U/L — SIGNIFICANT CHANGE UP (ref 12–78)
ANION GAP SERPL CALC-SCNC: 3 MMOL/L — LOW (ref 5–17)
AST SERPL-CCNC: 143 U/L — HIGH (ref 15–37)
BILIRUB SERPL-MCNC: 0.5 MG/DL — SIGNIFICANT CHANGE UP (ref 0.2–1.2)
BUN SERPL-MCNC: 23 MG/DL — SIGNIFICANT CHANGE UP (ref 7–23)
CALCIUM SERPL-MCNC: 8.7 MG/DL — SIGNIFICANT CHANGE UP (ref 8.5–10.1)
CHLORIDE SERPL-SCNC: 101 MMOL/L — SIGNIFICANT CHANGE UP (ref 96–108)
CO2 SERPL-SCNC: 35 MMOL/L — HIGH (ref 22–31)
CREAT SERPL-MCNC: 1.09 MG/DL — SIGNIFICANT CHANGE UP (ref 0.5–1.3)
CULTURE RESULTS: SIGNIFICANT CHANGE UP
CULTURE RESULTS: SIGNIFICANT CHANGE UP
EGFR: 73 ML/MIN/1.73M2 — SIGNIFICANT CHANGE UP
GLUCOSE SERPL-MCNC: 100 MG/DL — HIGH (ref 70–99)
HCT VFR BLD CALC: 30.3 % — LOW (ref 39–50)
HGB BLD-MCNC: 10.2 G/DL — LOW (ref 13–17)
MCHC RBC-ENTMCNC: 30.6 PG — SIGNIFICANT CHANGE UP (ref 27–34)
MCHC RBC-ENTMCNC: 33.7 G/DL — SIGNIFICANT CHANGE UP (ref 32–36)
MCV RBC AUTO: 91 FL — SIGNIFICANT CHANGE UP (ref 80–100)
NRBC # BLD: 0 /100 WBCS — SIGNIFICANT CHANGE UP (ref 0–0)
PLATELET # BLD AUTO: 382 K/UL — SIGNIFICANT CHANGE UP (ref 150–400)
POTASSIUM SERPL-MCNC: 4.2 MMOL/L — SIGNIFICANT CHANGE UP (ref 3.5–5.3)
POTASSIUM SERPL-SCNC: 4.2 MMOL/L — SIGNIFICANT CHANGE UP (ref 3.5–5.3)
PROT SERPL-MCNC: 5.6 GM/DL — LOW (ref 6–8.3)
RBC # BLD: 3.33 M/UL — LOW (ref 4.2–5.8)
RBC # FLD: 13.7 % — SIGNIFICANT CHANGE UP (ref 10.3–14.5)
SODIUM SERPL-SCNC: 139 MMOL/L — SIGNIFICANT CHANGE UP (ref 135–145)
SPECIMEN SOURCE: SIGNIFICANT CHANGE UP
SPECIMEN SOURCE: SIGNIFICANT CHANGE UP
WBC # BLD: 12.77 K/UL — HIGH (ref 3.8–10.5)
WBC # FLD AUTO: 12.77 K/UL — HIGH (ref 3.8–10.5)

## 2023-08-01 PROCEDURE — 99232 SBSQ HOSP IP/OBS MODERATE 35: CPT

## 2023-08-01 RX ORDER — ACETAMINOPHEN 500 MG
1000 TABLET ORAL ONCE
Refills: 0 | Status: COMPLETED | OUTPATIENT
Start: 2023-08-01 | End: 2023-08-01

## 2023-08-01 RX ORDER — ACETAMINOPHEN 500 MG
1000 TABLET ORAL ONCE
Refills: 0 | Status: COMPLETED | OUTPATIENT
Start: 2023-08-01 | End: 2023-08-02

## 2023-08-01 RX ADMIN — Medication 1000 MILLIGRAM(S): at 01:09

## 2023-08-01 RX ADMIN — PIPERACILLIN AND TAZOBACTAM 25 GRAM(S): 4; .5 INJECTION, POWDER, LYOPHILIZED, FOR SOLUTION INTRAVENOUS at 22:36

## 2023-08-01 RX ADMIN — LEVALBUTEROL 0.63 MILLIGRAM(S): 1.25 SOLUTION, CONCENTRATE RESPIRATORY (INHALATION) at 05:11

## 2023-08-01 RX ADMIN — LIDOCAINE 1 PATCH: 4 CREAM TOPICAL at 01:09

## 2023-08-01 RX ADMIN — OXYCODONE HYDROCHLORIDE 10 MILLIGRAM(S): 5 TABLET ORAL at 18:02

## 2023-08-01 RX ADMIN — OXYCODONE HYDROCHLORIDE 5 MILLIGRAM(S): 5 TABLET ORAL at 14:17

## 2023-08-01 RX ADMIN — Medication 25 MILLIGRAM(S): at 13:17

## 2023-08-01 RX ADMIN — Medication 250 MILLIGRAM(S): at 13:16

## 2023-08-01 RX ADMIN — ENOXAPARIN SODIUM 60 MILLIGRAM(S): 100 INJECTION SUBCUTANEOUS at 05:52

## 2023-08-01 RX ADMIN — LEVALBUTEROL 0.63 MILLIGRAM(S): 1.25 SOLUTION, CONCENTRATE RESPIRATORY (INHALATION) at 11:59

## 2023-08-01 RX ADMIN — Medication 250 MILLIGRAM(S): at 22:37

## 2023-08-01 RX ADMIN — PIPERACILLIN AND TAZOBACTAM 25 GRAM(S): 4; .5 INJECTION, POWDER, LYOPHILIZED, FOR SOLUTION INTRAVENOUS at 13:17

## 2023-08-01 RX ADMIN — Medication 4 MILLILITER(S): at 11:59

## 2023-08-01 RX ADMIN — OXYCODONE HYDROCHLORIDE 5 MILLIGRAM(S): 5 TABLET ORAL at 13:17

## 2023-08-01 RX ADMIN — Medication 50 MILLIGRAM(S): at 22:37

## 2023-08-01 RX ADMIN — PIPERACILLIN AND TAZOBACTAM 25 GRAM(S): 4; .5 INJECTION, POWDER, LYOPHILIZED, FOR SOLUTION INTRAVENOUS at 05:52

## 2023-08-01 RX ADMIN — Medication 250 MILLIGRAM(S): at 05:52

## 2023-08-01 RX ADMIN — Medication 4 MILLILITER(S): at 23:22

## 2023-08-01 RX ADMIN — LIDOCAINE 1 PATCH: 4 CREAM TOPICAL at 13:17

## 2023-08-01 RX ADMIN — QUETIAPINE FUMARATE 100 MILLIGRAM(S): 200 TABLET, FILM COATED ORAL at 22:36

## 2023-08-01 RX ADMIN — ENOXAPARIN SODIUM 60 MILLIGRAM(S): 100 INJECTION SUBCUTANEOUS at 17:14

## 2023-08-01 RX ADMIN — Medication 400 MILLIGRAM(S): at 17:14

## 2023-08-01 RX ADMIN — LEVALBUTEROL 0.63 MILLIGRAM(S): 1.25 SOLUTION, CONCENTRATE RESPIRATORY (INHALATION) at 23:21

## 2023-08-01 RX ADMIN — Medication 4 MILLILITER(S): at 17:18

## 2023-08-01 RX ADMIN — LEVALBUTEROL 0.63 MILLIGRAM(S): 1.25 SOLUTION, CONCENTRATE RESPIRATORY (INHALATION) at 17:18

## 2023-08-01 RX ADMIN — Medication 1000 MILLIGRAM(S): at 17:14

## 2023-08-01 RX ADMIN — Medication 4 MILLILITER(S): at 05:13

## 2023-08-01 RX ADMIN — Medication 400 MILLIGRAM(S): at 00:01

## 2023-08-01 RX ADMIN — OXYCODONE HYDROCHLORIDE 10 MILLIGRAM(S): 5 TABLET ORAL at 05:52

## 2023-08-01 RX ADMIN — Medication 25 MILLIGRAM(S): at 22:36

## 2023-08-01 RX ADMIN — LIDOCAINE 1 PATCH: 4 CREAM TOPICAL at 19:45

## 2023-08-01 RX ADMIN — OXYCODONE HYDROCHLORIDE 10 MILLIGRAM(S): 5 TABLET ORAL at 17:14

## 2023-08-01 RX ADMIN — Medication 25 MILLIGRAM(S): at 05:52

## 2023-08-01 NOTE — PROGRESS NOTE ADULT - ASSESSMENT
68 years old male with h/o HTN, HLD, TBI dementia here after a fall On exam lethargic orineted x 0 general weakness LE atrophy some hyperrefleia  CTH, C and L reviewed     Impression: generalized weakness. TME rhabdo  aspiration   uti     limit polypharmacy  consider holding gabapentin  given limited study consider repeat CTH  outpt EMG  GOCs  -B12, folate, TSH   -In order to enhance patient's overall well-being and clinical course, please try avoiding benzodiazepines, anticholinergics, and antihistamines (Can cause worsening confusion/delirium). Additionally, continue reorientation, supportive care, maintaining regular sleep/wake cycle, and optimizing nutritional/medical factors.   Call back as needed

## 2023-08-01 NOTE — CHART NOTE - NSCHARTNOTEFT_GEN_A_CORE
Per chart pt with PMH: HTN, HLD, TBI resulting in dementia, presents s/p ?fall, found with VIKAS and rhabdomyolysis. Course complicated by acute DVT. Pt confused unable to participate in interview.     Factors impacting intake: [ ] none [ ] nausea  [ ] vomiting [ ] diarrhea [ ] constipation  [ ]chewing problems [ ] swallowing issues  [x] other: acute illness    Diet Prescription: Diet, Minced and Moist (07-18-23 @ 09:58) [Active]    Intake: 0-75% as per flow sheets; pt had not yet consumed breakfast at time of visit.    Current Weight: (07/20) 65.2kg (07/14) 63.5kg   % Weight Change: no recent weights to trend    Edema: nonpitting edema of left and right feet, and right knee and ankle    Pertinent Medications: MEDICATIONS  (STANDING):  acetylcysteine 10%  Inhalation 4 milliLiter(s) Inhalation four times a day  enoxaparin Injectable 60 milliGRAM(s) SubCutaneous every 12 hours  levalbuterol Inhalation 0.63 milliGRAM(s) Inhalation every 6 hours  lidocaine   4% Patch 1 Patch Transdermal every 24 hours  metoprolol tartrate 25 milliGRAM(s) Oral every 8 hours  oxyCODONE  ER Tablet 10 milliGRAM(s) Oral every 12 hours  piperacillin/tazobactam IVPB.. 3.375 Gram(s) IV Intermittent every 8 hours  QUEtiapine 100 milliGRAM(s) Oral at bedtime  sodium chloride 0.9%. 1000 milliLiter(s) (75 mL/Hr) IV Continuous <Continuous>  sodium chloride 0.9%. 1000 milliLiter(s) (75 mL/Hr) IV Continuous <Continuous>  traZODone 50 milliGRAM(s) Oral at bedtime  valproic  acid Syrup 250 milliGRAM(s) Oral three times a day    MEDICATIONS  (PRN):  acetaminophen     Tablet .. 650 milliGRAM(s) Oral every 6 hours PRN Mild Pain (1 - 3), Moderate Pain (4 - 6)  acetaminophen     Tablet .. 650 milliGRAM(s) Oral every 6 hours PRN Temp greater or equal to 38C (100.4F)  melatonin 3 milliGRAM(s) Oral at bedtime PRN Insomnia  oxyCODONE    IR 10 milliGRAM(s) Oral every 4 hours PRN Severe Pain (7 - 10)  oxyCODONE    IR 5 milliGRAM(s) Oral every 4 hours PRN Moderate Pain (4 - 6)    Pertinent Labs: 08-01 Na139 mmol/L Glu 100 mg/dL<H> K+ 4.2 mmol/L Cr  1.09 mg/dL BUN 23 mg/dL 08-01 Alb 1.5 g/dL<L>      Skin: stage II right buttocks pressure injury as per flow sheets    Estimated Needs:   [x] no change since previous assessment: 07/17  [ ] recalculated:     Previous Nutrition Diagnosis:   [x] Malnutrition	Moderate Malnutrition in context of chronic illness  Etiology	Inadequate energy/protein intake related to TBI resulting in dementia  Signs/Symptoms	Physical findings of mild fat depletion & muscle wasting as noted 07/17  Goal/Expected Outcome	Pt to consume >50% meals/supplements during LOS (not consistently met)      Nutrition Diagnosis is [x] ongoing  [ ] resolved [ ] not applicable     New Nutrition Diagnosis: [x] not applicable        Interventions:   Recommend  [x] Continue current diet as ordered   [ ] Change Diet To:  [x] Nutrition Supplement:  Add Ensure Plus High Protein x 2/day (provides 700 kcal, 40 g protein)  [ ] Nutrition Support  [x] Other: Continue to provide assistance and encouragement with PO intake     Monitoring and Evaluation:   [x] PO intake [ x ] Tolerance to diet prescription [ x ] weights [ x ] labs[ x ] follow up per protocol  [ ] other:

## 2023-08-01 NOTE — PROGRESS NOTE ADULT - SUBJECTIVE AND OBJECTIVE BOX
[facility] Division of Hospital Medicine  Ricardo Rosado MD  Available via MS Teams    SUBJECTIVE / OVERNIGHT EVENTS: Patient seen and examined at bedside, resting comfortably and in no acute distress. Denies chest pain, palpitations. Denies SOB or cough. ROS otherwise noncontributory.     MEDICATIONS  (STANDING):  acetylcysteine 10%  Inhalation 4 milliLiter(s) Inhalation four times a day  enoxaparin Injectable 60 milliGRAM(s) SubCutaneous every 12 hours  levalbuterol Inhalation 0.63 milliGRAM(s) Inhalation every 6 hours  lidocaine   4% Patch 1 Patch Transdermal every 24 hours  metoprolol tartrate 25 milliGRAM(s) Oral every 8 hours  oxyCODONE  ER Tablet 10 milliGRAM(s) Oral every 12 hours  piperacillin/tazobactam IVPB.. 3.375 Gram(s) IV Intermittent every 8 hours  QUEtiapine 100 milliGRAM(s) Oral at bedtime  sodium chloride 0.9%. 1000 milliLiter(s) (75 mL/Hr) IV Continuous <Continuous>  sodium chloride 0.9%. 1000 milliLiter(s) (75 mL/Hr) IV Continuous <Continuous>  traZODone 50 milliGRAM(s) Oral at bedtime  valproic  acid Syrup 250 milliGRAM(s) Oral three times a day    MEDICATIONS  (PRN):  acetaminophen     Tablet .. 650 milliGRAM(s) Oral every 6 hours PRN Mild Pain (1 - 3), Moderate Pain (4 - 6)  acetaminophen     Tablet .. 650 milliGRAM(s) Oral every 6 hours PRN Temp greater or equal to 38C (100.4F)  melatonin 3 milliGRAM(s) Oral at bedtime PRN Insomnia  oxyCODONE    IR 10 milliGRAM(s) Oral every 4 hours PRN Severe Pain (7 - 10)  oxyCODONE    IR 5 milliGRAM(s) Oral every 4 hours PRN Moderate Pain (4 - 6)      I&O's Summary    31 Jul 2023 07:01  -  01 Aug 2023 07:00  --------------------------------------------------------  IN: 240 mL / OUT: 325 mL / NET: -85 mL        PHYSICAL EXAM:  Vital Signs Last 24 Hrs  T(C): 36.8 (01 Aug 2023 10:45), Max: 38 (31 Jul 2023 23:37)  T(F): 98.3 (01 Aug 2023 10:45), Max: 100.4 (31 Jul 2023 23:37)  HR: 126 (01 Aug 2023 11:59) (114 - 134)  BP: 139/61 (01 Aug 2023 10:45) (122/82 - 149/53)  BP(mean): 96 (31 Jul 2023 13:27) (96 - 96)  RR: 18 (01 Aug 2023 10:45) (18 - 18)  SpO2: 100% (01 Aug 2023 11:59) (97% - 100%)    Parameters below as of 01 Aug 2023 11:59  Patient On (Oxygen Delivery Method): nasal cannula      CONSTITUTIONAL: NAD, well-groomed  EYES: PERRLA; conjunctiva and sclera clear  ENMT: Moist oral mucosa, no pharyngeal injection or exudates; normal dentition  NECK: Supple, no palpable masses; no thyromegaly  RESPIRATORY: Normal respiratory effort; lungs are clear to auscultation bilaterally  CARDIOVASCULAR: normal S1 and S2, rapid rate, regular rhythm   ABDOMEN: Nontender to palpation, normoactive bowel sounds, no rebound/guarding; No hepatosplenomegaly  MUSCULOSKELETAL:  no clubbing or cyanosis of digits; no joint swelling or tenderness to palpation  PSYCH: A+O to person, place, and time; affect appropriate  NEUROLOGY: CN 2-12 are intact and symmetric; no gross sensory deficits   SKIN: No rashes; no palpable lesions    LABS:                        10.2   12.77 )-----------( 382      ( 01 Aug 2023 06:51 )             30.3     08-01    139  |  101  |  23  ----------------------------<  100<H>  4.2   |  35<H>  |  1.09    Ca    8.7      01 Aug 2023 06:51    TPro  5.6<L>  /  Alb  1.5<L>  /  TBili  0.5  /  DBili  x   /  AST  143<H>  /  ALT  75  /  AlkPhos  96  08-01      CARDIAC MARKERS ( 31 Jul 2023 17:35 )  x     / x     / x     / x     / 10.2 ng/mL      Urinalysis Basic - ( 01 Aug 2023 06:51 )    Color: x / Appearance: x / SG: x / pH: x  Gluc: 100 mg/dL / Ketone: x  / Bili: x / Urobili: x   Blood: x / Protein: x / Nitrite: x   Leuk Esterase: x / RBC: x / WBC x   Sq Epi: x / Non Sq Epi: x / Bacteria: x        Culture - Urine (collected 30 Jul 2023 15:00)  Source: Catheterized Catheterized  Final Report (31 Jul 2023 17:51):    <10,000 CFU/mL Normal Urogenital Ivette      SARS-CoV-2: NotDetec (27 Jul 2023 00:30)  SARS-CoV-2: NotDetec (17 Jul 2023 11:44)

## 2023-08-01 NOTE — PROGRESS NOTE ADULT - ASSESSMENT
68M PMH HTN, HLD, TBI with resulting dementia presented to ED as a fall. Patient found with rhabdo and VIKAS, also with possible aspiration pneumonia, which is now resolving.

## 2023-08-01 NOTE — PROVIDER CONTACT NOTE (OTHER) - SITUATION
Patient w/ 100.3F rectal temperature (on cooling blanket on and off), shivering at this time. HR remains between 130s-140s on cardiac monitor. Denies any palpitations.

## 2023-08-01 NOTE — PROGRESS NOTE ADULT - NSPROGADDITIONALINFOA_GEN_ALL_CORE
ID consulted. Continue with zosyn. Sinus tachycardia on ecg. IVF. Check 2pm BMP for K s/p cherie Marks MD  Division of Hospital Medicine  Available on [employer] Teams
ID consulted. Continue with zosyn. Sinus tachycardia on ecg. IVF. Check 2pm BMP for K s/p cherie Marks MD  Division of Hospital Medicine  Available on [employer] Teams
FREDY  rocephin  change diet
ID consulted. Continue with zosyn. Sinus tachycardia on ecg. IVF. Check 2pm BMP for K s/p cherie Marks MD  Division of Hospital Medicine  Available on Microsoft Teams
dc rehab
Case d/w ACP via MS Teams
c/w fluids  prepare for dc to rehab

## 2023-08-01 NOTE — PROVIDER CONTACT NOTE (OTHER) - BACKGROUND
hyperkalemia, VIKAS, Back pain
Patient admitted with + fall @ home, TMI w/ subsequent dementia. PMH includes: HTN and HLD.
Patient admitting dx: Hyperkalemia, VIKAS, back pain. Hx TBI. Persistent fevers & tachycardia during hospital stay.

## 2023-08-01 NOTE — PROGRESS NOTE ADULT - SUBJECTIVE AND OBJECTIVE BOX
ROJAS HE  MRN-[id number]    Follow Up:  PNA, fever    Interval History: the pt was seen and examined earlier, no acute distress, pt is less interactive today. Pt had a low grade temp at midnight 100.4 and 100.3 midday today, tachycardic, WBC better.     PAST MEDICAL & SURGICAL HISTORY:  TBI (traumatic brain injury)      HLD (hyperlipidemia)      HTN, age 0-18          ROS:    [x ] Unobtainable because: does not answer questions   [ ] All other systems negative    Constitutional: no fever, no chills  Head: no trauma  Eyes: no vision changes, no eye pain  ENT:  no sore throat, no rhinorrhea  Cardiovascular:  no chest pain, no palpitation  Respiratory:  no SOB, no cough  GI:  no abd pain, no vomiting, no diarrhea  urinary: no dysuria, no hematuria, no flank pain  musculoskeletal:  no joint pain, no joint swelling  skin:  no rash  neurology:  no headache, no seizure, no change in mental status  psych: no anxiety, no depression         Allergies  No Known Allergies        ANTIMICROBIALS:  piperacillin/tazobactam IVPB.. 3.375 every 8 hours      OTHER MEDS:  acetaminophen     Tablet .. 650 milliGRAM(s) Oral every 6 hours PRN  acetaminophen     Tablet .. 650 milliGRAM(s) Oral every 6 hours PRN  acetylcysteine 10%  Inhalation 4 milliLiter(s) Inhalation four times a day  enoxaparin Injectable 60 milliGRAM(s) SubCutaneous every 12 hours  levalbuterol Inhalation 0.63 milliGRAM(s) Inhalation every 6 hours  lidocaine   4% Patch 1 Patch Transdermal every 24 hours  melatonin 3 milliGRAM(s) Oral at bedtime PRN  metoprolol tartrate 25 milliGRAM(s) Oral every 8 hours  oxyCODONE    IR 5 milliGRAM(s) Oral every 4 hours PRN  oxyCODONE    IR 10 milliGRAM(s) Oral every 4 hours PRN  oxyCODONE  ER Tablet 10 milliGRAM(s) Oral every 12 hours  QUEtiapine 100 milliGRAM(s) Oral at bedtime  sodium chloride 0.9%. 1000 milliLiter(s) IV Continuous <Continuous>  sodium chloride 0.9%. 1000 milliLiter(s) IV Continuous <Continuous>  traZODone 50 milliGRAM(s) Oral at bedtime  valproic  acid Syrup 250 milliGRAM(s) Oral three times a day      Vital Signs Last 24 Hrs  T(C): 37.9 (01 Aug 2023 13:17), Max: 38 (31 Jul 2023 23:37)  T(F): 100.3 (01 Aug 2023 13:17), Max: 100.4 (31 Jul 2023 23:37)  HR: 134 (01 Aug 2023 13:17) (114 - 134)  BP: 127/97 (01 Aug 2023 13:17) (126/83 - 149/53)  BP(mean): 107 (01 Aug 2023 13:17) (107 - 107)  RR: 18 (01 Aug 2023 10:45) (18 - 18)  SpO2: 100% (01 Aug 2023 11:59) (97% - 100%)    Parameters below as of 01 Aug 2023 11:59  Patient On (Oxygen Delivery Method): nasal cannula        Physical Exam:  General: Chronically ill-appearing Male in no acute distress.  HEENT: AT/NC. Pupils/EOM unable secondary to patient compliance. Oropharynx/dentition unable secondary to patient compliance. Missing teeth   Neck: Increased tone not frankly rigid. No sense of mass.  Nodes: None palpable.  Lungs: Poor effort diminished BS bilaterally, no wheezes, no rhonchi  Heart: Tachycardic with RR  Abdomen: Bowel sounds present. Soft. Nondistended. Nontender.  Extremities: No cyanosis or clubbing. No edema. MIldy contracted  Neuro: fatigued, does not answer questions today, nods, does not follow commands   Skin: Warm. Dry. Good turgor.   Psychiatric: calm behavior     WBC Count: 12.77 K/uL (08-01 @ 06:51)  WBC Count: 15.50 K/uL (07-30 @ 07:50)  WBC Count: 13.35 K/uL (07-29 @ 09:19)  WBC Count: 18.61 K/uL (07-27 @ 06:10)  WBC Count: 16.63 K/uL (07-26 @ 13:30)                            10.2   12.77 )-----------( 382      ( 01 Aug 2023 06:51 )             30.3       08-01    139  |  101  |  23  ----------------------------<  100<H>  4.2   |  35<H>  |  1.09    Ca    8.7      01 Aug 2023 06:51    TPro  5.6<L>  /  Alb  1.5<L>  /  TBili  0.5  /  DBili  x   /  AST  143<H>  /  ALT  75  /  AlkPhos  96  08-01      Urinalysis Basic - ( 01 Aug 2023 06:51 )    Color: x / Appearance: x / SG: x / pH: x  Gluc: 100 mg/dL / Ketone: x  / Bili: x / Urobili: x   Blood: x / Protein: x / Nitrite: x   Leuk Esterase: x / RBC: x / WBC x   Sq Epi: x / Non Sq Epi: x / Bacteria: x        Creatinine Trend: 1.09<--, 1.13<--, 1.17<--, 1.38<--, 1.69<--, 1.53<--      MICROBIOLOGY:  v  Catheterized Catheterized  07-30-23   <10,000 CFU/mL Normal Urogenital Ivette  --  --      .Blood Blood-Peripheral  07-29-23   No growth at 72 Hours  --  --      Clean Catch Clean Catch (Midstream)  07-26-23   <10,000 CFU/mL Normal Urogenital Ivette  --  --      .Blood Blood-Peripheral  07-26-23   No growth at 5 days  --  --      .Blood Blood-Peripheral  07-26-23   No growth at 5 days  --  --          Rapid RVP Result: NotDetec (07-27 @ 00:30)      SARS-CoV-2: NotDetec (27 Jul 2023 00:30)  SARS-CoV-2: NotDetec (17 Jul 2023 11:44)    RADIOLOGY:

## 2023-08-01 NOTE — PROGRESS NOTE ADULT - ASSESSMENT
I doubt that tachycardia essentially since admission was on an infectious basis. Suspect the fever last night represents a superimposed event, with aspiration being most likely. It will be interesting to see whether tachycardia resolves, or not.   Certainly at risk for aspiration given poor mental status superimposed on baseline TBI.  No clear or convincing pneumonia on CT, does have plugging  VQ without PE, does have DVT but I do not think latter accounts for escalating leukocytosis.   RVP's neg  Elevated CK, presumed rhabdomyolysis why still fluctuating and elevated after ~1 week No troponin's sent but most recent ECG only reported as sinus tachycardia.   U/A without significant pyuria now or on admission  Abdominal exam and skin exam limited by patient's behavior, but no gross abdominal tenderness, skin/soft tissue infection, phlebitis    7/28: afebrile >24 hrs, tachycardic, WBC increased 18.61, Cr elevated 1.38, BCs and UC with no growth to date, covid 19 test is negative, CT chest - new RLL mucus plug, new b/l LLs atelectasis, MRSA PCR not detected, will continue with Zosyn for now.   + Right femoral DVT - on Lovenox.   Attending addendum:  I have reviewed all pertinent clinical information and agree with the NP's note.   continue zosyn through the weekend   follow all cultured  trend wbc and fever curve   aspiration precautions   frequent suctioning if necessary     7/31: pt is more awake and alert, more interactive, confused, no fevers since 7/29, WBC elevated 15.5, Cr normalized, LFTs better, MRSA PCR not detected, BCs and UC with no growth to date. Pt completed a course of ceftriaxone, now on zosyn day #6.   Pt's CK is trending up, cardiac enzymes should be obtained.   Attending addendum:  I have reviewed all pertinent clinical information and agree with the NP's note.   continue antibiotics   CK was improving but then starting climbing, asked for cardiac enzymes to be sent  suspended atorvastatin as he is on high dose   please trend CK levels    8/1: low grade fevers, tachycardic, NC, WBC better 12.77,Cr ok, LFTs better, BCs and UC with no growth to date, troponin x 1 negative, on Lovenox for right femoral DVT, Zosyn IV continued.     Suggestions--  Aspiration precautions as able  Continue Zosyn for now   MRSA nares PCR - negative   follow BCs - NGTD  trend temperatures and WBC  DVT management per medicine       Discussed with Dr. Adamson

## 2023-08-01 NOTE — PROGRESS NOTE ADULT - SUBJECTIVE AND OBJECTIVE BOX
Neurology Progress Note    S: Patient seen and examined. No new events overnight. patient denied CP, SOB, HA or pain.     MEDICATIONS:    acetaminophen     Tablet .. 650 milliGRAM(s) Oral every 6 hours PRN  acetaminophen     Tablet .. 650 milliGRAM(s) Oral every 6 hours PRN  acetylcysteine 10%  Inhalation 4 milliLiter(s) Inhalation four times a day  enoxaparin Injectable 60 milliGRAM(s) SubCutaneous every 12 hours  levalbuterol Inhalation 0.63 milliGRAM(s) Inhalation every 6 hours  lidocaine   4% Patch 1 Patch Transdermal every 24 hours  melatonin 3 milliGRAM(s) Oral at bedtime PRN  metoprolol tartrate 25 milliGRAM(s) Oral every 8 hours  oxyCODONE    IR 10 milliGRAM(s) Oral every 4 hours PRN  oxyCODONE    IR 5 milliGRAM(s) Oral every 4 hours PRN  oxyCODONE  ER Tablet 10 milliGRAM(s) Oral every 12 hours  piperacillin/tazobactam IVPB.. 3.375 Gram(s) IV Intermittent every 8 hours  QUEtiapine 100 milliGRAM(s) Oral at bedtime  sodium chloride 0.9%. 1000 milliLiter(s) IV Continuous <Continuous>  sodium chloride 0.9%. 1000 milliLiter(s) IV Continuous <Continuous>  traZODone 50 milliGRAM(s) Oral at bedtime  valproic  acid Syrup 250 milliGRAM(s) Oral three times a day      LABS:                          10.2   12.77 )-----------( 382      ( 01 Aug 2023 06:51 )             30.3     08-01    139  |  101  |  23  ----------------------------<  100<H>  4.2   |  35<H>  |  1.09    Ca    8.7      01 Aug 2023 06:51    TPro  5.6<L>  /  Alb  1.5<L>  /  TBili  0.5  /  DBili  x   /  AST  143<H>  /  ALT  75  /  AlkPhos  96  08-01    CAPILLARY BLOOD GLUCOSE          Urinalysis Basic - ( 01 Aug 2023 06:51 )    Color: x / Appearance: x / SG: x / pH: x  Gluc: 100 mg/dL / Ketone: x  / Bili: x / Urobili: x   Blood: x / Protein: x / Nitrite: x   Leuk Esterase: x / RBC: x / WBC x   Sq Epi: x / Non Sq Epi: x / Bacteria: x      I&O's Summary    31 Jul 2023 07:01  -  01 Aug 2023 07:00  --------------------------------------------------------  IN: 240 mL / OUT: 325 mL / NET: -85 mL      Vital Signs Last 24 Hrs  T(C): 37.1 (01 Aug 2023 07:00), Max: 38 (31 Jul 2023 23:37)  T(F): 98.8 (01 Aug 2023 07:00), Max: 100.4 (31 Jul 2023 23:37)  HR: 118 (01 Aug 2023 07:00) (114 - 134)  BP: 149/53 (01 Aug 2023 04:41) (122/82 - 149/53)  BP(mean): 96 (31 Jul 2023 13:27) (96 - 96)  RR: 18 (01 Aug 2023 04:41) (18 - 18)  SpO2: 98% (01 Aug 2023 05:13) (97% - 100%)    Parameters below as of 01 Aug 2023 05:13  Patient On (Oxygen Delivery Method): nasal cannula          General Exam:   General Appearance: Appropriately dressed and in no acute distress       Head: Normocephalic, atraumatic and no dysmorphic features  Ear, Nose, and Throat: Moist mucous membranes  CVS: S1S2+  Resp: No SOB, no wheeze or rhonchi  Abd: soft NTND  Extremities: No edema, no cyanosis  Skin: No bruises, no rashes     Neurological Exam:    Mental Status -lethargic opens eys does not follow commands   Cranial Nerves - PERRL, EOMI, VFF, V1-V3 intact, no gross facial asymmetry,    Motor Exam -   Moves UEs spontanesously LEs atrophy some contracture    Sensory    Intact to light touch and pinprick bilaterally    Reflexes UES hyperreflexia patellar trace Left ankle clonus at times right mute  Coordination: unable   Gait: deferred     I personally reviewed the below data/images/labs:      CBC Full  -  ( 30 Jul 2023 07:50 )  WBC Count : 15.50 K/uL  RBC Count : 3.67 M/uL  Hemoglobin : 11.2 g/dL  Hematocrit : 33.3 %  Platelet Count - Automated : 337 K/uL  Mean Cell Volume : 90.7 fl  Mean Cell Hemoglobin : 30.5 pg  Mean Cell Hemoglobin Concentration : 33.6 g/dL  Auto Neutrophil # : x  Auto Lymphocyte # : x  Auto Monocyte # : x  Auto Eosinophil # : x  Auto Basophil # : x  Auto Neutrophil % : x  Auto Lymphocyte % : x  Auto Monocyte % : x  Auto Eosinophil % : x  Auto Basophil % : x    07-30    138  |  100  |  21  ----------------------------<  99  4.6   |  34<H>  |  1.13    Ca    8.9      30 Jul 2023 07:50    TPro  6.0  /  Alb  1.6<L>  /  TBili  0.6  /  DBili  x   /  AST  152<H>  /  ALT  75  /  AlkPhos  89  07-30    LIVER FUNCTIONS - ( 30 Jul 2023 07:50 )  Alb: 1.6 g/dL / Pro: 6.0 gm/dL / ALK PHOS: 89 U/L / ALT: 75 U/L / AST: 152 U/L / GGT: x             Urinalysis Basic - ( 30 Jul 2023 07:50 )    Color: x / Appearance: x / SG: x / pH: x  Gluc: 99 mg/dL / Ketone: x  / Bili: x / Urobili: x   Blood: x / Protein: x / Nitrite: x   Leuk Esterase: x / RBC: x / WBC x   Sq Epi: x / Non Sq Epi: x / Bacteria: x        < from: CT Head No Cont (07.14.23 @ 14:35) >  IMPRESSION:    CT head: Markedly motion degraded exam. No gross evidence of acute   intracranial hemorrhage or mass effect.    CT cervical spine: No evidence for acute displaced fracture or   malalignment.    CT lumbar spine: No evidence for acute displaced fracture or malalignment.    --- End of Report ---

## 2023-08-02 LAB
ANION GAP SERPL CALC-SCNC: 5 MMOL/L — SIGNIFICANT CHANGE UP (ref 5–17)
BUN SERPL-MCNC: 25 MG/DL — HIGH (ref 7–23)
CALCIUM SERPL-MCNC: 8.7 MG/DL — SIGNIFICANT CHANGE UP (ref 8.5–10.1)
CHLORIDE SERPL-SCNC: 101 MMOL/L — SIGNIFICANT CHANGE UP (ref 96–108)
CK SERPL-CCNC: 510 U/L — HIGH (ref 26–308)
CO2 SERPL-SCNC: 33 MMOL/L — HIGH (ref 22–31)
CREAT SERPL-MCNC: 1.16 MG/DL — SIGNIFICANT CHANGE UP (ref 0.5–1.3)
EGFR: 68 ML/MIN/1.73M2 — SIGNIFICANT CHANGE UP
GLUCOSE SERPL-MCNC: 83 MG/DL — SIGNIFICANT CHANGE UP (ref 70–99)
HCT VFR BLD CALC: 29.6 % — LOW (ref 39–50)
HGB BLD-MCNC: 10 G/DL — LOW (ref 13–17)
MAGNESIUM SERPL-MCNC: 2.5 MG/DL — SIGNIFICANT CHANGE UP (ref 1.6–2.6)
MCHC RBC-ENTMCNC: 31.2 PG — SIGNIFICANT CHANGE UP (ref 27–34)
MCHC RBC-ENTMCNC: 33.8 G/DL — SIGNIFICANT CHANGE UP (ref 32–36)
MCV RBC AUTO: 92.2 FL — SIGNIFICANT CHANGE UP (ref 80–100)
NRBC # BLD: 0 /100 WBCS — SIGNIFICANT CHANGE UP (ref 0–0)
PHOSPHATE SERPL-MCNC: 3 MG/DL — SIGNIFICANT CHANGE UP (ref 2.5–4.5)
PLATELET # BLD AUTO: 424 K/UL — HIGH (ref 150–400)
POTASSIUM SERPL-MCNC: 4.6 MMOL/L — SIGNIFICANT CHANGE UP (ref 3.5–5.3)
POTASSIUM SERPL-SCNC: 4.6 MMOL/L — SIGNIFICANT CHANGE UP (ref 3.5–5.3)
RBC # BLD: 3.21 M/UL — LOW (ref 4.2–5.8)
RBC # FLD: 13.8 % — SIGNIFICANT CHANGE UP (ref 10.3–14.5)
SODIUM SERPL-SCNC: 139 MMOL/L — SIGNIFICANT CHANGE UP (ref 135–145)
WBC # BLD: 10.28 K/UL — SIGNIFICANT CHANGE UP (ref 3.8–10.5)
WBC # FLD AUTO: 10.28 K/UL — SIGNIFICANT CHANGE UP (ref 3.8–10.5)

## 2023-08-02 PROCEDURE — 99232 SBSQ HOSP IP/OBS MODERATE 35: CPT

## 2023-08-02 RX ORDER — OXYCODONE HYDROCHLORIDE 5 MG/1
10 TABLET ORAL EVERY 12 HOURS
Refills: 0 | Status: DISCONTINUED | OUTPATIENT
Start: 2023-08-02 | End: 2023-08-09

## 2023-08-02 RX ORDER — OXYCODONE HYDROCHLORIDE 5 MG/1
5 TABLET ORAL EVERY 4 HOURS
Refills: 0 | Status: DISCONTINUED | OUTPATIENT
Start: 2023-08-02 | End: 2023-08-02

## 2023-08-02 RX ORDER — SODIUM CHLORIDE 9 MG/ML
1000 INJECTION INTRAMUSCULAR; INTRAVENOUS; SUBCUTANEOUS
Refills: 0 | Status: DISCONTINUED | OUTPATIENT
Start: 2023-08-02 | End: 2023-08-03

## 2023-08-02 RX ADMIN — Medication 1000 MILLIGRAM(S): at 02:45

## 2023-08-02 RX ADMIN — Medication 250 MILLIGRAM(S): at 13:32

## 2023-08-02 RX ADMIN — Medication 25 MILLIGRAM(S): at 13:32

## 2023-08-02 RX ADMIN — SODIUM CHLORIDE 75 MILLILITER(S): 9 INJECTION INTRAMUSCULAR; INTRAVENOUS; SUBCUTANEOUS at 18:51

## 2023-08-02 RX ADMIN — LEVALBUTEROL 0.63 MILLIGRAM(S): 1.25 SOLUTION, CONCENTRATE RESPIRATORY (INHALATION) at 18:05

## 2023-08-02 RX ADMIN — LIDOCAINE 1 PATCH: 4 CREAM TOPICAL at 13:32

## 2023-08-02 RX ADMIN — LEVALBUTEROL 0.63 MILLIGRAM(S): 1.25 SOLUTION, CONCENTRATE RESPIRATORY (INHALATION) at 23:33

## 2023-08-02 RX ADMIN — Medication 650 MILLIGRAM(S): at 06:28

## 2023-08-02 RX ADMIN — Medication 650 MILLIGRAM(S): at 21:23

## 2023-08-02 RX ADMIN — Medication 650 MILLIGRAM(S): at 08:00

## 2023-08-02 RX ADMIN — LEVALBUTEROL 0.63 MILLIGRAM(S): 1.25 SOLUTION, CONCENTRATE RESPIRATORY (INHALATION) at 05:23

## 2023-08-02 RX ADMIN — PIPERACILLIN AND TAZOBACTAM 25 GRAM(S): 4; .5 INJECTION, POWDER, LYOPHILIZED, FOR SOLUTION INTRAVENOUS at 13:33

## 2023-08-02 RX ADMIN — Medication 4 MILLILITER(S): at 11:09

## 2023-08-02 RX ADMIN — OXYCODONE HYDROCHLORIDE 10 MILLIGRAM(S): 5 TABLET ORAL at 08:00

## 2023-08-02 RX ADMIN — LEVALBUTEROL 0.63 MILLIGRAM(S): 1.25 SOLUTION, CONCENTRATE RESPIRATORY (INHALATION) at 11:09

## 2023-08-02 RX ADMIN — ENOXAPARIN SODIUM 60 MILLIGRAM(S): 100 INJECTION SUBCUTANEOUS at 06:29

## 2023-08-02 RX ADMIN — PIPERACILLIN AND TAZOBACTAM 25 GRAM(S): 4; .5 INJECTION, POWDER, LYOPHILIZED, FOR SOLUTION INTRAVENOUS at 06:29

## 2023-08-02 RX ADMIN — Medication 4 MILLILITER(S): at 23:34

## 2023-08-02 RX ADMIN — Medication 250 MILLIGRAM(S): at 06:28

## 2023-08-02 RX ADMIN — Medication 4 MILLILITER(S): at 05:23

## 2023-08-02 RX ADMIN — Medication 400 MILLIGRAM(S): at 00:13

## 2023-08-02 RX ADMIN — Medication 650 MILLIGRAM(S): at 22:23

## 2023-08-02 RX ADMIN — QUETIAPINE FUMARATE 100 MILLIGRAM(S): 200 TABLET, FILM COATED ORAL at 21:20

## 2023-08-02 RX ADMIN — OXYCODONE HYDROCHLORIDE 10 MILLIGRAM(S): 5 TABLET ORAL at 18:25

## 2023-08-02 RX ADMIN — Medication 25 MILLIGRAM(S): at 06:28

## 2023-08-02 RX ADMIN — OXYCODONE HYDROCHLORIDE 10 MILLIGRAM(S): 5 TABLET ORAL at 17:43

## 2023-08-02 RX ADMIN — OXYCODONE HYDROCHLORIDE 10 MILLIGRAM(S): 5 TABLET ORAL at 06:56

## 2023-08-02 RX ADMIN — Medication 250 MILLIGRAM(S): at 21:23

## 2023-08-02 RX ADMIN — Medication 4 MILLILITER(S): at 18:05

## 2023-08-02 RX ADMIN — Medication 50 MILLIGRAM(S): at 21:20

## 2023-08-02 RX ADMIN — LIDOCAINE 1 PATCH: 4 CREAM TOPICAL at 01:19

## 2023-08-02 RX ADMIN — ENOXAPARIN SODIUM 60 MILLIGRAM(S): 100 INJECTION SUBCUTANEOUS at 17:43

## 2023-08-02 RX ADMIN — Medication 25 MILLIGRAM(S): at 21:22

## 2023-08-02 NOTE — PROGRESS NOTE ADULT - ASSESSMENT
I doubt that tachycardia essentially since admission was on an infectious basis. Suspect the fever last night represents a superimposed event, with aspiration being most likely. It will be interesting to see whether tachycardia resolves, or not.   Certainly at risk for aspiration given poor mental status superimposed on baseline TBI.  No clear or convincing pneumonia on CT, does have plugging  VQ without PE, does have DVT but I do not think latter accounts for escalating leukocytosis.   RVP's neg  Elevated CK, presumed rhabdomyolysis why still fluctuating and elevated after ~1 week No troponin's sent but most recent ECG only reported as sinus tachycardia.   U/A without significant pyuria now or on admission  Abdominal exam and skin exam limited by patient's behavior, but no gross abdominal tenderness, skin/soft tissue infection, phlebitis    7/28: afebrile >24 hrs, tachycardic, WBC increased 18.61, Cr elevated 1.38, BCs and UC with no growth to date, covid 19 test is negative, CT chest - new RLL mucus plug, new b/l LLs atelectasis, MRSA PCR not detected, will continue with Zosyn for now.   + Right femoral DVT - on Lovenox.   Attending addendum:  I have reviewed all pertinent clinical information and agree with the NP's note.   continue zosyn through the weekend   follow all cultured  trend wbc and fever curve   aspiration precautions   frequent suctioning if necessary     7/31: pt is more awake and alert, more interactive, confused, no fevers since 7/29, WBC elevated 15.5, Cr normalized, LFTs better, MRSA PCR not detected, BCs and UC with no growth to date. Pt completed a course of ceftriaxone, now on zosyn day #6.   Pt's CK is trending up, cardiac enzymes should be obtained.   Attending addendum:  I have reviewed all pertinent clinical information and agree with the NP's note.   continue antibiotics   CK was improving but then starting climbing, asked for cardiac enzymes to be sent  suspended atorvastatin as he is on high dose   please trend CK levels    8/1: low grade fevers, tachycardic, NC, WBC better 12.77,Cr ok, LFTs better, BCs and UC with no growth to date, troponin x 1 negative, on Lovenox for right femoral DVT, Zosyn IV continued.  Attending Addendum--  Case reviewed with NP Aruna Guzman. Her note reviewed and modified as appropriate.   Still with intermittent low-grade fever though trend perhaps moderating  Some decline in WBC  CK still elevated as of the 29th- etiology? Seems too long to attribute to rhabdo from outpatient setting. MB fraction nor troponin impressive  Role of Abx TBD  Drug fever an exclusionary diagnosis, but none known to be new    8/2: continues having fevers, tachycardic, WBC normalized, Cr ok, CK is better 510 today, all prior BCs with no growth to date, will obtain two sets of surveillance BCs. Unclear if pt's fevers are infectious in nature, will continue Zosyn IV for now.     Suggestions--  Aspiration precautions as able  Continue Zosyn for now   MRSA nares PCR - negative   follow BCs - NGTD  obtain two sets of surveillance BCs - ordered   trend temperatures and WBC  DVT management per medicine       Discussed with Dr. Adamson

## 2023-08-02 NOTE — PROGRESS NOTE ADULT - SUBJECTIVE AND OBJECTIVE BOX
[facility] Division of Hospital Medicine  Ricardo Rosado MD  Available via MS Teams    SUBJECTIVE / OVERNIGHT EVENTS: Patient seen and examined at bedside, resting comfortably and in no acute distress. Unable to participate in review of systems.     MEDICATIONS  (STANDING):  acetylcysteine 10%  Inhalation 4 milliLiter(s) Inhalation four times a day  enoxaparin Injectable 60 milliGRAM(s) SubCutaneous every 12 hours  levalbuterol Inhalation 0.63 milliGRAM(s) Inhalation every 6 hours  lidocaine   4% Patch 1 Patch Transdermal every 24 hours  metoprolol tartrate 25 milliGRAM(s) Oral every 8 hours  oxyCODONE  ER Tablet 10 milliGRAM(s) Oral every 12 hours  piperacillin/tazobactam IVPB.. 3.375 Gram(s) IV Intermittent every 8 hours  QUEtiapine 100 milliGRAM(s) Oral at bedtime  sodium chloride 0.9%. 1000 milliLiter(s) (75 mL/Hr) IV Continuous <Continuous>  sodium chloride 0.9%. 1000 milliLiter(s) (75 mL/Hr) IV Continuous <Continuous>  traZODone 50 milliGRAM(s) Oral at bedtime  valproic  acid Syrup 250 milliGRAM(s) Oral three times a day    MEDICATIONS  (PRN):  acetaminophen     Tablet .. 650 milliGRAM(s) Oral every 6 hours PRN Temp greater or equal to 38C (100.4F)  acetaminophen     Tablet .. 650 milliGRAM(s) Oral every 6 hours PRN Mild Pain (1 - 3), Moderate Pain (4 - 6)  melatonin 3 milliGRAM(s) Oral at bedtime PRN Insomnia  oxyCODONE    IR 5 milliGRAM(s) Oral every 4 hours PRN Moderate Pain (4 - 6)      I&O's Summary    01 Aug 2023 07:01  -  02 Aug 2023 07:00  --------------------------------------------------------  IN: 240 mL / OUT: 650 mL / NET: -410 mL        PHYSICAL EXAM:  Vital Signs Last 24 Hrs  T(C): 37.7 (02 Aug 2023 10:55), Max: 38.4 (01 Aug 2023 23:34)  T(F): 99.8 (02 Aug 2023 10:55), Max: 101.1 (01 Aug 2023 23:34)  HR: 108 (02 Aug 2023 11:13) (104 - 136)  BP: 125/70 (02 Aug 2023 10:55) (105/63 - 136/65)  BP(mean): 107 (01 Aug 2023 17:14) (107 - 107)  RR: 19 (02 Aug 2023 10:55) (18 - 20)  SpO2: 100% (02 Aug 2023 10:55) (93% - 100%)    Parameters below as of 02 Aug 2023 11:13  Patient On (Oxygen Delivery Method): nasal cannula    CONSTITUTIONAL: NAD, well-groomed  EYES: PERRLA; conjunctiva and sclera clear  ENMT: Moist oral mucosa, no pharyngeal injection or exudates; normal dentition  NECK: Supple, no palpable masses; no thyromegaly  RESPIRATORY: Normal respiratory effort; lungs are clear to auscultation bilaterally  CARDIOVASCULAR: normal S1 and S2, no murmur/rub/gallop; No lower extremity edema  ABDOMEN: Nontender to palpation, normoactive bowel sounds, no rebound/guarding; No hepatosplenomegaly  MUSCULOSKELETAL:  no clubbing or cyanosis of digits; no joint swelling or tenderness to palpation  PSYCH: AAOx0  NEUROLOGY: CN 2-12 are intact and symmetric  SKIN: No rashes; no palpable lesions    LABS:                        10.0   10.28 )-----------( 424      ( 02 Aug 2023 06:18 )             29.6     08-02    139  |  101  |  25<H>  ----------------------------<  83  4.6   |  33<H>  |  1.16    Ca    8.7      02 Aug 2023 06:18  Phos  3.0     08-02  Mg     2.5     08-02    TPro  5.6<L>  /  Alb  1.5<L>  /  TBili  0.5  /  DBili  x   /  AST  143<H>  /  ALT  75  /  AlkPhos  96  08-01      CARDIAC MARKERS ( 02 Aug 2023 06:18 )  x     / x     / 510 U/L / x     / x      CARDIAC MARKERS ( 31 Jul 2023 17:35 )  x     / x     / x     / x     / 10.2 ng/mL      Urinalysis Basic - ( 02 Aug 2023 06:18 )    Color: x / Appearance: x / SG: x / pH: x  Gluc: 83 mg/dL / Ketone: x  / Bili: x / Urobili: x   Blood: x / Protein: x / Nitrite: x   Leuk Esterase: x / RBC: x / WBC x   Sq Epi: x / Non Sq Epi: x / Bacteria: x        Culture - Urine (collected 30 Jul 2023 15:00)  Source: Catheterized Catheterized  Final Report (31 Jul 2023 17:51):    <10,000 CFU/mL Normal Urogenital Ivette      SARS-CoV-2: NotDetec (27 Jul 2023 00:30)  SARS-CoV-2: NotDetec (17 Jul 2023 11:44)

## 2023-08-02 NOTE — PROGRESS NOTE ADULT - PROBLEM SELECTOR PLAN 2
- Continue with home valproic acid syrup 250 mg PO TID   - FREDY on DC   - Oxycodone 10 mg PO q4hrs PRN for severe pain, 5 mg PO q4hrs PRN for moderate pain   - Continue with quetiapine 100 mg PO Qd at bedtime   - Continue with trazodone 50 mg PO Qd at bedtime
K 6, no EKG changes. Likely in the setting of VIKAS, rhabdo, ACEI use  Cocktail for hyperkalemia ---> insulin/dextrose, Lokelma  Repeat BMP, if elevated, will give another dose of lokelma  Anticipate improvement  Monitor renal function and serum K  Hold ACEI
- Continue with home valproic acid syrup 250 mg PO TID   - FREDY on DC   - Oxycodone 10 mg PO q4hrs PRN for severe pain, 5 mg PO q4hrs PRN for moderate pain   - Continue with quetiapine 100 mg PO Qd at bedtime   - Continue with trazodone 50 mg PO Qd at bedtime
K 6, no EKG changes. Likely in the setting of VIKAS, rhabdo, ACEI use  Hold ACEI
K 6, no EKG changes. Likely in the setting of VIKAS, rhabdo, ACEI use  Hold ACEI
K 6, no EKG changes. Likely in the setting of VIKAS, rhabdo, ACEI use  Cocktail for hyperkalemia ---> insulin/dextrose, Lokelma  Repeat BMP, if elevated, will give another dose of lokelma  Anticipate improvement  Monitor renal function and serum K  Hold ACEI

## 2023-08-02 NOTE — PROGRESS NOTE ADULT - PROBLEM SELECTOR PROBLEM 1
Sepsis
VIKAS (acute kidney injury)
Sepsis
VIKAS (acute kidney injury)

## 2023-08-02 NOTE — PROGRESS NOTE ADULT - PROBLEM SELECTOR PLAN 5
PT consulted and recommending FREDY on DC
due to TBI  continue divalproex 250mg DR tid, quetiapine 100mg hs, trazodone 50mg hs
PT consulted and recommending FREDY on DC
due to TBI  continue divalproex 250mg DR tid, quetiapine 100mg hs, trazodone 50mg hs

## 2023-08-02 NOTE — PROGRESS NOTE ADULT - PROBLEM SELECTOR PROBLEM 3
VIKAS (acute kidney injury)
Rhabdomyolysis
Rhabdomyolysis
VIKAS (acute kidney injury)
Rhabdomyolysis
Rhabdomyolysis

## 2023-08-02 NOTE — PROGRESS NOTE ADULT - PROBLEM SELECTOR PLAN 3
RESOLVED  Secondary to rhabdomyolysis. Cr is now stable  Encourage PO intake
Likely due to fall  IV fluid  Monitor CK level and renal function
RESOLVED  Secondary to rhabdomyolysis. Cr is now stable  Encourage PO intake
Likely due to fall  IV fluid  Monitor CK level and renal function

## 2023-08-02 NOTE — PROGRESS NOTE ADULT - PROBLEM SELECTOR PLAN 4
CT head  ( I personally review) with no acute pathology. C/L spine with no fracture. CT pelsis with no acute displace fracture. Questionable mild widening of the bilateral sacroiliac joints with subchondral sclerosis and questionable erosions along the iliac side, right greater than left  PT consult : rehab
CT head  ( I personally review) with no acute pathology. C/L spine with no fracture. CT pelsis with no acute displace fracture. Questionable mild widening of the bilateral sacroiliac joints with subchondral sclerosis and questionable erosions along the iliac side, right greater than left  PT consult : rehab
Secondary to fall. Now resolved.
CT head  ( I personally review) with no acute pathology. C/L spine with no fracture. CT pelsis with no acute displace fracture. Questionable mild widening of the bilateral sacroiliac joints with subchondral sclerosis and questionable erosions along the iliac side, right greater than left  PT consult
CT head  ( I personally review) with no acute pathology. C/L spine with no fracture. CT pelsis with no acute displace fracture. Questionable mild widening of the bilateral sacroiliac joints with subchondral sclerosis and questionable erosions along the iliac side, right greater than left  PT consult
Secondary to fall. Now resolved.

## 2023-08-02 NOTE — PROGRESS NOTE ADULT - PROBLEM SELECTOR PLAN 1
Cr 2.82, likely VIKAS, recent decrease in oral intake  Hold ACEI  IV fluid  Closely monitor renal function, avoid nephrotoxic substances  Slightly tachycardic--> likely due to dehydration--> IV fluid, check TSH, free thyroxine with AM lab
Cr 2.82, likely VIKAS, recent decrease in oral intake : now 1.6  TSH noted  plan:  IV fluid,  check free thyroid
Cr 2.82, likely VIKAS, recent decrease in oral intake  Hold ACEI  IV fluid  Closely monitor renal function, avoid nephrotoxic substances  Slightly tachycardic--> likely due to dehydration--> IV fluid, check TSH, free thyroxine with AM lab
ID previously consulted and believed to be due to aspiration pneumonia.   - Continue with zosyn, 7 day course to complete on 8/2
ID previously consulted and believed to be due to aspiration pneumonia.   - Continue with zosyn, 7 day course to complete on 8/2  - Infectious disease following, apprecaite recommendations  - Given temperature, suspect patient is with recurrent aspiration, may benefit from goals of care if recurrent pneumonia occurs
Cr 2.82, likely VIKAS, recent decrease in oral intake : now 1.6  TSH noted  plan:  IV fluid,  check free thyroid

## 2023-08-02 NOTE — PROGRESS NOTE ADULT - SUBJECTIVE AND OBJECTIVE BOX
ROJAS HE  MRN-[id number]    Follow Up:  Fever, PNA    Interval History: the pt was seen and examined earlier, no acute distress, pt is awake and more interactive, today. Pt denies any discomfort when asked, not a reliable historian. Pt continues having fevers, 101.1 at midnight, low grade temps over night, tachycardic, leukocytosis is better, 10.28.     PAST MEDICAL & SURGICAL HISTORY:  TBI (traumatic brain injury)      HLD (hyperlipidemia)      HTN, age 0-18          ROS:  answers no when asked if he has any pain  [x ] Unobtainable because: not reliable historian  [ ] All other systems negative    Constitutional: no fever, no chills  Head: no trauma  Eyes: no vision changes, no eye pain  ENT:  no sore throat, no rhinorrhea  Cardiovascular:  no chest pain, no palpitation  Respiratory:  no SOB, no cough  GI:  no abd pain, no vomiting, no diarrhea  urinary: no dysuria, no hematuria, no flank pain  musculoskeletal:  no joint pain, no joint swelling  skin:  no rash  neurology:  no headache, no seizure, no change in mental status  psych: no anxiety, no depression         Allergies  No Known Allergies        ANTIMICROBIALS:  piperacillin/tazobactam IVPB.. 3.375 every 8 hours      OTHER MEDS:  acetaminophen     Tablet .. 650 milliGRAM(s) Oral every 6 hours PRN  acetaminophen     Tablet .. 650 milliGRAM(s) Oral every 6 hours PRN  acetylcysteine 10%  Inhalation 4 milliLiter(s) Inhalation four times a day  enoxaparin Injectable 60 milliGRAM(s) SubCutaneous every 12 hours  levalbuterol Inhalation 0.63 milliGRAM(s) Inhalation every 6 hours  lidocaine   4% Patch 1 Patch Transdermal every 24 hours  melatonin 3 milliGRAM(s) Oral at bedtime PRN  metoprolol tartrate 25 milliGRAM(s) Oral every 8 hours  oxyCODONE    IR 5 milliGRAM(s) Oral every 4 hours PRN  oxyCODONE  ER Tablet 10 milliGRAM(s) Oral every 12 hours  QUEtiapine 100 milliGRAM(s) Oral at bedtime  sodium chloride 0.9%. 1000 milliLiter(s) IV Continuous <Continuous>  sodium chloride 0.9%. 1000 milliLiter(s) IV Continuous <Continuous>  traZODone 50 milliGRAM(s) Oral at bedtime  valproic  acid Syrup 250 milliGRAM(s) Oral three times a day      Vital Signs Last 24 Hrs  T(C): 37.8 (02 Aug 2023 07:00), Max: 38.4 (01 Aug 2023 23:34)  T(F): 100.1 (02 Aug 2023 07:00), Max: 101.1 (01 Aug 2023 23:34)  HR: 128 (02 Aug 2023 07:00) (118 - 136)  BP: 128/66 (02 Aug 2023 04:48) (105/63 - 136/65)  BP(mean): 107 (01 Aug 2023 17:14) (107 - 107)  RR: 18 (02 Aug 2023 07:00) (18 - 20)  SpO2: 93% (02 Aug 2023 07:00) (93% - 100%)    Parameters below as of 02 Aug 2023 07:00  Patient On (Oxygen Delivery Method): room air        Physical Exam:  General: Chronically ill-appearing Male in no acute distress.  HEENT: AT/NC. Pupils/EOM unable secondary to patient compliance. On superficial exam of pt's mouth - Missing teeth, no lesions seen  Neck: Increased tone not frankly rigid. No sense of mass.  Nodes: None palpable.  Lungs: Poor effort diminished BS bilaterally, no wheezes, no rhonchi  Heart: Tachycardic with RR, no audible murmur   Abdomen: Bowel sounds present. Soft. Nondistended. Nontender.  Extremities: No cyanosis or clubbing. No edema. Lower extremities contracted, painful when attempts were made to move pt's LEs   Neuro: fatigued, answers some questions in monosyllables, opened his mouth on command    Skin: Warm. Dry. Good turgor. Right hip pressure injury, not infected looking; left knee skin tear - not infected looking   Psychiatric: calm behavior       WBC Count: 10.28 K/uL (08-02 @ 06:18)  WBC Count: 12.77 K/uL (08-01 @ 06:51)  WBC Count: 15.50 K/uL (07-30 @ 07:50)  WBC Count: 13.35 K/uL (07-29 @ 09:19)  WBC Count: 18.61 K/uL (07-27 @ 06:10)  WBC Count: 16.63 K/uL (07-26 @ 13:30)                            10.0   10.28 )-----------( 424      ( 02 Aug 2023 06:18 )             29.6       08-02    139  |  101  |  25<H>  ----------------------------<  83  4.6   |  33<H>  |  1.16    Ca    8.7      02 Aug 2023 06:18  Phos  3.0     08-02  Mg     2.5     08-02    TPro  5.6<L>  /  Alb  1.5<L>  /  TBili  0.5  /  DBili  x   /  AST  143<H>  /  ALT  75  /  AlkPhos  96  08-01      Urinalysis Basic - ( 02 Aug 2023 06:18 )    Color: x / Appearance: x / SG: x / pH: x  Gluc: 83 mg/dL / Ketone: x  / Bili: x / Urobili: x   Blood: x / Protein: x / Nitrite: x   Leuk Esterase: x / RBC: x / WBC x   Sq Epi: x / Non Sq Epi: x / Bacteria: x        Creatinine Trend: 1.16<--, 1.09<--, 1.13<--, 1.17<--, 1.38<--, 1.69<--      MICROBIOLOGY:  v  Catheterized Catheterized  07-30-23   <10,000 CFU/mL Normal Urogenital Ivette  --  --      .Blood Blood-Peripheral  07-29-23   No growth at 72 Hours  --  --      Clean Catch Clean Catch (Midstream)  07-26-23   <10,000 CFU/mL Normal Urogenital Ivette  --  --      .Blood Blood-Peripheral  07-26-23   No growth at 5 days  --  --      .Blood Blood-Peripheral  07-26-23   No growth at 5 days  --  --    Rapid RVP Result: NotDetec (07-27 @ 00:30)    SARS-CoV-2: NotDetec (27 Jul 2023 00:30)  SARS-CoV-2: NotDetec (17 Jul 2023 11:44)    RADIOLOGY:

## 2023-08-02 NOTE — CHART NOTE - NSCHARTNOTEFT_GEN_A_CORE
68M PMH HTN, HLD, TBI with resulting dementia presented to ED as a fall admitted 6/14 pt consult suggested sue   I spoke with his daughter and pt was ambulatory at home with unsteady gait holding onto furniture to ambulate has walker on order   ct l spine negative for fracture malalignment pt has not been ambulatory secondary to pain with extension of knees hips   focused pt   upper extremities -carmen fully extend at the elbow joint  lower extremities hips in flexion  ,knees contracted at 100 degrees   I was unable to extend knees secondary to discomfort   will get hip films reconsult physical therapy ,consider muscle relaxer

## 2023-08-02 NOTE — PROGRESS NOTE ADULT - PROBLEM SELECTOR PLAN 6
- Continue with home metoprolol tartrate 25 mg PO q8hrs
monitor BP and titrate BP med  ACEI on hold due to VIKAS
- Continue with home metoprolol tartrate 25 mg PO q8hrs
monitor BP and titrate BP med  ACEI on hold due to VIKAS

## 2023-08-03 LAB
ANION GAP SERPL CALC-SCNC: 6 MMOL/L — SIGNIFICANT CHANGE UP (ref 5–17)
BUN SERPL-MCNC: 21 MG/DL — SIGNIFICANT CHANGE UP (ref 7–23)
CALCIUM SERPL-MCNC: 8.7 MG/DL — SIGNIFICANT CHANGE UP (ref 8.5–10.1)
CHLORIDE SERPL-SCNC: 104 MMOL/L — SIGNIFICANT CHANGE UP (ref 96–108)
CO2 SERPL-SCNC: 30 MMOL/L — SIGNIFICANT CHANGE UP (ref 22–31)
CREAT SERPL-MCNC: 0.93 MG/DL — SIGNIFICANT CHANGE UP (ref 0.5–1.3)
CULTURE RESULTS: SIGNIFICANT CHANGE UP
EGFR: 89 ML/MIN/1.73M2 — SIGNIFICANT CHANGE UP
GLUCOSE SERPL-MCNC: 91 MG/DL — SIGNIFICANT CHANGE UP (ref 70–99)
HCT VFR BLD CALC: 29.9 % — LOW (ref 39–50)
HGB BLD-MCNC: 10 G/DL — LOW (ref 13–17)
MCHC RBC-ENTMCNC: 31 PG — SIGNIFICANT CHANGE UP (ref 27–34)
MCHC RBC-ENTMCNC: 33.4 G/DL — SIGNIFICANT CHANGE UP (ref 32–36)
MCV RBC AUTO: 92.6 FL — SIGNIFICANT CHANGE UP (ref 80–100)
NRBC # BLD: 0 /100 WBCS — SIGNIFICANT CHANGE UP (ref 0–0)
PLATELET # BLD AUTO: 486 K/UL — HIGH (ref 150–400)
POTASSIUM SERPL-MCNC: 4.4 MMOL/L — SIGNIFICANT CHANGE UP (ref 3.5–5.3)
POTASSIUM SERPL-SCNC: 4.4 MMOL/L — SIGNIFICANT CHANGE UP (ref 3.5–5.3)
RBC # BLD: 3.23 M/UL — LOW (ref 4.2–5.8)
RBC # FLD: 14 % — SIGNIFICANT CHANGE UP (ref 10.3–14.5)
SODIUM SERPL-SCNC: 140 MMOL/L — SIGNIFICANT CHANGE UP (ref 135–145)
SPECIMEN SOURCE: SIGNIFICANT CHANGE UP
WBC # BLD: 10.12 K/UL — SIGNIFICANT CHANGE UP (ref 3.8–10.5)
WBC # FLD AUTO: 10.12 K/UL — SIGNIFICANT CHANGE UP (ref 3.8–10.5)

## 2023-08-03 PROCEDURE — 99233 SBSQ HOSP IP/OBS HIGH 50: CPT

## 2023-08-03 PROCEDURE — 99232 SBSQ HOSP IP/OBS MODERATE 35: CPT

## 2023-08-03 RX ORDER — SODIUM CHLORIDE 9 MG/ML
1000 INJECTION, SOLUTION INTRAVENOUS ONCE
Refills: 0 | Status: COMPLETED | OUTPATIENT
Start: 2023-08-03 | End: 2023-08-03

## 2023-08-03 RX ORDER — PIPERACILLIN AND TAZOBACTAM 4; .5 G/20ML; G/20ML
3.38 INJECTION, POWDER, LYOPHILIZED, FOR SOLUTION INTRAVENOUS EVERY 8 HOURS
Refills: 0 | Status: COMPLETED | OUTPATIENT
Start: 2023-08-03 | End: 2023-08-10

## 2023-08-03 RX ORDER — SODIUM CHLORIDE 9 MG/ML
1000 INJECTION, SOLUTION INTRAVENOUS
Refills: 0 | Status: DISCONTINUED | OUTPATIENT
Start: 2023-08-03 | End: 2023-08-16

## 2023-08-03 RX ORDER — METOPROLOL TARTRATE 50 MG
50 TABLET ORAL EVERY 6 HOURS
Refills: 0 | Status: DISCONTINUED | OUTPATIENT
Start: 2023-08-03 | End: 2023-08-19

## 2023-08-03 RX ADMIN — PIPERACILLIN AND TAZOBACTAM 25 GRAM(S): 4; .5 INJECTION, POWDER, LYOPHILIZED, FOR SOLUTION INTRAVENOUS at 13:40

## 2023-08-03 RX ADMIN — LEVALBUTEROL 0.63 MILLIGRAM(S): 1.25 SOLUTION, CONCENTRATE RESPIRATORY (INHALATION) at 11:50

## 2023-08-03 RX ADMIN — LIDOCAINE 1 PATCH: 4 CREAM TOPICAL at 20:43

## 2023-08-03 RX ADMIN — LEVALBUTEROL 0.63 MILLIGRAM(S): 1.25 SOLUTION, CONCENTRATE RESPIRATORY (INHALATION) at 23:30

## 2023-08-03 RX ADMIN — Medication 250 MILLIGRAM(S): at 22:49

## 2023-08-03 RX ADMIN — Medication 25 MILLIGRAM(S): at 13:39

## 2023-08-03 RX ADMIN — PIPERACILLIN AND TAZOBACTAM 25 GRAM(S): 4; .5 INJECTION, POWDER, LYOPHILIZED, FOR SOLUTION INTRAVENOUS at 22:49

## 2023-08-03 RX ADMIN — LIDOCAINE 1 PATCH: 4 CREAM TOPICAL at 02:21

## 2023-08-03 RX ADMIN — Medication 50 MILLIGRAM(S): at 22:49

## 2023-08-03 RX ADMIN — Medication 250 MILLIGRAM(S): at 13:40

## 2023-08-03 RX ADMIN — LEVALBUTEROL 0.63 MILLIGRAM(S): 1.25 SOLUTION, CONCENTRATE RESPIRATORY (INHALATION) at 17:27

## 2023-08-03 RX ADMIN — QUETIAPINE FUMARATE 100 MILLIGRAM(S): 200 TABLET, FILM COATED ORAL at 22:49

## 2023-08-03 RX ADMIN — OXYCODONE HYDROCHLORIDE 10 MILLIGRAM(S): 5 TABLET ORAL at 17:39

## 2023-08-03 RX ADMIN — SODIUM CHLORIDE 1000 MILLILITER(S): 9 INJECTION, SOLUTION INTRAVENOUS at 21:29

## 2023-08-03 RX ADMIN — LIDOCAINE 1 PATCH: 4 CREAM TOPICAL at 11:54

## 2023-08-03 RX ADMIN — Medication 4 MILLILITER(S): at 17:27

## 2023-08-03 RX ADMIN — SODIUM CHLORIDE 100 MILLILITER(S): 9 INJECTION, SOLUTION INTRAVENOUS at 23:02

## 2023-08-03 RX ADMIN — Medication 4 MILLILITER(S): at 11:50

## 2023-08-03 RX ADMIN — ENOXAPARIN SODIUM 60 MILLIGRAM(S): 100 INJECTION SUBCUTANEOUS at 17:39

## 2023-08-03 RX ADMIN — Medication 25 MILLIGRAM(S): at 05:13

## 2023-08-03 RX ADMIN — ENOXAPARIN SODIUM 60 MILLIGRAM(S): 100 INJECTION SUBCUTANEOUS at 05:13

## 2023-08-03 RX ADMIN — Medication 4 MILLILITER(S): at 23:30

## 2023-08-03 RX ADMIN — Medication 250 MILLIGRAM(S): at 05:13

## 2023-08-03 RX ADMIN — LIDOCAINE 1 PATCH: 4 CREAM TOPICAL at 22:09

## 2023-08-03 RX ADMIN — OXYCODONE HYDROCHLORIDE 10 MILLIGRAM(S): 5 TABLET ORAL at 05:13

## 2023-08-03 RX ADMIN — OXYCODONE HYDROCHLORIDE 10 MILLIGRAM(S): 5 TABLET ORAL at 18:15

## 2023-08-03 NOTE — PROGRESS NOTE ADULT - ASSESSMENT
I doubt that tachycardia essentially since admission was on an infectious basis. Suspect the fever last night represents a superimposed event, with aspiration being most likely. It will be interesting to see whether tachycardia resolves, or not.   Certainly at risk for aspiration given poor mental status superimposed on baseline TBI.  No clear or convincing pneumonia on CT, does have plugging  VQ without PE, does have DVT but I do not think latter accounts for escalating leukocytosis.   RVP's neg  Elevated CK, presumed rhabdomyolysis why still fluctuating and elevated after ~1 week No troponin's sent but most recent ECG only reported as sinus tachycardia.   U/A without significant pyuria now or on admission  Abdominal exam and skin exam limited by patient's behavior, but no gross abdominal tenderness, skin/soft tissue infection, phlebitis    7/28: afebrile >24 hrs, tachycardic, WBC increased 18.61, Cr elevated 1.38, BCs and UC with no growth to date, covid 19 test is negative, CT chest - new RLL mucus plug, new b/l LLs atelectasis, MRSA PCR not detected, will continue with Zosyn for now.   + Right femoral DVT - on Lovenox.   Attending addendum:  I have reviewed all pertinent clinical information and agree with the NP's note.   continue zosyn through the weekend   follow all cultured  trend wbc and fever curve   aspiration precautions   frequent suctioning if necessary     7/31: pt is more awake and alert, more interactive, confused, no fevers since 7/29, WBC elevated 15.5, Cr normalized, LFTs better, MRSA PCR not detected, BCs and UC with no growth to date. Pt completed a course of ceftriaxone, now on zosyn day #6.   Pt's CK is trending up, cardiac enzymes should be obtained.   Attending addendum:  I have reviewed all pertinent clinical information and agree with the NP's note.   continue antibiotics   CK was improving but then starting climbing, asked for cardiac enzymes to be sent  suspended atorvastatin as he is on high dose   please trend CK levels    8/1: low grade fevers, tachycardic, NC, WBC better 12.77,Cr ok, LFTs better, BCs and UC with no growth to date, troponin x 1 negative, on Lovenox for right femoral DVT, Zosyn IV continued.  Attending Addendum--  Case reviewed with NP Aruna Guzman. Her note reviewed and modified as appropriate.   Still with intermittent low-grade fever though trend perhaps moderating  Some decline in WBC  CK still elevated as of the 29th- etiology? Seems too long to attribute to rhabdo from outpatient setting. MB fraction nor troponin impressive  Role of Abx TBD  Drug fever an exclusionary diagnosis, but none known to be new    8/2: continues having fevers, tachycardic, WBC normalized, Cr ok, CK is better 510 today, all prior BCs with no growth to date, will obtain two sets of surveillance BCs. Unclear if pt's fevers are infectious in nature, will continue Zosyn IV for now.   Attending Addendum--  Case reviewed with NP Aruna Guzman. Her note reviewed and modified as appropriate.   Still febrile, higher grade  CK diminished  If fevers persist, will consider NM scan, but not clear that he will fit under the camera.  Role of Abx TBD    8/3: low grade temp yesterday late evening, no fevers since then, tachycardic, NC, WBC normalized, Cr ok, all BCs and UC with no growth to date, Zosyn IV continued for now. NM scan should be considered if the pt can fit, + contracted.     Suggestions--  Aspiration precautions as able  Continue Zosyn for now   MRSA nares PCR - negative   follow BCs - NGTD  trend temperatures and WBC  DVT management per medicine   consider NM scan       Discussed with Dr. Adamson  I doubt that tachycardia essentially since admission was on an infectious basis. Suspect the fever last night represents a superimposed event, with aspiration being most likely. It will be interesting to see whether tachycardia resolves, or not.   Certainly at risk for aspiration given poor mental status superimposed on baseline TBI.  No clear or convincing pneumonia on CT, does have plugging  VQ without PE, does have DVT but I do not think latter accounts for escalating leukocytosis.   RVP's neg  Elevated CK, presumed rhabdomyolysis why still fluctuating and elevated after ~1 week No troponin's sent but most recent ECG only reported as sinus tachycardia.   U/A without significant pyuria now or on admission  Abdominal exam and skin exam limited by patient's behavior, but no gross abdominal tenderness, skin/soft tissue infection, phlebitis    7/28: afebrile >24 hrs, tachycardic, WBC increased 18.61, Cr elevated 1.38, BCs and UC with no growth to date, covid 19 test is negative, CT chest - new RLL mucus plug, new b/l LLs atelectasis, MRSA PCR not detected, will continue with Zosyn for now.   + Right femoral DVT - on Lovenox.   Attending addendum:  I have reviewed all pertinent clinical information and agree with the NP's note.   continue zosyn through the weekend   follow all cultured  trend wbc and fever curve   aspiration precautions   frequent suctioning if necessary     7/31: pt is more awake and alert, more interactive, confused, no fevers since 7/29, WBC elevated 15.5, Cr normalized, LFTs better, MRSA PCR not detected, BCs and UC with no growth to date. Pt completed a course of ceftriaxone, now on zosyn day #6.   Pt's CK is trending up, cardiac enzymes should be obtained.   Attending addendum:  I have reviewed all pertinent clinical information and agree with the NP's note.   continue antibiotics   CK was improving but then starting climbing, asked for cardiac enzymes to be sent  suspended atorvastatin as he is on high dose   please trend CK levels    8/1: low grade fevers, tachycardic, NC, WBC better 12.77,Cr ok, LFTs better, BCs and UC with no growth to date, troponin x 1 negative, on Lovenox for right femoral DVT, Zosyn IV continued.  Attending Addendum--  Case reviewed with NP Aruna Guzman. Her note reviewed and modified as appropriate.   Still with intermittent low-grade fever though trend perhaps moderating  Some decline in WBC  CK still elevated as of the 29th- etiology? Seems too long to attribute to rhabdo from outpatient setting. MB fraction nor troponin impressive  Role of Abx TBD  Drug fever an exclusionary diagnosis, but none known to be new    8/2: continues having fevers, tachycardic, WBC normalized, Cr ok, CK is better 510 today, all prior BCs with no growth to date, will obtain two sets of surveillance BCs. Unclear if pt's fevers are infectious in nature, will continue Zosyn IV for now.   Attending Addendum--  Case reviewed with NP Aruna Guzman. Her note reviewed and modified as appropriate.   Still febrile, higher grade  CK diminished  If fevers persist, will consider NM scan, but not clear that he will fit under the camera.  Role of Abx TBD    8/3: low grade temp yesterday late evening, no fevers since then, tachycardic, NC, WBC normalized, Cr ok, all BCs and UC with no growth to date, Zosyn IV continued for now. NM scan should be considered if the pt can fit, + contracted.     Suggestions--  Aspiration precautions as able  Continue Zosyn for now   MRSA nares PCR - negative   follow BCs - NGTD  trend temperatures and WBC  DVT management per medicine   consider NM scan   please address GOC      Discussed with Dr. Adamson

## 2023-08-03 NOTE — PROGRESS NOTE ADULT - ASSESSMENT
68M PMH HTN, HLD, TBI with resulting dementia presented to ED as a fall. Patient found with rhabdo and VIKAS, also with possible aspiration pneumonia, remains febrile.       LE Doppler : IMPRESSION: There is acute occlusive DVT affecting the right femoral vein with the proximal extent of the thrombus in the right common femoral vein.    # Suspected Recurrent Aspiration Pneumonia - ID Following.  Resumed Zosyn per their recommendations.  F/u cultures, fever curve.  Monitor CBC.  # Tachycardia - noted to have been tachycardia over at least past day.  Increase BBlocker.  IVF.  Check Lactate.    # RLE DVT - continue full dose Lovenox for now.  Transition to DOAC on discharge  # Traumatic brain injury / Dementia - supportive care.  Continue Valproic acid.  # VIKAS - resolved.  # Rhabdomyolysis - resolved.  # Impaired ambulation. PT consulted and recommending FREDY on DC.  # Essential HTN - Monitor BP.  Continue antihypertensives.  # Inpatient DVT Proph - on anticoagulation

## 2023-08-03 NOTE — PROGRESS NOTE ADULT - SUBJECTIVE AND OBJECTIVE BOX
ROJAS HE  MRN-[id number]    Follow Up:  PNA, fever     Interval History: the pt was seen and examined earlier, no acute distress, pt is awake and alert, answers some questions in monosyllables, denies any pain, yet does not want to be moved due to pain. Pt continues having fevers, had a temp of 100.6 late last night.      PAST MEDICAL & SURGICAL HISTORY:  TBI (traumatic brain injury)      HLD (hyperlipidemia)      HTN, age 0-18          ROS:    [x ] Unobtainable because: pt is not answering questions, denies pain, not reliable historian   [ ] All other systems negative    Constitutional: no fever, no chills  Head: no trauma  Eyes: no vision changes, no eye pain  ENT:  no sore throat, no rhinorrhea  Cardiovascular:  no chest pain, no palpitation  Respiratory:  no SOB, no cough  GI:  no abd pain, no vomiting, no diarrhea  urinary: no dysuria, no hematuria, no flank pain  musculoskeletal:  no joint pain, no joint swelling  skin:  no rash  neurology:  no headache, no seizure, no change in mental status  psych: no anxiety, no depression         Allergies  No Known Allergies        ANTIMICROBIALS:  piperacillin/tazobactam IVPB.. 3.375 every 8 hours      OTHER MEDS:  acetaminophen     Tablet .. 650 milliGRAM(s) Oral every 6 hours PRN  acetaminophen     Tablet .. 650 milliGRAM(s) Oral every 6 hours PRN  acetylcysteine 10%  Inhalation 4 milliLiter(s) Inhalation four times a day  enoxaparin Injectable 60 milliGRAM(s) SubCutaneous every 12 hours  levalbuterol Inhalation 0.63 milliGRAM(s) Inhalation every 6 hours  lidocaine   4% Patch 1 Patch Transdermal every 24 hours  melatonin 3 milliGRAM(s) Oral at bedtime PRN  metoprolol tartrate 25 milliGRAM(s) Oral every 8 hours  oxyCODONE    IR 5 milliGRAM(s) Oral every 4 hours PRN  oxyCODONE  ER Tablet 10 milliGRAM(s) Oral every 12 hours  QUEtiapine 100 milliGRAM(s) Oral at bedtime  sodium chloride 0.9%. 1000 milliLiter(s) IV Continuous <Continuous>  sodium chloride 0.9%. 1000 milliLiter(s) IV Continuous <Continuous>  sodium chloride 0.9%. 1000 milliLiter(s) IV Continuous <Continuous>  traZODone 50 milliGRAM(s) Oral at bedtime  valproic  acid Syrup 250 milliGRAM(s) Oral three times a day      Vital Signs Last 24 Hrs  T(C): 37.5 (03 Aug 2023 17:04), Max: 38.2 (02 Aug 2023 21:19)  T(F): 99.5 (03 Aug 2023 17:04), Max: 100.8 (02 Aug 2023 21:19)  HR: 128 (03 Aug 2023 17:04) (124 - 140)  BP: 155/69 (03 Aug 2023 17:04) (113/75 - 155/69)  BP(mean): --  RR: 18 (03 Aug 2023 17:04) (18 - 19)  SpO2: 98% (03 Aug 2023 17:04) (96% - 98%)    Parameters below as of 03 Aug 2023 17:04  Patient On (Oxygen Delivery Method): nasal cannula  O2 Flow (L/min): 2      Physical Exam:  General: Chronically ill-appearing Male in no acute distress.  HEENT: AT/NC. Pupils/EOM unable secondary to patient compliance. On superficial exam of pt's mouth - Missing teeth, no lesions seen  Neck: Increased tone not frankly rigid. No sense of mass.  Nodes: None palpable.  Lungs: Poor effort diminished BS bilaterally, no wheezes, no rhonchi  Heart: Tachycardic with RR, no audible murmur   Abdomen: Bowel sounds present. Soft. Nondistended. Nontender.  Extremities: No cyanosis or clubbing. No edema. Lower extremities contracted, painful when attempts were made to move pt's LEs   Neuro: fatigued, answers some questions in monosyllables, opened his mouth on command    Skin: Warm. Dry. Good turgor. Right hip pressure injury, not infected looking; left knee skin tear - not infected looking   Psychiatric: calm behavior     WBC Count: 10.12 K/uL (08-03 @ 08:20)  WBC Count: 10.28 K/uL (08-02 @ 06:18)  WBC Count: 12.77 K/uL (08-01 @ 06:51)  WBC Count: 15.50 K/uL (07-30 @ 07:50)  WBC Count: 13.35 K/uL (07-29 @ 09:19)                            10.0   10.12 )-----------( 486      ( 03 Aug 2023 08:20 )             29.9       08-03    140  |  104  |  21  ----------------------------<  91  4.4   |  30  |  0.93    Ca    8.7      03 Aug 2023 08:20  Phos  3.0     08-02  Mg     2.5     08-02        Urinalysis Basic - ( 03 Aug 2023 08:20 )    Color: x / Appearance: x / SG: x / pH: x  Gluc: 91 mg/dL / Ketone: x  / Bili: x / Urobili: x   Blood: x / Protein: x / Nitrite: x   Leuk Esterase: x / RBC: x / WBC x   Sq Epi: x / Non Sq Epi: x / Bacteria: x        Creatinine Trend: 0.93<--, 1.16<--, 1.09<--, 1.13<--, 1.17<--, 1.38<--      MICROBIOLOGY:  v  .Blood Blood-Peripheral  08-02-23   No growth at 24 hours  --  --      .Blood Blood-Peripheral  08-02-23   No growth at 24 hours  --  --      Catheterized Catheterized  07-30-23   <10,000 CFU/mL Normal Urogenital Ivette  --  --      .Blood Blood-Peripheral  07-29-23   No growth at 5 days  --  --      Clean Catch Clean Catch (Midstream)  07-26-23   <10,000 CFU/mL Normal Urogenital Ivette  --  --      .Blood Blood-Peripheral  07-26-23   No growth at 5 days  --  --      .Blood Blood-Peripheral  07-26-23   No growth at 5 days  --  --    SARS-CoV-2: NotDetec (27 Jul 2023 00:30)  SARS-CoV-2: NotDetec (17 Jul 2023 11:44)    RADIOLOGY:

## 2023-08-04 LAB
ANION GAP SERPL CALC-SCNC: 7 MMOL/L — SIGNIFICANT CHANGE UP (ref 5–17)
BUN SERPL-MCNC: 22 MG/DL — SIGNIFICANT CHANGE UP (ref 7–23)
CALCIUM SERPL-MCNC: 8.6 MG/DL — SIGNIFICANT CHANGE UP (ref 8.5–10.1)
CHLORIDE SERPL-SCNC: 103 MMOL/L — SIGNIFICANT CHANGE UP (ref 96–108)
CO2 SERPL-SCNC: 27 MMOL/L — SIGNIFICANT CHANGE UP (ref 22–31)
CREAT SERPL-MCNC: 0.93 MG/DL — SIGNIFICANT CHANGE UP (ref 0.5–1.3)
EGFR: 89 ML/MIN/1.73M2 — SIGNIFICANT CHANGE UP
GLUCOSE SERPL-MCNC: 94 MG/DL — SIGNIFICANT CHANGE UP (ref 70–99)
HCT VFR BLD CALC: 27.1 % — LOW (ref 39–50)
HGB BLD-MCNC: 9.2 G/DL — LOW (ref 13–17)
LACTATE SERPL-SCNC: 1.6 MMOL/L — SIGNIFICANT CHANGE UP (ref 0.7–2)
MCHC RBC-ENTMCNC: 31.1 PG — SIGNIFICANT CHANGE UP (ref 27–34)
MCHC RBC-ENTMCNC: 33.9 G/DL — SIGNIFICANT CHANGE UP (ref 32–36)
MCV RBC AUTO: 91.6 FL — SIGNIFICANT CHANGE UP (ref 80–100)
NRBC # BLD: 0 /100 WBCS — SIGNIFICANT CHANGE UP (ref 0–0)
PLATELET # BLD AUTO: 536 K/UL — HIGH (ref 150–400)
POTASSIUM SERPL-MCNC: 4.9 MMOL/L — SIGNIFICANT CHANGE UP (ref 3.5–5.3)
POTASSIUM SERPL-SCNC: 4.9 MMOL/L — SIGNIFICANT CHANGE UP (ref 3.5–5.3)
RBC # BLD: 2.96 M/UL — LOW (ref 4.2–5.8)
RBC # FLD: 14.2 % — SIGNIFICANT CHANGE UP (ref 10.3–14.5)
SODIUM SERPL-SCNC: 137 MMOL/L — SIGNIFICANT CHANGE UP (ref 135–145)
WBC # BLD: 12.12 K/UL — HIGH (ref 3.8–10.5)
WBC # FLD AUTO: 12.12 K/UL — HIGH (ref 3.8–10.5)

## 2023-08-04 PROCEDURE — 99233 SBSQ HOSP IP/OBS HIGH 50: CPT

## 2023-08-04 PROCEDURE — 71045 X-RAY EXAM CHEST 1 VIEW: CPT | Mod: 26

## 2023-08-04 PROCEDURE — 99232 SBSQ HOSP IP/OBS MODERATE 35: CPT

## 2023-08-04 RX ORDER — ACETYLCYSTEINE 200 MG/ML
4 VIAL (ML) MISCELLANEOUS EVERY 6 HOURS
Refills: 0 | Status: DISCONTINUED | OUTPATIENT
Start: 2023-08-04 | End: 2023-08-24

## 2023-08-04 RX ORDER — LEVALBUTEROL 1.25 MG/.5ML
0.63 SOLUTION, CONCENTRATE RESPIRATORY (INHALATION) EVERY 6 HOURS
Refills: 0 | Status: DISCONTINUED | OUTPATIENT
Start: 2023-08-04 | End: 2023-08-24

## 2023-08-04 RX ORDER — CYCLOBENZAPRINE HYDROCHLORIDE 10 MG/1
5 TABLET, FILM COATED ORAL THREE TIMES A DAY
Refills: 0 | Status: COMPLETED | OUTPATIENT
Start: 2023-08-04 | End: 2023-08-07

## 2023-08-04 RX ORDER — DILTIAZEM HCL 120 MG
30 CAPSULE, EXT RELEASE 24 HR ORAL EVERY 6 HOURS
Refills: 0 | Status: DISCONTINUED | OUTPATIENT
Start: 2023-08-04 | End: 2023-08-08

## 2023-08-04 RX ADMIN — PIPERACILLIN AND TAZOBACTAM 25 GRAM(S): 4; .5 INJECTION, POWDER, LYOPHILIZED, FOR SOLUTION INTRAVENOUS at 06:41

## 2023-08-04 RX ADMIN — OXYCODONE HYDROCHLORIDE 10 MILLIGRAM(S): 5 TABLET ORAL at 17:08

## 2023-08-04 RX ADMIN — Medication 250 MILLIGRAM(S): at 16:54

## 2023-08-04 RX ADMIN — Medication 4 MILLILITER(S): at 05:17

## 2023-08-04 RX ADMIN — Medication 650 MILLIGRAM(S): at 03:28

## 2023-08-04 RX ADMIN — SODIUM CHLORIDE 100 MILLILITER(S): 9 INJECTION, SOLUTION INTRAVENOUS at 11:17

## 2023-08-04 RX ADMIN — QUETIAPINE FUMARATE 100 MILLIGRAM(S): 200 TABLET, FILM COATED ORAL at 22:01

## 2023-08-04 RX ADMIN — ENOXAPARIN SODIUM 60 MILLIGRAM(S): 100 INJECTION SUBCUTANEOUS at 06:40

## 2023-08-04 RX ADMIN — SODIUM CHLORIDE 100 MILLILITER(S): 9 INJECTION, SOLUTION INTRAVENOUS at 22:00

## 2023-08-04 RX ADMIN — OXYCODONE HYDROCHLORIDE 10 MILLIGRAM(S): 5 TABLET ORAL at 17:10

## 2023-08-04 RX ADMIN — Medication 50 MILLIGRAM(S): at 22:01

## 2023-08-04 RX ADMIN — Medication 250 MILLIGRAM(S): at 22:01

## 2023-08-04 RX ADMIN — SODIUM CHLORIDE 100 MILLILITER(S): 9 INJECTION, SOLUTION INTRAVENOUS at 06:48

## 2023-08-04 RX ADMIN — OXYCODONE HYDROCHLORIDE 10 MILLIGRAM(S): 5 TABLET ORAL at 06:36

## 2023-08-04 RX ADMIN — Medication 650 MILLIGRAM(S): at 17:46

## 2023-08-04 RX ADMIN — Medication 650 MILLIGRAM(S): at 16:57

## 2023-08-04 RX ADMIN — CYCLOBENZAPRINE HYDROCHLORIDE 5 MILLIGRAM(S): 10 TABLET, FILM COATED ORAL at 22:01

## 2023-08-04 RX ADMIN — Medication 50 MILLIGRAM(S): at 11:20

## 2023-08-04 RX ADMIN — OXYCODONE HYDROCHLORIDE 10 MILLIGRAM(S): 5 TABLET ORAL at 06:49

## 2023-08-04 RX ADMIN — Medication 650 MILLIGRAM(S): at 04:31

## 2023-08-04 RX ADMIN — PIPERACILLIN AND TAZOBACTAM 25 GRAM(S): 4; .5 INJECTION, POWDER, LYOPHILIZED, FOR SOLUTION INTRAVENOUS at 16:43

## 2023-08-04 RX ADMIN — LIDOCAINE 1 PATCH: 4 CREAM TOPICAL at 11:17

## 2023-08-04 RX ADMIN — LEVALBUTEROL 0.63 MILLIGRAM(S): 1.25 SOLUTION, CONCENTRATE RESPIRATORY (INHALATION) at 05:18

## 2023-08-04 RX ADMIN — PIPERACILLIN AND TAZOBACTAM 25 GRAM(S): 4; .5 INJECTION, POWDER, LYOPHILIZED, FOR SOLUTION INTRAVENOUS at 22:01

## 2023-08-04 RX ADMIN — SODIUM CHLORIDE 100 MILLILITER(S): 9 INJECTION, SOLUTION INTRAVENOUS at 16:43

## 2023-08-04 RX ADMIN — LIDOCAINE 1 PATCH: 4 CREAM TOPICAL at 19:42

## 2023-08-04 RX ADMIN — ENOXAPARIN SODIUM 60 MILLIGRAM(S): 100 INJECTION SUBCUTANEOUS at 17:08

## 2023-08-04 RX ADMIN — Medication 50 MILLIGRAM(S): at 06:36

## 2023-08-04 RX ADMIN — Medication 50 MILLIGRAM(S): at 17:09

## 2023-08-04 RX ADMIN — Medication 250 MILLIGRAM(S): at 06:34

## 2023-08-04 NOTE — PROGRESS NOTE ADULT - SUBJECTIVE AND OBJECTIVE BOX
Patient: ROJAS HE [id number] 68y Male                            Hospitalist Attending Note    Denies complaints.   ____________________PHYSICAL EXAM:  GENERAL:  NAD, awake, confused.   HEENT: NCAT  CARDIOVASCULAR:  S1, S2  LUNGS: CTAB  ABDOMEN:  soft, (-) tenderness, (-) distension, (+) bowel sounds, (-) guarding, (-) rebound (-) rigidity  EXTREMITIES:  no cyanosis / clubbing / edema.   NEURO: b/l slightly contracted. Sensation grossly intact.   ____________________      VITALS:  Vital Signs Last 24 Hrs  T(C): 38.3 (04 Aug 2023 16:53), Max: 38.3 (04 Aug 2023 10:57)  T(F): 101 (04 Aug 2023 16:53), Max: 101 (04 Aug 2023 10:57)  HR: 130 (04 Aug 2023 16:53) (125 - 136)  BP: 115/73 (04 Aug 2023 16:53) (115/73 - 174/90)  BP(mean): --  RR: 18 (04 Aug 2023 16:53) (18 - 18)  SpO2: 96% (04 Aug 2023 16:53) (95% - 100%)    Parameters below as of 04 Aug 2023 16:53  Patient On (Oxygen Delivery Method): room air     Daily     Daily   CAPILLARY BLOOD GLUCOSE        I&O's Summary    03 Aug 2023 07:01  -  04 Aug 2023 07:00  --------------------------------------------------------  IN: 440 mL / OUT: 0 mL / NET: 440 mL    04 Aug 2023 07:01  -  04 Aug 2023 18:37  --------------------------------------------------------  IN: 1300 mL / OUT: 0 mL / NET: 1300 mL        LABS:                        9.2    12.12 )-----------( 536      ( 04 Aug 2023 07:32 )             27.1     08-04    137  |  103  |  22  ----------------------------<  94  4.9   |  27  |  0.93    Ca    8.6      04 Aug 2023 07:32          Urinalysis Basic - ( 04 Aug 2023 07:32 )    Color: x / Appearance: x / SG: x / pH: x  Gluc: 94 mg/dL / Ketone: x  / Bili: x / Urobili: x   Blood: x / Protein: x / Nitrite: x   Leuk Esterase: x / RBC: x / WBC x   Sq Epi: x / Non Sq Epi: x / Bacteria: x            Culture - Blood (collected 02 Aug 2023 09:55)  Source: .Blood Blood-Peripheral  Preliminary Report (04 Aug 2023 17:01):    No growth at 48 Hours    Culture - Blood (collected 02 Aug 2023 09:22)  Source: .Blood Blood-Peripheral  Preliminary Report (04 Aug 2023 17:01):    No growth at 48 Hours        MEDICATIONS:  acetaminophen     Tablet .. 650 milliGRAM(s) Oral every 6 hours PRN  acetaminophen     Tablet .. 650 milliGRAM(s) Oral every 6 hours PRN  acetylcysteine 10%  Inhalation 4 milliLiter(s) Inhalation every 6 hours PRN  cyclobenzaprine 5 milliGRAM(s) Oral three times a day  diltiazem    Tablet 30 milliGRAM(s) Oral every 6 hours  enoxaparin Injectable 60 milliGRAM(s) SubCutaneous every 12 hours  lactated ringers. 1000 milliLiter(s) IV Continuous <Continuous>  levalbuterol Inhalation 0.63 milliGRAM(s) Inhalation every 6 hours PRN  lidocaine   4% Patch 1 Patch Transdermal every 24 hours  melatonin 3 milliGRAM(s) Oral at bedtime PRN  metoprolol tartrate 50 milliGRAM(s) Oral every 6 hours  oxyCODONE    IR 5 milliGRAM(s) Oral every 4 hours PRN  oxyCODONE  ER Tablet 10 milliGRAM(s) Oral every 12 hours  piperacillin/tazobactam IVPB.. 3.375 Gram(s) IV Intermittent every 8 hours  QUEtiapine 100 milliGRAM(s) Oral at bedtime  traZODone 50 milliGRAM(s) Oral at bedtime  valproic  acid Syrup 250 milliGRAM(s) Oral three times a day

## 2023-08-04 NOTE — PROGRESS NOTE ADULT - ASSESSMENT
I doubt that tachycardia essentially since admission was on an infectious basis. Suspect the fever last night represents a superimposed event, with aspiration being most likely. It will be interesting to see whether tachycardia resolves, or not.   Certainly at risk for aspiration given poor mental status superimposed on baseline TBI.  No clear or convincing pneumonia on CT, does have plugging  VQ without PE, does have DVT but I do not think latter accounts for escalating leukocytosis.   RVP's neg  Elevated CK, presumed rhabdomyolysis why still fluctuating and elevated after ~1 week No troponin's sent but most recent ECG only reported as sinus tachycardia.   U/A without significant pyuria now or on admission  Abdominal exam and skin exam limited by patient's behavior, but no gross abdominal tenderness, skin/soft tissue infection, phlebitis    7/28: afebrile >24 hrs, tachycardic, WBC increased 18.61, Cr elevated 1.38, BCs and UC with no growth to date, covid 19 test is negative, CT chest - new RLL mucus plug, new b/l LLs atelectasis, MRSA PCR not detected, will continue with Zosyn for now.   + Right femoral DVT - on Lovenox.   Attending addendum:  I have reviewed all pertinent clinical information and agree with the NP's note.   continue zosyn through the weekend   follow all cultured  trend wbc and fever curve   aspiration precautions   frequent suctioning if necessary     7/31: pt is more awake and alert, more interactive, confused, no fevers since 7/29, WBC elevated 15.5, Cr normalized, LFTs better, MRSA PCR not detected, BCs and UC with no growth to date. Pt completed a course of ceftriaxone, now on zosyn day #6.   Pt's CK is trending up, cardiac enzymes should be obtained.   Attending addendum:  I have reviewed all pertinent clinical information and agree with the NP's note.   continue antibiotics   CK was improving but then starting climbing, asked for cardiac enzymes to be sent  suspended atorvastatin as he is on high dose   please trend CK levels    8/1: low grade fevers, tachycardic, NC, WBC better 12.77,Cr ok, LFTs better, BCs and UC with no growth to date, troponin x 1 negative, on Lovenox for right femoral DVT, Zosyn IV continued.  Attending Addendum--  Case reviewed with NP Aruna Guzman. Her note reviewed and modified as appropriate.   Still with intermittent low-grade fever though trend perhaps moderating  Some decline in WBC  CK still elevated as of the 29th- etiology? Seems too long to attribute to rhabdo from outpatient setting. MB fraction nor troponin impressive  Role of Abx TBD  Drug fever an exclusionary diagnosis, but none known to be new    8/2: continues having fevers, tachycardic, WBC normalized, Cr ok, CK is better 510 today, all prior BCs with no growth to date, will obtain two sets of surveillance BCs. Unclear if pt's fevers are infectious in nature, will continue Zosyn IV for now.   Attending Addendum--  Case reviewed with JOANNA Guzman. Her note reviewed and modified as appropriate.   Still febrile, higher grade  CK diminished  If fevers persist, will consider NM scan, but not clear that he will fit under the camera.  Role of Abx TBD    8/3: low grade temp yesterday late evening, no fevers since then, tachycardic, NC, WBC normalized, Cr ok, all BCs and UC with no growth to date, Zosyn IV continued for now. NM scan should be considered if the pt can fit, + contracted.   Attending Addendum--  Case reviewed with JOANNA Guzman. Her note reviewed and modified as appropriate.   Patient personally assessed and examined.  Seen earlier   Shivering w low grade fever earlier. Abx have not made much impact and cx unrevealing.   Still not clear why CK still elevate- no concern of active injury, no clear tenderness on abdominal exam  Seems too long for rhabdo in setting of normal renal function.    8/4: continues having fevers, tachycardic, RA, WBC increased 12.12, Cr ok, BCs with no growth to date, will continue with Zosyn for now. In favor of obtaining for indium scan, consider rheumatology workup.     Suggestions--  Aspiration precautions as able  Continue Zosyn for now   MRSA nares PCR - negative   follow BCs - NGTD  trend temperatures and WBC  DVT management per medicine   obtain indium scan  obtain JESICA, RF, ANCA, ESR, CRP, am Cortisol   please address GOC    Discussed with Dr. Adamson  Discussed with Dr. Bravo      Discussed with Dr. Adamson

## 2023-08-04 NOTE — PROGRESS NOTE ADULT - ASSESSMENT
68M PMH HTN, HLD, TBI with resulting dementia presented to ED as a fall. Patient found with rhabdo and VIKAS, also with possible aspiration pneumonia, remains febrile.       LE Doppler : IMPRESSION: There is acute occlusive DVT affecting the right femoral vein with the proximal extent of the thrombus in the right common femoral vein.    # Suspected Recurrent Aspiration Pneumonia - ID Following.  Resumed Zosyn per their recommendations.  F/u cultures, fever curve.  Monitor CBC.  Fever persists.  Indium scan.    # Tachycardia - HR remains elevated.  Add cardizem.  Check EKG.    # RLE DVT - continue full dose Lovenox for now.  Transition to DOAC on discharge  # Traumatic brain injury / Dementia - supportive care.  Continue Valproic acid.  # VIKAS - resolved.  # Rhabdomyolysis - resolved.  # Impaired ambulation. PT consulted and recommending FREDY on DC.  # Essential HTN - Monitor BP.  Continue antihypertensives.  # Inpatient DVT Proph - on anticoagulation  68M PMH HTN, HLD, TBI with resulting dementia presented to ED as a fall. Patient found with rhabdo and VIKAS, also with possible aspiration pneumonia, remains febrile.       LE Doppler : IMPRESSION: There is acute occlusive DVT affecting the right femoral vein with the proximal extent of the thrombus in the right common femoral vein.    # Suspected Recurrent Aspiration Pneumonia - ID Following.  Resumed Zosyn per their recommendations.  F/u cultures, fever curve.  Monitor CBC.  Fever persists.  Indium scan.    # Tachycardia - HR remains elevated.  Add cardizem.  Check EKG.    # Impaired ambulation. PT consulted and recommending FREDY on DC.  ? spasticity present on admission.  Trial of muscle relaxants.   # RLE DVT - continue full dose Lovenox for now.  Transition to DOAC on discharge  # Traumatic brain injury / Dementia - supportive care.  Continue Valproic acid.  # VIKAS - resolved.  # Rhabdomyolysis - resolved.  # Essential HTN - Monitor BP.  Continue antihypertensives.  # Inpatient DVT Proph - on anticoagulation

## 2023-08-04 NOTE — PROGRESS NOTE ADULT - SUBJECTIVE AND OBJECTIVE BOX
ROJAS HE  MRN-[id number]    Follow Up:  fevers, pna    Interval History: the pt was seen and examined earlier, no acute distress, awake and alert, nods, does not answer questions. Pt continues having fevers, tachycardic, RA, WBC increased.     PAST MEDICAL & SURGICAL HISTORY:  TBI (traumatic brain injury)      HLD (hyperlipidemia)      HTN, age 0-18          ROS:    [x ] Unobtainable because: pt does not answer any of my questions, TBI  [ ] All other systems negative    Constitutional: no fever, no chills  Head: no trauma  Eyes: no vision changes, no eye pain  ENT:  no sore throat, no rhinorrhea  Cardiovascular:  no chest pain, no palpitation  Respiratory:  no SOB, no cough  GI:  no abd pain, no vomiting, no diarrhea  urinary: no dysuria, no hematuria, no flank pain  musculoskeletal:  no joint pain, no joint swelling  skin:  no rash  neurology:  no headache, no seizure, no change in mental status  psych: no anxiety, no depression         Allergies  No Known Allergies        ANTIMICROBIALS:  piperacillin/tazobactam IVPB.. 3.375 every 8 hours      OTHER MEDS:  acetaminophen     Tablet .. 650 milliGRAM(s) Oral every 6 hours PRN  acetaminophen     Tablet .. 650 milliGRAM(s) Oral every 6 hours PRN  acetylcysteine 10%  Inhalation 4 milliLiter(s) Inhalation every 6 hours PRN  cyclobenzaprine 5 milliGRAM(s) Oral three times a day  enoxaparin Injectable 60 milliGRAM(s) SubCutaneous every 12 hours  lactated ringers. 1000 milliLiter(s) IV Continuous <Continuous>  levalbuterol Inhalation 0.63 milliGRAM(s) Inhalation every 6 hours PRN  lidocaine   4% Patch 1 Patch Transdermal every 24 hours  melatonin 3 milliGRAM(s) Oral at bedtime PRN  metoprolol tartrate 50 milliGRAM(s) Oral every 6 hours  oxyCODONE    IR 5 milliGRAM(s) Oral every 4 hours PRN  oxyCODONE  ER Tablet 10 milliGRAM(s) Oral every 12 hours  QUEtiapine 100 milliGRAM(s) Oral at bedtime  traZODone 50 milliGRAM(s) Oral at bedtime  valproic  acid Syrup 250 milliGRAM(s) Oral three times a day      Vital Signs Last 24 Hrs  T(C): 38.3 (04 Aug 2023 16:53), Max: 38.3 (04 Aug 2023 10:57)  T(F): 101 (04 Aug 2023 16:53), Max: 101 (04 Aug 2023 10:57)  HR: 130 (04 Aug 2023 16:53) (117 - 136)  BP: 115/73 (04 Aug 2023 16:53) (115/73 - 174/90)  BP(mean): --  RR: 18 (04 Aug 2023 16:53) (18 - 18)  SpO2: 96% (04 Aug 2023 16:53) (95% - 100%)    Parameters below as of 04 Aug 2023 16:53  Patient On (Oxygen Delivery Method): room air        Physical Exam:  General: Chronically ill-appearing Male in no acute distress.  HEENT: AT/NC. Pupils/EOM unable secondary to patient compliance. On superficial exam of pt's mouth - Missing teeth, no lesions seen  Neck: Increased tone not frankly rigid. No sense of mass.  Nodes: None palpable.  Lungs: Poor effort diminished BS bilaterally, no wheezes, no rhonchi  Heart: Tachycardic with RR, no audible murmur   Abdomen: Bowel sounds present. Soft. Nondistended. Nontender.  Extremities: No cyanosis or clubbing. No edema. Lower extremities contracted, painful when attempts were made to move pt's LEs   Neuro: awake and alert, nods, does not answer any questions and does not follow commands today  Skin: Warm. Dry. Good turgor. Right hip pressure injury, not infected looking; left knee skin tear - not infected looking   Psychiatric: calm behavior     WBC Count: 12.12 K/uL (08-04 @ 07:32)  WBC Count: 10.12 K/uL (08-03 @ 08:20)  WBC Count: 10.28 K/uL (08-02 @ 06:18)  WBC Count: 12.77 K/uL (08-01 @ 06:51)  WBC Count: 15.50 K/uL (07-30 @ 07:50)  WBC Count: 13.35 K/uL (07-29 @ 09:19)                            9.2    12.12 )-----------( 536      ( 04 Aug 2023 07:32 )             27.1       08-04    137  |  103  |  22  ----------------------------<  94  4.9   |  27  |  0.93    Ca    8.6      04 Aug 2023 07:32        Urinalysis Basic - ( 04 Aug 2023 07:32 )    Color: x / Appearance: x / SG: x / pH: x  Gluc: 94 mg/dL / Ketone: x  / Bili: x / Urobili: x   Blood: x / Protein: x / Nitrite: x   Leuk Esterase: x / RBC: x / WBC x   Sq Epi: x / Non Sq Epi: x / Bacteria: x        Creatinine Trend: 0.93<--, 0.93<--, 1.16<--, 1.09<--, 1.13<--, 1.17<--  Lactate, Blood: 1.6 mmol/L (08-04-23 @ 06:30)      MICROBIOLOGY:  v  .Blood Blood-Peripheral  08-02-23   No growth at 24 hours  --  --      .Blood Blood-Peripheral  08-02-23   No growth at 24 hours  --  --      Catheterized Catheterized  07-30-23   <10,000 CFU/mL Normal Urogenital Ivette  --  --      .Blood Blood-Peripheral  07-29-23   No growth at 5 days  --  --      Clean Catch Clean Catch (Midstream)  07-26-23   <10,000 CFU/mL Normal Urogenital Ivette  --  --      .Blood Blood-Peripheral  07-26-23   No growth at 5 days  --  --      .Blood Blood-Peripheral  07-26-23   No growth at 5 days  --  --      SARS-CoV-2: NotDetec (27 Jul 2023 00:30)  SARS-CoV-2: NotDetec (17 Jul 2023 11:44)    RADIOLOGY:

## 2023-08-05 LAB
ANTI-RIBONUCLEAR PROTEIN: 7.2 AI — HIGH
CK SERPL-CCNC: 952 U/L — HIGH (ref 26–308)
CORTIS AM PEAK SERPL-MCNC: 20.1 UG/DL — HIGH (ref 6–18.4)
CRP SERPL-MCNC: 70 MG/L — HIGH
ENA JO1 AB SER-ACNC: <0.2 AI — SIGNIFICANT CHANGE UP
ERYTHROCYTE [SEDIMENTATION RATE] IN BLOOD: 75 MM/HR — HIGH (ref 0–20)
RHEUMATOID FACT SERPL-ACNC: 11 IU/ML — SIGNIFICANT CHANGE UP (ref 0–13)

## 2023-08-05 PROCEDURE — 73501 X-RAY EXAM HIP UNI 1 VIEW: CPT | Mod: 26,LT

## 2023-08-05 PROCEDURE — 99233 SBSQ HOSP IP/OBS HIGH 50: CPT

## 2023-08-05 RX ADMIN — Medication 250 MILLIGRAM(S): at 14:42

## 2023-08-05 RX ADMIN — SODIUM CHLORIDE 100 MILLILITER(S): 9 INJECTION, SOLUTION INTRAVENOUS at 22:54

## 2023-08-05 RX ADMIN — Medication 250 MILLIGRAM(S): at 22:53

## 2023-08-05 RX ADMIN — OXYCODONE HYDROCHLORIDE 10 MILLIGRAM(S): 5 TABLET ORAL at 17:43

## 2023-08-05 RX ADMIN — CYCLOBENZAPRINE HYDROCHLORIDE 5 MILLIGRAM(S): 10 TABLET, FILM COATED ORAL at 14:42

## 2023-08-05 RX ADMIN — Medication 250 MILLIGRAM(S): at 05:47

## 2023-08-05 RX ADMIN — PIPERACILLIN AND TAZOBACTAM 25 GRAM(S): 4; .5 INJECTION, POWDER, LYOPHILIZED, FOR SOLUTION INTRAVENOUS at 05:48

## 2023-08-05 RX ADMIN — Medication 50 MILLIGRAM(S): at 01:00

## 2023-08-05 RX ADMIN — CYCLOBENZAPRINE HYDROCHLORIDE 5 MILLIGRAM(S): 10 TABLET, FILM COATED ORAL at 05:47

## 2023-08-05 RX ADMIN — Medication 650 MILLIGRAM(S): at 22:54

## 2023-08-05 RX ADMIN — Medication 30 MILLIGRAM(S): at 12:51

## 2023-08-05 RX ADMIN — Medication 50 MILLIGRAM(S): at 22:53

## 2023-08-05 RX ADMIN — Medication 650 MILLIGRAM(S): at 23:54

## 2023-08-05 RX ADMIN — OXYCODONE HYDROCHLORIDE 10 MILLIGRAM(S): 5 TABLET ORAL at 06:47

## 2023-08-05 RX ADMIN — LIDOCAINE 1 PATCH: 4 CREAM TOPICAL at 03:57

## 2023-08-05 RX ADMIN — Medication 30 MILLIGRAM(S): at 05:47

## 2023-08-05 RX ADMIN — LIDOCAINE 1 PATCH: 4 CREAM TOPICAL at 12:51

## 2023-08-05 RX ADMIN — Medication 50 MILLIGRAM(S): at 14:42

## 2023-08-05 RX ADMIN — Medication 50 MILLIGRAM(S): at 05:47

## 2023-08-05 RX ADMIN — LIDOCAINE 1 PATCH: 4 CREAM TOPICAL at 20:05

## 2023-08-05 RX ADMIN — PIPERACILLIN AND TAZOBACTAM 25 GRAM(S): 4; .5 INJECTION, POWDER, LYOPHILIZED, FOR SOLUTION INTRAVENOUS at 22:54

## 2023-08-05 RX ADMIN — PIPERACILLIN AND TAZOBACTAM 25 GRAM(S): 4; .5 INJECTION, POWDER, LYOPHILIZED, FOR SOLUTION INTRAVENOUS at 14:43

## 2023-08-05 RX ADMIN — ENOXAPARIN SODIUM 60 MILLIGRAM(S): 100 INJECTION SUBCUTANEOUS at 05:48

## 2023-08-05 RX ADMIN — ENOXAPARIN SODIUM 60 MILLIGRAM(S): 100 INJECTION SUBCUTANEOUS at 17:42

## 2023-08-05 RX ADMIN — QUETIAPINE FUMARATE 100 MILLIGRAM(S): 200 TABLET, FILM COATED ORAL at 22:53

## 2023-08-05 RX ADMIN — Medication 30 MILLIGRAM(S): at 22:53

## 2023-08-05 RX ADMIN — SODIUM CHLORIDE 100 MILLILITER(S): 9 INJECTION, SOLUTION INTRAVENOUS at 14:44

## 2023-08-05 RX ADMIN — OXYCODONE HYDROCHLORIDE 10 MILLIGRAM(S): 5 TABLET ORAL at 05:47

## 2023-08-05 RX ADMIN — Medication 30 MILLIGRAM(S): at 01:00

## 2023-08-05 RX ADMIN — CYCLOBENZAPRINE HYDROCHLORIDE 5 MILLIGRAM(S): 10 TABLET, FILM COATED ORAL at 22:53

## 2023-08-05 NOTE — PROGRESS NOTE ADULT - ASSESSMENT
68M PMH HTN, HLD, TBI with resulting dementia presented to ED as a fall. Patient found with rhabdo and VIKAS, also with possible aspiration pneumonia, remains febrile.       LE Doppler : IMPRESSION: There is acute occlusive DVT affecting the right femoral vein with the proximal extent of the thrombus in the right common femoral vein.    # Suspected Recurrent Aspiration Pneumonia - ID Following.  Resumed Zosyn per their recommendations.  F/u cultures, fever curve.  Monitor CBC.  Fever persists.  Indium scan.    # Tachycardia - HR remains elevated.  Continue Cardizem, BBlocker.  Tele showing sinus tachycardia.     # Impaired ambulation. PT consulted and recommending FREDY on DC.  ? spasticity present on admission.  Trial of muscle relaxants.   # RLE DVT - continue full dose Lovenox for now.  Transition to DOAC on discharge  # Traumatic brain injury / Dementia - supportive care.  Continue Valproic acid.  # VIKAS - resolved.  # Rhabdomyolysis - resolved.  # Essential HTN - Monitor BP.  Continue antihypertensives.  # Inpatient DVT Proph - on anticoagulation

## 2023-08-05 NOTE — PROGRESS NOTE ADULT - SUBJECTIVE AND OBJECTIVE BOX
Patient: ROJAS HE [id number] 68y Male                            Hospitalist Attending Note    Denies complaints.     ____________________PHYSICAL EXAM:  GENERAL:  NAD, awake, confused.   HEENT: NCAT  CARDIOVASCULAR:  S1, S2  LUNGS: CTAB  ABDOMEN:  soft, (-) tenderness, (-) distension, (+) bowel sounds, (-) guarding, (-) rebound (-) rigidity  EXTREMITIES:  no cyanosis / clubbing / edema.   NEURO: b/l slightly contracted. Sensation grossly intact.   ____________________        VITALS:  Vital Signs Last 24 Hrs  T(C): 36.9 (05 Aug 2023 10:31), Max: 38.3 (04 Aug 2023 16:53)  T(F): 98.4 (05 Aug 2023 10:31), Max: 101 (04 Aug 2023 16:53)  HR: 125 (05 Aug 2023 14:09) (115 - 144)  BP: 125/65 (05 Aug 2023 14:09) (97/71 - 131/91)  BP(mean): 104 (05 Aug 2023 00:58) (80 - 104)  RR: 18 (05 Aug 2023 10:31) (18 - 19)  SpO2: 95% (05 Aug 2023 10:31) (93% - 97%)    Parameters below as of 05 Aug 2023 10:31  Patient On (Oxygen Delivery Method): room air     Daily     Daily Weight in k.2 (05 Aug 2023 04:22)  CAPILLARY BLOOD GLUCOSE        I&O's Summary    04 Aug 2023 07:  -  05 Aug 2023 07:00  --------------------------------------------------------  IN: 2700 mL / OUT: 0 mL / NET: 2700 mL    05 Aug 2023 07:01  -  05 Aug 2023 15:56  --------------------------------------------------------  IN: 100 mL / OUT: 0 mL / NET: 100 mL        LABS:                        9.2    12.12 )-----------( 536      ( 04 Aug 2023 07:32 )             27.1     08-04    137  |  103  |  22  ----------------------------<  94  4.9   |  27  |  0.93    Ca    8.6      04 Aug 2023 07:32          Urinalysis Basic - ( 04 Aug 2023 07:32 )    Color: x / Appearance: x / SG: x / pH: x  Gluc: 94 mg/dL / Ketone: x  / Bili: x / Urobili: x   Blood: x / Protein: x / Nitrite: x   Leuk Esterase: x / RBC: x / WBC x   Sq Epi: x / Non Sq Epi: x / Bacteria: x      CARDIAC MARKERS ( 05 Aug 2023 06:15 )  x     / x     / 952 U/L / x     / x              MEDICATIONS:  acetaminophen     Tablet .. 650 milliGRAM(s) Oral every 6 hours PRN  acetaminophen     Tablet .. 650 milliGRAM(s) Oral every 6 hours PRN  acetylcysteine 10%  Inhalation 4 milliLiter(s) Inhalation every 6 hours PRN  cyclobenzaprine 5 milliGRAM(s) Oral three times a day  diltiazem    Tablet 30 milliGRAM(s) Oral every 6 hours  enoxaparin Injectable 60 milliGRAM(s) SubCutaneous every 12 hours  lactated ringers. 1000 milliLiter(s) IV Continuous <Continuous>  levalbuterol Inhalation 0.63 milliGRAM(s) Inhalation every 6 hours PRN  lidocaine   4% Patch 1 Patch Transdermal every 24 hours  melatonin 3 milliGRAM(s) Oral at bedtime PRN  metoprolol tartrate 50 milliGRAM(s) Oral every 6 hours  oxyCODONE    IR 5 milliGRAM(s) Oral every 4 hours PRN  oxyCODONE  ER Tablet 10 milliGRAM(s) Oral every 12 hours  piperacillin/tazobactam IVPB.. 3.375 Gram(s) IV Intermittent every 8 hours  QUEtiapine 100 milliGRAM(s) Oral at bedtime  traZODone 50 milliGRAM(s) Oral at bedtime  valproic  acid Syrup 250 milliGRAM(s) Oral three times a day

## 2023-08-06 PROCEDURE — 99233 SBSQ HOSP IP/OBS HIGH 50: CPT

## 2023-08-06 RX ORDER — IBUPROFEN 200 MG
400 TABLET ORAL ONCE
Refills: 0 | Status: COMPLETED | OUTPATIENT
Start: 2023-08-06 | End: 2023-08-08

## 2023-08-06 RX ADMIN — Medication 30 MILLIGRAM(S): at 23:11

## 2023-08-06 RX ADMIN — Medication 250 MILLIGRAM(S): at 13:44

## 2023-08-06 RX ADMIN — Medication 650 MILLIGRAM(S): at 11:24

## 2023-08-06 RX ADMIN — Medication 30 MILLIGRAM(S): at 17:22

## 2023-08-06 RX ADMIN — PIPERACILLIN AND TAZOBACTAM 25 GRAM(S): 4; .5 INJECTION, POWDER, LYOPHILIZED, FOR SOLUTION INTRAVENOUS at 22:59

## 2023-08-06 RX ADMIN — Medication 50 MILLIGRAM(S): at 11:06

## 2023-08-06 RX ADMIN — LIDOCAINE 1 PATCH: 4 CREAM TOPICAL at 23:50

## 2023-08-06 RX ADMIN — Medication 50 MILLIGRAM(S): at 17:22

## 2023-08-06 RX ADMIN — CYCLOBENZAPRINE HYDROCHLORIDE 5 MILLIGRAM(S): 10 TABLET, FILM COATED ORAL at 22:58

## 2023-08-06 RX ADMIN — PIPERACILLIN AND TAZOBACTAM 25 GRAM(S): 4; .5 INJECTION, POWDER, LYOPHILIZED, FOR SOLUTION INTRAVENOUS at 05:21

## 2023-08-06 RX ADMIN — Medication 250 MILLIGRAM(S): at 05:22

## 2023-08-06 RX ADMIN — SODIUM CHLORIDE 100 MILLILITER(S): 9 INJECTION, SOLUTION INTRAVENOUS at 23:16

## 2023-08-06 RX ADMIN — Medication 250 MILLIGRAM(S): at 22:59

## 2023-08-06 RX ADMIN — OXYCODONE HYDROCHLORIDE 10 MILLIGRAM(S): 5 TABLET ORAL at 18:16

## 2023-08-06 RX ADMIN — SODIUM CHLORIDE 100 MILLILITER(S): 9 INJECTION, SOLUTION INTRAVENOUS at 11:03

## 2023-08-06 RX ADMIN — Medication 30 MILLIGRAM(S): at 11:06

## 2023-08-06 RX ADMIN — ENOXAPARIN SODIUM 60 MILLIGRAM(S): 100 INJECTION SUBCUTANEOUS at 05:21

## 2023-08-06 RX ADMIN — OXYCODONE HYDROCHLORIDE 10 MILLIGRAM(S): 5 TABLET ORAL at 17:21

## 2023-08-06 RX ADMIN — OXYCODONE HYDROCHLORIDE 10 MILLIGRAM(S): 5 TABLET ORAL at 05:22

## 2023-08-06 RX ADMIN — LIDOCAINE 1 PATCH: 4 CREAM TOPICAL at 11:14

## 2023-08-06 RX ADMIN — QUETIAPINE FUMARATE 100 MILLIGRAM(S): 200 TABLET, FILM COATED ORAL at 22:59

## 2023-08-06 RX ADMIN — Medication 650 MILLIGRAM(S): at 12:31

## 2023-08-06 RX ADMIN — SODIUM CHLORIDE 100 MILLILITER(S): 9 INJECTION, SOLUTION INTRAVENOUS at 05:21

## 2023-08-06 RX ADMIN — LIDOCAINE 1 PATCH: 4 CREAM TOPICAL at 00:38

## 2023-08-06 RX ADMIN — CYCLOBENZAPRINE HYDROCHLORIDE 5 MILLIGRAM(S): 10 TABLET, FILM COATED ORAL at 05:21

## 2023-08-06 RX ADMIN — Medication 50 MILLIGRAM(S): at 23:11

## 2023-08-06 RX ADMIN — CYCLOBENZAPRINE HYDROCHLORIDE 5 MILLIGRAM(S): 10 TABLET, FILM COATED ORAL at 13:44

## 2023-08-06 RX ADMIN — Medication 30 MILLIGRAM(S): at 05:21

## 2023-08-06 RX ADMIN — Medication 50 MILLIGRAM(S): at 05:21

## 2023-08-06 RX ADMIN — PIPERACILLIN AND TAZOBACTAM 25 GRAM(S): 4; .5 INJECTION, POWDER, LYOPHILIZED, FOR SOLUTION INTRAVENOUS at 13:44

## 2023-08-06 RX ADMIN — ENOXAPARIN SODIUM 60 MILLIGRAM(S): 100 INJECTION SUBCUTANEOUS at 17:22

## 2023-08-06 RX ADMIN — Medication 50 MILLIGRAM(S): at 22:58

## 2023-08-06 RX ADMIN — Medication 650 MILLIGRAM(S): at 23:59

## 2023-08-06 NOTE — PROGRESS NOTE ADULT - ASSESSMENT
68M PMH HTN, HLD, TBI with resulting dementia presented to ED as a fall. Patient found with rhabdo and VIKAS, also with possible aspiration pneumonia, remains febrile.   LE Doppler : IMPRESSION: There is acute occlusive DVT affecting the right femoral vein with the proximal extent of the thrombus in the right common femoral vein.    # Recurrent fevers - Elevated ESR, CRP.  ? rheumatologic etiology.  Rheumatology consulted.    # Aspiration Pneumonia - ID Following.  Resumed Zosyn per their recommendations.  F/u cultures, fever curve.  Monitor CBC.  Fever persists.  Indium scan.    # Tachycardia - HR remains elevated.  Continue Cardizem, BBlocker.  Tele showing sinus tachycardia.     # Impaired ambulation. PT consulted and recommending FREDY - ? spasticity present on admission.  Trial of muscle relaxants.  Rheum workup.    # RLE DVT - continue full dose Lovenox for now.  Transition to DOAC on discharge  # Traumatic brain injury / Dementia - supportive care.  Continue Valproic acid.  # VIKAS - resolved.  # Rhabdomyolysis - resolved.  # Essential HTN - Monitor BP.  Continue antihypertensives.  # Inpatient DVT Proph - on anticoagulation  68M PMH HTN, HLD, TBI with resulting dementia presented to ED as a fall. Patient found with rhabdo and VIKAS, also with possible aspiration pneumonia, remains febrile.   LE Doppler : IMPRESSION: There is acute occlusive DVT affecting the right femoral vein with the proximal extent of the thrombus in the right common femoral vein.    # Recurrent fevers - Elevated ESR, CRP.  ? rheumatologic etiology.  Rheumatology consulted.    # Aspiration Pneumonia - ID Following.  Resumed Zosyn per their recommendations.  F/u cultures, fever curve.  Monitor CBC.  Fever persists.  Indium scan.    # Tachycardia - HR remains elevated.  Continue Cardizem, BBlocker.  Tele showing sinus tachycardia.     # Impaired ambulation. PT consulted and recommending FREDY - ? spasticity present on admission.  Trial of muscle relaxants.  Rheum workup.    # RLE DVT - continue full dose Lovenox for now.  Transition to DOAC on discharge  # Traumatic brain injury / Dementia - supportive care.  Continue Valproic acid.  # IVKAS - resolved.  # Rhabdomyolysis - resolved.  # Essential HTN - Monitor BP.  Continue antihypertensives.  # Inpatient DVT Proph - on anticoagulation     Plan of care d/w daughter on 8/5 [phone]

## 2023-08-06 NOTE — PROGRESS NOTE ADULT - SUBJECTIVE AND OBJECTIVE BOX
Patient: ROJAS HE [id number] 68y Male                            Hospitalist Attending Note    Denies complaints.     ____________________PHYSICAL EXAM:  GENERAL:  NAD, awake, confused.   HEENT: NCAT  CARDIOVASCULAR:  S1, S2  LUNGS: CTAB  ABDOMEN:  soft, (-) tenderness, (-) distension, (+) bowel sounds, (-) guarding, (-) rebound (-) rigidity  EXTREMITIES:  no cyanosis / clubbing / edema.   NEURO: b/l slightly contracted. Sensation grossly intact.   ____________________        VITALS:  Vital Signs Last 24 Hrs  T(C): 37.1 (06 Aug 2023 16:12), Max: 38.2 (06 Aug 2023 10:40)  T(F): 98.7 (06 Aug 2023 16:12), Max: 100.8 (06 Aug 2023 10:40)  HR: 79 (06 Aug 2023 16:12) (79 - 134)  BP: 144/82 (06 Aug 2023 16:12) (112/74 - 144/82)  BP(mean): 87 (05 Aug 2023 22:50) (87 - 87)  RR: 16 (06 Aug 2023 16:12) (16 - 16)  SpO2: 94% (06 Aug 2023 16:12) (90% - 94%)    Parameters below as of 06 Aug 2023 11:28  Patient On (Oxygen Delivery Method): nasal cannula     Daily     Daily   CAPILLARY BLOOD GLUCOSE        I&O's Summary    05 Aug 2023 07:01  -  06 Aug 2023 07:00  --------------------------------------------------------  IN: 1700 mL / OUT: 0 mL / NET: 1700 mL        LABS:                CARDIAC MARKERS ( 05 Aug 2023 06:15 )  x     / x     / 952 U/L / x     / x            Culture - Blood (collected 04 Aug 2023 18:30)  Source: .Blood Blood  Preliminary Report (05 Aug 2023 22:02):    No growth at 24 hours        MEDICATIONS:  acetaminophen     Tablet .. 650 milliGRAM(s) Oral every 6 hours PRN  acetaminophen     Tablet .. 650 milliGRAM(s) Oral every 6 hours PRN  acetylcysteine 10%  Inhalation 4 milliLiter(s) Inhalation every 6 hours PRN  cyclobenzaprine 5 milliGRAM(s) Oral three times a day  diltiazem    Tablet 30 milliGRAM(s) Oral every 6 hours  enoxaparin Injectable 60 milliGRAM(s) SubCutaneous every 12 hours  lactated ringers. 1000 milliLiter(s) IV Continuous <Continuous>  levalbuterol Inhalation 0.63 milliGRAM(s) Inhalation every 6 hours PRN  lidocaine   4% Patch 1 Patch Transdermal every 24 hours  melatonin 3 milliGRAM(s) Oral at bedtime PRN  metoprolol tartrate 50 milliGRAM(s) Oral every 6 hours  oxyCODONE    IR 5 milliGRAM(s) Oral every 4 hours PRN  oxyCODONE  ER Tablet 10 milliGRAM(s) Oral every 12 hours  piperacillin/tazobactam IVPB.. 3.375 Gram(s) IV Intermittent every 8 hours  QUEtiapine 100 milliGRAM(s) Oral at bedtime  traZODone 50 milliGRAM(s) Oral at bedtime  valproic  acid Syrup 250 milliGRAM(s) Oral three times a day

## 2023-08-07 LAB
CULTURE RESULTS: SIGNIFICANT CHANGE UP
CULTURE RESULTS: SIGNIFICANT CHANGE UP
SPECIMEN SOURCE: SIGNIFICANT CHANGE UP
SPECIMEN SOURCE: SIGNIFICANT CHANGE UP
SSA-52 (RO52) (ENA) ANTIBODY, IGG: 1 AU/ML — SIGNIFICANT CHANGE UP (ref 0–40)
SSA-60 (RO60) (ENA) ANTIBODY, IGG: 1 AU/ML — SIGNIFICANT CHANGE UP (ref 0–40)

## 2023-08-07 PROCEDURE — 99223 1ST HOSP IP/OBS HIGH 75: CPT

## 2023-08-07 PROCEDURE — 99232 SBSQ HOSP IP/OBS MODERATE 35: CPT

## 2023-08-07 PROCEDURE — 99233 SBSQ HOSP IP/OBS HIGH 50: CPT

## 2023-08-07 RX ADMIN — OXYCODONE HYDROCHLORIDE 10 MILLIGRAM(S): 5 TABLET ORAL at 06:56

## 2023-08-07 RX ADMIN — PIPERACILLIN AND TAZOBACTAM 25 GRAM(S): 4; .5 INJECTION, POWDER, LYOPHILIZED, FOR SOLUTION INTRAVENOUS at 21:56

## 2023-08-07 RX ADMIN — Medication 30 MILLIGRAM(S): at 17:27

## 2023-08-07 RX ADMIN — QUETIAPINE FUMARATE 100 MILLIGRAM(S): 200 TABLET, FILM COATED ORAL at 21:56

## 2023-08-07 RX ADMIN — LIDOCAINE 1 PATCH: 4 CREAM TOPICAL at 12:17

## 2023-08-07 RX ADMIN — Medication 30 MILLIGRAM(S): at 23:49

## 2023-08-07 RX ADMIN — OXYCODONE HYDROCHLORIDE 10 MILLIGRAM(S): 5 TABLET ORAL at 17:27

## 2023-08-07 RX ADMIN — Medication 50 MILLIGRAM(S): at 23:49

## 2023-08-07 RX ADMIN — Medication 250 MILLIGRAM(S): at 06:57

## 2023-08-07 RX ADMIN — OXYCODONE HYDROCHLORIDE 10 MILLIGRAM(S): 5 TABLET ORAL at 18:17

## 2023-08-07 RX ADMIN — Medication 250 MILLIGRAM(S): at 21:56

## 2023-08-07 RX ADMIN — Medication 50 MILLIGRAM(S): at 06:56

## 2023-08-07 RX ADMIN — LIDOCAINE 1 PATCH: 4 CREAM TOPICAL at 23:15

## 2023-08-07 RX ADMIN — LIDOCAINE 1 PATCH: 4 CREAM TOPICAL at 22:24

## 2023-08-07 RX ADMIN — CYCLOBENZAPRINE HYDROCHLORIDE 5 MILLIGRAM(S): 10 TABLET, FILM COATED ORAL at 13:38

## 2023-08-07 RX ADMIN — Medication 50 MILLIGRAM(S): at 17:27

## 2023-08-07 RX ADMIN — Medication 250 MILLIGRAM(S): at 13:38

## 2023-08-07 RX ADMIN — PIPERACILLIN AND TAZOBACTAM 25 GRAM(S): 4; .5 INJECTION, POWDER, LYOPHILIZED, FOR SOLUTION INTRAVENOUS at 13:38

## 2023-08-07 RX ADMIN — ENOXAPARIN SODIUM 60 MILLIGRAM(S): 100 INJECTION SUBCUTANEOUS at 06:58

## 2023-08-07 RX ADMIN — SODIUM CHLORIDE 100 MILLILITER(S): 9 INJECTION, SOLUTION INTRAVENOUS at 09:32

## 2023-08-07 RX ADMIN — Medication 50 MILLIGRAM(S): at 12:19

## 2023-08-07 RX ADMIN — ENOXAPARIN SODIUM 60 MILLIGRAM(S): 100 INJECTION SUBCUTANEOUS at 19:19

## 2023-08-07 RX ADMIN — CYCLOBENZAPRINE HYDROCHLORIDE 5 MILLIGRAM(S): 10 TABLET, FILM COATED ORAL at 21:56

## 2023-08-07 RX ADMIN — Medication 30 MILLIGRAM(S): at 06:57

## 2023-08-07 RX ADMIN — OXYCODONE HYDROCHLORIDE 10 MILLIGRAM(S): 5 TABLET ORAL at 07:46

## 2023-08-07 RX ADMIN — PIPERACILLIN AND TAZOBACTAM 25 GRAM(S): 4; .5 INJECTION, POWDER, LYOPHILIZED, FOR SOLUTION INTRAVENOUS at 06:57

## 2023-08-07 RX ADMIN — CYCLOBENZAPRINE HYDROCHLORIDE 5 MILLIGRAM(S): 10 TABLET, FILM COATED ORAL at 06:59

## 2023-08-07 RX ADMIN — Medication 50 MILLIGRAM(S): at 21:56

## 2023-08-07 RX ADMIN — Medication 30 MILLIGRAM(S): at 12:18

## 2023-08-07 NOTE — PROGRESS NOTE ADULT - SUBJECTIVE AND OBJECTIVE BOX
ROJAS HE  MRN-[id number]    Follow Up:  Fever    Interval History: the pt was seen and examined earlier, no acute distress, awake and alert, confused. Pt continues having fevers, Tmax 100.6, tachycardic, RA.     PAST MEDICAL & SURGICAL HISTORY:  TBI (traumatic brain injury)      HLD (hyperlipidemia)      HTN, age 0-18          ROS:    [x ] Unobtainable because: TBI   [ ] All other systems negative    Constitutional: no fever, no chills  Head: no trauma  Eyes: no vision changes, no eye pain  ENT:  no sore throat, no rhinorrhea  Cardiovascular:  no chest pain, no palpitation  Respiratory:  no SOB, no cough  GI:  no abd pain, no vomiting, no diarrhea  urinary: no dysuria, no hematuria, no flank pain  musculoskeletal:  no joint pain, no joint swelling  skin:  no rash  neurology:  no headache, no seizure, no change in mental status  psych: no anxiety, no depression         Allergies  No Known Allergies        ANTIMICROBIALS:  piperacillin/tazobactam IVPB.. 3.375 every 8 hours      OTHER MEDS:  acetaminophen     Tablet .. 650 milliGRAM(s) Oral every 6 hours PRN  acetaminophen     Tablet .. 650 milliGRAM(s) Oral every 6 hours PRN  acetylcysteine 10%  Inhalation 4 milliLiter(s) Inhalation every 6 hours PRN  cyclobenzaprine 5 milliGRAM(s) Oral three times a day  diltiazem    Tablet 30 milliGRAM(s) Oral every 6 hours  enoxaparin Injectable 60 milliGRAM(s) SubCutaneous every 12 hours  ibuprofen  Tablet. 400 milliGRAM(s) Oral once  lactated ringers. 1000 milliLiter(s) IV Continuous <Continuous>  levalbuterol Inhalation 0.63 milliGRAM(s) Inhalation every 6 hours PRN  lidocaine   4% Patch 1 Patch Transdermal every 24 hours  melatonin 3 milliGRAM(s) Oral at bedtime PRN  metoprolol tartrate 50 milliGRAM(s) Oral every 6 hours  oxyCODONE    IR 5 milliGRAM(s) Oral every 4 hours PRN  oxyCODONE  ER Tablet 10 milliGRAM(s) Oral every 12 hours  QUEtiapine 100 milliGRAM(s) Oral at bedtime  traZODone 50 milliGRAM(s) Oral at bedtime  valproic  acid Syrup 250 milliGRAM(s) Oral three times a day      Vital Signs Last 24 Hrs  T(C): 36.7 (07 Aug 2023 15:36), Max: 38.1 (06 Aug 2023 22:30)  T(F): 98.1 (07 Aug 2023 15:36), Max: 100.6 (06 Aug 2023 22:30)  HR: 129 (07 Aug 2023 15:36) (120 - 142)  BP: 132/79 (07 Aug 2023 15:36) (110/73 - 135/82)  BP(mean): --  RR: 18 (07 Aug 2023 15:36) (18 - 19)  SpO2: 95% (07 Aug 2023 15:36) (95% - 98%)    Parameters below as of 06 Aug 2023 22:30  Patient On (Oxygen Delivery Method): room air        Physical Exam:  General: Chronically ill-appearing Male in no acute distress.  HEENT: AT/NC. Pupils/EOM unable secondary to patient compliance. On superficial exam of pt's mouth - Missing teeth, no lesions seen  Neck: Increased tone not frankly rigid. No sense of mass.  Nodes: None palpable.  Lungs: Poor effort diminished BS bilaterally, no wheezes, no rhonchi  Heart: Tachycardic with RR, no audible murmur   Abdomen: Bowel sounds present. Soft. Nondistended. Nontender.  Extremities: No cyanosis or clubbing. No edema. Lower extremities contracted  Neuro: awake and alert, nods, does not answer any questions and does not follow commands today  Skin: Warm. Dry. Good turgor. Right hip pressure injury, not infected looking; left knee skin tear - not infected looking   Psychiatric: calm behavior     WBC Count: 12.12 K/uL (08-04 @ 07:32)  WBC Count: 10.12 K/uL (08-03 @ 08:20)  WBC Count: 10.28 K/uL (08-02 @ 06:18)  WBC Count: 12.77 K/uL (08-01 @ 06:51)          Creatinine Trend: 0.93<--, 0.93<--, 1.16<--, 1.09<--, 1.13<--, 1.17<--      MICROBIOLOGY:  v  .Blood Blood  08-04-23   No growth at 48 Hours  --  --      .Blood Blood-Peripheral  08-02-23   No growth at 4 days  --  --      .Blood Blood-Peripheral  08-02-23   No growth at 4 days  --  --      Catheterized Catheterized  07-30-23   <10,000 CFU/mL Normal Urogenital Ivette  --  --      .Blood Blood-Peripheral  07-29-23   No growth at 5 days  --  --      Clean Catch Clean Catch (Midstream)  07-26-23   <10,000 CFU/mL Normal Urogenital Ivette  --  --      .Blood Blood-Peripheral  07-26-23   No growth at 5 days  --  --      .Blood Blood-Peripheral  07-26-23   No growth at 5 days  --  --        C-Reactive Protein, Serum: 70 (08-05)      SARS-CoV-2: NotDetec (27 Jul 2023 00:30)  SARS-CoV-2: NotDetec (17 Jul 2023 11:44)    RADIOLOGY:

## 2023-08-07 NOTE — CHART NOTE - NSCHARTNOTEFT_GEN_A_CORE
Chart reviewed and noted steep decline in function and cognition. Initially flagged up by PT to medical team due to new onset tachycardia at rest and increased neuromuscular tone, not evident on initial PT assessment on 7/14. Patient later transferred to telemetry and consulted c Cardiology, Neurology and Infectious Disease. Remains to demonstrate limited participation in rehab efforts, with increased tone and altered consciousness. Dr. Bravo made aware of continued barriers, and stated will continue medical work-up.  PT intervention frequency hereby adjusted to 1-2x/week due to limited carry-over with current acute illness. Will revisit this and adjust as medical progress improves.

## 2023-08-07 NOTE — PROGRESS NOTE ADULT - ASSESSMENT
I doubt that tachycardia essentially since admission was on an infectious basis. Suspect the fever last night represents a superimposed event, with aspiration being most likely. It will be interesting to see whether tachycardia resolves, or not.   Certainly at risk for aspiration given poor mental status superimposed on baseline TBI.  No clear or convincing pneumonia on CT, does have plugging  VQ without PE, does have DVT but I do not think latter accounts for escalating leukocytosis.   RVP's neg  Elevated CK, presumed rhabdomyolysis why still fluctuating and elevated after ~1 week No troponin's sent but most recent ECG only reported as sinus tachycardia.   U/A without significant pyuria now or on admission  Abdominal exam and skin exam limited by patient's behavior, but no gross abdominal tenderness, skin/soft tissue infection, phlebitis    7/28: afebrile >24 hrs, tachycardic, WBC increased 18.61, Cr elevated 1.38, BCs and UC with no growth to date, covid 19 test is negative, CT chest - new RLL mucus plug, new b/l LLs atelectasis, MRSA PCR not detected, will continue with Zosyn for now.   + Right femoral DVT - on Lovenox.   Attending addendum:  I have reviewed all pertinent clinical information and agree with the NP's note.   continue zosyn through the weekend   follow all cultured  trend wbc and fever curve   aspiration precautions   frequent suctioning if necessary     7/31: pt is more awake and alert, more interactive, confused, no fevers since 7/29, WBC elevated 15.5, Cr normalized, LFTs better, MRSA PCR not detected, BCs and UC with no growth to date. Pt completed a course of ceftriaxone, now on zosyn day #6.   Pt's CK is trending up, cardiac enzymes should be obtained.   Attending addendum:  I have reviewed all pertinent clinical information and agree with the NP's note.   continue antibiotics   CK was improving but then starting climbing, asked for cardiac enzymes to be sent  suspended atorvastatin as he is on high dose   please trend CK levels    8/1: low grade fevers, tachycardic, NC, WBC better 12.77,Cr ok, LFTs better, BCs and UC with no growth to date, troponin x 1 negative, on Lovenox for right femoral DVT, Zosyn IV continued.  Attending Addendum--  Case reviewed with NP Aruna Guzman. Her note reviewed and modified as appropriate.   Still with intermittent low-grade fever though trend perhaps moderating  Some decline in WBC  CK still elevated as of the 29th- etiology? Seems too long to attribute to rhabdo from outpatient setting. MB fraction nor troponin impressive  Role of Abx TBD  Drug fever an exclusionary diagnosis, but none known to be new    8/2: continues having fevers, tachycardic, WBC normalized, Cr ok, CK is better 510 today, all prior BCs with no growth to date, will obtain two sets of surveillance BCs. Unclear if pt's fevers are infectious in nature, will continue Zosyn IV for now.   Attending Addendum--  Case reviewed with JOANNA Guzman. Her note reviewed and modified as appropriate.   Still febrile, higher grade  CK diminished  If fevers persist, will consider NM scan, but not clear that he will fit under the camera.  Role of Abx TBD    8/3: low grade temp yesterday late evening, no fevers since then, tachycardic, NC, WBC normalized, Cr ok, all BCs and UC with no growth to date, Zosyn IV continued for now. NM scan should be considered if the pt can fit, + contracted.   Attending Addendum--  Case reviewed with JOANNA Guzman. Her note reviewed and modified as appropriate.   Patient personally assessed and examined.  Seen earlier   Shivering w low grade fever earlier. Abx have not made much impact and cx unrevealing.   Still not clear why CK still elevate- no concern of active injury, no clear tenderness on abdominal exam  Seems too long for rhabdo in setting of normal renal function.    8/4: continues having fevers, tachycardic, RA, WBC increased 12.12, Cr ok, BCs with no growth to date, will continue with Zosyn for now. In favor of obtaining for indium scan, consider rheumatology workup.   Attending Addendum--  Case reviewed with JOANNA Guzman. Her note reviewed and modified as appropriate.   FUO.  NM scan at this point reasonable  Checking serologies re: autoimmune process, especially as persistent CK elevation not well explained at present (myositis?).  Role of abx TBD  I will be available via Teams for any issues that may arise over the weekend.    8/7: continues having low grade temps, tachycardic, RA, no new blood work, BCs remain with no growth to date, Zosyn IV continued for now, pending indium scan, consider rheumatology consult.    Suggestions--  Aspiration precautions as able  Continue Zosyn for now   MRSA nares PCR - negative   follow BCs - NGTD  trend temperatures and WBC  DVT management per medicine   obtain indium scan - pending   please address GOC  consider rheumatology consult     Discussed with Dr. Adamson

## 2023-08-07 NOTE — CONSULT NOTE ADULT - SUBJECTIVE AND OBJECTIVE BOX
HISTORY OF PRESENT ILLNESS:  Pt poor historian due to dementia.  Hx obtained from chart and from speaking to patient's daughter Stacy Graves over the phone  Patient is a 68y Male    HPI:    68 years old male with h/o HTN, HLD, TBI resulting in dementia present to ED ? fall. As per daughter, patient was found on the floor. Patient is a poor history. Patient reported he felt weak and fell onto the floor. Denied any head trauma or LOC. No fever, chills. Patient denied any pain.  Slightly tachycardic, afebrile, sat well at RA. EKG with sinus tachy. No leukocytosis, plt 129, K 6, Cr 2.82, glucose 110, . CXR with no focal consolidation. CT head with no acute pathology. C/L spine with no fracture. CT pelsis with no acute displace fracture. Questionable mild widening of the bilateral sacroiliac joints with subchondral sclerosis and questionable erosions along the iliac side, right greater than left  Pt found w/ flexion contractures of his B/L knees, though per his daughter, he was not like this prior to his fall.    SH: former smoker (2023 16:21)    PAST MEDICAL & SURGICAL HISTORY:  TBI (traumatic brain injury)      HLD (hyperlipidemia)      HTN, age 0-18          ROS:  per pt's daughter, pt has not c/o fever/chills, wt loss, night sweats, chest pain/dyspnea/cough, oral ulcers, rashes, joint pain, alopecia, photosensitivity, dry eye/dry mouth, Raynaud's, dysphagia, focal weakness, or eye symptoms.    MEDICATIONS  (STANDING):  diltiazem    Tablet 30 milliGRAM(s) Oral every 6 hours  enoxaparin Injectable 60 milliGRAM(s) SubCutaneous every 12 hours  ibuprofen  Tablet. 400 milliGRAM(s) Oral once  lactated ringers. 1000 milliLiter(s) (100 mL/Hr) IV Continuous <Continuous>  lidocaine   4% Patch 1 Patch Transdermal every 24 hours  metoprolol tartrate 50 milliGRAM(s) Oral every 6 hours  oxyCODONE  ER Tablet 10 milliGRAM(s) Oral every 12 hours  piperacillin/tazobactam IVPB.. 3.375 Gram(s) IV Intermittent every 8 hours  QUEtiapine 100 milliGRAM(s) Oral at bedtime  traZODone 50 milliGRAM(s) Oral at bedtime  valproic  acid Syrup 250 milliGRAM(s) Oral three times a day    MEDICATIONS  (PRN):  acetaminophen     Tablet .. 650 milliGRAM(s) Oral every 6 hours PRN Mild Pain (1 - 3), Moderate Pain (4 - 6)  acetaminophen     Tablet .. 650 milliGRAM(s) Oral every 6 hours PRN Temp greater or equal to 38C (100.4F)  acetylcysteine 10%  Inhalation 4 milliLiter(s) Inhalation every 6 hours PRN dyspnea  levalbuterol Inhalation 0.63 milliGRAM(s) Inhalation every 6 hours PRN Shortness of breath/Wheezing  melatonin 3 milliGRAM(s) Oral at bedtime PRN Insomnia  oxyCODONE    IR 5 milliGRAM(s) Oral every 4 hours PRN Moderate Pain (4 - 6)      Allergies    No Known Allergies    Intolerances        FAMILY HISTORY:      SOCIAL HISTORY:  Tobacco--   none            Vital Signs Last 24 Hrs  T(C): 36.8 (07 Aug 2023 21:50), Max: 37.5 (07 Aug 2023 19:54)  T(F): 98.3 (07 Aug 2023 21:50), Max: 99.5 (07 Aug 2023 19:54)  HR: 129 (07 Aug 2023 15:36) (124 - 142)  BP: 132/79 (07 Aug 2023 15:36) (126/88 - 135/82)  BP(mean): --  RR: 18 (07 Aug 2023 15:36) (18 - 19)  SpO2: 95% (07 Aug 2023 15:36) (95% - 95%)        PHYSICAL EXAM:  General :  NAD  HEENT--  no oral ulcers  Nodes-- no LAD  Lungs--  CTA B/L  Heart--  RRR, nlS1 &S2 normal;   Abdomen--  soft, NT, ND +BS  Skin:  no rashes  Musculoskeletal exam:  No synovitis;  no tender joints;  pt unable or unwilling to unclench his fists;  (+)flexion contractures of B/L knees    LABS:    Complete Blood Count in AM (23 @ 07:32)    Nucleated RBC: 0 /100 WBCs   WBC Count: 12.12 K/uL   RBC Count: 2.96 M/uL   Hemoglobin: 9.2 g/dL   Hematocrit: 27.1 %   Mean Cell Volume: 91.6 fl   Mean Cell Hemoglobin: 31.1 pg   Mean Cell Hemoglobin Conc: 33.9 g/dL   Red Cell Distrib Width: 14.2 %   Platelet Count - Automated: 536 K/uL    Comprehensive Metabolic Panel in AM (23 @ 06:51)    Sodium: 139 mmol/L   Potassium: 4.2 mmol/L   Chloride: 101 mmol/L   Carbon Dioxide: 35 mmol/L   Anion Gap: 3 mmol/L   Blood Urea Nitrogen: 23 mg/dL   Creatinine: 1.09 mg/dL   Glucose: 100 mg/dL   Calcium: 8.7 mg/dL   Protein Total: 5.6 gm/dL   Albumin: 1.5 g/dL   Bilirubin Total: 0.5 mg/dL   Alkaline Phosphatase: 96 U/L   Aspartate Aminotransferase (AST/SGOT): 143 U/L   Alanine Aminotransferase (ALT/SGPT): 75 U/L   eGFR: 73: The estimated glomerular filtration rate (eGFR) is calculated using the   CKD-EPI creatinine equation, which does not have a coefficient for  race and is validated in individuals 18 years of age and older (N Engl J  Med ; [id number]). Creatinine-based eGFR may be inaccurate in  various situations including but not limited to extremes of muscle mass,  altered dietary protein intake, or medications that affect renal tubular  creatinine secretion. mL/min/1.73m2    Sedimentation Rate, Erythrocyte (23 @ 06:15)    Sedimentation Rate, Erythrocyte: 75 mm/hr    C-Reactive Protein, Serum (23 @ 06:15)    C-Reactive Protein, Serum: 70 mg/L    Rheumatoid Factor Quant, Serum or Plasma in AM (23 @ 06:15)    Rheumatoid Factor Quant, Serum or Plasma: 11 IU/mL    BRANDEE-1 Antibody (23 @ 12:15)    BRANDEE-1 Antibody: <0.2: Fluorescent Bead Immunoassay                       Brandee 1 Antibodies, IgG                      <1.0 AI (negative)                      > or =1.0 AI (positive)                      Reference values apply to all ages. AI    SSA 52 and 60 (Ro) (PH[facility]) Antibodies, IgG (23 @ 12:15)    SSA-52 (Ro52) (PHUC) Antibody, Ig: INTERPRETIVE INFORMATION: SSA-52 (Ro52) (PHUC) Antibody, IgG    29 AU/mL or Less ............. Negative    30 - 40 AU/mL ................ Equivocal    41 AU/mL or Greater .......... Positive  SSA-52 (Ro52) and/or SSA-60 (Ro60) antibodies are associated with  a diagnosis of Sjogren syndrome, systemic lupus erythematosus  (SLE), and systemic sclerosis. SSA-52 antibody overlaps  significantly with the major SSc-related antibodies. SSA-52 (Ro52)  antibody occurs frequently in patients with inflammatory  myopathies, often in the presence of interstitial lung disease. AU/mL   SSA-60 (Ro60) (PHUC) Antibody, Ig: REFERENCE INTERVAL: SSA-60 (Ro60) (PHUC) Antibody, IgG    29 AU/mL or Less ............. Negative    30 - 40 AU/mL ................ Equivocal    41 AU/mL or Greater .......... Positive  Performed By: [facility]  [address] [place] [address]  : Severino Garcias MD, PhD  CLIA Number: [id number] AU/mL    Anti-Ribonuclear Protein (23 @ 12:15)    Anti-Ribonuclear Protein: 7.2: Fluorescent Bead Immunoassy                      Reference Ranges for RNP and SM:                      <1.0 AI (negative)                      > or =1.0 AI (positive)                      Reference values apply to all ages AI      RADIOLOGY & ADDITIONAL STUDIES:    < from: CT Abdomen and Pelvis w/ IV Cont (23 @ 12:26) >  EXAM:  CT ABDOMEN AND PELVIS IC   ORDERED BY: TORIN CUI     ACC: [id number] EXAM:  CT CHEST IC   ORDERED BY: TORIN CUI     PROCEDURE DATE:  2023          INTERPRETATION:  CLINICAL INFORMATION: Fever.    COMPARISON: Noncontrast CT of the thorax 2023.    CONTRAST/COMPLICATIONS:  IV Contrast: IV contrast documented in unlinked concurrent exam   (accession [id number]), Omnipaque 350 (accession [id number])  95 cc   administered   05 cc discarded  Oral Contrast: NONE  Complications: None reported at time of study completion    PROCEDURE:  CT of the Chest, Abdomen and Pelvis was performed.  Sagittal and coronal reformats were performed.    FINDINGS:  CHEST:  LUNGS AND LARGE AIRWAYS: Patent central airways. There is new mild linear   and platelike atelectasis in the lower lobes. There is mucous plugging in   the bronchus intermedius and right lower lobe segmental airways.. No   confluent airspace opacity is identified.  PLEURA: No pleural effusion.  VESSELS: Within normal limits.  HEART: Heart size is normal. No pericardial effusion.  MEDIASTINUM AND TEJAL: No lymphadenopathy.  CHEST WALL AND LOWER NECK: Within normal limits.    ABDOMEN AND PELVIS:  LIVER: Within normal limits.  BILE DUCTS: Normal caliber.  GALLBLADDER: Within normal limits.  SPLEEN: Within normal limits.  PANCREAS: Within normal limits.  ADRENALS: Within normal limits.  KIDNEYS/URETERS: Within normal limits.    BLADDER: Within normal limits.  REPRODUCTIVE ORGANS: The prostate is prominent.    BOWEL: Scattered cecal diverticula. No bowel obstruction. Appendix is   normal.  PERITONEUM: No ascites.  VESSELS: Moderate atherosclerotic disease of the nonaneurysmal abdominal   aorta.  RETROPERITONEUM/LYMPH NODES: No lymphadenopathy. Minimal fluid   infiltration of the presacral space.  ABDOMINAL WALL: Within normal limits.  BONES: Within normal limits.    IMPRESSION: New right lower lobe mucus plugging. New bilateral lower lobe   linear and platelike atelectasis.    --- End of Report ---    < end of copied text >    < from: CT Pelvis Bony Only No Cont (23 @ 14:35) >  EXAM:  CT PELVIS BONY ONLY   ORDERED BY: YAZ HOPE     PROCEDURE DATE:  2023          INTERPRETATION:  Exam Type: CT PELVIS BONY  Exam Date and Time: 2023 2:35 PM  Indication: Unwitnessed fall with difficulty ambulating  Comparison: No relevant prior studies available for comparison.    TECHNIQUE:  Multiplanar CT images of the pelvis were obtained without contrast. 3-D   reconstructions were obtained    FINDINGS:    No acute displaced fracture. Bilateral femoral headsare well situated   within the acetabulum. Mild degenerative changes of the bilateral hips   with subchondral cystic changes along the anterior superior acetabulum,   right greater than left. Minimal degenerative changes of pubic symphysis.   Mild tomoderate degenerative changes of the lumbar spine. Questionable   mild widening of the bilateral sacroiliac joints with subchondral   sclerosis and questionable erosions along the iliac side, right greater   than left. Clinically correlate for underlying metabolic disease.   Vascular calcifications are present.    IMPRESSION:  1.  No acute displaced fracture.  2.  Questionable mild widening of the bilateral sacroiliac joints with   subchondral sclerosis and questionable erosions along the iliac side,   right greater than left. Clinically correlate for underlying metabolic   disease.    --- End of Report ---    < end of copied text >

## 2023-08-07 NOTE — CONSULT NOTE ADULT - ASSESSMENT
68 year old male admitted s/p a fall, now w/ recurring fevers of unclear etiology.  Pt also w/ new-onset contractures of his B/L LE's.  Workup revealed a positive RNP AB, as well as possible signs of sacroiliitis, though no other obvious signs/symptoms of an underlying spondyloparthropathy vs other connective tissue disorder.    - Will check further serologies    - Will order x-rays.

## 2023-08-07 NOTE — CHART NOTE - NSCHARTNOTEFT_GEN_A_CORE
Pt with PMH: HTN, HLD, TBI resulting in dementia, presents s/p fall; found with AK, rhabdomyolysis, acute DVT affecting right femoral vein, impaired ambulation, & recurrent fevers.  Pt confused unable to participate in interview.       Factors impacting intake: [ ] none [ ] nausea  [ ] vomiting [ ] diarrhea [ ] constipation  [ ]chewing problems [ ] swallowing issues  [ X] other: AMS    Diet Prescription:   Minced & Moist c Ensure Plus High Protein x 2/day (provides 700 kcal, 40 g protein)   Intake: pt consuming <50% most meals; drinking some of supplement; requires total feeding assistance    Current Weight:  76.2 kg (8/5); admission wt 63.5 kg (7/14)  % Weight Change:  16.7% wt gain x 22 days most likely due to fluid retention    At admission no edema noted; as of 8/5 pt c 1+ b/l legs, 2+ b/l feet + 3+ scrotum  As of today (8/7) pt noted c 2+ edema b/l legs & feet, & 3+ generalized, scrotum    Pertinent Medications: MEDICATIONS  (STANDING):  cyclobenzaprine 5 milliGRAM(s) Oral three times a day  diltiazem    Tablet 30 milliGRAM(s) Oral every 6 hours  enoxaparin Injectable 60 milliGRAM(s) SubCutaneous every 12 hours  ibuprofen  Tablet. 400 milliGRAM(s) Oral once  lactated ringers. 1000 milliLiter(s) (100 mL/Hr) IV Continuous <Continuous>  lidocaine   4% Patch 1 Patch Transdermal every 24 hours  metoprolol tartrate 50 milliGRAM(s) Oral every 6 hours  oxyCODONE  ER Tablet 10 milliGRAM(s) Oral every 12 hours  piperacillin/tazobactam IVPB.. 3.375 Gram(s) IV Intermittent every 8 hours  QUEtiapine 100 milliGRAM(s) Oral at bedtime  traZODone 50 milliGRAM(s) Oral at bedtime  valproic  acid Syrup 250 milliGRAM(s) Oral three times a day    MEDICATIONS  (PRN):  acetaminophen     Tablet .. 650 milliGRAM(s) Oral every 6 hours PRN Temp greater or equal to 38C (100.4F)  acetaminophen     Tablet .. 650 milliGRAM(s) Oral every 6 hours PRN Mild Pain (1 - 3), Moderate Pain (4 - 6)  acetylcysteine 10%  Inhalation 4 milliLiter(s) Inhalation every 6 hours PRN dyspnea  levalbuterol Inhalation 0.63 milliGRAM(s) Inhalation every 6 hours PRN Shortness of breath/Wheezing  melatonin 3 milliGRAM(s) Oral at bedtime PRN Insomnia  oxyCODONE    IR 5 milliGRAM(s) Oral every 4 hours PRN Moderate Pain (4 - 6)    Pertinent Labs:  08-02 Phos 3.0 mg/dL 08-01 Alb 1.5 g/dL<L>    Skin:   Stage II pressure ulcer noted on right buttocks    Estimated Needs:   [X ] no change since previous assessment (7/17)  [ ] recalculated:     Previous Nutrition Diagnosis:   [x]  Moderate Malnutrition in context of chronic illness  Etiology:	Inadequate energy/protein intake + increased needs related to TBI resulting in dementia & stage II pressure ulcers  Signs/Symptoms: Physical findings of mild fat depletion & muscle wasting as noted 07/17    GOAL:  Pt to consume >50% meals/supplements during LOS - not being met at this time      Nutrition Diagnosis is [x] ongoing  [ ] resolved [ ] not applicable     New Nutrition Diagnosis: [x] not applicable      Interventions:   continue current diet rx as noted  Recommend  [ ] Change Diet To:  [ ] Nutrition Supplement  [ ] Nutrition Support  [ ] Other:     Monitoring and Evaluation:   [X ] PO intake [ x ] Tolerance to diet prescription [ x ] weights [ x ] labs[ x ] follow up per protocol  [ ] other:

## 2023-08-07 NOTE — PROGRESS NOTE ADULT - ASSESSMENT
68M PMH HTN, HLD, TBI with resulting dementia presented to ED as a fall. Patient found with rhabdo and VIKAS, also with possible aspiration pneumonia, remains febrile.   LE Doppler : IMPRESSION: There is acute occlusive DVT affecting the right femoral vein with the proximal extent of the thrombus in the right common femoral vein.    # Recurrent fevers - Elevated ESR, CRP.  ? rheumatologic etiology.  Rheumatology consulted.    # Aspiration Pneumonia - ID Following.  Resumed Zosyn per their recommendations.  F/u cultures, fever curve.  Monitor CBC.  Fever persists.  Indium scan.    # Tachycardia - HR remains elevated.  Continue Cardizem, BBlocker.  Tele showing sinus tachycardia.     # Impaired ambulation. PT consulted and recommending FREDY - ? spasticity present on admission.  Trial of muscle relaxants.  Seen by Neurology.  Recommended CT head.  Rheum workup.    # RLE DVT - continue full dose Lovenox for now.  Transition to DOAC on discharge  # Traumatic brain injury / Dementia - supportive care.  Continue Valproic acid.  # VIKAS - resolved.  # Rhabdomyolysis - resolved.  # Essential HTN - Monitor BP.  Continue antihypertensives.  # Inpatient DVT Proph - on anticoagulation     Plan of care d/w daughter on 8/5 [phone]

## 2023-08-08 LAB
ALBUMIN SERPL ELPH-MCNC: 1.4 G/DL — LOW (ref 3.3–5)
ALP SERPL-CCNC: 80 U/L — SIGNIFICANT CHANGE UP (ref 40–120)
ALT FLD-CCNC: 64 U/L — SIGNIFICANT CHANGE UP (ref 12–78)
ANA PAT FLD IF-IMP: ABNORMAL
ANA TITR SER: ABNORMAL
ANION GAP SERPL CALC-SCNC: 5 MMOL/L — SIGNIFICANT CHANGE UP (ref 5–17)
ANTI-RIBONUCLEAR PROTEIN: 7.3 AI — HIGH
AST SERPL-CCNC: 96 U/L — HIGH (ref 15–37)
AUTO DIFF PNL BLD: ABNORMAL
BILIRUB SERPL-MCNC: 0.3 MG/DL — SIGNIFICANT CHANGE UP (ref 0.2–1.2)
BUN SERPL-MCNC: 14 MG/DL — SIGNIFICANT CHANGE UP (ref 7–23)
C-ANCA SER-ACNC: NEGATIVE — SIGNIFICANT CHANGE UP
C3 SERPL-MCNC: 176 MG/DL — HIGH (ref 81–157)
C4 SERPL-MCNC: 40 MG/DL — HIGH (ref 13–39)
CALCIUM SERPL-MCNC: 8.9 MG/DL — SIGNIFICANT CHANGE UP (ref 8.5–10.1)
CHLORIDE SERPL-SCNC: 107 MMOL/L — SIGNIFICANT CHANGE UP (ref 96–108)
CO2 SERPL-SCNC: 31 MMOL/L — SIGNIFICANT CHANGE UP (ref 22–31)
CREAT SERPL-MCNC: 0.97 MG/DL — SIGNIFICANT CHANGE UP (ref 0.5–1.3)
DSDNA AB FLD-ACNC: <0.2 AI — SIGNIFICANT CHANGE UP
EGFR: 85 ML/MIN/1.73M2 — SIGNIFICANT CHANGE UP
ENA SM AB FLD QL: <0.2 AI — SIGNIFICANT CHANGE UP
ENA SS-A AB FLD IA-ACNC: <0.2 AI — SIGNIFICANT CHANGE UP
GLUCOSE SERPL-MCNC: 88 MG/DL — SIGNIFICANT CHANGE UP (ref 70–99)
HCT VFR BLD CALC: 28.9 % — LOW (ref 39–50)
HGB BLD-MCNC: 9.2 G/DL — LOW (ref 13–17)
MCHC RBC-ENTMCNC: 30.9 PG — SIGNIFICANT CHANGE UP (ref 27–34)
MCHC RBC-ENTMCNC: 31.8 G/DL — LOW (ref 32–36)
MCV RBC AUTO: 97 FL — SIGNIFICANT CHANGE UP (ref 80–100)
NRBC # BLD: 0 /100 WBCS — SIGNIFICANT CHANGE UP (ref 0–0)
P-ANCA SER-ACNC: NEGATIVE — SIGNIFICANT CHANGE UP
PLATELET # BLD AUTO: 551 K/UL — HIGH (ref 150–400)
POTASSIUM SERPL-MCNC: 4.8 MMOL/L — SIGNIFICANT CHANGE UP (ref 3.5–5.3)
POTASSIUM SERPL-SCNC: 4.8 MMOL/L — SIGNIFICANT CHANGE UP (ref 3.5–5.3)
PROT SERPL-MCNC: 5.6 GM/DL — LOW (ref 6–8.3)
RBC # BLD: 2.98 M/UL — LOW (ref 4.2–5.8)
RBC # FLD: 15.2 % — HIGH (ref 10.3–14.5)
SODIUM SERPL-SCNC: 143 MMOL/L — SIGNIFICANT CHANGE UP (ref 135–145)
WBC # BLD: 15.64 K/UL — HIGH (ref 3.8–10.5)
WBC # FLD AUTO: 15.64 K/UL — HIGH (ref 3.8–10.5)

## 2023-08-08 PROCEDURE — 71250 CT THORAX DX C-: CPT | Mod: 26

## 2023-08-08 PROCEDURE — 73562 X-RAY EXAM OF KNEE 3: CPT | Mod: 26,50

## 2023-08-08 PROCEDURE — 73130 X-RAY EXAM OF HAND: CPT | Mod: 26,50

## 2023-08-08 PROCEDURE — 70450 CT HEAD/BRAIN W/O DYE: CPT | Mod: 26

## 2023-08-08 PROCEDURE — 99232 SBSQ HOSP IP/OBS MODERATE 35: CPT

## 2023-08-08 PROCEDURE — 99233 SBSQ HOSP IP/OBS HIGH 50: CPT

## 2023-08-08 RX ORDER — DILTIAZEM HCL 120 MG
60 CAPSULE, EXT RELEASE 24 HR ORAL EVERY 8 HOURS
Refills: 0 | Status: DISCONTINUED | OUTPATIENT
Start: 2023-08-08 | End: 2023-08-10

## 2023-08-08 RX ORDER — IBUPROFEN 200 MG
400 TABLET ORAL THREE TIMES A DAY
Refills: 0 | Status: COMPLETED | OUTPATIENT
Start: 2023-08-08 | End: 2023-08-13

## 2023-08-08 RX ORDER — ACETAMINOPHEN 500 MG
1000 TABLET ORAL ONCE
Refills: 0 | Status: COMPLETED | OUTPATIENT
Start: 2023-08-08 | End: 2023-08-08

## 2023-08-08 RX ADMIN — SODIUM CHLORIDE 100 MILLILITER(S): 9 INJECTION, SOLUTION INTRAVENOUS at 12:01

## 2023-08-08 RX ADMIN — OXYCODONE HYDROCHLORIDE 10 MILLIGRAM(S): 5 TABLET ORAL at 18:04

## 2023-08-08 RX ADMIN — ENOXAPARIN SODIUM 60 MILLIGRAM(S): 100 INJECTION SUBCUTANEOUS at 05:04

## 2023-08-08 RX ADMIN — Medication 30 MILLIGRAM(S): at 05:04

## 2023-08-08 RX ADMIN — Medication 50 MILLIGRAM(S): at 17:06

## 2023-08-08 RX ADMIN — PIPERACILLIN AND TAZOBACTAM 25 GRAM(S): 4; .5 INJECTION, POWDER, LYOPHILIZED, FOR SOLUTION INTRAVENOUS at 13:31

## 2023-08-08 RX ADMIN — Medication 400 MILLIGRAM(S): at 13:09

## 2023-08-08 RX ADMIN — Medication 50 MILLIGRAM(S): at 12:01

## 2023-08-08 RX ADMIN — Medication 250 MILLIGRAM(S): at 22:14

## 2023-08-08 RX ADMIN — Medication 650 MILLIGRAM(S): at 18:58

## 2023-08-08 RX ADMIN — Medication 1000 MILLIGRAM(S): at 01:55

## 2023-08-08 RX ADMIN — Medication 250 MILLIGRAM(S): at 05:04

## 2023-08-08 RX ADMIN — ENOXAPARIN SODIUM 60 MILLIGRAM(S): 100 INJECTION SUBCUTANEOUS at 17:06

## 2023-08-08 RX ADMIN — Medication 400 MILLIGRAM(S): at 11:59

## 2023-08-08 RX ADMIN — Medication 60 MILLIGRAM(S): at 13:30

## 2023-08-08 RX ADMIN — Medication 60 MILLIGRAM(S): at 22:15

## 2023-08-08 RX ADMIN — Medication 250 MILLIGRAM(S): at 13:30

## 2023-08-08 RX ADMIN — LIDOCAINE 1 PATCH: 4 CREAM TOPICAL at 23:08

## 2023-08-08 RX ADMIN — OXYCODONE HYDROCHLORIDE 10 MILLIGRAM(S): 5 TABLET ORAL at 17:05

## 2023-08-08 RX ADMIN — QUETIAPINE FUMARATE 100 MILLIGRAM(S): 200 TABLET, FILM COATED ORAL at 22:14

## 2023-08-08 RX ADMIN — Medication 50 MILLIGRAM(S): at 05:04

## 2023-08-08 RX ADMIN — SODIUM CHLORIDE 100 MILLILITER(S): 9 INJECTION, SOLUTION INTRAVENOUS at 19:56

## 2023-08-08 RX ADMIN — Medication 400 MILLIGRAM(S): at 00:57

## 2023-08-08 RX ADMIN — LIDOCAINE 1 PATCH: 4 CREAM TOPICAL at 11:59

## 2023-08-08 RX ADMIN — PIPERACILLIN AND TAZOBACTAM 25 GRAM(S): 4; .5 INJECTION, POWDER, LYOPHILIZED, FOR SOLUTION INTRAVENOUS at 05:01

## 2023-08-08 RX ADMIN — OXYCODONE HYDROCHLORIDE 10 MILLIGRAM(S): 5 TABLET ORAL at 06:00

## 2023-08-08 RX ADMIN — OXYCODONE HYDROCHLORIDE 10 MILLIGRAM(S): 5 TABLET ORAL at 05:08

## 2023-08-08 RX ADMIN — Medication 50 MILLIGRAM(S): at 22:14

## 2023-08-08 RX ADMIN — PIPERACILLIN AND TAZOBACTAM 25 GRAM(S): 4; .5 INJECTION, POWDER, LYOPHILIZED, FOR SOLUTION INTRAVENOUS at 22:15

## 2023-08-08 RX ADMIN — SODIUM CHLORIDE 100 MILLILITER(S): 9 INJECTION, SOLUTION INTRAVENOUS at 05:08

## 2023-08-08 RX ADMIN — Medication 400 MILLIGRAM(S): at 23:08

## 2023-08-08 RX ADMIN — Medication 400 MILLIGRAM(S): at 22:14

## 2023-08-08 NOTE — PROGRESS NOTE ADULT - ASSESSMENT
68 years old male with h/o HTN, HLD, TBI dementia here after a fall On exam lethargic orineted x 0 general weakness LE atrophy some hyperrefleia  CTH, C and L reviewed     Impression: generalized weakness. TME rhabdo  aspiration   uti     limit polypharmacy  Can consider MRI brain and C-spine if no CI, non-urgent  outpt EMG  GOCs  -B12, folate, TSH   -In order to enhance patient's overall well-being and clinical course, please try avoiding benzodiazepines, anticholinergics, and antihistamines (Can cause worsening confusion/delirium). Additionally, continue reorientation, supportive care, maintaining regular sleep/wake cycle, and optimizing nutritional/medical factors.   Call back as needed

## 2023-08-08 NOTE — PROGRESS NOTE ADULT - ASSESSMENT
I doubt that tachycardia essentially since admission was on an infectious basis. Suspect the fever last night represents a superimposed event, with aspiration being most likely. It will be interesting to see whether tachycardia resolves, or not.   Certainly at risk for aspiration given poor mental status superimposed on baseline TBI.  No clear or convincing pneumonia on CT, does have plugging  VQ without PE, does have DVT but I do not think latter accounts for escalating leukocytosis.   RVP's neg  Elevated CK, presumed rhabdomyolysis why still fluctuating and elevated after ~1 week No troponin's sent but most recent ECG only reported as sinus tachycardia.   U/A without significant pyuria now or on admission  Abdominal exam and skin exam limited by patient's behavior, but no gross abdominal tenderness, skin/soft tissue infection, phlebitis    7/28: afebrile >24 hrs, tachycardic, WBC increased 18.61, Cr elevated 1.38, BCs and UC with no growth to date, covid 19 test is negative, CT chest - new RLL mucus plug, new b/l LLs atelectasis, MRSA PCR not detected, will continue with Zosyn for now.   + Right femoral DVT - on Lovenox.   Attending addendum:  I have reviewed all pertinent clinical information and agree with the NP's note.   continue zosyn through the weekend   follow all cultured  trend wbc and fever curve   aspiration precautions   frequent suctioning if necessary     7/31: pt is more awake and alert, more interactive, confused, no fevers since 7/29, WBC elevated 15.5, Cr normalized, LFTs better, MRSA PCR not detected, BCs and UC with no growth to date. Pt completed a course of ceftriaxone, now on zosyn day #6.   Pt's CK is trending up, cardiac enzymes should be obtained.   Attending addendum:  I have reviewed all pertinent clinical information and agree with the NP's note.   continue antibiotics   CK was improving but then starting climbing, asked for cardiac enzymes to be sent  suspended atorvastatin as he is on high dose   please trend CK levels    8/1: low grade fevers, tachycardic, NC, WBC better 12.77,Cr ok, LFTs better, BCs and UC with no growth to date, troponin x 1 negative, on Lovenox for right femoral DVT, Zosyn IV continued.  Attending Addendum--  Case reviewed with NP Aruna Guzman. Her note reviewed and modified as appropriate.   Still with intermittent low-grade fever though trend perhaps moderating  Some decline in WBC  CK still elevated as of the 29th- etiology? Seems too long to attribute to rhabdo from outpatient setting. MB fraction nor troponin impressive  Role of Abx TBD  Drug fever an exclusionary diagnosis, but none known to be new    8/2: continues having fevers, tachycardic, WBC normalized, Cr ok, CK is better 510 today, all prior BCs with no growth to date, will obtain two sets of surveillance BCs. Unclear if pt's fevers are infectious in nature, will continue Zosyn IV for now.   Attending Addendum--  Case reviewed with JOANNA Guzman. Her note reviewed and modified as appropriate.   Still febrile, higher grade  CK diminished  If fevers persist, will consider NM scan, but not clear that he will fit under the camera.  Role of Abx TBD    8/3: low grade temp yesterday late evening, no fevers since then, tachycardic, NC, WBC normalized, Cr ok, all BCs and UC with no growth to date, Zosyn IV continued for now. NM scan should be considered if the pt can fit, + contracted.   Attending Addendum--  Case reviewed with JOANNA Guzman. Her note reviewed and modified as appropriate.   Patient personally assessed and examined.  Seen earlier   Shivering w low grade fever earlier. Abx have not made much impact and cx unrevealing.   Still not clear why CK still elevate- no concern of active injury, no clear tenderness on abdominal exam  Seems too long for rhabdo in setting of normal renal function.    8/4: continues having fevers, tachycardic, RA, WBC increased 12.12, Cr ok, BCs with no growth to date, will continue with Zosyn for now. In favor of obtaining for indium scan, consider rheumatology workup.   Attending Addendum--  Case reviewed with JOANNA Guzman. Her note reviewed and modified as appropriate.   FUO.  NM scan at this point reasonable  Checking serologies re: autoimmune process, especially as persistent CK elevation not well explained at present (myositis?).  Role of abx TBD  I will be available via Teams for any issues that may arise over the weekend.    8/7: continues having low grade temps, tachycardic, RA, no new blood work, BCs remain with no growth to date, Zosyn IV continued for now, pending indium scan, consider rheumatology consult.  Attending Addendum--  Case reviewed with NP Aruna Guzman. Her note reviewed and modified as appropriate.   Patient personally assessed and examined.  Seems more alert  Intermittent low grade fever  Awaiting NM study, though not clear how useful it will be  Anti-RNP elevated, CK's variably elevated-- myositis?  JESICA pending  Rheum eval    8/8: pt is very contracted, exam is difficult, continues having fevers, RA, tachycardic, WBC increased 15.64. All BCs with no growth to date, Zosyn IV continued for now, indium scan is pending.     Suggestions--  Aspiration precautions as able  Continue Zosyn for now   MRSA nares PCR - negative   follow BCs - NGTD  trend temperatures and WBC  DVT management per medicine   obtain indium scan - pending   Palliative care consult   rheumatology consult is appreciated     Discussed with Dr. Bravo  Will discuss with Dr. Adamson

## 2023-08-08 NOTE — PROGRESS NOTE ADULT - SUBJECTIVE AND OBJECTIVE BOX
Patient: ROJAS HE [id number] 68y Male                            Hospitalist Attending Note    Denies complaints.   Still febrile.    ____________________PHYSICAL EXAM:  GENERAL:  NAD, awake, confused.   HEENT: NCAT  CARDIOVASCULAR:  S1, S2  LUNGS: CTAB  ABDOMEN:  soft, (-) tenderness, (-) distension, (+) bowel sounds, (-) guarding, (-) rebound (-) rigidity  EXTREMITIES:  no cyanosis / clubbing / edema.   NEURO: b/l contracted. Sensation grossly intact.   ____________________      VITALS:  Vital Signs Last 24 Hrs  T(C): 37.2 (08 Aug 2023 17:03), Max: 38.6 (08 Aug 2023 00:43)  T(F): 99.1 (08 Aug 2023 17:03), Max: 101.5 (08 Aug 2023 00:43)  HR: 105 (08 Aug 2023 17:03) (105 - 127)  BP: 113/60 (08 Aug 2023 17:03) (109/69 - 149/91)  BP(mean): --  RR: 18 (08 Aug 2023 17:03) (16 - 18)  SpO2: 95% (08 Aug 2023 17:03) (94% - 96%)    Parameters below as of 08 Aug 2023 17:03  Patient On (Oxygen Delivery Method): room air     Daily     Daily   CAPILLARY BLOOD GLUCOSE        I&O's Summary    07 Aug 2023 07:01  -  08 Aug 2023 07:00  --------------------------------------------------------  IN: 1354 mL / OUT: 0 mL / NET: 1354 mL        LABS:                        9.2    15.64 )-----------( 551      ( 08 Aug 2023 06:25 )             28.9     08-08    143  |  107  |  14  ----------------------------<  88  4.8   |  31  |  0.97    Ca    8.9      08 Aug 2023 06:25    TPro  5.6<L>  /  Alb  1.4<L>  /  TBili  0.3  /  DBili  x   /  AST  96<H>  /  ALT  64  /  AlkPhos  80  08-08      LIVER FUNCTIONS - ( 08 Aug 2023 06:25 )  Alb: 1.4 g/dL / Pro: 5.6 gm/dL / ALK PHOS: 80 U/L / ALT: 64 U/L / AST: 96 U/L / GGT: x           Urinalysis Basic - ( 08 Aug 2023 06:25 )    Color: x / Appearance: x / SG: x / pH: x  Gluc: 88 mg/dL / Ketone: x  / Bili: x / Urobili: x   Blood: x / Protein: x / Nitrite: x   Leuk Esterase: x / RBC: x / WBC x   Sq Epi: x / Non Sq Epi: x / Bacteria: x              MEDICATIONS:  acetaminophen     Tablet .. 650 milliGRAM(s) Oral every 6 hours PRN  acetylcysteine 10%  Inhalation 4 milliLiter(s) Inhalation every 6 hours PRN  diltiazem    Tablet 60 milliGRAM(s) Oral every 8 hours  enoxaparin Injectable 60 milliGRAM(s) SubCutaneous every 12 hours  ibuprofen  Tablet. 400 milliGRAM(s) Oral three times a day  lactated ringers. 1000 milliLiter(s) IV Continuous <Continuous>  levalbuterol Inhalation 0.63 milliGRAM(s) Inhalation every 6 hours PRN  lidocaine   4% Patch 1 Patch Transdermal every 24 hours  melatonin 3 milliGRAM(s) Oral at bedtime PRN  metoprolol tartrate 50 milliGRAM(s) Oral every 6 hours  oxyCODONE    IR 5 milliGRAM(s) Oral every 4 hours PRN  oxyCODONE  ER Tablet 10 milliGRAM(s) Oral every 12 hours  piperacillin/tazobactam IVPB.. 3.375 Gram(s) IV Intermittent every 8 hours  QUEtiapine 100 milliGRAM(s) Oral at bedtime  traZODone 50 milliGRAM(s) Oral at bedtime  valproic  acid Syrup 250 milliGRAM(s) Oral three times a day

## 2023-08-08 NOTE — PROGRESS NOTE ADULT - SUBJECTIVE AND OBJECTIVE BOX
ROJAS HE  MRN-[id number]    Follow Up:  fevers     Interval History: the pt was seen and examined earlier, no acute distress, pt is awake, not interactive today, did not answer any of my questions. Pt continues having fevers, tachycardic, RA, WBC increased.     PAST MEDICAL & SURGICAL HISTORY:  TBI (traumatic brain injury)      HLD (hyperlipidemia)      HTN, age 0-18          ROS:    [x ] Unobtainable because: TBI  [ ] All other systems negative    Constitutional: no fever, no chills  Head: no trauma  Eyes: no vision changes, no eye pain  ENT:  no sore throat, no rhinorrhea  Cardiovascular:  no chest pain, no palpitation  Respiratory:  no SOB, no cough  GI:  no abd pain, no vomiting, no diarrhea  urinary: no dysuria, no hematuria, no flank pain  musculoskeletal:  no joint pain, no joint swelling  skin:  no rash  neurology:  no headache, no seizure, no change in mental status  psych: no anxiety, no depression         Allergies  No Known Allergies        ANTIMICROBIALS:  piperacillin/tazobactam IVPB.. 3.375 every 8 hours      OTHER MEDS:  acetaminophen     Tablet .. 650 milliGRAM(s) Oral every 6 hours PRN  acetylcysteine 10%  Inhalation 4 milliLiter(s) Inhalation every 6 hours PRN  diltiazem    Tablet 60 milliGRAM(s) Oral every 8 hours  enoxaparin Injectable 60 milliGRAM(s) SubCutaneous every 12 hours  ibuprofen  Tablet. 400 milliGRAM(s) Oral three times a day  lactated ringers. 1000 milliLiter(s) IV Continuous <Continuous>  levalbuterol Inhalation 0.63 milliGRAM(s) Inhalation every 6 hours PRN  lidocaine   4% Patch 1 Patch Transdermal every 24 hours  melatonin 3 milliGRAM(s) Oral at bedtime PRN  metoprolol tartrate 50 milliGRAM(s) Oral every 6 hours  oxyCODONE    IR 5 milliGRAM(s) Oral every 4 hours PRN  oxyCODONE  ER Tablet 10 milliGRAM(s) Oral every 12 hours  QUEtiapine 100 milliGRAM(s) Oral at bedtime  traZODone 50 milliGRAM(s) Oral at bedtime  valproic  acid Syrup 250 milliGRAM(s) Oral three times a day      Vital Signs Last 24 Hrs  T(C): 37.4 (08 Aug 2023 11:00), Max: 38.6 (08 Aug 2023 00:43)  T(F): 99.4 (08 Aug 2023 11:00), Max: 101.5 (08 Aug 2023 00:43)  HR: 117 (08 Aug 2023 11:00) (110 - 129)  BP: 146/79 (08 Aug 2023 11:00) (109/69 - 149/91)  BP(mean): --  RR: 16 (08 Aug 2023 11:00) (16 - 18)  SpO2: 96% (08 Aug 2023 11:00) (94% - 96%)    Parameters below as of 08 Aug 2023 11:00  Patient On (Oxygen Delivery Method): room air        Physical Exam:  General: Chronically ill-appearing Male in no acute distress.  HEENT: AT/NC. Pupils/EOM unable secondary to patient compliance. On superficial exam of pt's mouth - Missing teeth, no lesions seen  Neck: Increased tone not frankly rigid. No sense of mass.  Nodes: None palpable.  Lungs: Poor effort diminished BS bilaterally, no wheezes, no rhonchi  Heart: Tachycardic with RR, no audible murmur   Abdomen: Bowel sounds present. Soft. Nondistended. Nontender.  Extremities: No cyanosis or clubbing. No edema. Lower extremities contracted  Neuro: awake and alert, nods, does not answer any questions and does not follow commands today  Skin: Warm. Dry. Good turgor. Right hip pressure injury, not infected looking  Psychiatric: calm behavior     WBC Count: 15.64 K/uL (08-08 @ 06:25)  WBC Count: 12.12 K/uL (08-04 @ 07:32)  WBC Count: 10.12 K/uL (08-03 @ 08:20)  WBC Count: 10.28 K/uL (08-02 @ 06:18)                            9.2    15.64 )-----------( 551      ( 08 Aug 2023 06:25 )             28.9       08-08    143  |  107  |  14  ----------------------------<  88  4.8   |  31  |  0.97    Ca    8.9      08 Aug 2023 06:25    TPro  5.6<L>  /  Alb  1.4<L>  /  TBili  0.3  /  DBili  x   /  AST  96<H>  /  ALT  64  /  AlkPhos  80  08-08      Urinalysis Basic - ( 08 Aug 2023 06:25 )    Color: x / Appearance: x / SG: x / pH: x  Gluc: 88 mg/dL / Ketone: x  / Bili: x / Urobili: x   Blood: x / Protein: x / Nitrite: x   Leuk Esterase: x / RBC: x / WBC x   Sq Epi: x / Non Sq Epi: x / Bacteria: x        Creatinine Trend: 0.97<--, 0.93<--, 0.93<--, 1.16<--, 1.09<--, 1.13<--      MICROBIOLOGY:  v  .Blood Blood  08-04-23   No growth at 72 Hours  --  --      .Blood Blood-Peripheral  08-02-23   No growth at 5 days  --  --      .Blood Blood-Peripheral  08-02-23   No growth at 5 days  --  --      Catheterized Catheterized  07-30-23   <10,000 CFU/mL Normal Urogenital Ivette  --  --      .Blood Blood-Peripheral  07-29-23   No growth at 5 days  --  --      Clean Catch Clean Catch (Midstream)  07-26-23   <10,000 CFU/mL Normal Urogenital Ivette  --  --      .Blood Blood-Peripheral  07-26-23   No growth at 5 days  --  --      .Blood Blood-Peripheral  07-26-23   No growth at 5 days  --  --      C-Reactive Protein, Serum: 70 (08-05)      SARS-CoV-2: NotDetec (27 Jul 2023 00:30)  SARS-CoV-2: NotDetec (17 Jul 2023 11:44)    RADIOLOGY:

## 2023-08-08 NOTE — PROGRESS NOTE ADULT - SUBJECTIVE AND OBJECTIVE BOX
Neurology Progress Note    S: Patient seen and examined. No new events overnight. patient denied CP, SOB, HA or pain.     MEDICATIONS:    acetaminophen     Tablet .. 650 milliGRAM(s) Oral every 6 hours PRN  acetylcysteine 10%  Inhalation 4 milliLiter(s) Inhalation every 6 hours PRN  diltiazem    Tablet 60 milliGRAM(s) Oral every 8 hours  enoxaparin Injectable 60 milliGRAM(s) SubCutaneous every 12 hours  ibuprofen  Tablet. 400 milliGRAM(s) Oral once  ibuprofen  Tablet. 400 milliGRAM(s) Oral three times a day  lactated ringers. 1000 milliLiter(s) IV Continuous <Continuous>  levalbuterol Inhalation 0.63 milliGRAM(s) Inhalation every 6 hours PRN  lidocaine   4% Patch 1 Patch Transdermal every 24 hours  melatonin 3 milliGRAM(s) Oral at bedtime PRN  metoprolol tartrate 50 milliGRAM(s) Oral every 6 hours  oxyCODONE    IR 5 milliGRAM(s) Oral every 4 hours PRN  oxyCODONE  ER Tablet 10 milliGRAM(s) Oral every 12 hours  piperacillin/tazobactam IVPB.. 3.375 Gram(s) IV Intermittent every 8 hours  QUEtiapine 100 milliGRAM(s) Oral at bedtime  traZODone 50 milliGRAM(s) Oral at bedtime  valproic  acid Syrup 250 milliGRAM(s) Oral three times a day      LABS:                          9.2    15.64 )-----------( 551      ( 08 Aug 2023 06:25 )             28.9           CAPILLARY BLOOD GLUCOSE            I&O's Summary    07 Aug 2023 07:01  -  08 Aug 2023 07:00  --------------------------------------------------------  IN: 1354 mL / OUT: 0 mL / NET: 1354 mL      Vital Signs Last 24 Hrs  T(C): 37.2 (08 Aug 2023 07:36), Max: 38.6 (08 Aug 2023 00:43)  T(F): 99.1 (08 Aug 2023 07:36), Max: 101.5 (08 Aug 2023 00:43)  HR: 110 (08 Aug 2023 07:36) (110 - 142)  BP: 125/75 (08 Aug 2023 07:36) (109/69 - 149/91)  BP(mean): --  RR: 16 (08 Aug 2023 07:36) (16 - 19)  SpO2: 95% (08 Aug 2023 07:36) (94% - 95%)    Parameters below as of 08 Aug 2023 07:36  Patient On (Oxygen Delivery Method): room air          General Exam:   General Appearance: Appropriately dressed and in no acute distress       Head: Normocephalic, atraumatic and no dysmorphic features  Ear, Nose, and Throat: Moist mucous membranes  CVS: S1S2+  Resp: No SOB, no wheeze or rhonchi  Abd: soft NTND  Extremities: No edema, no cyanosis  Skin: No bruises, no rashes     Neurological Exam:    Mental Status -lethargic opens eys does not follow commands   Cranial Nerves - PERRL, EOMI, VFF, V1-V3 intact, no gross facial asymmetry,    Motor Exam -   Moves UEs spontanesously LEs atrophy some contracture    Sensory    Intact to light touch and pinprick bilaterally    Reflexes UES hyperreflexia patellar trace Left ankle clonus at times right mute  Coordination: unable   Gait: deferred     I personally reviewed the below data/images/labs:    < from: CT Head No Cont (07.14.23 @ 14:35) >  IMPRESSION:    CT head: Markedly motion degraded exam. No gross evidence of acute   intracranial hemorrhage or mass effect.    CT cervical spine: No evidence for acute displaced fracture or   malalignment.    CT lumbar spine: No evidence for acute displaced fracture or malalignment.    --- End of Report ---

## 2023-08-09 LAB
ALDOLASE SERPL-CCNC: 15.4 U/L — HIGH (ref 3.3–10.3)
ANION GAP SERPL CALC-SCNC: 5 MMOL/L — SIGNIFICANT CHANGE UP (ref 5–17)
BLD GP AB SCN SERPL QL: SIGNIFICANT CHANGE UP
BUN SERPL-MCNC: 15 MG/DL — SIGNIFICANT CHANGE UP (ref 7–23)
CALCIUM SERPL-MCNC: 8.2 MG/DL — LOW (ref 8.5–10.1)
CHLORIDE SERPL-SCNC: 106 MMOL/L — SIGNIFICANT CHANGE UP (ref 96–108)
CO2 SERPL-SCNC: 30 MMOL/L — SIGNIFICANT CHANGE UP (ref 22–31)
CREAT SERPL-MCNC: 0.89 MG/DL — SIGNIFICANT CHANGE UP (ref 0.5–1.3)
CULTURE RESULTS: SIGNIFICANT CHANGE UP
DSDNA AB SER-ACNC: <12 IU/ML — SIGNIFICANT CHANGE UP
EGFR: 93 ML/MIN/1.73M2 — SIGNIFICANT CHANGE UP
GLUCOSE SERPL-MCNC: 122 MG/DL — HIGH (ref 70–99)
HCT VFR BLD CALC: 23.6 % — LOW (ref 39–50)
HCT VFR BLD CALC: 24.2 % — LOW (ref 39–50)
HGB BLD-MCNC: 7.5 G/DL — LOW (ref 13–17)
HGB BLD-MCNC: 8 G/DL — LOW (ref 13–17)
MCHC RBC-ENTMCNC: 30.4 PG — SIGNIFICANT CHANGE UP (ref 27–34)
MCHC RBC-ENTMCNC: 31.8 G/DL — LOW (ref 32–36)
MCV RBC AUTO: 95.5 FL — SIGNIFICANT CHANGE UP (ref 80–100)
NRBC # BLD: 0 /100 WBCS — SIGNIFICANT CHANGE UP (ref 0–0)
PLATELET # BLD AUTO: 433 K/UL — HIGH (ref 150–400)
POTASSIUM SERPL-MCNC: 3.9 MMOL/L — SIGNIFICANT CHANGE UP (ref 3.5–5.3)
POTASSIUM SERPL-SCNC: 3.9 MMOL/L — SIGNIFICANT CHANGE UP (ref 3.5–5.3)
PROCALCITONIN SERPL-MCNC: 0.16 NG/ML — HIGH (ref 0.02–0.1)
RAPID RVP RESULT: SIGNIFICANT CHANGE UP
RBC # BLD: 2.47 M/UL — LOW (ref 4.2–5.8)
RBC # FLD: 15.1 % — HIGH (ref 10.3–14.5)
SARS-COV-2 RNA SPEC QL NAA+PROBE: SIGNIFICANT CHANGE UP
SODIUM SERPL-SCNC: 141 MMOL/L — SIGNIFICANT CHANGE UP (ref 135–145)
SPECIMEN SOURCE: SIGNIFICANT CHANGE UP
WBC # BLD: 12.57 K/UL — HIGH (ref 3.8–10.5)
WBC # FLD AUTO: 12.57 K/UL — HIGH (ref 3.8–10.5)

## 2023-08-09 PROCEDURE — 99232 SBSQ HOSP IP/OBS MODERATE 35: CPT

## 2023-08-09 PROCEDURE — 99233 SBSQ HOSP IP/OBS HIGH 50: CPT

## 2023-08-09 PROCEDURE — 76604 US EXAM CHEST: CPT | Mod: 26

## 2023-08-09 PROCEDURE — 99223 1ST HOSP IP/OBS HIGH 75: CPT | Mod: 25

## 2023-08-09 RX ORDER — NYSTATIN CREAM 100000 [USP'U]/G
1 CREAM TOPICAL
Refills: 0 | Status: DISCONTINUED | OUTPATIENT
Start: 2023-08-09 | End: 2023-08-24

## 2023-08-09 RX ADMIN — PIPERACILLIN AND TAZOBACTAM 25 GRAM(S): 4; .5 INJECTION, POWDER, LYOPHILIZED, FOR SOLUTION INTRAVENOUS at 14:41

## 2023-08-09 RX ADMIN — Medication 650 MILLIGRAM(S): at 14:00

## 2023-08-09 RX ADMIN — Medication 400 MILLIGRAM(S): at 05:53

## 2023-08-09 RX ADMIN — Medication 400 MILLIGRAM(S): at 21:54

## 2023-08-09 RX ADMIN — Medication 400 MILLIGRAM(S): at 06:46

## 2023-08-09 RX ADMIN — OXYCODONE HYDROCHLORIDE 10 MILLIGRAM(S): 5 TABLET ORAL at 06:46

## 2023-08-09 RX ADMIN — ENOXAPARIN SODIUM 60 MILLIGRAM(S): 100 INJECTION SUBCUTANEOUS at 05:52

## 2023-08-09 RX ADMIN — SODIUM CHLORIDE 100 MILLILITER(S): 9 INJECTION, SOLUTION INTRAVENOUS at 00:00

## 2023-08-09 RX ADMIN — Medication 60 MILLIGRAM(S): at 05:53

## 2023-08-09 RX ADMIN — Medication 400 MILLIGRAM(S): at 22:27

## 2023-08-09 RX ADMIN — OXYCODONE HYDROCHLORIDE 10 MILLIGRAM(S): 5 TABLET ORAL at 18:30

## 2023-08-09 RX ADMIN — QUETIAPINE FUMARATE 100 MILLIGRAM(S): 200 TABLET, FILM COATED ORAL at 21:54

## 2023-08-09 RX ADMIN — Medication 250 MILLIGRAM(S): at 21:53

## 2023-08-09 RX ADMIN — ENOXAPARIN SODIUM 60 MILLIGRAM(S): 100 INJECTION SUBCUTANEOUS at 17:27

## 2023-08-09 RX ADMIN — PIPERACILLIN AND TAZOBACTAM 25 GRAM(S): 4; .5 INJECTION, POWDER, LYOPHILIZED, FOR SOLUTION INTRAVENOUS at 21:53

## 2023-08-09 RX ADMIN — Medication 60 MILLIGRAM(S): at 21:54

## 2023-08-09 RX ADMIN — PIPERACILLIN AND TAZOBACTAM 25 GRAM(S): 4; .5 INJECTION, POWDER, LYOPHILIZED, FOR SOLUTION INTRAVENOUS at 05:52

## 2023-08-09 RX ADMIN — SODIUM CHLORIDE 100 MILLILITER(S): 9 INJECTION, SOLUTION INTRAVENOUS at 18:07

## 2023-08-09 RX ADMIN — NYSTATIN CREAM 1 APPLICATION(S): 100000 CREAM TOPICAL at 17:28

## 2023-08-09 RX ADMIN — SODIUM CHLORIDE 100 MILLILITER(S): 9 INJECTION, SOLUTION INTRAVENOUS at 19:34

## 2023-08-09 RX ADMIN — Medication 50 MILLIGRAM(S): at 17:27

## 2023-08-09 RX ADMIN — Medication 60 MILLIGRAM(S): at 14:40

## 2023-08-09 RX ADMIN — Medication 250 MILLIGRAM(S): at 05:52

## 2023-08-09 RX ADMIN — Medication 50 MILLIGRAM(S): at 05:53

## 2023-08-09 RX ADMIN — Medication 650 MILLIGRAM(S): at 13:05

## 2023-08-09 RX ADMIN — Medication 400 MILLIGRAM(S): at 14:40

## 2023-08-09 RX ADMIN — Medication 400 MILLIGRAM(S): at 15:40

## 2023-08-09 RX ADMIN — OXYCODONE HYDROCHLORIDE 10 MILLIGRAM(S): 5 TABLET ORAL at 17:27

## 2023-08-09 RX ADMIN — Medication 50 MILLIGRAM(S): at 21:54

## 2023-08-09 RX ADMIN — LIDOCAINE 1 PATCH: 4 CREAM TOPICAL at 11:30

## 2023-08-09 RX ADMIN — OXYCODONE HYDROCHLORIDE 10 MILLIGRAM(S): 5 TABLET ORAL at 05:53

## 2023-08-09 RX ADMIN — Medication 50 MILLIGRAM(S): at 11:33

## 2023-08-09 RX ADMIN — Medication 50 MILLIGRAM(S): at 00:00

## 2023-08-09 RX ADMIN — Medication 250 MILLIGRAM(S): at 14:40

## 2023-08-09 NOTE — CONSULT NOTE ADULT - SUBJECTIVE AND OBJECTIVE BOX
Hx obtained from chart  poor historian      HPI:  Talked to patient's daughter Stacy Graves ( [phone] over the phone  68 years old male with h/o HTN, HLD, TBI resulting in dementia present to ED ? fall. As per daughter, patient was found on the floor. Patient is a poor history. Patient reported he felt weak and fell onto the floor. Denied any head trauma or LOC. No fever, chills. Patient denied any pain.  Slightly tachycardic, afebrile, sat well at RA. EKG with sinus tachy. No leukocytosis, plt 129, K 6, Cr 2.82, glucose 110, . CXR with no focal consolidation. CT head with no acute pathology. C/L spine with no fracture. CT pelsis with no acute displace fracture. Questionable mild widening of the bilateral sacroiliac joints with subchondral sclerosis and questionable erosions along the iliac side, right greater than left      SH: former smoker (14 Jul 2023 16:21)      Allergies    No Known Allergies    Intolerances        MEDICATIONS  (STANDING):  diltiazem    Tablet 60 milliGRAM(s) Oral every 8 hours  enoxaparin Injectable 60 milliGRAM(s) SubCutaneous every 12 hours  ibuprofen  Tablet. 400 milliGRAM(s) Oral three times a day  lactated ringers. 1000 milliLiter(s) (100 mL/Hr) IV Continuous <Continuous>  lidocaine   4% Patch 1 Patch Transdermal every 24 hours  metoprolol tartrate 50 milliGRAM(s) Oral every 6 hours  nystatin Powder 1 Application(s) Topical two times a day  piperacillin/tazobactam IVPB.. 3.375 Gram(s) IV Intermittent every 8 hours  QUEtiapine 100 milliGRAM(s) Oral at bedtime  traZODone 50 milliGRAM(s) Oral at bedtime  valproic  acid Syrup 250 milliGRAM(s) Oral three times a day    MEDICATIONS  (PRN):  acetaminophen     Tablet .. 650 milliGRAM(s) Oral every 6 hours PRN Temp greater or equal to 38C (100.4F)  acetylcysteine 10%  Inhalation 4 milliLiter(s) Inhalation every 6 hours PRN dyspnea  levalbuterol Inhalation 0.63 milliGRAM(s) Inhalation every 6 hours PRN Shortness of breath/Wheezing  melatonin 3 milliGRAM(s) Oral at bedtime PRN Insomnia  oxyCODONE    IR 5 milliGRAM(s) Oral every 4 hours PRN Moderate Pain (4 - 6)      Drug Dosing Weight  Height (cm): 180.3 (17 Jul 2023 10:44)  Weight (kg): 63.5 (14 Jul 2023 12:32)  BMI (kg/m2): 19.5 (17 Jul 2023 10:44)  BSA (m2): 1.81 (17 Jul 2023 10:44)    PAST MEDICAL & SURGICAL HISTORY:  TBI (traumatic brain injury)      HLD (hyperlipidemia)      HTN, age 0-18          FAMILY HISTORY:      SOCIAL HISTORY:    ADVANCE DIRECTIVES:    REVIEW OF SYSTEMS:    CONSTITUTIONAL: No fever, weight loss, or fatigue  EYES: No eye pain, visual disturbances, or discharge  ENMT:  No difficulty hearing, tinnitus, vertigo; No sinus or throat pain  NECK: No pain or stiffness  BREASTS: No pain, masses, or nipple discharge  RESPIRATORY: No cough, wheezing, chills or hemoptysis; No shortness of breath  CARDIOVASCULAR: No chest pain, palpitations, dizziness, or leg swelling  GASTROINTESTINAL: No abdominal or epigastric pain. No nausea, vomiting, or hematemesis; No diarrhea or constipation. No melena or hematochezia.  GENITOURINARY: No dysuria, frequency, hematuria, or incontinence  NEUROLOGICAL: No headaches, memory loss, loss of strength, numbness, or tremors  SKIN: No itching, burning, rashes, or lesions   LYMPH NODES: No enlarged glands  ENDOCRINE: No heat or cold intolerance; No hair loss  MUSCULOSKELETAL: No joint pain or swelling; No muscle, back, or extremity pain  PSYCHIATRIC: No depression, anxiety, mood swings, or difficulty sleeping  HEME/LYMPH: No easy bruising, or bleeding gums  ALLERGY AND IMMUNOLOGIC: No hives or eczema          Vital Signs Last 24 Hrs  T(C): 37.4 (09 Aug 2023 16:45), Max: 37.6 (08 Aug 2023 20:43)  T(F): 99.4 (09 Aug 2023 16:45), Max: 99.6 (08 Aug 2023 20:43)  HR: 112 (09 Aug 2023 16:45) (112 - 120)  BP: 128/74 (09 Aug 2023 16:45) (105/63 - 135/73)  RR: 18 (09 Aug 2023 16:45) (18 - 19)  SpO2: 95% (09 Aug 2023 16:45) (92% - 96%)    O2 Parameters below as of 09 Aug 2023 16:45  Patient On (Oxygen Delivery Method): room air                  PHYSICAL EXAM:    GENERAL: NAD, well-groomed, well-developed  HEAD:  Atraumatic, Normocephalic  EYES: EOMI, PERRLA, conjunctiva and sclera clear  ENMT: No tonsillar erythema, exudates, or enlargement; Moist mucous membranes, Good dentition, No lesions  NECK: Supple, No JVD, Normal thyroid  NERVOUS SYSTEM:  Alert & Oriented X3, Good concentration; Motor Strength 5/5 B/L upper and lower extremities; DTRs 2+ intact and symmetric  CHEST/LUNG: Clear to percussion bilaterally; No rales, rhonchi, wheezing, or rubs  HEART: Regular rate and rhythm; No murmurs, rubs, or gallops  ABDOMEN: Soft, Nontender, Nondistended; Bowel sounds present  EXTREMITIES:  2+ Peripheral Pulses, No clubbing, cyanosis, or edema  LYMPH: No lymphadenopathy noted  SKIN: No rashes or lesions    LABS:                              8.0    12.57   )-----------( 433        ( 09 Aug 2023 10:15 )                   24.2       08-09    141  |  106  |  15  ----------------------------<  122<H>  3.9   |  30  |  0.89    Ca    8.2<L>      09 Aug 2023 06:42    TPro  5.6<L>  /  Alb  1.4<L>  /  TBili  0.3  /  DBili  x   /  AST  96<H>  /  ALT  64  /  AlkPhos  80  08-08    Culture - Blood (08.04.23 @ 18:30)    Specimen Source: .Blood Blood   Culture Results: No growth at 4 days    Culture - Blood (08.02.23 @ 09:55)    Specimen Source: .Blood Blood-Peripheral   Culture Results: No growth at 5 days    Culture - Blood (08.02.23 @ 09:22)    Specimen Source: .Blood Blood-Peripheral   Culture Results: No growth at 5 days    Culture - Urine in AM (07.30.23 @ 15:00)    Specimen Source: Catheterized Catheterized   Culture Results: <10,000 CFU/mL Normal Urogenital Ivette    Culture - Blood (07.26.23 @ 13:30)    Specimen Source: .Blood Blood-Peripheral   Culture Results: No growth at 5 days          ECHO, US:  < from: TTE Echo Complete w/o Contrast w/ Doppler (07.29.23 @ 09:46) >  Left Ventricle: The left ventricular internal cavity size is normal.   Increased relative wall thickness with normal mass index consistent with   left ventricular concentric remodeling.  Global LV systolic function was normal. Left ventricular ejection   fraction, by visual estimation, is 60 to 65%. Spectral Doppler shows   impaired relaxation pattern of left ventricular myocardial filling (Grade   I diastolic dysfunction).  Right Ventricle: Normal right ventricular size and function.  Left Atrium: Normal left atrial size.  Right Atrium: Normal right atrial size.  Pericardium: There is no evidence of pericardial effusion.  Mitral Valve: Structurally normal mitral valve, with normal leaflet   excursion. The mitral valve is not well seen. Trace mitral valve   regurgitation is seen.  Tricuspid Valve: The tricuspid valve is not well visualized. Trivial   tricuspid regurgitation is visualized. Adequate TR velocity was not   obtained to accurately assess RVSP.  Aortic Valve: The aortic valve was not well visualized. Sclerotic aortic   valve with normal opening. No evidence of aortic valve regurgitation is   seen.  Pulmonic Valve: The pulmonic valve was not well visualized. Trace   pulmonic valve regurgitation.  Aorta: Aortic root measured at Sinus of Valsalva is normal.  Venous: The inferior vena cava was normal sized, with respiratory size   variation greater than 50%.      Summary:   1. Left ventricular ejection fraction, by visual estimation, is 60 to 65%.   2. Technically difficult study.   3. Normal global left ventricular systolic function.   4. Normal left ventricular internal cavity size.   5. Spectral Doppler shows impaired relaxation pattern of left ventricular myocardial filling (Grade I diastolic dysfunction).   6. Normal right ventricular size and function.   7. Normal left atrial size.   8. Normal right atrial size.   9. There is no evidence of pericardial effusion.  10. Trace mitral valve regurgitation.  11. Sclerotic aortic valve with normal opening.  12. Increased relative wall thickness with normal mass index consistent with left ventricular concentric remodeling.        RADIOLOGY:  CT chest < from: CT Chest No Cont (08.08.23 @ 15:21) >  LUNGS AND AIRWAYS: Patent central airways.  Left upper lobe groundglass   opacities either motion artifact or pneumonia. Bilateral lower lobe   dependent and passive atelectasis.  PLEURA: Small bilateral pleural effusions, new compared with prior. No   pneumothorax.  MEDIASTINUM AND TEJAL: No lymphadenopathy.  VESSELS: Aortic and coronary atherosclerosis.  HEART: Heart size is normal. No pericardial effusion.  CHEST WALL AND LOWER NECK: Large left chest wall edema versus   subcutaneous hematoma extending from the level of T9 to at least L2. The   process extends inferiorly below the field-of-view..  VISUALIZED UPPER ABDOMEN: Within normal limits.  BONES: Within normal limits.    IMPRESSION:  Large left chest wall subcutaneous hematoma versus dependent edema.    New small bilateral pleural effusions.    Left upper lobe infiltrate versus motion artifact. Bilateral lower lobe  passive atelectasis.    ----------------------------    CXR < from: Xray Chest 1 View- PORTABLE-Urgent (Xray Chest 1 View- PORTABLE-Urgent .) (08.04.23 @ 19:50) >  HEART: normal in size.  LUNGS: Parahilar opacities are seen, unclear whether this represents   atelectasis or pneumonia. There is shallow inspiration.  BONES: degenerative changes         Hx obtained from chart  poor historian      HPI:  Talked to patient's daughter Stacy Graves ( [phone] over the phone  68 years old male with h/o HTN, HLD, TBI resulting in dementia present to ED ? fall. As per daughter, patient was found on the floor. Patient is a poor history. Patient reported he felt weak and fell onto the floor. Denied any head trauma or LOC. No fever, chills. Patient denied any pain.  Slightly tachycardic, afebrile, sat well at RA. EKG with sinus tachy. No leukocytosis, plt 129, K 6, Cr 2.82, glucose 110, . CXR with no focal consolidation. CT head with no acute pathology. C/L spine with no fracture. CT pelsis with no acute displace fracture. Questionable mild widening of the bilateral sacroiliac joints with subchondral sclerosis and questionable erosions along the iliac side, right greater than left      SH: former smoker (14 Jul 2023 16:21)  ---------------  Per chart documentation pt reportedly walking and talking prior to admission  He does have hx of dementia s/p TBI  Currently contracted  Admitted with rhabdomyolysis and VIKAS, and possible aspiration pneumonia  Remains febrile despite treatment with antibx   LE Doppler with acute occlusive DVT affecting the right femoral vein with the proximal extension of the thrombus in the right common femoral vein.  VQ 7/19 with very low probability PE  CT chest with Large left chest wall subcutaneous hematoma versus dependent edema. New small bilateral pleural effusions. Left upper lobe infiltrate versus motion artifact. Bilateral lower lobe passive atelectasis.  Rheum work up in progress with elevated very elevated JESICA    Asked to evaluate CT findings.      Currently on RA without respiratory distress, oxygenating well.        Allergies  No Known Allergies            MEDICATIONS  (STANDING):  diltiazem    Tablet 60 milliGRAM(s) Oral every 8 hours  enoxaparin Injectable 60 milliGRAM(s) SubCutaneous every 12 hours  ibuprofen  Tablet. 400 milliGRAM(s) Oral three times a day  lactated ringers. 1000 milliLiter(s) (100 mL/Hr) IV Continuous <Continuous>  lidocaine   4% Patch 1 Patch Transdermal every 24 hours  metoprolol tartrate 50 milliGRAM(s) Oral every 6 hours  nystatin Powder 1 Application(s) Topical two times a day  piperacillin/tazobactam IVPB.. 3.375 Gram(s) IV Intermittent every 8 hours  QUEtiapine 100 milliGRAM(s) Oral at bedtime  traZODone 50 milliGRAM(s) Oral at bedtime  valproic  acid Syrup 250 milliGRAM(s) Oral three times a day    MEDICATIONS  (PRN):  acetaminophen     Tablet .. 650 milliGRAM(s) Oral every 6 hours PRN Temp greater or equal to 38C (100.4F)  acetylcysteine 10%  Inhalation 4 milliLiter(s) Inhalation every 6 hours PRN dyspnea  levalbuterol Inhalation 0.63 milliGRAM(s) Inhalation every 6 hours PRN Shortness of breath/Wheezing  melatonin 3 milliGRAM(s) Oral at bedtime PRN Insomnia  oxyCODONE    IR 5 milliGRAM(s) Oral every 4 hours PRN Moderate Pain (4 - 6)      Drug Dosing Weight  Height (cm): 180.3 (17 Jul 2023 10:44)  Weight (kg): 63.5 (14 Jul 2023 12:32)  BMI (kg/m2): 19.5 (17 Jul 2023 10:44)  BSA (m2): 1.81 (17 Jul 2023 10:44)    PAST MEDICAL & SURGICAL HISTORY:  TBI (traumatic brain injury)      HLD (hyperlipidemia)      HTN, age 0-18          FAMILY HISTORY:  unable to obtain (due to pt's poor historian/dementia/TBI)    SOCIAL HISTORY:  unable to obtain      REVIEW OF SYSTEMS:  limited due to dementia  pt sometimes gives different answers despite being asked the same questions twice  (ie do you have cough? Replying with No but when re asked same questions says yes)    + fever, no chills  no HA, no dizziness  no visual changes, no auditory changes  no sore throat, no sinus congestion  + SOB, + cough  no chest pain  no abdominal pain, no N/V/D  no dysuria  + bilateral leg pain  + swelling  no rashes, no pruritis            Vital Signs Last 24 Hrs  T(C): 37.4 (09 Aug 2023 16:45), Max: 37.6 (08 Aug 2023 20:43)  T(F): 99.4 (09 Aug 2023 16:45), Max: 99.6 (08 Aug 2023 20:43)  HR: 112 (09 Aug 2023 16:45) (112 - 120)  BP: 128/74 (09 Aug 2023 16:45) (105/63 - 135/73)  RR: 18 (09 Aug 2023 16:45) (18 - 19)  SpO2: 95% (09 Aug 2023 16:45) (92% - 96%)    O2 Parameters below as of 09 Aug 2023 16:45  Patient On (Oxygen Delivery Method): room air                  PHYSICAL EXAM:  GENERAL: NAD, contracted in fetal position bilateral knees to chest  HEAD:  Normocephalic  EYES: EOMI, conjunctiva and sclera clear  ENMT: No tonsillar erythema,  Moist mucous membranes  NECK: Supple, No JVD  NERVOUS SYSTEM:  Alert & Oriented x1, extremities contracted and stiff  CHEST/LUNG: Clear to auscultation bilaterally; No rales, rhonchi, wheezing  HEART: Regular rate and rhythm  ABDOMEN: Soft, Nontender, Nondistended; Bowel sounds present  EXTREMITIES:  bilateral feet edema, no cyanosis, bilateral legs contracted and flexed to chest, holding bilateral arms to chest as well, unable to straighten extremities   LYMPH: No lymphadenopathy noted      LABS:                              8.0    12.57   )-----------( 433        ( 09 Aug 2023 10:15 )                   24.2       08-09    141  |  106  |  15  ----------------------------<  122<H>  3.9   |  30  |  0.89    Ca    8.2<L>      09 Aug 2023 06:42    TPro  5.6<L>  /  Alb  1.4<L>  /  TBili  0.3  /  DBili  x   /  AST  96<H>  /  ALT  64  /  AlkPhos  80  08-08    Culture - Blood (08.04.23 @ 18:30)    Specimen Source: .Blood Blood   Culture Results: No growth at 4 days    Culture - Blood (08.02.23 @ 09:55)    Specimen Source: .Blood Blood-Peripheral   Culture Results: No growth at 5 days    Culture - Blood (08.02.23 @ 09:22)    Specimen Source: .Blood Blood-Peripheral   Culture Results: No growth at 5 days    Culture - Urine in AM (07.30.23 @ 15:00)    Specimen Source: Catheterized Catheterized   Culture Results: <10,000 CFU/mL Normal Urogenital Ivette    Culture - Blood (07.26.23 @ 13:30)    Specimen Source: .Blood Blood-Peripheral   Culture Results: No growth at 5 days    Anti-Nuclear Antibody in AM (08.05.23 @ 06:15)    Anti Nuclear Factor Titer: 1:1280: Antinuclear AB (JESICA), IFA Method   JESICA Pattern: Speckled          ECHO, US:  < from: TTE Echo Complete w/o Contrast w/ Doppler (07.29.23 @ 09:46) >  Left Ventricle: The left ventricular internal cavity size is normal.   Increased relative wall thickness with normal mass index consistent with   left ventricular concentric remodeling.  Global LV systolic function was normal. Left ventricular ejection   fraction, by visual estimation, is 60 to 65%. Spectral Doppler shows   impaired relaxation pattern of left ventricular myocardial filling (Grade   I diastolic dysfunction).  Right Ventricle: Normal right ventricular size and function.  Left Atrium: Normal left atrial size.  Right Atrium: Normal right atrial size.  Pericardium: There is no evidence of pericardial effusion.  Mitral Valve: Structurally normal mitral valve, with normal leaflet   excursion. The mitral valve is not well seen. Trace mitral valve   regurgitation is seen.  Tricuspid Valve: The tricuspid valve is not well visualized. Trivial   tricuspid regurgitation is visualized. Adequate TR velocity was not   obtained to accurately assess RVSP.  Aortic Valve: The aortic valve was not well visualized. Sclerotic aortic   valve with normal opening. No evidence of aortic valve regurgitation is   seen.  Pulmonic Valve: The pulmonic valve was not well visualized. Trace   pulmonic valve regurgitation.  Aorta: Aortic root measured at Sinus of Valsalva is normal.  Venous: The inferior vena cava was normal sized, with respiratory size   variation greater than 50%.      Summary:   1. Left ventricular ejection fraction, by visual estimation, is 60 to 65%.   2. Technically difficult study.   3. Normal global left ventricular systolic function.   4. Normal left ventricular internal cavity size.   5. Spectral Doppler shows impaired relaxation pattern of left ventricular myocardial filling (Grade I diastolic dysfunction).   6. Normal right ventricular size and function.   7. Normal left atrial size.   8. Normal right atrial size.   9. There is no evidence of pericardial effusion.  10. Trace mitral valve regurgitation.  11. Sclerotic aortic valve with normal opening.  12. Increased relative wall thickness with normal mass index consistent with left ventricular concentric remodeling.        RADIOLOGY:  CT chest < from: CT Chest No Cont (08.08.23 @ 15:21) >  LUNGS AND AIRWAYS: Patent central airways.  Left upper lobe ground glass   opacities either motion artifact or pneumonia. Bilateral lower lobe   dependent and passive atelectasis.  PLEURA: Small bilateral pleural effusions, new compared with prior. No   pneumothorax.  MEDIASTINUM AND TEJAL: No lymphadenopathy.  VESSELS: Aortic and coronary atherosclerosis.  HEART: Heart size is normal. No pericardial effusion.  CHEST WALL AND LOWER NECK: Large left chest wall edema versus   subcutaneous hematoma extending from the level of T9 to at least L2. The   process extends inferiorly below the field-of-view..  VISUALIZED UPPER ABDOMEN: Within normal limits.  BONES: Within normal limits.    IMPRESSION:  Large left chest wall subcutaneous hematoma versus dependent edema.    New small bilateral pleural effusions.    Left upper lobe infiltrate versus motion artifact. Bilateral lower lobe  passive atelectasis.    ----------------------------    CXR < from: Xray Chest 1 View- PORTABLE-Urgent (Xray Chest 1 View- PORTABLE-Urgent .) (08.04.23 @ 19:50) >  HEART: normal in size.  LUNGS: Parahilar opacities are seen, unclear whether this represents   atelectasis or pneumonia. There is shallow inspiration.  BONES: degenerative changes

## 2023-08-09 NOTE — PROGRESS NOTE ADULT - SUBJECTIVE AND OBJECTIVE BOX
ROJAS HE  MRN-[id number]    Follow Up:  fever    Interval History: the pt was seen and examined earlier, no acute distress, more awake and interactive today, denies pain. Pt is afebrile within last 24 hrs.     PAST MEDICAL & SURGICAL HISTORY:  TBI (traumatic brain injury)      HLD (hyperlipidemia)      HTN, age 0-18          ROS:    [x ] Unobtainable because: TBI, denies pain   [ ] All other systems negative    Constitutional: no fever, no chills  Head: no trauma  Eyes: no vision changes, no eye pain  ENT:  no sore throat, no rhinorrhea  Cardiovascular:  no chest pain, no palpitation  Respiratory:  no SOB, no cough  GI:  no abd pain, no vomiting, no diarrhea  urinary: no dysuria, no hematuria, no flank pain  musculoskeletal:  no joint pain, no joint swelling  skin:  no rash  neurology:  no headache, no seizure, no change in mental status  psych: no anxiety, no depression         Allergies  No Known Allergies        ANTIMICROBIALS:  piperacillin/tazobactam IVPB.. 3.375 every 8 hours      OTHER MEDS:  acetaminophen     Tablet .. 650 milliGRAM(s) Oral every 6 hours PRN  acetylcysteine 10%  Inhalation 4 milliLiter(s) Inhalation every 6 hours PRN  diltiazem    Tablet 60 milliGRAM(s) Oral every 8 hours  enoxaparin Injectable 60 milliGRAM(s) SubCutaneous every 12 hours  ibuprofen  Tablet. 400 milliGRAM(s) Oral three times a day  lactated ringers. 1000 milliLiter(s) IV Continuous <Continuous>  levalbuterol Inhalation 0.63 milliGRAM(s) Inhalation every 6 hours PRN  lidocaine   4% Patch 1 Patch Transdermal every 24 hours  melatonin 3 milliGRAM(s) Oral at bedtime PRN  metoprolol tartrate 50 milliGRAM(s) Oral every 6 hours  nystatin Powder 1 Application(s) Topical two times a day  oxyCODONE    IR 5 milliGRAM(s) Oral every 4 hours PRN  oxyCODONE  ER Tablet 10 milliGRAM(s) Oral every 12 hours  QUEtiapine 100 milliGRAM(s) Oral at bedtime  traZODone 50 milliGRAM(s) Oral at bedtime  valproic  acid Syrup 250 milliGRAM(s) Oral three times a day      Vital Signs Last 24 Hrs  T(C): 36.9 (09 Aug 2023 10:57), Max: 37.6 (08 Aug 2023 20:43)  T(F): 98.5 (09 Aug 2023 10:57), Max: 99.6 (08 Aug 2023 20:43)  HR: 119 (09 Aug 2023 10:57) (105 - 120)  BP: 105/63 (09 Aug 2023 10:57) (105/63 - 135/73)  BP(mean): --  RR: 19 (09 Aug 2023 10:57) (18 - 19)  SpO2: 92% (09 Aug 2023 10:57) (92% - 96%)    Parameters below as of 09 Aug 2023 10:57  Patient On (Oxygen Delivery Method): room air        Physical Exam:  General: Chronically ill-appearing Male in no acute distress.  HEENT: AT/NC. Pupils/EOM unable secondary to patient compliance. On superficial exam of pt's mouth - Missing teeth, no lesions seen  Neck: Increased tone not frankly rigid. No sense of mass.  Nodes: None palpable.  Lungs: Poor effort diminished BS bilaterally, no wheezes, no rhonchi  Heart: Tachycardic with RR, no audible murmur   Abdomen: Bowel sounds present. Soft. Nondistended. Nontender.  Extremities: No cyanosis or clubbing. No edema. Lower extremities contracted  Neuro: awake and alert, nods, does not answer any questions and does not follow commands today  Skin: Warm. Dry. Good turgor. Right hip pressure injury, not infected looking  Psychiatric: calm behavior     WBC Count: 12.57 K/uL (08-09 @ 06:42)  WBC Count: 15.64 K/uL (08-08 @ 06:25)  WBC Count: 12.12 K/uL (08-04 @ 07:32)  WBC Count: 10.12 K/uL (08-03 @ 08:20)                            8.0    x     )-----------( x        ( 09 Aug 2023 10:15 )             24.2       08-09    141  |  106  |  15  ----------------------------<  122<H>  3.9   |  30  |  0.89    Ca    8.2<L>      09 Aug 2023 06:42    TPro  5.6<L>  /  Alb  1.4<L>  /  TBili  0.3  /  DBili  x   /  AST  96<H>  /  ALT  64  /  AlkPhos  80  08-08      Urinalysis Basic - ( 09 Aug 2023 06:42 )    Color: x / Appearance: x / SG: x / pH: x  Gluc: 122 mg/dL / Ketone: x  / Bili: x / Urobili: x   Blood: x / Protein: x / Nitrite: x   Leuk Esterase: x / RBC: x / WBC x   Sq Epi: x / Non Sq Epi: x / Bacteria: x        Creatinine Trend: 0.89<--, 0.97<--, 0.93<--, 0.93<--, 1.16<--, 1.09<--      MICROBIOLOGY:  v  .Blood Blood  08-04-23   No growth at 4 days  --  --      .Blood Blood-Peripheral  08-02-23   No growth at 5 days  --  --      .Blood Blood-Peripheral  08-02-23   No growth at 5 days  --  --      Catheterized Catheterized  07-30-23   <10,000 CFU/mL Normal Urogenital Ivette  --  --      .Blood Blood-Peripheral  07-29-23   No growth at 5 days  --  --      Clean Catch Clean Catch (Midstream)  07-26-23   <10,000 CFU/mL Normal Urogenital Ivette  --  --      .Blood Blood-Peripheral  07-26-23   No growth at 5 days  --  --      .Blood Blood-Peripheral  07-26-23   No growth at 5 days  --  --          Rapid RVP Result: NotDetec (08-09 @ 11:30)        C-Reactive Protein, Serum: 70 (08-05)            SARS-CoV-2: NotDetec (08-09-23 @ 11:30)  Rapid RVP Result: NotDetec (08-09-23 @ 11:30)    SARS-CoV-2: NotDetec (09 Aug 2023 11:30)  SARS-CoV-2: NotDetec (27 Jul 2023 00:30)  SARS-CoV-2: NotDetec (17 Jul 2023 11:44)    RADIOLOGY:

## 2023-08-09 NOTE — PROGRESS NOTE ADULT - ASSESSMENT
68M PMH HTN, HLD, TBI with resulting dementia presented to ED as a fall. Patient found with rhabdo and VIKAS, also with possible aspiration pneumonia, remains febrile.   LE Doppler : IMPRESSION: There is acute occlusive DVT affecting the right femoral vein with the proximal extent of the thrombus in the right common femoral vein.  < from: CT Chest No Cont (08.08.23 @ 15:21) >  Large left chest wall subcutaneous hematoma versus dependent edema.  New small bilateral pleural effusions.  Left upper lobe infiltrate versus motion artifact. Bilateral lower lobe passive atelectasis.   Limitations as above.  < end of copied text >    # Recurrent fevers - Elevated ESR, CRP.  ? rheumatologic etiology.  Rheumatology consulted.    # Aspiration Pneumonia / b/l Pleural Effusion - ID Following.  Resumed Zosyn per their recommendations.  F/u cultures, fever curve.  Monitor CBC.  Fever persists.  Indium scan.  Incentive Spirometry.  Pulmonary input requested.    # ? L chest hematoma - no obvious ecchymosis of L chest wall.  Drop in H/H noted.  Repeat CBC in am.    # Tachycardia - HR remains elevated.  Continue Cardizem, BBlocker.  Tele showing sinus tachycardia.   Increased cardizem.  HR better controlled.    # Impaired ambulation. PT consulted and recommending FREDY - More contracted.  ? due to sacroilitis.  Trial of NSAIDs.  Neuro followup.  CT head not suggestive of acute infarct.  Attempt MR Head / neck, however, pt contracted.    # RLE DVT - continue full dose Lovenox for now.  Transition to [facility] on discharge  # Traumatic brain injury / Dementia - supportive care.  Continue Valproic acid.  # VIKAS - resolved.  # Rhabdomyolysis - resolved.  # Essential HTN - Monitor BP.  Continue antihypertensives.  # Inpatient DVT Proph - on anticoagulation     Plan of care d/w daughter  [phone] 8/8

## 2023-08-09 NOTE — CONSULT NOTE ADULT - ASSESSMENT
68M PMH HTN, HLD, TBI resulting in dementia present to ED with ?fall as he was found on the ground.    Hospital course with fevers, leukocytosis.   Cultures negative.   RVP negative  completed a course of ceftriaxone  currently on zosyn  Initial CT chest 7/26 with bilateral basilar atelectasis and mucus plugging   Repeat CT chest 8/8 with motion artifact but noted bilateral basilar atelectasis and small pleural effusions, CAROL GGO vs motion artifact, also noted chest wall hematoma vs edema  LE duplex with R femoral DVT - on full dose lovenox  VQ scan with low probability PE  rheumatologic work up in progress    POCUS  [x] limited lung    INDICATIONS  [x] fever, pleural effusions    FINDINGS  - a line predominance bilaterally with small L pleural effusion and adjacent lung atelectasis noted. effusion looks simple in nature      - source of fever remains unclear  - no definite infiltrate noted on chest imaging  - he has no O2 requirements and is on RA saturating well  - he says "yes" when asked if he has cough, but no cough noted during time of exam  - breath sounds overall are clear  - aspiration is a possibility, aspiration pneumonitis can induce fevers and some leukocytosis however unclear if this reason alone is his etiology of fevers  - agree with indium scan  - ID following  - follow up rheum serology   68M PMH HTN, HLD, TBI resulting in dementia presents to ED with ?fall as he was found on the ground. Admitted for rhabdomyolysis, VIKAS.    Hospital course with fevers, leukocytosis.   Cultures negative.   RVP negative  completed a course of ceftriaxone  currently on zosyn  Initial CT chest 7/26 with bilateral basilar atelectasis and mucus plugging   Repeat CT chest 8/8 with motion artifact but noted bilateral basilar atelectasis and small pleural effusions, CAROL GGO vs motion artifact, also noted chest wall hematoma vs edema  LE duplex with R femoral DVT - on full dose lovenox  VQ scan with low probability PE  rheumatologic work up in progress with elevated JESICA    POCUS  [x] limited lung    INDICATIONS  [x] fever, pleural effusions    FINDINGS  - a line predominance bilaterally with small L pleural effusion and adjacent lung atelectasis noted. effusion looks simple in nature      - source of fever remains unclear  - no definite infiltrate noted on chest imaging  - he has no O2 requirements and is on RA saturating well  - he says "yes" when asked if he has cough, but no cough noted during time of exam  - breath sounds overall are clear  - aspiration is a possibility, aspiration pneumonitis can induce fevers and some leukocytosis however unclear if this reason alone is his etiology of persistent fevers  - no intervention needed for small L effusion noted on US  - agree with indium scan  - at present no clear pulmonary source to explain for persistent fevers   - doubt pulmonary etiology to cause the rest of his physical findings of limb contracture  - ID following: antibx per ID  - follow up rheum serology, rheum consult  - d/w hospitalist

## 2023-08-09 NOTE — PROGRESS NOTE ADULT - ASSESSMENT
68 year old male admitted s/p a fall, and experienced recurring fevers of unclear etiology - now afebrile for >36 hours.  Pt also w/ new-onset contractures of his B/L LE's of unclear etiology.  Workup revealed a positive RNP AB, as well as possible signs of sacroiliitis, though no other obvious signs/symptoms of an underlying spondyloparthropathy vs other connective tissue disorder.    - Follow up rest of serologies, HLA-B27

## 2023-08-09 NOTE — PROGRESS NOTE ADULT - ASSESSMENT
I doubt that tachycardia essentially since admission was on an infectious basis. Suspect the fever last night represents a superimposed event, with aspiration being most likely. It will be interesting to see whether tachycardia resolves, or not.   Certainly at risk for aspiration given poor mental status superimposed on baseline TBI.  No clear or convincing pneumonia on CT, does have plugging  VQ without PE, does have DVT but I do not think latter accounts for escalating leukocytosis.   RVP's neg  Elevated CK, presumed rhabdomyolysis why still fluctuating and elevated after ~1 week No troponin's sent but most recent ECG only reported as sinus tachycardia.   U/A without significant pyuria now or on admission  Abdominal exam and skin exam limited by patient's behavior, but no gross abdominal tenderness, skin/soft tissue infection, phlebitis    7/28: afebrile >24 hrs, tachycardic, WBC increased 18.61, Cr elevated 1.38, BCs and UC with no growth to date, covid 19 test is negative, CT chest - new RLL mucus plug, new b/l LLs atelectasis, MRSA PCR not detected, will continue with Zosyn for now.   + Right femoral DVT - on Lovenox.   Attending addendum:  I have reviewed all pertinent clinical information and agree with the NP's note.   continue zosyn through the weekend   follow all cultured  trend wbc and fever curve   aspiration precautions   frequent suctioning if necessary     7/31: pt is more awake and alert, more interactive, confused, no fevers since 7/29, WBC elevated 15.5, Cr normalized, LFTs better, MRSA PCR not detected, BCs and UC with no growth to date. Pt completed a course of ceftriaxone, now on zosyn day #6.   Pt's CK is trending up, cardiac enzymes should be obtained.   Attending addendum:  I have reviewed all pertinent clinical information and agree with the NP's note.   continue antibiotics   CK was improving but then starting climbing, asked for cardiac enzymes to be sent  suspended atorvastatin as he is on high dose   please trend CK levels    8/1: low grade fevers, tachycardic, NC, WBC better 12.77,Cr ok, LFTs better, BCs and UC with no growth to date, troponin x 1 negative, on Lovenox for right femoral DVT, Zosyn IV continued.  Attending Addendum--  Case reviewed with NP Aruna Guzman. Her note reviewed and modified as appropriate.   Still with intermittent low-grade fever though trend perhaps moderating  Some decline in WBC  CK still elevated as of the 29th- etiology? Seems too long to attribute to rhabdo from outpatient setting. MB fraction nor troponin impressive  Role of Abx TBD  Drug fever an exclusionary diagnosis, but none known to be new    8/2: continues having fevers, tachycardic, WBC normalized, Cr ok, CK is better 510 today, all prior BCs with no growth to date, will obtain two sets of surveillance BCs. Unclear if pt's fevers are infectious in nature, will continue Zosyn IV for now.   Attending Addendum--  Case reviewed with JOANNA Guzman. Her note reviewed and modified as appropriate.   Still febrile, higher grade  CK diminished  If fevers persist, will consider NM scan, but not clear that he will fit under the camera.  Role of Abx TBD    8/3: low grade temp yesterday late evening, no fevers since then, tachycardic, NC, WBC normalized, Cr ok, all BCs and UC with no growth to date, Zosyn IV continued for now. NM scan should be considered if the pt can fit, + contracted.   Attending Addendum--  Case reviewed with JOANNA Guzman. Her note reviewed and modified as appropriate.   Patient personally assessed and examined.  Seen earlier   Shivering w low grade fever earlier. Abx have not made much impact and cx unrevealing.   Still not clear why CK still elevate- no concern of active injury, no clear tenderness on abdominal exam  Seems too long for rhabdo in setting of normal renal function.    8/4: continues having fevers, tachycardic, RA, WBC increased 12.12, Cr ok, BCs with no growth to date, will continue with Zosyn for now. In favor of obtaining for indium scan, consider rheumatology workup.   Attending Addendum--  Case reviewed with JOANNA Guzman. Her note reviewed and modified as appropriate.   FUO.  NM scan at this point reasonable  Checking serologies re: autoimmune process, especially as persistent CK elevation not well explained at present (myositis?).  Role of abx TBD  I will be available via Teams for any issues that may arise over the weekend.    8/7: continues having low grade temps, tachycardic, RA, no new blood work, BCs remain with no growth to date, Zosyn IV continued for now, pending indium scan, consider rheumatology consult.  Attending Addendum--  Case reviewed with JOANNA Guzman. Her note reviewed and modified as appropriate.   Patient personally assessed and examined.  Seems more alert  Intermittent low grade fever  Awaiting NM study, though not clear how useful it will be  Anti-RNP elevated, CK's variably elevated-- myositis?  JESICA pending  Rheum eval    8/8: pt is very contracted, exam is difficult, continues having fevers, RA, tachycardic, WBC increased 15.64. All BCs with no growth to date, Zosyn IV continued for now, indium scan is pending.   Attending Addendum--  Case reviewed with JOANNA Guzman. Her note reviewed and modified as appropriate.   Patient personally assessed and examined.  Little changed  Trend CBC  Awaiting NM scan  Rheum appreciated    8/9: no fevers within last 24 hrs, tachycardic, RA, WBC better 12.57, BCs with no growth to date, Cr ok, CT chest - large left chest hematoma vs dependent edema, + b/l pleural effusions, CAROL ? infiltrate, b/l LLs atelectasis. Indium scan is pending, Zosyn IV continued for now.     Suggestions--  Aspiration precautions as able  Continue Zosyn for now   MRSA nares PCR - negative   follow BCs - NGTD  trend temperatures and WBC  DVT management per medicine   obtain indium scan - pending   Palliative care consult   rheumatology is following    Discussed with Dr. Adamson

## 2023-08-09 NOTE — PROGRESS NOTE ADULT - SUBJECTIVE AND OBJECTIVE BOX
INTERVAL HPI/OVERNIGHT EVENTS:  No new complaints.    MEDICATIONS  (STANDING):  diltiazem    Tablet 60 milliGRAM(s) Oral every 8 hours  enoxaparin Injectable 60 milliGRAM(s) SubCutaneous every 12 hours  ibuprofen  Tablet. 400 milliGRAM(s) Oral three times a day  lactated ringers. 1000 milliLiter(s) (100 mL/Hr) IV Continuous <Continuous>  lidocaine   4% Patch 1 Patch Transdermal every 24 hours  metoprolol tartrate 50 milliGRAM(s) Oral every 6 hours  nystatin Powder 1 Application(s) Topical two times a day  piperacillin/tazobactam IVPB.. 3.375 Gram(s) IV Intermittent every 8 hours  QUEtiapine 100 milliGRAM(s) Oral at bedtime  traZODone 50 milliGRAM(s) Oral at bedtime  valproic  acid Syrup 250 milliGRAM(s) Oral three times a day    MEDICATIONS  (PRN):  acetaminophen     Tablet .. 650 milliGRAM(s) Oral every 6 hours PRN Temp greater or equal to 38C (100.4F)  acetylcysteine 10%  Inhalation 4 milliLiter(s) Inhalation every 6 hours PRN dyspnea  levalbuterol Inhalation 0.63 milliGRAM(s) Inhalation every 6 hours PRN Shortness of breath/Wheezing  melatonin 3 milliGRAM(s) Oral at bedtime PRN Insomnia  oxyCODONE    IR 5 milliGRAM(s) Oral every 4 hours PRN Moderate Pain (4 - 6)      Allergies    No Known Allergies        Vital Signs Last 24 Hrs  T(C): 37.4 (09 Aug 2023 16:45), Max: 37.6 (08 Aug 2023 20:43)  T(F): 99.4 (09 Aug 2023 16:45), Max: 99.6 (08 Aug 2023 20:43)  HR: 112 (09 Aug 2023 16:45) (112 - 120)  BP: 128/74 (09 Aug 2023 16:45) (105/63 - 135/73)  BP(mean): --  RR: 18 (09 Aug 2023 16:45) (18 - 19)  SpO2: 95% (09 Aug 2023 16:45) (92% - 96%)    Parameters below as of 09 Aug 2023 16:45  Patient On (Oxygen Delivery Method): room air        PHYSICAL EXAM:  General :  NAD  HEENT--  no oral ulcers  Nodes-- no LAD  Lungs--  CTA B/L  Heart--  RRR, nlS1 &S2 normal;   Abdomen--  soft, NT, ND +BS  Skin:  no rashes  Musculoskeletal exam:  No synovitis;  no tender joints;  pt unable or unwilling to unclench his fists;  (+)flexion contractures of B/L knees;  pt s/p amputation of right thumb      LABS:                        8.0    x     )-----------( x        ( 09 Aug 2023 10:15 )             24.2     08-09    141  |  106  |  15  ----------------------------<  122<H>  3.9   |  30  |  0.89    Ca    8.2<L>      09 Aug 2023 06:42    TPro  5.6<L>  /  Alb  1.4<L>  /  TBili  0.3  /  DBili  x   /  AST  96<H>  /  ALT  64  /  AlkPhos  80  08-08      Urinalysis Basic - ( 09 Aug 2023 06:42 )    Color: x / Appearance: x / SG: x / pH: x  Gluc: 122 mg/dL / Ketone: x  / Bili: x / Urobili: x   Blood: x / Protein: x / Nitrite: x   Leuk Esterase: x / RBC: x / WBC x   Sq Epi: x / Non Sq Epi: x / Bacteria: x    Anti-Nuclear Antibody in AM (08.05.23 @ 06:15)    Anti Nuclear Factor Titer: 1:1280: Antinuclear AB (JESICA), IFA Method   JESICA Pattern: Speckled    Sjogren&#x27;s Syndrome Antibodies (08.08.23 @ 06:25)    Anti SS-A Antibody: <0.2 AI   Anti SS-B Antibody: <0.2: Fluorescent Bead Immunoassay   Reference Ranges for SS-A AND SS-B:   <1.0 AI (negative)   > or =1.0 AI (positive)   Reference values apply  to all ages. AI    PHUC Antibody Screening Test (08.08.23 @ 06:25)    SM ([facility]) Ab FBIA: <0.2 AI   Anti-Ribonuclear Protein: 7.3: Fluorescent Bead Immunoassy                      Reference Ranges for RNP and SM:                      <1.0 AI (negative)                      > or =1.0 AI (positive)                      Reference values apply to all ages AI          RADIOLOGY & ADDITIONAL TESTS:

## 2023-08-10 LAB
ANION GAP SERPL CALC-SCNC: 3 MMOL/L — LOW (ref 5–17)
BUN SERPL-MCNC: 11 MG/DL — SIGNIFICANT CHANGE UP (ref 7–23)
CALCIUM SERPL-MCNC: 8.2 MG/DL — LOW (ref 8.5–10.1)
CHLORIDE SERPL-SCNC: 106 MMOL/L — SIGNIFICANT CHANGE UP (ref 96–108)
CO2 SERPL-SCNC: 31 MMOL/L — SIGNIFICANT CHANGE UP (ref 22–31)
CREAT SERPL-MCNC: 0.91 MG/DL — SIGNIFICANT CHANGE UP (ref 0.5–1.3)
EGFR: 91 ML/MIN/1.73M2 — SIGNIFICANT CHANGE UP
GLUCOSE SERPL-MCNC: 82 MG/DL — SIGNIFICANT CHANGE UP (ref 70–99)
HCT VFR BLD CALC: 26.7 % — LOW (ref 39–50)
HGB BLD-MCNC: 8.7 G/DL — LOW (ref 13–17)
MCHC RBC-ENTMCNC: 30.6 PG — SIGNIFICANT CHANGE UP (ref 27–34)
MCHC RBC-ENTMCNC: 32.6 G/DL — SIGNIFICANT CHANGE UP (ref 32–36)
MCV RBC AUTO: 94 FL — SIGNIFICANT CHANGE UP (ref 80–100)
NRBC # BLD: 0 /100 WBCS — SIGNIFICANT CHANGE UP (ref 0–0)
PLATELET # BLD AUTO: 412 K/UL — HIGH (ref 150–400)
POTASSIUM SERPL-MCNC: 3.7 MMOL/L — SIGNIFICANT CHANGE UP (ref 3.5–5.3)
POTASSIUM SERPL-SCNC: 3.7 MMOL/L — SIGNIFICANT CHANGE UP (ref 3.5–5.3)
RBC # BLD: 2.84 M/UL — LOW (ref 4.2–5.8)
RBC # FLD: 15.4 % — HIGH (ref 10.3–14.5)
SODIUM SERPL-SCNC: 140 MMOL/L — SIGNIFICANT CHANGE UP (ref 135–145)
VALPROATE SERPL-MCNC: 39 UG/ML — LOW (ref 50–100)
WBC # BLD: 7.81 K/UL — SIGNIFICANT CHANGE UP (ref 3.8–10.5)
WBC # FLD AUTO: 7.81 K/UL — SIGNIFICANT CHANGE UP (ref 3.8–10.5)

## 2023-08-10 PROCEDURE — 99233 SBSQ HOSP IP/OBS HIGH 50: CPT

## 2023-08-10 PROCEDURE — 99232 SBSQ HOSP IP/OBS MODERATE 35: CPT

## 2023-08-10 RX ORDER — DILTIAZEM HCL 120 MG
60 CAPSULE, EXT RELEASE 24 HR ORAL EVERY 6 HOURS
Refills: 0 | Status: DISCONTINUED | OUTPATIENT
Start: 2023-08-10 | End: 2023-08-23

## 2023-08-10 RX ORDER — ACETAMINOPHEN 500 MG
650 TABLET ORAL ONCE
Refills: 0 | Status: COMPLETED | OUTPATIENT
Start: 2023-08-10 | End: 2023-08-10

## 2023-08-10 RX ADMIN — Medication 60 MILLIGRAM(S): at 12:18

## 2023-08-10 RX ADMIN — LIDOCAINE 1 PATCH: 4 CREAM TOPICAL at 20:07

## 2023-08-10 RX ADMIN — PIPERACILLIN AND TAZOBACTAM 25 GRAM(S): 4; .5 INJECTION, POWDER, LYOPHILIZED, FOR SOLUTION INTRAVENOUS at 06:26

## 2023-08-10 RX ADMIN — Medication 400 MILLIGRAM(S): at 23:00

## 2023-08-10 RX ADMIN — ENOXAPARIN SODIUM 60 MILLIGRAM(S): 100 INJECTION SUBCUTANEOUS at 17:40

## 2023-08-10 RX ADMIN — Medication 650 MILLIGRAM(S): at 12:17

## 2023-08-10 RX ADMIN — Medication 50 MILLIGRAM(S): at 12:21

## 2023-08-10 RX ADMIN — Medication 250 MILLIGRAM(S): at 14:43

## 2023-08-10 RX ADMIN — LIDOCAINE 1 PATCH: 4 CREAM TOPICAL at 00:27

## 2023-08-10 RX ADMIN — Medication 50 MILLIGRAM(S): at 23:43

## 2023-08-10 RX ADMIN — Medication 400 MILLIGRAM(S): at 15:45

## 2023-08-10 RX ADMIN — Medication 400 MILLIGRAM(S): at 22:10

## 2023-08-10 RX ADMIN — NYSTATIN CREAM 1 APPLICATION(S): 100000 CREAM TOPICAL at 06:26

## 2023-08-10 RX ADMIN — Medication 400 MILLIGRAM(S): at 06:26

## 2023-08-10 RX ADMIN — Medication 250 MILLIGRAM(S): at 22:10

## 2023-08-10 RX ADMIN — LIDOCAINE 1 PATCH: 4 CREAM TOPICAL at 23:48

## 2023-08-10 RX ADMIN — SODIUM CHLORIDE 100 MILLILITER(S): 9 INJECTION, SOLUTION INTRAVENOUS at 14:43

## 2023-08-10 RX ADMIN — QUETIAPINE FUMARATE 100 MILLIGRAM(S): 200 TABLET, FILM COATED ORAL at 22:10

## 2023-08-10 RX ADMIN — Medication 60 MILLIGRAM(S): at 23:44

## 2023-08-10 RX ADMIN — Medication 650 MILLIGRAM(S): at 13:15

## 2023-08-10 RX ADMIN — Medication 400 MILLIGRAM(S): at 07:04

## 2023-08-10 RX ADMIN — Medication 60 MILLIGRAM(S): at 06:27

## 2023-08-10 RX ADMIN — Medication 50 MILLIGRAM(S): at 22:10

## 2023-08-10 RX ADMIN — Medication 50 MILLIGRAM(S): at 17:40

## 2023-08-10 RX ADMIN — Medication 50 MILLIGRAM(S): at 00:14

## 2023-08-10 RX ADMIN — Medication 50 MILLIGRAM(S): at 06:26

## 2023-08-10 RX ADMIN — Medication 400 MILLIGRAM(S): at 14:43

## 2023-08-10 RX ADMIN — Medication 250 MILLIGRAM(S): at 06:26

## 2023-08-10 RX ADMIN — LIDOCAINE 1 PATCH: 4 CREAM TOPICAL at 12:17

## 2023-08-10 RX ADMIN — NYSTATIN CREAM 1 APPLICATION(S): 100000 CREAM TOPICAL at 17:40

## 2023-08-10 RX ADMIN — ENOXAPARIN SODIUM 60 MILLIGRAM(S): 100 INJECTION SUBCUTANEOUS at 06:26

## 2023-08-10 RX ADMIN — Medication 60 MILLIGRAM(S): at 17:39

## 2023-08-10 NOTE — PROGRESS NOTE ADULT - SUBJECTIVE AND OBJECTIVE BOX
Patient: ROJAS HE [id number] 68y Male                            Hospitalist Attending Note    Denies complaints.   Seen with daughter and son at bedside.     ____________________PHYSICAL EXAM:  GENERAL:  NAD, awake, confused.   HEENT: NCAT  CARDIOVASCULAR:  S1, S2  LUNGS: CTAB  ABDOMEN:  soft, (-) tenderness, (-) distension, (+) bowel sounds, (-) guarding, (-) rebound (-) rigidity  EXTREMITIES:  no cyanosis / clubbing / edema.   NEURO: b/l contracted. Sensation grossly intact.   ____________________      VITALS:  Vital Signs Last 24 Hrs  T(C): 37.6 (10 Aug 2023 10:42), Max: 37.6 (10 Aug 2023 10:42)  T(F): 99.6 (10 Aug 2023 10:42), Max: 99.6 (10 Aug 2023 10:42)  HR: 114 (10 Aug 2023 10:42) (112 - 123)  BP: 146/82 (10 Aug 2023 10:42) (117/71 - 148/83)  BP(mean): --  RR: 19 (10 Aug 2023 10:42) (18 - 19)  SpO2: 97% (10 Aug 2023 10:42) (95% - 97%)    Parameters below as of 10 Aug 2023 10:42  Patient On (Oxygen Delivery Method): room air  O2 Flow (L/min): 34   Daily     Daily   CAPILLARY BLOOD GLUCOSE        I&O's Summary    09 Aug 2023 07:01  -  10 Aug 2023 07:00  --------------------------------------------------------  IN: 350 mL / OUT: 0 mL / NET: 350 mL    10 Aug 2023 07:01  -  10 Aug 2023 13:51  --------------------------------------------------------  IN: 50 mL / OUT: 0 mL / NET: 50 mL        LABS:                        8.7    7.81  )-----------( 412      ( 10 Aug 2023 12:10 )             26.7     08-10    140  |  106  |  11  ----------------------------<  82  3.7   |  31  |  0.91    Ca    8.2<L>      10 Aug 2023 12:10          Urinalysis Basic - ( 10 Aug 2023 12:10 )    Color: x / Appearance: x / SG: x / pH: x  Gluc: 82 mg/dL / Ketone: x  / Bili: x / Urobili: x   Blood: x / Protein: x / Nitrite: x   Leuk Esterase: x / RBC: x / WBC x   Sq Epi: x / Non Sq Epi: x / Bacteria: x              MEDICATIONS:  acetaminophen     Tablet .. 650 milliGRAM(s) Oral every 6 hours PRN  acetylcysteine 10%  Inhalation 4 milliLiter(s) Inhalation every 6 hours PRN  diltiazem    Tablet 60 milliGRAM(s) Oral every 6 hours  enoxaparin Injectable 60 milliGRAM(s) SubCutaneous every 12 hours  ibuprofen  Tablet. 400 milliGRAM(s) Oral three times a day  lactated ringers. 1000 milliLiter(s) IV Continuous <Continuous>  levalbuterol Inhalation 0.63 milliGRAM(s) Inhalation every 6 hours PRN  lidocaine   4% Patch 1 Patch Transdermal every 24 hours  melatonin 3 milliGRAM(s) Oral at bedtime PRN  metoprolol tartrate 50 milliGRAM(s) Oral every 6 hours  nystatin Powder 1 Application(s) Topical two times a day  QUEtiapine 100 milliGRAM(s) Oral at bedtime  traZODone 50 milliGRAM(s) Oral at bedtime  valproic  acid Syrup 250 milliGRAM(s) Oral three times a day

## 2023-08-10 NOTE — PROGRESS NOTE ADULT - SUBJECTIVE AND OBJECTIVE BOX
Neurology Progress Note    S: Patient seen and examined. No new events overnight    MEDICATIONS:    acetaminophen     Tablet .. 650 milliGRAM(s) Oral every 6 hours PRN  acetylcysteine 10%  Inhalation 4 milliLiter(s) Inhalation every 6 hours PRN  diltiazem    Tablet 60 milliGRAM(s) Oral every 8 hours  enoxaparin Injectable 60 milliGRAM(s) SubCutaneous every 12 hours  ibuprofen  Tablet. 400 milliGRAM(s) Oral three times a day  lactated ringers. 1000 milliLiter(s) IV Continuous <Continuous>  levalbuterol Inhalation 0.63 milliGRAM(s) Inhalation every 6 hours PRN  lidocaine   4% Patch 1 Patch Transdermal every 24 hours  melatonin 3 milliGRAM(s) Oral at bedtime PRN  metoprolol tartrate 50 milliGRAM(s) Oral every 6 hours  nystatin Powder 1 Application(s) Topical two times a day  QUEtiapine 100 milliGRAM(s) Oral at bedtime  traZODone 50 milliGRAM(s) Oral at bedtime  valproic  acid Syrup 250 milliGRAM(s) Oral three times a day      LABS:                          8.0    x     )-----------( x        ( 09 Aug 2023 10:15 )             24.2     08-09    141  |  106  |  15  ----------------------------<  122<H>  3.9   |  30  |  0.89    Ca    8.2<L>      09 Aug 2023 06:42      CAPILLARY BLOOD GLUCOSE          Urinalysis Basic - ( 09 Aug 2023 06:42 )    Color: x / Appearance: x / SG: x / pH: x  Gluc: 122 mg/dL / Ketone: x  / Bili: x / Urobili: x   Blood: x / Protein: x / Nitrite: x   Leuk Esterase: x / RBC: x / WBC x   Sq Epi: x / Non Sq Epi: x / Bacteria: x      I&O's Summary    09 Aug 2023 07:01  -  10 Aug 2023 07:00  --------------------------------------------------------  IN: 350 mL / OUT: 0 mL / NET: 350 mL      Vital Signs Last 24 Hrs  T(C): 37.2 (10 Aug 2023 04:48), Max: 37.4 (09 Aug 2023 16:45)  T(F): 99 (10 Aug 2023 04:48), Max: 99.4 (09 Aug 2023 16:45)  HR: 123 (10 Aug 2023 04:48) (112 - 123)  BP: 148/83 (10 Aug 2023 04:48) (105/63 - 148/83)  BP(mean): --  RR: 18 (10 Aug 2023 04:48) (18 - 19)  SpO2: 96% (10 Aug 2023 04:48) (92% - 97%)    Parameters below as of 10 Aug 2023 04:48  Patient On (Oxygen Delivery Method): room air        Neurological Exam:    Mental Status -lethargic opens eys does not follow commands   Cranial Nerves - PERRL, EOMI, VFF, V1-V3 intact, no gross facial asymmetry,    Motor Exam -   Moves UEs spontanesously LEs atrophy some contracture    Sensory    Intact to light touch and pinprick bilaterally    Reflexes UES hyperreflexia patellar trace Left ankle clonus at times right mute  Coordination: unable   Gait: deferred     I personally reviewed the below data/images/labs:    < from: CT Head No Cont (07.14.23 @ 14:35) >  IMPRESSION:    CT head: Markedly motion degraded exam. No gross evidence of acute   intracranial hemorrhage or mass effect.    CT cervical spine: No evidence for acute displaced fracture or   malalignment.    CT lumbar spine: No evidence for acute displaced fracture or malalignment.    --- End of Report ---

## 2023-08-10 NOTE — PROGRESS NOTE ADULT - SUBJECTIVE AND OBJECTIVE BOX
24 hr events  no acute events  denies SOB  denies cough  low grade fever 100 today  cultures remain negative    ROS  [x] limited due to dementia  no chills  no HA  no SOB  no cough  no CP  no abdominal pain  still unable to straighten out limbs    MEDICATIONS:  acetaminophen     Tablet .. 650 milliGRAM(s) Oral every 6 hours PRN  acetylcysteine 10%  Inhalation 4 milliLiter(s) Inhalation every 6 hours PRN  diltiazem    Tablet 60 milliGRAM(s) Oral every 6 hours  enoxaparin Injectable 60 milliGRAM(s) SubCutaneous every 12 hours  ibuprofen  Tablet. 400 milliGRAM(s) Oral three times a day  lactated ringers. 1000 milliLiter(s) IV Continuous <Continuous>  levalbuterol Inhalation 0.63 milliGRAM(s) Inhalation every 6 hours PRN  lidocaine   4% Patch 1 Patch Transdermal every 24 hours  melatonin 3 milliGRAM(s) Oral at bedtime PRN  metoprolol tartrate 50 milliGRAM(s) Oral every 6 hours  nystatin Powder 1 Application(s) Topical two times a day  QUEtiapine 100 milliGRAM(s) Oral at bedtime  traZODone 50 milliGRAM(s) Oral at bedtime  valproic  acid Syrup 250 milliGRAM(s) Oral three times a day    LABS              8.7    7.81  )-----------( 412      ( 10 Aug 2023 12:10 )             26.7     08-10    140  |  106  |  11  ----------------------------<  82  3.7   |  31  |  0.91    Ca    8.2<L>      10 Aug 2023 12:10    Anti-Nuclear Antibody in AM (08.05.23 @ 06:15)    Anti Nuclear Factor Titer: 1:1280: Antinuclear AB (JESICA), IFA Method   JESICA Pattern: Speckled    Procalcitonin, Serum (08.09.23 @ 10:40)    Procalcitonin, Serum: 0.16 ng/mL    [x] cultures reviewed  [x] imagings reviewed      Vital Signs Last 24 Hrs  T(C): 37.6 (10 Aug 2023 10:42), Max: 37.6 (10 Aug 2023 10:42)  T(F): 99.6 (10 Aug 2023 10:42), Max: 99.6 (10 Aug 2023 10:42)  HR: 114 (10 Aug 2023 10:42) (112 - 123)  BP: 146/82 (10 Aug 2023 10:42) (117/71 - 148/83)  RR: 19 (10 Aug 2023 10:42) (18 - 19)  SpO2: 97% (10 Aug 2023 10:42) (95% - 97%)    Parameters below as of 10 Aug 2023 10:42  Patient On (Oxygen Delivery Method): room air     24 hr events  no acute events  denies SOB  denies cough  low grade fever 100 today  cultures remain negative    ROS  [x] limited due to dementia  no chills  no HA  no SOB  no cough  no CP  no abdominal pain  still unable to straighten out limbs    MEDICATIONS:  acetaminophen     Tablet .. 650 milliGRAM(s) Oral every 6 hours PRN  acetylcysteine 10%  Inhalation 4 milliLiter(s) Inhalation every 6 hours PRN  diltiazem    Tablet 60 milliGRAM(s) Oral every 6 hours  enoxaparin Injectable 60 milliGRAM(s) SubCutaneous every 12 hours  ibuprofen  Tablet. 400 milliGRAM(s) Oral three times a day  lactated ringers. 1000 milliLiter(s) IV Continuous <Continuous>  levalbuterol Inhalation 0.63 milliGRAM(s) Inhalation every 6 hours PRN  lidocaine   4% Patch 1 Patch Transdermal every 24 hours  melatonin 3 milliGRAM(s) Oral at bedtime PRN  metoprolol tartrate 50 milliGRAM(s) Oral every 6 hours  nystatin Powder 1 Application(s) Topical two times a day  QUEtiapine 100 milliGRAM(s) Oral at bedtime  traZODone 50 milliGRAM(s) Oral at bedtime  valproic  acid Syrup 250 milliGRAM(s) Oral three times a day    LABS              8.7    7.81  )-----------( 412      ( 10 Aug 2023 12:10 )             26.7     08-10    140  |  106  |  11  ----------------------------<  82  3.7   |  31  |  0.91    Ca    8.2<L>      10 Aug 2023 12:10    Anti-Nuclear Antibody in AM (08.05.23 @ 06:15)    Anti Nuclear Factor Titer: 1:1280: Antinuclear AB (JESICA), IFA Method   JESICA Pattern: Speckled    Procalcitonin, Serum (08.09.23 @ 10:40)    Procalcitonin, Serum: 0.16 ng/mL    [x] cultures reviewed  [x] imagings reviewed      Vital Signs Last 24 Hrs  T(C): 37.6 (10 Aug 2023 10:42), Max: 37.6 (10 Aug 2023 10:42)  T(F): 99.6 (10 Aug 2023 10:42), Max: 99.6 (10 Aug 2023 10:42)  HR: 114 (10 Aug 2023 10:42) (112 - 123)  BP: 146/82 (10 Aug 2023 10:42) (117/71 - 148/83)  RR: 19 (10 Aug 2023 10:42) (18 - 19)  SpO2: 97% (10 Aug 2023 10:42) (95% - 97%)    Parameters below as of 10 Aug 2023 10:42  Patient On (Oxygen Delivery Method): room air    EXAM  GEN: NAD, comfortable in bed, contracted, fetal position  HEENT: sclera white, EOMI, moist mucus membrane  CV: RRR  PULM; CTA bilaterally, no wheeze, no rhonchi , no visible ecchymosis or palpable hematoma  ABD: soft, NT, ND, +BS  EXT: contracted bilateral with knees flexed to chest, LE edema bilaterally  NEURO: awake, following simple commands, answering simple questions

## 2023-08-10 NOTE — PROGRESS NOTE ADULT - ASSESSMENT
68 years old male with h/o HTN, HLD, TBI dementia here after a fall On exam lethargic orineted x 0 general weakness LE atrophy some hyperrefleia  CTH, C and L reviewed     Impression: generalized weakness. TME rhabdo  aspiration   uti     limit polypharmacy  Can consider MRI brain and C-spine if no CI, non-urgent  outpt EMG  GOCs  -B12, folate, TSH   -In order to enhance patient's overall well-being and clinical course, please try avoiding benzodiazepines, anticholinergics, and antihistamines (Can cause worsening confusion/delirium). Additionally, continue reorientation, supportive care, maintaining regular sleep/wake cycle, and optimizing nutritional/medical factors.   Call back as needed  Can follow up with Neurology, Dr. Rafael Jeffries at [phone]

## 2023-08-10 NOTE — PROGRESS NOTE ADULT - ASSESSMENT
68M PMH HTN, HLD, TBI resulting in dementia presents to ED with ?fall as he was found on the ground. Admitted for rhabdomyolysis, VIKAS.    Hospital course with fevers, leukocytosis.   Cultures negative.   RVP negative  completed a course of ceftriaxone  started on zosyn  Initial CT chest 7/26 with bilateral basilar atelectasis and mucus plugging   Repeat CT chest 8/8 with motion artifact but noted bilateral basilar atelectasis and small pleural effusions, CAROL GGO vs motion artifact, also noted chest wall hematoma vs edema  LE duplex with R femoral DVT - on full dose lovenox  VQ scan with low probability PE  rheumatologic work up in progress with elevated JESICA  bedside lung US yesterday with small simple L effusion and adjacent likely atelectasis, a line predominance         - source of fever remains unclear though fever curve seems to be down trending  - no definite infiltrate noted on chest imaging  - he remains without O2 requirements and is on RA saturating well  - breath sounds overall are clear  - aspiration is a possibility, aspiration pneumonitis can induce fevers and some leukocytosis however unclear if this reason alone is his etiology of persistent fevers  - no intervention needed for small L effusion noted on US  - agree with indium scan in attempt to elucidate source of fever  - at present no clear pulmonary source to explain for persistent fevers   - ID following: antibx per ID- recommending observing off antibx  - follow up rheum serology work up, underlying inflammatory process/rheum process can causes fever, rheum follow  - reconsult as needed for any further pulmonary questions/concerns

## 2023-08-10 NOTE — PROGRESS NOTE ADULT - ASSESSMENT
68M PMH HTN, HLD, TBI with resulting dementia presented to ED as a fall. Patient found with rhabdo and VIKAS, also with possible aspiration pneumonia, remains febrile despite IV Abx.  Seen by ID, rheumatology.  Rheumatology workup in progress.  Pt was unable to have Indium scan due to b/l LE contractures.  ? due to sacroilitis.  Started on NSAIDs.  Evaluated by Neuro.  CT head not suggestive of CVA.  Unable to have MRI Head / neck due to contractures.  s/p course of Zosyn, on regular PT.     LE Doppler : IMPRESSION: There is acute occlusive DVT affecting the right femoral vein with the proximal extent of the thrombus in the right common femoral vein.  < from: CT Chest No Cont (08.08.23 @ 15:21) >  Large left chest wall subcutaneous hematoma versus dependent edema.  New small bilateral pleural effusions.  Left upper lobe infiltrate versus motion artifact. Bilateral lower lobe passive atelectasis.   Limitations as above.  < end of copied text >    # Recurrent fevers - Elevated ESR, CRP, JESICA, antiDSDNA positive.  ? rheumatologic etiology.  Rheumatology consulted and workup in progress.    # Aspiration Pneumonia / b/l Pleural Effusion - ID Following.  s/p Zosyn.  CT not indicative of acute infection.  ? hematoma.  Per nuclear, pt now too contracted to be positioned for indium scan.    # ? L chest hematoma - no obvious ecchymosis of L chest wall.  H/H stable.  Monitor CBC.  # Tachycardia - HR remains elevated.  Continue Cardizem, BBlocker.  Tele showing sinus tachycardia.   Increased cardizem.    # Impaired ambulation / LE contractures - ? due to sacroilitis, ? underlying dementia -  Trial of NSAIDs.  PT consulted and recommending FREDY.  Neuro followup.  CT head not suggestive of acute infarct.  Attempt MR Head / neck, however, pt contracted.    # RLE DVT - continue full dose Lovenox for now.  Transition to DOAC on discharge  # Traumatic brain injury / Dementia - supportive care.  Continue Valproic acid.  Level ordered.    # VIKAS - resolved.  # Rhabdomyolysis - resolved.  # Essential HTN - Monitor BP.  Continue antihypertensives.  # Inpatient DVT Proph - on anticoagulation     Plan of care d/w daughter  [phone] and son at bedside.   68M PMH HTN, HLD, TBI with resulting dementia presented to ED as a fall. Patient found with rhabdo and VIKAS, also with possible aspiration pneumonia, remains febrile despite IV Abx.  Seen by ID, rheumatology.  Rheumatology workup in progress.  Pt was unable to have Indium scan due to b/l LE contractures.  ? due to sacroilitis.  Started on NSAIDs.  Evaluated by Neuro.  CT head not suggestive of CVA.  Unable to have MRI Head / neck due to contractures.  s/p course of Zosyn, on regular PT.     LE Doppler : IMPRESSION: There is acute occlusive DVT affecting the right femoral vein with the proximal extent of the thrombus in the right common femoral vein.  < from: CT Chest No Cont (08.08.23 @ 15:21) >  Large left chest wall subcutaneous hematoma versus dependent edema.  New small bilateral pleural effusions.  Left upper lobe infiltrate versus motion artifact. Bilateral lower lobe passive atelectasis.   Limitations as above.  < end of copied text >    # Recurrent fevers - Elevated ESR, CRP, JESICA, antiDSDNA positive.  ? rheumatologic etiology.  Rheumatology consulted and workup in progress.    # Aspiration Pneumonia / b/l Pleural Effusion - ID Following.  s/p Zosyn.  CT not indicative of acute infection.  ? hematoma.  Per nuclear, pt now too contracted to be positioned for indium scan.    # ? L chest hematoma - no obvious ecchymosis of L chest wall.  H/H stable.  Monitor CBC.  # Tachycardia - HR remains elevated.  Continue Cardizem, BBlocker.  Tele showing sinus tachycardia.   Increased cardizem.    # Impaired ambulation / LE contractures - ? due to sacroilitis, ? underlying dementia -  Trial of NSAIDs.  PT consulted and recommending FREDY.  Neuro followup.  CT head not suggestive of acute infarct.  Attempt MR Head / neck, however, pt contracted.  Outpatient EMG.    # RLE DVT - continue full dose Lovenox for now.  Transition to DOAC on discharge  # Traumatic brain injury / Dementia - supportive care.  Continue Valproic acid.  Level ordered.    # VIKAS - resolved.  # Rhabdomyolysis - resolved.  # Essential HTN - Monitor BP.  Continue antihypertensives.  # Inpatient DVT Proph - on anticoagulation     Plan of care d/w daughter  [phone] and son at bedside.   68M PMH HTN, HLD, TBI with resulting dementia presented to ED as a fall. Patient found with rhabdo and VIKAS, also with possible aspiration pneumonia, remains febrile despite IV Abx.  Seen by ID, rheumatology.  Rheumatology workup in progress.  Pt was unable to have Indium scan due to b/l LE contractures.  ? due to sacroilitis.  Started on NSAIDs.  Evaluated by Neuro.  CT head not suggestive of CVA.  Unable to have MRI Head / neck due to contractures.  s/p course of Zosyn, on regular PT.     LE Doppler : IMPRESSION: There is acute occlusive DVT affecting the right femoral vein with the proximal extent of the thrombus in the right common femoral vein.  < from: CT Chest No Cont (08.08.23 @ 15:21) >  Large left chest wall subcutaneous hematoma versus dependent edema.  New small bilateral pleural effusions.  Left upper lobe infiltrate versus motion artifact. Bilateral lower lobe passive atelectasis.   Limitations as above.  < end of copied text >    # Recurrent fevers - Elevated ESR, CRP, JESICA, antiDSDNA positive.  ? rheumatologic etiology.  Rheumatology consulted and workup in progress.    # Aspiration Pneumonia / b/l Pleural Effusion - ID Following.  s/p Zosyn.  CT not indicative of acute infection.  ? hematoma.  Per nuclear, pt now too contracted to be positioned for indium scan.    # ? L chest hematoma - no obvious ecchymosis of L chest wall.  H/H stable.  Monitor CBC.  # Tachycardia - HR remains elevated.  Continue Cardizem, BBlocker.  Tele showing sinus tachycardia.   Increased cardizem.    # Impaired ambulation / LE contractures / Functional Qudriplegia - ? due to sacroilitis, ? underlying dementia -  Trial of NSAIDs.  PT consulted and recommending FREDY.  Neuro followup.  CT head not suggestive of acute infarct.  Attempt MR Head / neck, however, pt contracted.  Outpatient EMG.    # RLE DVT - continue full dose Lovenox for now.  Transition to DOAC on discharge  # Traumatic brain injury / Dementia - supportive care.  Continue Valproic acid.  Level ordered.    # VIKAS - resolved.  # Rhabdomyolysis - resolved.  # Essential HTN - Monitor BP.  Continue antihypertensives.  # Inpatient DVT Proph - on anticoagulation     Plan of care d/w daughter  [phone] and son at bedside.

## 2023-08-10 NOTE — PROGRESS NOTE ADULT - ASSESSMENT
I doubt that tachycardia essentially since admission was on an infectious basis. Suspect the fever last night represents a superimposed event, with aspiration being most likely. It will be interesting to see whether tachycardia resolves, or not.   Certainly at risk for aspiration given poor mental status superimposed on baseline TBI.  No clear or convincing pneumonia on CT, does have plugging  VQ without PE, does have DVT but I do not think latter accounts for escalating leukocytosis.   RVP's neg  Elevated CK, presumed rhabdomyolysis why still fluctuating and elevated after ~1 week No troponin's sent but most recent ECG only reported as sinus tachycardia.   U/A without significant pyuria now or on admission  Abdominal exam and skin exam limited by patient's behavior, but no gross abdominal tenderness, skin/soft tissue infection, phlebitis    7/28: afebrile >24 hrs, tachycardic, WBC increased 18.61, Cr elevated 1.38, BCs and UC with no growth to date, covid 19 test is negative, CT chest - new RLL mucus plug, new b/l LLs atelectasis, MRSA PCR not detected, will continue with Zosyn for now.   + Right femoral DVT - on Lovenox.   Attending addendum:  I have reviewed all pertinent clinical information and agree with the NP's note.   continue zosyn through the weekend   follow all cultured  trend wbc and fever curve   aspiration precautions   frequent suctioning if necessary     7/31: pt is more awake and alert, more interactive, confused, no fevers since 7/29, WBC elevated 15.5, Cr normalized, LFTs better, MRSA PCR not detected, BCs and UC with no growth to date. Pt completed a course of ceftriaxone, now on zosyn day #6.   Pt's CK is trending up, cardiac enzymes should be obtained.   Attending addendum:  I have reviewed all pertinent clinical information and agree with the NP's note.   continue antibiotics   CK was improving but then starting climbing, asked for cardiac enzymes to be sent  suspended atorvastatin as he is on high dose   please trend CK levels    8/1: low grade fevers, tachycardic, NC, WBC better 12.77,Cr ok, LFTs better, BCs and UC with no growth to date, troponin x 1 negative, on Lovenox for right femoral DVT, Zosyn IV continued.  Attending Addendum--  Case reviewed with NP Aruna Guzman. Her note reviewed and modified as appropriate.   Still with intermittent low-grade fever though trend perhaps moderating  Some decline in WBC  CK still elevated as of the 29th- etiology? Seems too long to attribute to rhabdo from outpatient setting. MB fraction nor troponin impressive  Role of Abx TBD  Drug fever an exclusionary diagnosis, but none known to be new    8/2: continues having fevers, tachycardic, WBC normalized, Cr ok, CK is better 510 today, all prior BCs with no growth to date, will obtain two sets of surveillance BCs. Unclear if pt's fevers are infectious in nature, will continue Zosyn IV for now.   Attending Addendum--  Case reviewed with JOANNA Guzman. Her note reviewed and modified as appropriate.   Still febrile, higher grade  CK diminished  If fevers persist, will consider NM scan, but not clear that he will fit under the camera.  Role of Abx TBD    8/3: low grade temp yesterday late evening, no fevers since then, tachycardic, NC, WBC normalized, Cr ok, all BCs and UC with no growth to date, Zosyn IV continued for now. NM scan should be considered if the pt can fit, + contracted.   Attending Addendum--  Case reviewed with JOANNA Guzman. Her note reviewed and modified as appropriate.   Patient personally assessed and examined.  Seen earlier   Shivering w low grade fever earlier. Abx have not made much impact and cx unrevealing.   Still not clear why CK still elevate- no concern of active injury, no clear tenderness on abdominal exam  Seems too long for rhabdo in setting of normal renal function.    8/4: continues having fevers, tachycardic, RA, WBC increased 12.12, Cr ok, BCs with no growth to date, will continue with Zosyn for now. In favor of obtaining for indium scan, consider rheumatology workup.   Attending Addendum--  Case reviewed with JOANNA Guzman. Her note reviewed and modified as appropriate.   FUO.  NM scan at this point reasonable  Checking serologies re: autoimmune process, especially as persistent CK elevation not well explained at present (myositis?).  Role of abx TBD  I will be available via Teams for any issues that may arise over the weekend.    8/7: continues having low grade temps, tachycardic, RA, no new blood work, BCs remain with no growth to date, Zosyn IV continued for now, pending indium scan, consider rheumatology consult.  Attending Addendum--  Case reviewed with JOANNA Guzman. Her note reviewed and modified as appropriate.   Patient personally assessed and examined.  Seems more alert  Intermittent low grade fever  Awaiting NM study, though not clear how useful it will be  Anti-RNP elevated, CK's variably elevated-- myositis?  JESICA pending  Rheum eval    8/8: pt is very contracted, exam is difficult, continues having fevers, RA, tachycardic, WBC increased 15.64. All BCs with no growth to date, Zosyn IV continued for now, indium scan is pending.   Attending Addendum--  Case reviewed with JOANNA Guzman. Her note reviewed and modified as appropriate.   Patient personally assessed and examined.  Little changed  Trend CBC  Awaiting NM scan  Rheum appreciated    8/9: no fevers within last 24 hrs, tachycardic, RA, WBC better 12.57, BCs with no growth to date, Cr ok, CT chest - large left chest hematoma vs dependent edema, + b/l pleural effusions, CAROL ? infiltrate, b/l LLs atelectasis. Indium scan is pending, Zosyn IV continued for now.   Attending Addendum--  Case reviewed with JOANNA Guzman. Her note reviewed and modified as appropriate.   Aldolase elevated, as espected.  JESICA + 1:1280  CT chest not overly impressive for infectious process. hematoma could cause fever but fever prolonged  Rheum follow up    08/10: afebrile for the last couple days, tachycardic, RA, wbc normalized, RVP negative, all BCs with no growth to date, completed longer course of abx, will continue monitoring off abx for now.     Suggestions--  Aspiration precautions as able  completed long course of abx, continue monitoring off abx for now  MRSA nares PCR - negative   follow BCs - NGTD  trend temperatures and WBC  DVT management per medicine   obtain indium scan - pending   Palliative care consult     Discussed with Dr. Adamson

## 2023-08-10 NOTE — PROGRESS NOTE ADULT - SUBJECTIVE AND OBJECTIVE BOX
ROJAS HE  MRN-[id number]    Follow Up:  fever    Interval History: the pt was seen and examined earlier, no acute distress, awake and alert, denies pain. Pt is afebrile, tachycardic, wbc normalized.     PAST MEDICAL & SURGICAL HISTORY:  TBI (traumatic brain injury)      HLD (hyperlipidemia)      HTN, age 0-18          ROS:    [x ] Unobtainable because: TBI  [ ] All other systems negative    Constitutional: no fever, no chills  Head: no trauma  Eyes: no vision changes, no eye pain  ENT:  no sore throat, no rhinorrhea  Cardiovascular:  no chest pain, no palpitation  Respiratory:  no SOB, no cough  GI:  no abd pain, no vomiting, no diarrhea  urinary: no dysuria, no hematuria, no flank pain  musculoskeletal:  no joint pain, no joint swelling  skin:  no rash  neurology:  no headache, no seizure, no change in mental status  psych: no anxiety, no depression         Allergies  No Known Allergies        ANTIMICROBIALS:      OTHER MEDS:  acetaminophen     Tablet .. 650 milliGRAM(s) Oral every 6 hours PRN  acetylcysteine 10%  Inhalation 4 milliLiter(s) Inhalation every 6 hours PRN  diltiazem    Tablet 60 milliGRAM(s) Oral every 6 hours  enoxaparin Injectable 60 milliGRAM(s) SubCutaneous every 12 hours  ibuprofen  Tablet. 400 milliGRAM(s) Oral three times a day  lactated ringers. 1000 milliLiter(s) IV Continuous <Continuous>  levalbuterol Inhalation 0.63 milliGRAM(s) Inhalation every 6 hours PRN  lidocaine   4% Patch 1 Patch Transdermal every 24 hours  melatonin 3 milliGRAM(s) Oral at bedtime PRN  metoprolol tartrate 50 milliGRAM(s) Oral every 6 hours  nystatin Powder 1 Application(s) Topical two times a day  QUEtiapine 100 milliGRAM(s) Oral at bedtime  traZODone 50 milliGRAM(s) Oral at bedtime  valproic  acid Syrup 250 milliGRAM(s) Oral three times a day      Vital Signs Last 24 Hrs  T(C): 37.6 (10 Aug 2023 10:42), Max: 37.6 (10 Aug 2023 10:42)  T(F): 99.6 (10 Aug 2023 10:42), Max: 99.6 (10 Aug 2023 10:42)  HR: 114 (10 Aug 2023 10:42) (112 - 123)  BP: 146/82 (10 Aug 2023 10:42) (117/71 - 148/83)  BP(mean): --  RR: 19 (10 Aug 2023 10:42) (18 - 19)  SpO2: 97% (10 Aug 2023 10:42) (95% - 97%)    Parameters below as of 10 Aug 2023 10:42  Patient On (Oxygen Delivery Method): room air  O2 Flow (L/min): 34      Physical Exam:  General: Chronically ill-appearing Male in no acute distress.  HEENT: AT/NC. Pupils/EOM unable secondary to patient compliance. On superficial exam of pt's mouth - Missing teeth, no lesions seen  Neck: Increased tone not frankly rigid. No sense of mass.  Nodes: None palpable.  Lungs: Poor effort diminished BS bilaterally, no wheezes, no rhonchi  Heart: Tachycardic with RR, no audible murmur   Abdomen: Bowel sounds present. Soft. Nondistended. Nontender.  Extremities: No cyanosis or clubbing. No edema. Lower extremities contracted  Neuro: awake and alert, nods, does not follow commands.  Skin: Warm. Dry. Good turgor. Right hip pressure injury, not infected looking  Psychiatric: calm behavior     WBC Count: 7.81 K/uL (08-10 @ 12:10)  WBC Count: 12.57 K/uL (08-09 @ 06:42)  WBC Count: 15.64 K/uL (08-08 @ 06:25)  WBC Count: 12.12 K/uL (08-04 @ 07:32)                            8.7    7.81  )-----------( 412      ( 10 Aug 2023 12:10 )             26.7       08-10    140  |  106  |  11  ----------------------------<  82  3.7   |  31  |  0.91    Ca    8.2<L>      10 Aug 2023 12:10        Urinalysis Basic - ( 10 Aug 2023 12:10 )    Color: x / Appearance: x / SG: x / pH: x  Gluc: 82 mg/dL / Ketone: x  / Bili: x / Urobili: x   Blood: x / Protein: x / Nitrite: x   Leuk Esterase: x / RBC: x / WBC x   Sq Epi: x / Non Sq Epi: x / Bacteria: x        Creatinine Trend: 0.91<--, 0.89<--, 0.97<--, 0.93<--, 0.93<--, 1.16<--      MICROBIOLOGY:  v  .Blood Blood  08-04-23   No growth at 5 days  --  --      .Blood Blood-Peripheral  08-02-23   No growth at 5 days  --  --      .Blood Blood-Peripheral  08-02-23   No growth at 5 days  --  --      Catheterized Catheterized  07-30-23   <10,000 CFU/mL Normal Urogenital Ivette  --  --      .Blood Blood-Peripheral  07-29-23   No growth at 5 days  --  --      Clean Catch Clean Catch (Midstream)  07-26-23   <10,000 CFU/mL Normal Urogenital Ivette  --  --      .Blood Blood-Peripheral  07-26-23   No growth at 5 days  --  --      .Blood Blood-Peripheral  07-26-23   No growth at 5 days  --  --      Rapid RVP Result: NotDetec (08-09 @ 11:30)    C-Reactive Protein, Serum: 70 (08-05)    Procalcitonin, Serum: 0.16 (08-09-23 @ 10:40)    SARS-CoV-2: NotDetec (08-09-23 @ 11:30)  Rapid RVP Result: NotDetec (08-09-23 @ 11:30)    SARS-CoV-2: NotDetec (09 Aug 2023 11:30)  SARS-CoV-2: NotDetec (27 Jul 2023 00:30)  SARS-CoV-2: NotDetec (17 Jul 2023 11:44)    RADIOLOGY:

## 2023-08-10 NOTE — PROGRESS NOTE ADULT - TIME BILLING
Patient encounter, exam F-2-F, medical decision making , chart review and documentation
review of chart, blood work, vitals, medications, direct patient care, d/w ID
Patient encounter, exam F-2-F, medical decision making , chart review and documentation

## 2023-08-11 LAB
ANION GAP SERPL CALC-SCNC: 4 MMOL/L — LOW (ref 5–17)
BUN SERPL-MCNC: 9 MG/DL — SIGNIFICANT CHANGE UP (ref 7–23)
CALCIUM SERPL-MCNC: 8.5 MG/DL — SIGNIFICANT CHANGE UP (ref 8.5–10.1)
CHLORIDE SERPL-SCNC: 108 MMOL/L — SIGNIFICANT CHANGE UP (ref 96–108)
CO2 SERPL-SCNC: 31 MMOL/L — SIGNIFICANT CHANGE UP (ref 22–31)
CREAT SERPL-MCNC: 0.86 MG/DL — SIGNIFICANT CHANGE UP (ref 0.5–1.3)
EGFR: 94 ML/MIN/1.73M2 — SIGNIFICANT CHANGE UP
GLUCOSE SERPL-MCNC: 84 MG/DL — SIGNIFICANT CHANGE UP (ref 70–99)
HCT VFR BLD CALC: 27.1 % — LOW (ref 39–50)
HGB BLD-MCNC: 9.1 G/DL — LOW (ref 13–17)
MCHC RBC-ENTMCNC: 31.5 PG — SIGNIFICANT CHANGE UP (ref 27–34)
MCHC RBC-ENTMCNC: 33.6 G/DL — SIGNIFICANT CHANGE UP (ref 32–36)
MCV RBC AUTO: 93.8 FL — SIGNIFICANT CHANGE UP (ref 80–100)
NRBC # BLD: 0 /100 WBCS — SIGNIFICANT CHANGE UP (ref 0–0)
PLATELET # BLD AUTO: 437 K/UL — HIGH (ref 150–400)
POTASSIUM SERPL-MCNC: 4 MMOL/L — SIGNIFICANT CHANGE UP (ref 3.5–5.3)
POTASSIUM SERPL-SCNC: 4 MMOL/L — SIGNIFICANT CHANGE UP (ref 3.5–5.3)
RBC # BLD: 2.89 M/UL — LOW (ref 4.2–5.8)
RBC # FLD: 15.8 % — HIGH (ref 10.3–14.5)
SODIUM SERPL-SCNC: 143 MMOL/L — SIGNIFICANT CHANGE UP (ref 135–145)
WBC # BLD: 8.09 K/UL — SIGNIFICANT CHANGE UP (ref 3.8–10.5)
WBC # FLD AUTO: 8.09 K/UL — SIGNIFICANT CHANGE UP (ref 3.8–10.5)

## 2023-08-11 PROCEDURE — 99232 SBSQ HOSP IP/OBS MODERATE 35: CPT

## 2023-08-11 RX ADMIN — Medication 400 MILLIGRAM(S): at 06:00

## 2023-08-11 RX ADMIN — Medication 60 MILLIGRAM(S): at 05:09

## 2023-08-11 RX ADMIN — LIDOCAINE 1 PATCH: 4 CREAM TOPICAL at 11:53

## 2023-08-11 RX ADMIN — Medication 250 MILLIGRAM(S): at 05:10

## 2023-08-11 RX ADMIN — Medication 50 MILLIGRAM(S): at 18:01

## 2023-08-11 RX ADMIN — Medication 400 MILLIGRAM(S): at 22:15

## 2023-08-11 RX ADMIN — ENOXAPARIN SODIUM 60 MILLIGRAM(S): 100 INJECTION SUBCUTANEOUS at 18:01

## 2023-08-11 RX ADMIN — Medication 250 MILLIGRAM(S): at 13:42

## 2023-08-11 RX ADMIN — SODIUM CHLORIDE 100 MILLILITER(S): 9 INJECTION, SOLUTION INTRAVENOUS at 08:47

## 2023-08-11 RX ADMIN — Medication 400 MILLIGRAM(S): at 05:09

## 2023-08-11 RX ADMIN — Medication 400 MILLIGRAM(S): at 13:42

## 2023-08-11 RX ADMIN — SODIUM CHLORIDE 100 MILLILITER(S): 9 INJECTION, SOLUTION INTRAVENOUS at 22:16

## 2023-08-11 RX ADMIN — Medication 50 MILLIGRAM(S): at 22:15

## 2023-08-11 RX ADMIN — Medication 400 MILLIGRAM(S): at 13:50

## 2023-08-11 RX ADMIN — Medication 60 MILLIGRAM(S): at 18:01

## 2023-08-11 RX ADMIN — Medication 50 MILLIGRAM(S): at 11:54

## 2023-08-11 RX ADMIN — QUETIAPINE FUMARATE 100 MILLIGRAM(S): 200 TABLET, FILM COATED ORAL at 22:15

## 2023-08-11 RX ADMIN — ENOXAPARIN SODIUM 60 MILLIGRAM(S): 100 INJECTION SUBCUTANEOUS at 05:11

## 2023-08-11 RX ADMIN — Medication 50 MILLIGRAM(S): at 05:09

## 2023-08-11 RX ADMIN — NYSTATIN CREAM 1 APPLICATION(S): 100000 CREAM TOPICAL at 18:06

## 2023-08-11 RX ADMIN — Medication 60 MILLIGRAM(S): at 11:54

## 2023-08-11 RX ADMIN — Medication 250 MILLIGRAM(S): at 22:15

## 2023-08-11 RX ADMIN — NYSTATIN CREAM 1 APPLICATION(S): 100000 CREAM TOPICAL at 05:10

## 2023-08-11 NOTE — PROGRESS NOTE ADULT - NS ATTEND OPT1A GEN_ALL_CORE
Medical decision making
History/Exam/Medical decision making
Medical decision making
History/Exam/Medical decision making

## 2023-08-11 NOTE — PROGRESS NOTE ADULT - ASSESSMENT
68M PMH HTN, HLD, TBI with resulting dementia presented to ED as a fall. Patient found with rhabdo and VIKAS, also with possible aspiration pneumonia, remains febrile despite IV Abx.  Seen by ID, rheumatology.  Rheumatology workup in progress.  Pt was unable to have Indium scan due to b/l LE contractures.  ? due to sacroilitis.  Started on NSAIDs.  Evaluated by Neuro.  CT head not suggestive of CVA.  Unable to have MRI Head / neck due to contractures.  s/p course of Zosyn, on regular PT.     LE Doppler : IMPRESSION: There is acute occlusive DVT affecting the right femoral vein with the proximal extent of the thrombus in the right common femoral vein.  < from: CT Chest No Cont (08.08.23 @ 15:21) >  Large left chest wall subcutaneous hematoma versus dependent edema.  New small bilateral pleural effusions.  Left upper lobe infiltrate versus motion artifact. Bilateral lower lobe passive atelectasis.   Limitations as above.  < end of copied text >    # Recurrent fevers - Elevated ESR, CRP, JESICA, antiDSDNA positive.  ? rheumatologic etiology.  Rheumatology consulted and workup in progress.    # Aspiration Pneumonia / b/l Pleural Effusion - ID Following.  s/p Zosyn.  CT not indicative of acute infection.  ? hematoma.  Per nuclear, pt now too contracted to be positioned for indium scan.    # ? L chest hematoma - no obvious ecchymosis of L chest wall.  H/H stable.  Monitor CBC.  # Tachycardia - HR remains elevated.  Continue Cardizem, BBlocker.  Tele showing sinus tachycardia.   Increased cardizem.    # Impaired ambulation / LE contractures / Functional Qudriplegia - ? due to sacroilitis, ? underlying dementia -  Trial of NSAIDs.  PT consulted and recommending FREDY.  Neuro followup.  CT head not suggestive of acute infarct.  Attempt MR Head / neck, however, pt contracted.  Outpatient EMG.    # RLE DVT - continue full dose Lovenox for now.  Transition to DOAC on discharge  # Traumatic brain injury / Dementia - supportive care.  Continue Valproic acid.  Level ordered.    # VIKAS - resolved.  # Rhabdomyolysis - resolved.  # Essential HTN - Monitor BP.  Continue antihypertensives.  # Inpatient DVT Proph - on anticoagulation     Plan of care d/w daughter  [phone] and son at bedside. 68M PMH HTN, HLD, TBI with resulting dementia presented to ED as a fall. Patient found with rhabdo and VIKAS, also with possible aspiration pneumonia, remains febrile despite IV Abx.  Seen by ID, rheumatology.  Rheumatology workup in progress.  Pt was unable to have Indium scan due to b/l LE contractures.  ? due to sacroilitis.  Started on NSAIDs.  Evaluated by Neuro.  CT head not suggestive of CVA.  Unable to have MRI Head / neck due to contractures.  s/p course of Zosyn, on regular PT.     LE Doppler : IMPRESSION: There is acute occlusive DVT affecting the right femoral vein with the proximal extent of the thrombus in the right common femoral vein.    Large left chest wall subcutaneous hematoma versus dependent edema.  New small bilateral pleural effusions.  Left upper lobe infiltrate versus motion artifact. Bilateral lower lobe passive atelectasis.      # Recurrent fevers - Elevated ESR, CRP, JESICA, antiDSDNA positive.  ? rheumatologic etiology.  Rheumatology consulted and workup in progress.    Pt afebrile now, but continues to be tachycardic, labs reviewed    # Aspiration Pneumonia / b/l Pleural Effusion - ID Following.  s/p Zosyn.  CT not indicative of acute infection.  ? hematoma.  Per nuclear, pt now too contracted to be positioned for indium scan.    As per ID- continue to monitor off antibiotics, continue aspiration precautions,   # ? L chest hematoma - no obvious ecchymosis of L chest wall.  H/H stable.  Monitor CBC.  # Tachycardia - HR remains elevated.  Continue Cardizem, BBlocker.  Tele showing sinus tachycardia.   Increased cardizem as needed.     # Impaired ambulation / LE contractures / Functional Quadriplegia - ? due to sacroilitis, ? underlying dementia -  Trial of NSAIDs.  PT consulted and recommending FREDY.  Neuro following.  CT head not suggestive of acute infarct.  Attempt MR Head / neck, however, pt contracted- consider if pt were to improve.  Outpatient EMG.    As per neuro---"please try avoiding benzodiazepines, anticholinergics, and antihistamines (Can cause worsening confusion/delirium). Additionally, continue reorientation, supportive care, maintaining regular sleep/wake cycle, and optimizing nutritional/medical factors"  -frequent turns, aspiration precautions    # RLE DVT - continue full dose Lovenox for now.  Transition to DOAC on discharge  # Traumatic brain injury / Dementia - supportive care.  Continue Valproic acid.  Level bit below norm on 8/10, repeat again and adjust as needed  # VIKAS - resolved.  # Rhabdomyolysis - resolved.  # Essential HTN - Monitor BP.  Continue antihypertensives.  # Inpatient DVT Proph - on anticoagulation   #GOC- will need to call family, also obtain Palliative on Monday       daughter  [phone] ---please call over the weekend and discuss GOC

## 2023-08-11 NOTE — PROGRESS NOTE ADULT - SUBJECTIVE AND OBJECTIVE BOX
ROJAS HE  MRN-[id number]    Follow Up:  fever    Interval History: the pt was seen and examined earlier, no acute distress, pt is awake and alert, able to answer some questions, denies pain. Pt is afebrile, tachycardic, no leukocytosis.     PAST MEDICAL & SURGICAL HISTORY:  TBI (traumatic brain injury)      HLD (hyperlipidemia)      HTN, age 0-18          ROS:    [x ] Unobtainable because: TBI, denies pain   [ ] All other systems negative    Constitutional: no fever, no chills  Head: no trauma  Eyes: no vision changes, no eye pain  ENT:  no sore throat, no rhinorrhea  Cardiovascular:  no chest pain, no palpitation  Respiratory:  no SOB, no cough  GI:  no abd pain, no vomiting, no diarrhea  urinary: no dysuria, no hematuria, no flank pain  musculoskeletal:  no joint pain, no joint swelling  skin:  no rash  neurology:  no headache, no seizure, no change in mental status  psych: no anxiety, no depression         Allergies  No Known Allergies        ANTIMICROBIALS:      OTHER MEDS:  acetaminophen     Tablet .. 650 milliGRAM(s) Oral every 6 hours PRN  acetylcysteine 10%  Inhalation 4 milliLiter(s) Inhalation every 6 hours PRN  diltiazem    Tablet 60 milliGRAM(s) Oral every 6 hours  enoxaparin Injectable 60 milliGRAM(s) SubCutaneous every 12 hours  ibuprofen  Tablet. 400 milliGRAM(s) Oral three times a day  lactated ringers. 1000 milliLiter(s) IV Continuous <Continuous>  levalbuterol Inhalation 0.63 milliGRAM(s) Inhalation every 6 hours PRN  lidocaine   4% Patch 1 Patch Transdermal every 24 hours  melatonin 3 milliGRAM(s) Oral at bedtime PRN  metoprolol tartrate 50 milliGRAM(s) Oral every 6 hours  nystatin Powder 1 Application(s) Topical two times a day  QUEtiapine 100 milliGRAM(s) Oral at bedtime  traZODone 50 milliGRAM(s) Oral at bedtime  valproic  acid Syrup 250 milliGRAM(s) Oral three times a day      Vital Signs Last 24 Hrs  T(C): 37.3 (11 Aug 2023 10:52), Max: 37.4 (10 Aug 2023 19:10)  T(F): 99.2 (11 Aug 2023 10:52), Max: 99.4 (10 Aug 2023 19:10)  HR: 109 (11 Aug 2023 10:52) (97 - 116)  BP: 139/80 (11 Aug 2023 10:52) (139/80 - 153/94)  BP(mean): --  RR: 19 (11 Aug 2023 10:52) (16 - 19)  SpO2: 95% (11 Aug 2023 10:52) (92% - 96%)    Parameters below as of 11 Aug 2023 10:52  Patient On (Oxygen Delivery Method): room air        Physical Exam:  General: Chronically ill-appearing Male in no acute distress.  HEENT: AT/NC. Pupils/EOM unable secondary to patient compliance. On superficial exam of pt's mouth - Missing teeth, no lesions seen  Neck: Increased tone not frankly rigid. No sense of mass.  Nodes: None palpable.  Lungs: Poor effort diminished BS bilaterally, no wheezes, no rhonchi  Heart: Tachycardic with RR, no audible murmur   Abdomen: Bowel sounds present. Soft. Nondistended. Nontender.  Extremities: No cyanosis or clubbing. + edema. Lower extremities contracted  Neuro: awake and alert, nods, does not follow commands.  Skin: Warm. Dry. Good turgor. Right hip pressure injury, not infected looking  Psychiatric: calm behavior     WBC Count: 8.09 K/uL (08-11 @ 06:52)  WBC Count: 7.81 K/uL (08-10 @ 12:10)  WBC Count: 12.57 K/uL (08-09 @ 06:42)  WBC Count: 15.64 K/uL (08-08 @ 06:25)                            9.1    8.09  )-----------( 437      ( 11 Aug 2023 06:52 )             27.1       08-11    143  |  108  |  9   ----------------------------<  84  4.0   |  31  |  0.86    Ca    8.5      11 Aug 2023 06:52        Urinalysis Basic - ( 11 Aug 2023 06:52 )    Color: x / Appearance: x / SG: x / pH: x  Gluc: 84 mg/dL / Ketone: x  / Bili: x / Urobili: x   Blood: x / Protein: x / Nitrite: x   Leuk Esterase: x / RBC: x / WBC x   Sq Epi: x / Non Sq Epi: x / Bacteria: x        Creatinine Trend: 0.86<--, 0.91<--, 0.89<--, 0.97<--, 0.93<--, 0.93<--      MICROBIOLOGY:  v  .Blood Blood  08-04-23   No growth at 5 days  --  --      .Blood Blood-Peripheral  08-02-23   No growth at 5 days  --  --      .Blood Blood-Peripheral  08-02-23   No growth at 5 days  --  --      Catheterized Catheterized  07-30-23   <10,000 CFU/mL Normal Urogenital Ivette  --  --      .Blood Blood-Peripheral  07-29-23   No growth at 5 days  --  --      Clean Catch Clean Catch (Midstream)  07-26-23   <10,000 CFU/mL Normal Urogenital Ivette  --  --      .Blood Blood-Peripheral  07-26-23   No growth at 5 days  --  --      .Blood Blood-Peripheral  07-26-23   No growth at 5 days  --  --      Rapid RVP Result: NotDetec (08-09 @ 11:30)    C-Reactive Protein, Serum: 70 (08-05)      Procalcitonin, Serum: 0.16 (08-09-23 @ 10:40)    SARS-CoV-2: NotDetec (08-09-23 @ 11:30)  Rapid RVP Result: NotDetec (08-09-23 @ 11:30)    SARS-CoV-2: NotDetec (09 Aug 2023 11:30)  SARS-CoV-2: NotDetec (27 Jul 2023 00:30)  SARS-CoV-2: NotDetec (17 Jul 2023 11:44)    RADIOLOGY:

## 2023-08-11 NOTE — PROGRESS NOTE ADULT - NS ATTEND OPT1 GEN_ALL_CORE

## 2023-08-11 NOTE — PROGRESS NOTE ADULT - SUBJECTIVE AND OBJECTIVE BOX
Neurology Progress Note    S: Patient seen and examined. No new events overnight    MEDICATIONS:    acetaminophen     Tablet .. 650 milliGRAM(s) Oral every 6 hours PRN  acetylcysteine 10%  Inhalation 4 milliLiter(s) Inhalation every 6 hours PRN  diltiazem    Tablet 60 milliGRAM(s) Oral every 6 hours  enoxaparin Injectable 60 milliGRAM(s) SubCutaneous every 12 hours  ibuprofen  Tablet. 400 milliGRAM(s) Oral three times a day  lactated ringers. 1000 milliLiter(s) IV Continuous <Continuous>  levalbuterol Inhalation 0.63 milliGRAM(s) Inhalation every 6 hours PRN  lidocaine   4% Patch 1 Patch Transdermal every 24 hours  melatonin 3 milliGRAM(s) Oral at bedtime PRN  metoprolol tartrate 50 milliGRAM(s) Oral every 6 hours  nystatin Powder 1 Application(s) Topical two times a day  QUEtiapine 100 milliGRAM(s) Oral at bedtime  traZODone 50 milliGRAM(s) Oral at bedtime  valproic  acid Syrup 250 milliGRAM(s) Oral three times a day      LABS:                          9.1    8.09  )-----------( 437      ( 11 Aug 2023 06:52 )             27.1     08-11    143  |  108  |  9   ----------------------------<  84  4.0   |  31  |  0.86    Ca    8.5      11 Aug 2023 06:52      CAPILLARY BLOOD GLUCOSE          Urinalysis Basic - ( 11 Aug 2023 06:52 )    Color: x / Appearance: x / SG: x / pH: x  Gluc: 84 mg/dL / Ketone: x  / Bili: x / Urobili: x   Blood: x / Protein: x / Nitrite: x   Leuk Esterase: x / RBC: x / WBC x   Sq Epi: x / Non Sq Epi: x / Bacteria: x      I&O's Summary    10 Aug 2023 07:01  -  11 Aug 2023 07:00  --------------------------------------------------------  IN: 290 mL / OUT: 0 mL / NET: 290 mL      Vital Signs Last 24 Hrs  T(C): 37.3 (11 Aug 2023 10:52), Max: 37.8 (10 Aug 2023 14:00)  T(F): 99.2 (11 Aug 2023 10:52), Max: 100 (10 Aug 2023 14:00)  HR: 109 (11 Aug 2023 10:52) (97 - 116)  BP: 139/80 (11 Aug 2023 10:52) (139/80 - 153/94)  BP(mean): --  RR: 19 (11 Aug 2023 10:52) (16 - 19)  SpO2: 95% (11 Aug 2023 10:52) (92% - 96%)    Parameters below as of 11 Aug 2023 10:52  Patient On (Oxygen Delivery Method): room air        Neurological Exam:    Mental Status -lethargic opens eys does not follow commands   Cranial Nerves - PERRL, EOMI, VFF, V1-V3 intact, no gross facial asymmetry,    Motor Exam -   Moves UEs spontanesously LEs atrophy some contracture    Sensory    Intact to light touch and pinprick bilaterally    Reflexes UES hyperreflexia patellar trace Left ankle clonus at times right mute  Coordination: unable   Gait: deferred     I personally reviewed the below data/images/labs:    < from: CT Head No Cont (07.14.23 @ 14:35) >  IMPRESSION:    CT head: Markedly motion degraded exam. No gross evidence of acute   intracranial hemorrhage or mass effect.    CT cervical spine: No evidence for acute displaced fracture or   malalignment.    CT lumbar spine: No evidence for acute displaced fracture or malalignment.    --- End of Report ---

## 2023-08-11 NOTE — PROGRESS NOTE ADULT - SUBJECTIVE AND OBJECTIVE BOX
Patient is a 68y old  Male who presents with a chief complaint of VIKAS, rhabdomyolysis (11 Aug 2023 16:15)      INTERVAL HPI/OVERNIGHT EVENTS:    MEDICATIONS  (STANDING):  diltiazem    Tablet 60 milliGRAM(s) Oral every 6 hours  enoxaparin Injectable 60 milliGRAM(s) SubCutaneous every 12 hours  ibuprofen  Tablet. 400 milliGRAM(s) Oral three times a day  lactated ringers. 1000 milliLiter(s) (100 mL/Hr) IV Continuous <Continuous>  lidocaine   4% Patch 1 Patch Transdermal every 24 hours  metoprolol tartrate 50 milliGRAM(s) Oral every 6 hours  nystatin Powder 1 Application(s) Topical two times a day  QUEtiapine 100 milliGRAM(s) Oral at bedtime  traZODone 50 milliGRAM(s) Oral at bedtime  valproic  acid Syrup 250 milliGRAM(s) Oral three times a day    MEDICATIONS  (PRN):  acetaminophen     Tablet .. 650 milliGRAM(s) Oral every 6 hours PRN Temp greater or equal to 38C (100.4F)  acetylcysteine 10%  Inhalation 4 milliLiter(s) Inhalation every 6 hours PRN dyspnea  levalbuterol Inhalation 0.63 milliGRAM(s) Inhalation every 6 hours PRN Shortness of breath/Wheezing  melatonin 3 milliGRAM(s) Oral at bedtime PRN Insomnia      Allergies    No Known Allergies    Intolerances        REVIEW OF SYSTEMS:  CONSTITUTIONAL: No fever, weight loss, or fatigue  EYES: No eye pain, visual disturbances, or discharge  ENMT:  No difficulty hearing, tinnitus, vertigo; No sinus or throat pain  NECK: No pain or stiffness  BREASTS: No pain, masses, or nipple discharge  RESPIRATORY: No cough, wheezing, chills or hemoptysis; No shortness of breath  CARDIOVASCULAR: No chest pain, palpitations, dizziness, or leg swelling  GASTROINTESTINAL: No abdominal or epigastric pain. No nausea, vomiting, or hematemesis; No diarrhea or constipation. No melena or hematochezia.  GENITOURINARY: No dysuria, frequency, hematuria, or incontinence  NEUROLOGICAL: No headaches, memory loss, loss of strength, numbness, or tremors  SKIN: No itching, burning, rashes, or lesions   LYMPH NODES: No enlarged glands  ENDOCRINE: No heat or cold intolerance; No hair loss  MUSCULOSKELETAL: No joint pain or swelling; No muscle, back, or extremity pain  PSYCHIATRIC: No depression, anxiety, mood swings, or difficulty sleeping  HEME/LYMPH: No easy bruising, or bleeding gums  ALLERGY AND IMMUNOLOGIC: No hives or eczema    Vital Signs Last 24 Hrs  T(C): 37.5 (11 Aug 2023 16:37), Max: 37.5 (11 Aug 2023 16:37)  T(F): 99.5 (11 Aug 2023 16:37), Max: 99.5 (11 Aug 2023 16:37)  HR: 107 (11 Aug 2023 16:37) (97 - 116)  BP: 147/73 (11 Aug 2023 16:37) (139/80 - 149/94)  BP(mean): --  RR: 17 (11 Aug 2023 16:37) (16 - 19)  SpO2: 93% (11 Aug 2023 16:37) (92% - 96%)    Parameters below as of 11 Aug 2023 16:37  Patient On (Oxygen Delivery Method): room air        PHYSICAL EXAM:  GENERAL: NAD, well-groomed, well-developed  HEAD:  Atraumatic, Normocephalic  EYES: EOMI, PERRLA, conjunctiva and sclera clear  ENMT: No tonsillar erythema, exudates, or enlargement; Moist mucous membranes, Good dentition, No lesions  NECK: Supple, No JVD, Normal thyroid  NERVOUS SYSTEM:  Alert & Oriented X3, Good concentration; Motor Strength 5/5 B/L upper and lower extremities; DTRs 2+ intact and symmetric  CHEST/LUNG: Clear to percussion bilaterally; No rales, rhonchi, wheezing, or rubs  HEART: Regular rate and rhythm; No murmurs, rubs, or gallops  ABDOMEN: Soft, Nontender, Nondistended; Bowel sounds present  EXTREMITIES:  2+ Peripheral Pulses, No clubbing, cyanosis, or edema  LYMPH: No lymphadenopathy noted  SKIN: No rashes or lesions    LABS:                        9.1    8.09  )-----------( 437      ( 11 Aug 2023 06:52 )             27.1     08-11    143  |  108  |  9   ----------------------------<  84  4.0   |  31  |  0.86    Ca    8.5      11 Aug 2023 06:52        Urinalysis Basic - ( 11 Aug 2023 06:52 )    Color: x / Appearance: x / SG: x / pH: x  Gluc: 84 mg/dL / Ketone: x  / Bili: x / Urobili: x   Blood: x / Protein: x / Nitrite: x   Leuk Esterase: x / RBC: x / WBC x   Sq Epi: x / Non Sq Epi: x / Bacteria: x      CAPILLARY BLOOD GLUCOSE          RADIOLOGY & ADDITIONAL TESTS:    Imaging Personally Reviewed:  [ ] YES  [ ] NO    Consultant(s) Notes Reviewed:  [ ] YES  [ ] NO    Care Discussed with Consultants/Other Providers [ ] YES  [ ] NO Patient is a 68y old  Male who presents with a chief complaint of VIKAS, rhabdomyolysis (11 Aug 2023 16:15)      INTERVAL HPI/OVERNIGHT EVENTS: no changes overnight as per nursing, pt calm, no complaints     MEDICATIONS  (STANDING):  diltiazem    Tablet 60 milliGRAM(s) Oral every 6 hours  enoxaparin Injectable 60 milliGRAM(s) SubCutaneous every 12 hours  ibuprofen  Tablet. 400 milliGRAM(s) Oral three times a day  lactated ringers. 1000 milliLiter(s) (100 mL/Hr) IV Continuous <Continuous>  lidocaine   4% Patch 1 Patch Transdermal every 24 hours  metoprolol tartrate 50 milliGRAM(s) Oral every 6 hours  nystatin Powder 1 Application(s) Topical two times a day  QUEtiapine 100 milliGRAM(s) Oral at bedtime  traZODone 50 milliGRAM(s) Oral at bedtime  valproic  acid Syrup 250 milliGRAM(s) Oral three times a day    MEDICATIONS  (PRN):  acetaminophen     Tablet .. 650 milliGRAM(s) Oral every 6 hours PRN Temp greater or equal to 38C (100.4F)  acetylcysteine 10%  Inhalation 4 milliLiter(s) Inhalation every 6 hours PRN dyspnea  levalbuterol Inhalation 0.63 milliGRAM(s) Inhalation every 6 hours PRN Shortness of breath/Wheezing  melatonin 3 milliGRAM(s) Oral at bedtime PRN Insomnia      Allergies    No Known Allergies    Intolerances        REVIEW OF SYSTEMS:  unable to clearly obtain, pt does not respond to all questions    Vital Signs Last 24 Hrs  T(C): 37.5 (11 Aug 2023 16:37), Max: 37.5 (11 Aug 2023 16:37)  T(F): 99.5 (11 Aug 2023 16:37), Max: 99.5 (11 Aug 2023 16:37)  HR: 107 (11 Aug 2023 16:37) (97 - 116)  BP: 147/73 (11 Aug 2023 16:37) (139/80 - 149/94)  BP(mean): --  RR: 17 (11 Aug 2023 16:37) (16 - 19)  SpO2: 93% (11 Aug 2023 16:37) (92% - 96%)    Parameters below as of 11 Aug 2023 16:37  Patient On (Oxygen Delivery Method): room air        PHYSICAL EXAM:  GENERAL: NAD, but chronically ill appearing.   HEAD:  Atraumatic, Normocephalic  EYES: EOMI, PERRLA, conjunctiva and sclera clear  ENMT: Some missing teeth, no lesions seen, does not want to open mouth.  NECK: Supple, No JVD, Normal thyroid  NERVOUS SYSTEM:  Alert & Oriented X1, but does not follow directions well.   CHEST/LUNG: Clear to percussion bilaterally; No rales, rhonchi, wheezing, or rubs  HEART: Regular rate and rhythm; No murmurs, rubs, or gallops  ABDOMEN: Soft, Nontender, Nondistended; Bowel sounds present  EXTREMITIES:  2+ Peripheral Pulses, No clubbing, cyanosis, or +1edema, extremities very contracted  SKIN: No rashes or lesions, right hip pressure ulcer (not infected)      LABS:                        9.1    8.09  )-----------( 437      ( 11 Aug 2023 06:52 )             27.1     08-11    143  |  108  |  9   ----------------------------<  84  4.0   |  31  |  0.86    Ca    8.5      11 Aug 2023 06:52        Urinalysis Basic - ( 11 Aug 2023 06:52 )    Color: x / Appearance: x / SG: x / pH: x  Gluc: 84 mg/dL / Ketone: x  / Bili: x / Urobili: x   Blood: x / Protein: x / Nitrite: x   Leuk Esterase: x / RBC: x / WBC x   Sq Epi: x / Non Sq Epi: x / Bacteria: x      CAPILLARY BLOOD GLUCOSE          RADIOLOGY & ADDITIONAL TESTS:    Imaging Personally Reviewed:  [ ] YES  [ ] NO    Consultant(s) Notes Reviewed:  [ ] YES  [ ] NO    Care Discussed with Consultants/Other Providers [ ] YES  [ ] NO

## 2023-08-11 NOTE — PROGRESS NOTE ADULT - ASSESSMENT
68 years old male with h/o HTN, HLD, TBI dementia here after a fall On exam lethargic orineted x 0 general weakness LE atrophy some hyperrefleia  CTH, C and L reviewed     Impression: generalized weakness. TME rhabdo  aspiration   uti     limit polypharmacy  Can consider MRI brain and C-spine if no CI, non-urgent  outpt EMG  GOCs  -B12, folate, TSH   -In order to enhance patient's overall well-being and clinical course, please try avoiding benzodiazepines, anticholinergics, and antihistamines (Can cause worsening confusion/delirium). Additionally, continue reorientation, supportive care, maintaining regular sleep/wake cycle, and optimizing nutritional/medical factors.   Call back as needed  Can follow up with Neurology, Dr. Rafael Jeffries at [phone]

## 2023-08-11 NOTE — PROGRESS NOTE ADULT - NS ATTEND AMEND GEN_ALL_CORE FT
Attending Addendum--  Case reviewed with JOANNA Guzman. Her note reviewed and modified as appropriate.   FUO.  NM scan at this point reasonable  Checking serologies re: autoimmune process, especially as persistent CK elevation not well explained at present (myositis?).  Role of abx TBD  I will be available via Teams for any issues that may arise over the weekend.    Joel Adamson MD  Attending Physician  [facility]  Division of Infectious Diseases  [phone]
Attending Addendum--  Case reviewed with JOANNA Guzman. Her note reviewed and modified as appropriate.   Patient personally assessed and examined.  Afebrile.  More awake/alert  Feels "all right" no specific complaints.  Defer additional antibiotics for ow  Joel Adamson MD  Attending Physician  [facility]  Division of Infectious Diseases  [phone]
I have reviewed all pertinent clinical information and agree with the NP's note.   continue zosyn through the weekend   follow all cultured  trend wbc and fever curve   aspiration precautions   frequent suctioning if necessary     Severino Potts, DO  Infectious Disease Attending  Reachable via [employer] Teams or ID office: [phone]  After 5pm/weekends please call [phone] for all inquiries and new consults
I have reviewed all pertinent clinical information and agree with the NP's note.   monitor off antibiotics   discuss with our team if any concern fo recurrence and if antibiotics need to be restarted    Severino Potts, DO  Infectious Disease Attending  Reachable via [employer] Teams or ID office: [phone]  After 5pm/weekends please call [phone] for all inquiries and new consults
Attending Addendum--  Case reviewed with JOANNA Guzman. Her note reviewed and modified as appropriate.   Patient personally assessed and examined.  Little changed  Trend CBC  Awaiting NM scan  Rheum appreciated  Joel Adamson MD  Attending Physician  [facility]  Division of Infectious Diseases  [phone]
Attending Addendum--  Case reviewed with JOANNA Guzman. Her note reviewed and modified as appropriate.   Patient personally assessed and examined.  Seen earlier   Shivering w low grade fever earlier. Abx have not made much impact and cx unrevealing.   Still not clear why CK still elevate- no concern of active injury, no clear tenderness on abdominal exam  Seems too long for rhabdo in setting of normal renal function.  Joel Adamson MD  Attending Physician  [facility]  Division of Infectious Diseases  [phone]
Attending Addendum--  Case reviewed with JOANNA Guzman. Her note reviewed and modified as appropriate.   Still with intermittent low-grade fever though trend perhaps moderating  Some decline in WBC  CK still elevated as of the 29th- etiology? Seems too long to attribute to rhabdo from outpatient setting. MB fraction nor troponin impressive  Role of Abx TBD  Drug fever an exclusionary diagnosis, but none known to be new  Joel Adamson MD  Attending Physician  [facility]  Division of Infectious Diseases  [phone]
I have reviewed all pertinent clinical information and agree with the NP's note.   continue antibiotics   CK was improving but then starting climbing, asked for cardiac enzymes to be sent  suspended atorvastatin as he is on high dose   please trend CK levels    Severino Potts, DO  Infectious Disease Attending  Reachable via [employer] Teams or ID office: [phone]  After 5pm/weekends please call [phone] for all inquiries and new consults
Attending Addendum--  Case reviewed with JOANNA Guzman. Her note reviewed and modified as appropriate.   Patient personally assessed and examined.  Seems more alert  Intermittent low grade fever  Awaiting NM study, though not clear how useful it will be  Anti-RNP elevated, CK's variably elevated-- myositis?  JESICA pending  Rheum eval  Reviewed with Dr. Lawrence Adamson MD  Attending Physician  [facility]  Division of Infectious Diseases  [phone]
personally saw and examined pt   labs and vitals reviewed  agree with above assessment and plan
pt seen and examined  labs and vitals reviewed  agree with above assessment and plan
Attending Addendum--  Case reviewed with JOANNA Guzman. Her note reviewed and modified as appropriate.   Aldolase elevated, as espected.  JESICA + 1:1280  CT chest not overly impressive for infectious process. hematoma could cause fever but fever prolonged  Rheum follow up  Joel Adamson MD  Attending Physician  [facility]  Division of Infectious Diseases  [phone]
Attending Addendum--  Case reviewed with JOANNA Guzman. Her note reviewed and modified as appropriate.   Still febrile, higher grade  CK diminished  If fevers persist, will consider NM scan, but not clear that he will fit under the camera.  Role of Abx TBD  Thank you for the courtesy of this referral.  Jole Adamson MD  Attending Physician  [facility]  Division of Infectious Diseases  [phone]

## 2023-08-11 NOTE — PROGRESS NOTE ADULT - ASSESSMENT
I doubt that tachycardia essentially since admission was on an infectious basis. Suspect the fever last night represents a superimposed event, with aspiration being most likely. It will be interesting to see whether tachycardia resolves, or not.   Certainly at risk for aspiration given poor mental status superimposed on baseline TBI.  No clear or convincing pneumonia on CT, does have plugging  VQ without PE, does have DVT but I do not think latter accounts for escalating leukocytosis.   RVP's neg  Elevated CK, presumed rhabdomyolysis why still fluctuating and elevated after ~1 week No troponin's sent but most recent ECG only reported as sinus tachycardia.   U/A without significant pyuria now or on admission  Abdominal exam and skin exam limited by patient's behavior, but no gross abdominal tenderness, skin/soft tissue infection, phlebitis    7/28: afebrile >24 hrs, tachycardic, WBC increased 18.61, Cr elevated 1.38, BCs and UC with no growth to date, covid 19 test is negative, CT chest - new RLL mucus plug, new b/l LLs atelectasis, MRSA PCR not detected, will continue with Zosyn for now.   + Right femoral DVT - on Lovenox.   Attending addendum:  I have reviewed all pertinent clinical information and agree with the NP's note.   continue zosyn through the weekend   follow all cultured  trend wbc and fever curve   aspiration precautions   frequent suctioning if necessary     7/31: pt is more awake and alert, more interactive, confused, no fevers since 7/29, WBC elevated 15.5, Cr normalized, LFTs better, MRSA PCR not detected, BCs and UC with no growth to date. Pt completed a course of ceftriaxone, now on zosyn day #6.   Pt's CK is trending up, cardiac enzymes should be obtained.   Attending addendum:  I have reviewed all pertinent clinical information and agree with the NP's note.   continue antibiotics   CK was improving but then starting climbing, asked for cardiac enzymes to be sent  suspended atorvastatin as he is on high dose   please trend CK levels    8/1: low grade fevers, tachycardic, NC, WBC better 12.77,Cr ok, LFTs better, BCs and UC with no growth to date, troponin x 1 negative, on Lovenox for right femoral DVT, Zosyn IV continued.  Attending Addendum--  Case reviewed with NP Aruna Guzman. Her note reviewed and modified as appropriate.   Still with intermittent low-grade fever though trend perhaps moderating  Some decline in WBC  CK still elevated as of the 29th- etiology? Seems too long to attribute to rhabdo from outpatient setting. MB fraction nor troponin impressive  Role of Abx TBD  Drug fever an exclusionary diagnosis, but none known to be new    8/2: continues having fevers, tachycardic, WBC normalized, Cr ok, CK is better 510 today, all prior BCs with no growth to date, will obtain two sets of surveillance BCs. Unclear if pt's fevers are infectious in nature, will continue Zosyn IV for now.   Attending Addendum--  Case reviewed with JOANNA Guzman. Her note reviewed and modified as appropriate.   Still febrile, higher grade  CK diminished  If fevers persist, will consider NM scan, but not clear that he will fit under the camera.  Role of Abx TBD    8/3: low grade temp yesterday late evening, no fevers since then, tachycardic, NC, WBC normalized, Cr ok, all BCs and UC with no growth to date, Zosyn IV continued for now. NM scan should be considered if the pt can fit, + contracted.   Attending Addendum--  Case reviewed with JOANNA Guzman. Her note reviewed and modified as appropriate.   Patient personally assessed and examined.  Seen earlier   Shivering w low grade fever earlier. Abx have not made much impact and cx unrevealing.   Still not clear why CK still elevate- no concern of active injury, no clear tenderness on abdominal exam  Seems too long for rhabdo in setting of normal renal function.    8/4: continues having fevers, tachycardic, RA, WBC increased 12.12, Cr ok, BCs with no growth to date, will continue with Zosyn for now. In favor of obtaining for indium scan, consider rheumatology workup.   Attending Addendum--  Case reviewed with JOANNA Guzman. Her note reviewed and modified as appropriate.   FUO.  NM scan at this point reasonable  Checking serologies re: autoimmune process, especially as persistent CK elevation not well explained at present (myositis?).  Role of abx TBD  I will be available via Teams for any issues that may arise over the weekend.    8/7: continues having low grade temps, tachycardic, RA, no new blood work, BCs remain with no growth to date, Zosyn IV continued for now, pending indium scan, consider rheumatology consult.  Attending Addendum--  Case reviewed with JOANNA Guzman. Her note reviewed and modified as appropriate.   Patient personally assessed and examined.  Seems more alert  Intermittent low grade fever  Awaiting NM study, though not clear how useful it will be  Anti-RNP elevated, CK's variably elevated-- myositis?  JESICA pending  Rheum eval    8/8: pt is very contracted, exam is difficult, continues having fevers, RA, tachycardic, WBC increased 15.64. All BCs with no growth to date, Zosyn IV continued for now, indium scan is pending.   Attending Addendum--  Case reviewed with JOANNA Guzman. Her note reviewed and modified as appropriate.   Patient personally assessed and examined.  Little changed  Trend CBC  Awaiting NM scan  Rheum appreciated    8/9: no fevers within last 24 hrs, tachycardic, RA, WBC better 12.57, BCs with no growth to date, Cr ok, CT chest - large left chest hematoma vs dependent edema, + b/l pleural effusions, CAROL ? infiltrate, b/l LLs atelectasis. Indium scan is pending, Zosyn IV continued for now.   Attending Addendum--  Case reviewed with JOANNA Guzman. Her note reviewed and modified as appropriate.   Aldolase elevated, as espected.  JESICA + 1:1280  CT chest not overly impressive for infectious process. hematoma could cause fever but fever prolonged  Rheum follow up    08/10: afebrile for the last couple days, tachycardic, RA, wbc normalized, RVP negative, all BCs with no growth to date, completed longer course of abx, will continue monitoring off abx for now.   Attending Addendum--  Case reviewed with JOANNA Guzman. Her note reviewed and modified as appropriate.   Patient personally assessed and examined.  Afebrile.  More awake/alert  Feels "all right" no specific complaints.  Defer additional antibiotics for now    8/11: remains afebrile, tachycardic, RA, no leukocytosis, BCs remain with no growth to date, pt is being monitored off abx.     Suggestions--  Aspiration precautions as able  completed long course of abx, continue monitoring off abx  MRSA nares PCR - negative   follow BCs - NGTD  trend temperatures and WBC  DVT management per medicine   obtain indium scan - cancelled by performing dept due to body habitus   Palliative care consult, please address GOC     Discussed with Dr. Potts

## 2023-08-12 LAB — VALPROATE SERPL-MCNC: 47 UG/ML — LOW (ref 50–100)

## 2023-08-12 RX ADMIN — Medication 250 MILLIGRAM(S): at 13:09

## 2023-08-12 RX ADMIN — Medication 400 MILLIGRAM(S): at 06:06

## 2023-08-12 RX ADMIN — SODIUM CHLORIDE 100 MILLILITER(S): 9 INJECTION, SOLUTION INTRAVENOUS at 11:08

## 2023-08-12 RX ADMIN — Medication 50 MILLIGRAM(S): at 00:33

## 2023-08-12 RX ADMIN — Medication 50 MILLIGRAM(S): at 06:02

## 2023-08-12 RX ADMIN — QUETIAPINE FUMARATE 100 MILLIGRAM(S): 200 TABLET, FILM COATED ORAL at 22:31

## 2023-08-12 RX ADMIN — Medication 400 MILLIGRAM(S): at 13:10

## 2023-08-12 RX ADMIN — ENOXAPARIN SODIUM 60 MILLIGRAM(S): 100 INJECTION SUBCUTANEOUS at 06:02

## 2023-08-12 RX ADMIN — Medication 50 MILLIGRAM(S): at 17:13

## 2023-08-12 RX ADMIN — NYSTATIN CREAM 1 APPLICATION(S): 100000 CREAM TOPICAL at 17:12

## 2023-08-12 RX ADMIN — Medication 60 MILLIGRAM(S): at 11:09

## 2023-08-12 RX ADMIN — Medication 50 MILLIGRAM(S): at 22:31

## 2023-08-12 RX ADMIN — LIDOCAINE 1 PATCH: 4 CREAM TOPICAL at 11:09

## 2023-08-12 RX ADMIN — Medication 50 MILLIGRAM(S): at 11:09

## 2023-08-12 RX ADMIN — Medication 60 MILLIGRAM(S): at 06:02

## 2023-08-12 RX ADMIN — SODIUM CHLORIDE 100 MILLILITER(S): 9 INJECTION, SOLUTION INTRAVENOUS at 22:35

## 2023-08-12 RX ADMIN — ENOXAPARIN SODIUM 60 MILLIGRAM(S): 100 INJECTION SUBCUTANEOUS at 17:12

## 2023-08-12 RX ADMIN — Medication 60 MILLIGRAM(S): at 00:33

## 2023-08-12 RX ADMIN — Medication 400 MILLIGRAM(S): at 22:31

## 2023-08-12 RX ADMIN — Medication 250 MILLIGRAM(S): at 06:02

## 2023-08-12 RX ADMIN — Medication 250 MILLIGRAM(S): at 22:29

## 2023-08-12 RX ADMIN — Medication 60 MILLIGRAM(S): at 17:13

## 2023-08-12 RX ADMIN — NYSTATIN CREAM 1 APPLICATION(S): 100000 CREAM TOPICAL at 06:06

## 2023-08-12 NOTE — PROGRESS NOTE ADULT - SUBJECTIVE AND OBJECTIVE BOX
HOSPITALIST ATTENDING PROGRESS NOTE    Cc: Follow up for DVT, hematoma, fevers    HPI: non-verbal. Eyes closed, in no distress    EXAM  Vitals: Vital Signs Last 24 Hrs  T(C): 37.4 (12 Aug 2023 16:07), Max: 37.5 (11 Aug 2023 16:37)  T(F): 99.3 (12 Aug 2023 16:07), Max: 99.5 (11 Aug 2023 16:37)  HR: 108 (12 Aug 2023 16:07) (87 - 121)  BP: 129/80 (12 Aug 2023 16:07) (127/81 - 147/73)  BP(mean): --  RR: 17 (12 Aug 2023 16:07) (17 - 18)  SpO2: 94% (12 Aug 2023 16:07) (93% - 100%)  Gen: NAD, comfortable  HEENT: normocephalic, no nasal discharge, trachea midline, anicteric sclerae  CVS: Normal S1S2, RRR, no JVD  RESP: B/L air entry, no wheezing, no rhonchi, normal respiratory effort  ABD: normal bowel sounds, non-tender, non-distended, no organomegaly  SKIN: no rash seen on visible skin  EXT: contractures of all 4 extremities    MEDS  MEDICATIONS  (STANDING):  diltiazem    Tablet 60 milliGRAM(s) Oral every 6 hours  enoxaparin Injectable 60 milliGRAM(s) SubCutaneous every 12 hours  ibuprofen  Tablet. 400 milliGRAM(s) Oral three times a day  lactated ringers. 1000 milliLiter(s) (100 mL/Hr) IV Continuous <Continuous>  lidocaine   4% Patch 1 Patch Transdermal every 24 hours  metoprolol tartrate 50 milliGRAM(s) Oral every 6 hours  nystatin Powder 1 Application(s) Topical two times a day  QUEtiapine 100 milliGRAM(s) Oral at bedtime  traZODone 50 milliGRAM(s) Oral at bedtime  valproic  acid Syrup 250 milliGRAM(s) Oral three times a day    MEDICATIONS  (PRN):  acetaminophen     Tablet .. 650 milliGRAM(s) Oral every 6 hours PRN Temp greater or equal to 38C (100.4F)  acetylcysteine 10%  Inhalation 4 milliLiter(s) Inhalation every 6 hours PRN dyspnea  levalbuterol Inhalation 0.63 milliGRAM(s) Inhalation every 6 hours PRN Shortness of breath/Wheezing  melatonin 3 milliGRAM(s) Oral at bedtime PRN Insomnia    LABS/RADIOLOGY                          9.1    8.09  )-----------( 437      ( 11 Aug 2023 06:52 )             27.1    08-11    143  |  108  |  9   ----------------------------<  84  4.0   |  31  |  0.86    Ca    8.5      11 Aug 2023 06:52      CAPILLARY BLOOD GLUCOSE            ASSESMENT/PLAN          68M PMH HTN, HLD, TBI with resulting dementia presented to ED as a fall. Patient found with rhabdo and VIKAS, also with possible aspiration pneumonia, now afebrile, s/p a course of abx.  Seen by ID, rheumatology.  Rheumatology workup in progress.  Pt was unable to have Indium scan due to b/l LE contractures.  ? due to sacroilitis.  Started on NSAIDs.  Evaluated by Neuro.  CT head not suggestive of CVA.  Unable to have MRI Head / neck due to contractures.  s/p course of Zosyn, on regular PT.     LE Doppler : IMPRESSION: There is acute occlusive DVT affecting the right femoral vein with the proximal extent of the thrombus in the right common femoral vein.    Large left chest wall subcutaneous hematoma versus dependent edema.  New small bilateral pleural effusions.  Left upper lobe infiltrate versus motion artifact. Bilateral lower lobe passive atelectasis.      # Recurrent fevers, now afebrile - Elevated ESR, CRP, JESICA, antiDSDNA positive.  ? rheumatologic etiology.  Rheumatology consulted and workup in progress.    -continues to be tachycardic    # Aspiration Pneumonia / b/l Pleural Effusion, right sided mucous plugging, b/l atelectasis - ID Following.  s/p Zosyn.  CT c/a/p not indicative of acute infection.  ? fever from hematoma.  Per nuclear, pt too contracted to be positioned for indium scan.    As per ID- continue to monitor off antibiotics, continue aspiration precautions,   # ? L chest hematoma - no obvious ecchymosis of L chest wall.  H/H stable.  Monitor CBC.  # Tachycardia - HR remains elevated.  Continue Cardizem, BBlocker.  Tele showing sinus tachycardia.        # Impaired ambulation / LE contractures / Functional Quadriplegia / TBI - ? due to sacroilitis, underlying dementia -  Trial of NSAIDs.  PT consulted and recommending FREDY.  Neuro following.  CT head not suggestive of acute infarct.  Attempt MR Head / neck, however, pt contracted- consider if pt were to improve.  Outpatient EMG.      As per neuro---try avoiding benzodiazepines, anticholinergics, and antihistamines (Can cause worsening confusion/delirium). Additionally, continue reorientation, supportive care, maintaining regular sleep/wake cycle, and optimizing nutritional/medical factors"  -frequent turns, aspiration precautions    # RLE DVT - continue full dose Lovenox for now.  Transition to DOAC on discharge    # Traumatic brain injury / Dementia - supportive care.  Continue Valproic acid.      # VIKAS - resolved.    # Rhabdomyolysis - resolved.    # Essential HTN - Monitor BP.  Continue antihypertensives.    # Inpatient DVT Proph - on anticoagulation     #GOC- full code. Poor prognosis. Palliative care consult on monday        david uKmar [phone]

## 2023-08-13 LAB
ANION GAP SERPL CALC-SCNC: 4 MMOL/L — LOW (ref 5–17)
BUN SERPL-MCNC: 8 MG/DL — SIGNIFICANT CHANGE UP (ref 7–23)
CALCIUM SERPL-MCNC: 8.3 MG/DL — LOW (ref 8.5–10.1)
CHLORIDE SERPL-SCNC: 108 MMOL/L — SIGNIFICANT CHANGE UP (ref 96–108)
CO2 SERPL-SCNC: 30 MMOL/L — SIGNIFICANT CHANGE UP (ref 22–31)
CREAT SERPL-MCNC: 0.91 MG/DL — SIGNIFICANT CHANGE UP (ref 0.5–1.3)
EGFR: 91 ML/MIN/1.73M2 — SIGNIFICANT CHANGE UP
GLUCOSE SERPL-MCNC: 87 MG/DL — SIGNIFICANT CHANGE UP (ref 70–99)
HCT VFR BLD CALC: 30 % — LOW (ref 39–50)
HGB BLD-MCNC: 9.5 G/DL — LOW (ref 13–17)
MCHC RBC-ENTMCNC: 30.1 PG — SIGNIFICANT CHANGE UP (ref 27–34)
MCHC RBC-ENTMCNC: 31.7 G/DL — LOW (ref 32–36)
MCV RBC AUTO: 94.9 FL — SIGNIFICANT CHANGE UP (ref 80–100)
NRBC # BLD: 0 /100 WBCS — SIGNIFICANT CHANGE UP (ref 0–0)
PLATELET # BLD AUTO: 363 K/UL — SIGNIFICANT CHANGE UP (ref 150–400)
POTASSIUM SERPL-MCNC: 3.7 MMOL/L — SIGNIFICANT CHANGE UP (ref 3.5–5.3)
POTASSIUM SERPL-SCNC: 3.7 MMOL/L — SIGNIFICANT CHANGE UP (ref 3.5–5.3)
RBC # BLD: 3.16 M/UL — LOW (ref 4.2–5.8)
RBC # FLD: 16.3 % — HIGH (ref 10.3–14.5)
SODIUM SERPL-SCNC: 142 MMOL/L — SIGNIFICANT CHANGE UP (ref 135–145)
WBC # BLD: 6.56 K/UL — SIGNIFICANT CHANGE UP (ref 3.8–10.5)
WBC # FLD AUTO: 6.56 K/UL — SIGNIFICANT CHANGE UP (ref 3.8–10.5)

## 2023-08-13 RX ADMIN — Medication 50 MILLIGRAM(S): at 06:29

## 2023-08-13 RX ADMIN — ENOXAPARIN SODIUM 60 MILLIGRAM(S): 100 INJECTION SUBCUTANEOUS at 18:07

## 2023-08-13 RX ADMIN — NYSTATIN CREAM 1 APPLICATION(S): 100000 CREAM TOPICAL at 18:08

## 2023-08-13 RX ADMIN — ENOXAPARIN SODIUM 60 MILLIGRAM(S): 100 INJECTION SUBCUTANEOUS at 06:29

## 2023-08-13 RX ADMIN — Medication 60 MILLIGRAM(S): at 12:48

## 2023-08-13 RX ADMIN — SODIUM CHLORIDE 100 MILLILITER(S): 9 INJECTION, SOLUTION INTRAVENOUS at 18:02

## 2023-08-13 RX ADMIN — Medication 250 MILLIGRAM(S): at 06:35

## 2023-08-13 RX ADMIN — Medication 650 MILLIGRAM(S): at 22:58

## 2023-08-13 RX ADMIN — Medication 50 MILLIGRAM(S): at 22:59

## 2023-08-13 RX ADMIN — QUETIAPINE FUMARATE 100 MILLIGRAM(S): 200 TABLET, FILM COATED ORAL at 22:58

## 2023-08-13 RX ADMIN — SODIUM CHLORIDE 100 MILLILITER(S): 9 INJECTION, SOLUTION INTRAVENOUS at 06:29

## 2023-08-13 RX ADMIN — Medication 50 MILLIGRAM(S): at 02:25

## 2023-08-13 RX ADMIN — Medication 60 MILLIGRAM(S): at 22:59

## 2023-08-13 RX ADMIN — Medication 60 MILLIGRAM(S): at 18:03

## 2023-08-13 RX ADMIN — Medication 400 MILLIGRAM(S): at 06:29

## 2023-08-13 RX ADMIN — Medication 250 MILLIGRAM(S): at 22:58

## 2023-08-13 RX ADMIN — LIDOCAINE 1 PATCH: 4 CREAM TOPICAL at 12:49

## 2023-08-13 RX ADMIN — NYSTATIN CREAM 1 APPLICATION(S): 100000 CREAM TOPICAL at 06:34

## 2023-08-13 RX ADMIN — Medication 60 MILLIGRAM(S): at 02:25

## 2023-08-13 RX ADMIN — LIDOCAINE 1 PATCH: 4 CREAM TOPICAL at 23:17

## 2023-08-13 RX ADMIN — LIDOCAINE 1 PATCH: 4 CREAM TOPICAL at 20:19

## 2023-08-13 RX ADMIN — Medication 250 MILLIGRAM(S): at 18:07

## 2023-08-13 RX ADMIN — Medication 60 MILLIGRAM(S): at 06:29

## 2023-08-13 RX ADMIN — Medication 50 MILLIGRAM(S): at 22:58

## 2023-08-13 RX ADMIN — Medication 50 MILLIGRAM(S): at 18:04

## 2023-08-13 RX ADMIN — Medication 50 MILLIGRAM(S): at 12:48

## 2023-08-13 NOTE — PROGRESS NOTE ADULT - ASSESSMENT
68 years old male with h/o HTN, HLD, TBI dementia here after a fall On exam lethargic orineted x 0 general weakness LE atrophy some hyperrefleia  CTH, C and L reviewed     Impression: generalized weakness. TME rhabdo  aspiration   uti     limit polypharmacy  Can consider MRI brain and C-spine if no CI, non-urgent  outpt EMG  GOCs  -B12, folate, TSH   -In order to enhance patient's overall well-being and clinical course, please try avoiding benzodiazepines, anticholinergics, and antihistamines (Can cause worsening confusion/delirium). Additionally, continue reorientation, supportive care, maintaining regular sleep/wake cycle, and optimizing nutritional/medical factors.   Call back as needed  Can follow up with Neurology, Dr. Rafael Jeffries or Dr. Macedo  at [phone]

## 2023-08-13 NOTE — PROGRESS NOTE ADULT - SUBJECTIVE AND OBJECTIVE BOX
Neurology Progress Note    S: Patient seen and examined. No new events overnight    MEDICATIONS:        Medications: MEDICATIONS  (STANDING):  diltiazem    Tablet 60 milliGRAM(s) Oral every 6 hours  enoxaparin Injectable 60 milliGRAM(s) SubCutaneous every 12 hours  lactated ringers. 1000 milliLiter(s) (100 mL/Hr) IV Continuous <Continuous>  lidocaine   4% Patch 1 Patch Transdermal every 24 hours  metoprolol tartrate 50 milliGRAM(s) Oral every 6 hours  nystatin Powder 1 Application(s) Topical two times a day  QUEtiapine 100 milliGRAM(s) Oral at bedtime  traZODone 50 milliGRAM(s) Oral at bedtime  valproic  acid Syrup 250 milliGRAM(s) Oral three times a day    MEDICATIONS  (PRN):  acetaminophen     Tablet .. 650 milliGRAM(s) Oral every 6 hours PRN Temp greater or equal to 38C (100.4F)  acetylcysteine 10%  Inhalation 4 milliLiter(s) Inhalation every 6 hours PRN dyspnea  levalbuterol Inhalation 0.63 milliGRAM(s) Inhalation every 6 hours PRN Shortness of breath/Wheezing  melatonin 3 milliGRAM(s) Oral at bedtime PRN Insomnia       Vitals:  Vital Signs Last 24 Hrs  T(C): 37.6 (12 Aug 2023 23:30), Max: 37.6 (12 Aug 2023 23:30)  T(F): 99.7 (12 Aug 2023 23:30), Max: 99.7 (12 Aug 2023 23:30)  HR: 105 (13 Aug 2023 04:40) (87 - 110)  BP: 154/89 (13 Aug 2023 04:40) (127/81 - 154/89)  BP(mean): --  RR: 18 (13 Aug 2023 04:40) (17 - 18)  SpO2: 94% (13 Aug 2023 04:40) (94% - 100%)    Parameters below as of 13 Aug 2023 04:40  Patient On (Oxygen Delivery Method): room air           Neurological Exam:    Mental Status -lethargic opens eys does not follow commands   Cranial Nerves - PERRL, EOMI, VFF, V1-V3 intact, no gross facial asymmetry,    Motor Exam -   Moves UEs spontanesously LEs atrophy some contracture    Sensory    Intact to light touch and pinprick bilaterally    Reflexes UES hyperreflexia patellar trace Left ankle clonus at times right mute  Coordination: unable   Gait: deferred     I personally reviewed the below data/images/labs:    LABS:                          9.5    6.56  )-----------( 363      ( 13 Aug 2023 06:05 )             30.0     08-13    142  |  108  |  8   ----------------------------<  87  3.7   |  30  |  0.91    Ca    8.3<L>      13 Aug 2023 06:05          Urinalysis Basic - ( 13 Aug 2023 06:05 )    Color: x / Appearance: x / SG: x / pH: x  Gluc: 87 mg/dL / Ketone: x  / Bili: x / Urobili: x   Blood: x / Protein: x / Nitrite: x   Leuk Esterase: x / RBC: x / WBC x   Sq Epi: x / Non Sq Epi: x / Bacteria: x        < from: CT Head No Cont (07.14.23 @ 14:35) >  IMPRESSION:    CT head: Markedly motion degraded exam. No gross evidence of acute   intracranial hemorrhage or mass effect.    CT cervical spine: No evidence for acute displaced fracture or   malalignment.    CT lumbar spine: No evidence for acute displaced fracture or malalignment.    --- End of Report ---

## 2023-08-13 NOTE — PROGRESS NOTE ADULT - SUBJECTIVE AND OBJECTIVE BOX
HOSPITALIST ATTENDING PROGRESS NOTE    Cc: Follow up for DVT, aspiration PNA, Hx of TBI, multiple issues    HPI: somewhat more awake today. Eating 50% of meals per nursing staff. afebrile. No overnight events    EXAM  Vitals: Vital Signs Last 24 Hrs  T(C): 37.2 (13 Aug 2023 10:58), Max: 37.6 (12 Aug 2023 23:30)  T(F): 99 (13 Aug 2023 10:58), Max: 99.7 (12 Aug 2023 23:30)  HR: 112 (13 Aug 2023 10:58) (105 - 112)  BP: 142/88 (13 Aug 2023 10:58) (129/80 - 154/89)  BP(mean): --  RR: 18 (13 Aug 2023 10:58) (17 - 18)  SpO2: 97% (13 Aug 2023 10:58) (94% - 97%)  Gen: NAD, comfortable  HEENT: normocephalic, no nasal discharge, trachea midline, anicteric sclerae  CVS: Normal S1S2, RRR, no JVD  RESP: B/L air entry, no wheezing, no rhonchi, normal respiratory effort  ABD: normal bowel sounds, non-tender, non-distended, no organomegaly  SKIN: no rash seen on visible skin  EXT: no edema. Contractures of all 4 extremities     MEDS  MEDICATIONS  (STANDING):  diltiazem    Tablet 60 milliGRAM(s) Oral every 6 hours  enoxaparin Injectable 60 milliGRAM(s) SubCutaneous every 12 hours  lactated ringers. 1000 milliLiter(s) (100 mL/Hr) IV Continuous <Continuous>  lidocaine   4% Patch 1 Patch Transdermal every 24 hours  metoprolol tartrate 50 milliGRAM(s) Oral every 6 hours  nystatin Powder 1 Application(s) Topical two times a day  QUEtiapine 100 milliGRAM(s) Oral at bedtime  traZODone 50 milliGRAM(s) Oral at bedtime  valproic  acid Syrup 250 milliGRAM(s) Oral three times a day    MEDICATIONS  (PRN):  acetaminophen     Tablet .. 650 milliGRAM(s) Oral every 6 hours PRN Temp greater or equal to 38C (100.4F)  acetylcysteine 10%  Inhalation 4 milliLiter(s) Inhalation every 6 hours PRN dyspnea  levalbuterol Inhalation 0.63 milliGRAM(s) Inhalation every 6 hours PRN Shortness of breath/Wheezing  melatonin 3 milliGRAM(s) Oral at bedtime PRN Insomnia    LABS/RADIOLOGY                          9.5    6.56  )-----------( 363      ( 13 Aug 2023 06:05 )             30.0    08-13    142  |  108  |  8   ----------------------------<  87  3.7   |  30  |  0.91    Ca    8.3<L>      13 Aug 2023 06:05      CAPILLARY BLOOD GLUCOSE            ASSESMENT/PLAN    68M PMH HTN, HLD, TBI with resulting dementia presented to ED as a fall. Patient found with rhabdo and VIKAS, also with possible aspiration pneumonia, now afebrile, s/p a course of abx.  Seen by ID, rheumatology.  Rheumatology workup in progress.  Pt was unable to have Indium scan due to b/l LE contractures.  ? due to sacroilitis.  Started on NSAIDs.  Evaluated by Neuro.  CT head not suggestive of CVA.  Unable to have MRI Head / neck due to contractures.  s/p course of Zosyn, on regular PT.     LE Doppler : IMPRESSION: There is acute occlusive DVT affecting the right femoral vein with the proximal extent of the thrombus in the right common femoral vein.    Large left chest wall subcutaneous hematoma versus dependent edema.  New small bilateral pleural effusions.  Left upper lobe infiltrate versus motion artifact. Bilateral lower lobe passive atelectasis.      # Recurrent fevers, now afebrile - Elevated ESR, CRP, JESICA, antiDSDNA positive.  ? rheumatologic etiology.  Rheumatology consulted and workup in progress.    -continues to be tachycardic    # Aspiration Pneumonia / b/l Pleural Effusion, right sided mucous plugging, b/l atelectasis - ID Following.  s/p Zosyn.  CT c/a/p not indicative of acute infection.  ? fever from hematoma.  Per nuclear, pt too contracted to be positioned for indium scan.    As per ID- continue to monitor off antibiotics, continue aspiration precautions,   # ? L chest hematoma - no obvious ecchymosis of L chest wall.  H/H stable.  Monitor CBC.  # Tachycardia - HR remains elevated.  Continue Cardizem, BBlocker.  Tele showing sinus tachycardia.   VQ scan: low probability      # Impaired ambulation / LE contractures / Functional Quadriplegia / TBI - ? due to sacroilitis, underlying dementia -  Trial of NSAIDs.  PT consulted and recommending FREDY.  Neuro following.  CT head not suggestive of acute infarct.  Attempt MR Head / neck, however, pt contracted- consider if pt were to improve.  Outpatient EMG.      As per neuro---try avoiding benzodiazepines, anticholinergics, and antihistamines (Can cause worsening confusion/delirium). Additionally, continue reorientation, supportive care, maintaining regular sleep/wake cycle, and optimizing nutritional/medical factors"  -frequent turns, aspiration precautions    # RLE DVT - continue full dose Lovenox for now.  Transition to DOAC on discharge    # Traumatic brain injury / Dementia - supportive care.  Continue Valproic acid.      # VIKAS - resolved.    # Rhabdomyolysis - resolved.    # Essential HTN - Monitor BP.  Continue antihypertensives.    # Inpatient DVT Proph - on anticoagulation     #GOC- full code. Poor prognosis. Get palliative care consult on monday

## 2023-08-14 LAB
ANION GAP SERPL CALC-SCNC: 3 MMOL/L — LOW (ref 5–17)
BUN SERPL-MCNC: 8 MG/DL — SIGNIFICANT CHANGE UP (ref 7–23)
CALCIUM SERPL-MCNC: 8.3 MG/DL — LOW (ref 8.5–10.1)
CHLORIDE SERPL-SCNC: 110 MMOL/L — HIGH (ref 96–108)
CO2 SERPL-SCNC: 25 MMOL/L — SIGNIFICANT CHANGE UP (ref 22–31)
CREAT SERPL-MCNC: 0.8 MG/DL — SIGNIFICANT CHANGE UP (ref 0.5–1.3)
EGFR: 96 ML/MIN/1.73M2 — SIGNIFICANT CHANGE UP
GLUCOSE SERPL-MCNC: 86 MG/DL — SIGNIFICANT CHANGE UP (ref 70–99)
HCT VFR BLD CALC: 32.5 % — LOW (ref 39–50)
HGB BLD-MCNC: 10.4 G/DL — LOW (ref 13–17)
MCHC RBC-ENTMCNC: 30.7 PG — SIGNIFICANT CHANGE UP (ref 27–34)
MCHC RBC-ENTMCNC: 32 G/DL — SIGNIFICANT CHANGE UP (ref 32–36)
MCV RBC AUTO: 95.9 FL — SIGNIFICANT CHANGE UP (ref 80–100)
NRBC # BLD: 0 /100 WBCS — SIGNIFICANT CHANGE UP (ref 0–0)
PLATELET # BLD AUTO: 318 K/UL — SIGNIFICANT CHANGE UP (ref 150–400)
POTASSIUM SERPL-MCNC: 4.2 MMOL/L — SIGNIFICANT CHANGE UP (ref 3.5–5.3)
POTASSIUM SERPL-SCNC: 4.2 MMOL/L — SIGNIFICANT CHANGE UP (ref 3.5–5.3)
RBC # BLD: 3.39 M/UL — LOW (ref 4.2–5.8)
RBC # FLD: 16.6 % — HIGH (ref 10.3–14.5)
SODIUM SERPL-SCNC: 138 MMOL/L — SIGNIFICANT CHANGE UP (ref 135–145)
WBC # BLD: 7.39 K/UL — SIGNIFICANT CHANGE UP (ref 3.8–10.5)
WBC # FLD AUTO: 7.39 K/UL — SIGNIFICANT CHANGE UP (ref 3.8–10.5)

## 2023-08-14 PROCEDURE — 99232 SBSQ HOSP IP/OBS MODERATE 35: CPT

## 2023-08-14 RX ADMIN — NYSTATIN CREAM 1 APPLICATION(S): 100000 CREAM TOPICAL at 05:56

## 2023-08-14 RX ADMIN — Medication 60 MILLIGRAM(S): at 23:39

## 2023-08-14 RX ADMIN — NYSTATIN CREAM 1 APPLICATION(S): 100000 CREAM TOPICAL at 19:27

## 2023-08-14 RX ADMIN — Medication 250 MILLIGRAM(S): at 21:42

## 2023-08-14 RX ADMIN — Medication 250 MILLIGRAM(S): at 05:51

## 2023-08-14 RX ADMIN — ENOXAPARIN SODIUM 60 MILLIGRAM(S): 100 INJECTION SUBCUTANEOUS at 18:06

## 2023-08-14 RX ADMIN — SODIUM CHLORIDE 100 MILLILITER(S): 9 INJECTION, SOLUTION INTRAVENOUS at 20:09

## 2023-08-14 RX ADMIN — Medication 250 MILLIGRAM(S): at 13:49

## 2023-08-14 RX ADMIN — LIDOCAINE 1 PATCH: 4 CREAM TOPICAL at 18:05

## 2023-08-14 RX ADMIN — Medication 50 MILLIGRAM(S): at 23:39

## 2023-08-14 RX ADMIN — SODIUM CHLORIDE 100 MILLILITER(S): 9 INJECTION, SOLUTION INTRAVENOUS at 06:41

## 2023-08-14 RX ADMIN — Medication 50 MILLIGRAM(S): at 18:06

## 2023-08-14 RX ADMIN — QUETIAPINE FUMARATE 100 MILLIGRAM(S): 200 TABLET, FILM COATED ORAL at 21:42

## 2023-08-14 RX ADMIN — Medication 60 MILLIGRAM(S): at 05:51

## 2023-08-14 RX ADMIN — Medication 50 MILLIGRAM(S): at 21:42

## 2023-08-14 RX ADMIN — Medication 60 MILLIGRAM(S): at 18:06

## 2023-08-14 RX ADMIN — Medication 50 MILLIGRAM(S): at 05:51

## 2023-08-14 RX ADMIN — LIDOCAINE 1 PATCH: 4 CREAM TOPICAL at 18:00

## 2023-08-14 RX ADMIN — ENOXAPARIN SODIUM 60 MILLIGRAM(S): 100 INJECTION SUBCUTANEOUS at 05:51

## 2023-08-14 NOTE — PROGRESS NOTE ADULT - SUBJECTIVE AND OBJECTIVE BOX
Patient is a 68y old  Male who presents with a chief complaint of VIKAS, rhabdomyolysis (14 Aug 2023 12:01)      INTERVAL HPI/OVERNIGHT EVENTS:    MEDICATIONS  (STANDING):  diltiazem    Tablet 60 milliGRAM(s) Oral every 6 hours  enoxaparin Injectable 60 milliGRAM(s) SubCutaneous every 12 hours  lactated ringers. 1000 milliLiter(s) (100 mL/Hr) IV Continuous <Continuous>  lidocaine   4% Patch 1 Patch Transdermal every 24 hours  metoprolol tartrate 50 milliGRAM(s) Oral every 6 hours  nystatin Powder 1 Application(s) Topical two times a day  QUEtiapine 100 milliGRAM(s) Oral at bedtime  traZODone 50 milliGRAM(s) Oral at bedtime  valproic  acid Syrup 250 milliGRAM(s) Oral three times a day    MEDICATIONS  (PRN):  acetaminophen     Tablet .. 650 milliGRAM(s) Oral every 6 hours PRN Temp greater or equal to 38C (100.4F)  acetylcysteine 10%  Inhalation 4 milliLiter(s) Inhalation every 6 hours PRN dyspnea  levalbuterol Inhalation 0.63 milliGRAM(s) Inhalation every 6 hours PRN Shortness of breath/Wheezing  melatonin 3 milliGRAM(s) Oral at bedtime PRN Insomnia      Allergies    No Known Allergies    Intolerances        Vital Signs Last 24 Hrs  T(C): 37.2 (14 Aug 2023 16:39), Max: 37.9 (13 Aug 2023 22:55)  T(F): 99 (14 Aug 2023 16:39), Max: 100.3 (13 Aug 2023 22:55)  HR: 130 (14 Aug 2023 16:39) (76 - 130)  BP: 149/96 (14 Aug 2023 16:39) (138/76 - 171/80)  BP(mean): --  RR: 18 (14 Aug 2023 16:39) (18 - 19)  SpO2: 95% (14 Aug 2023 16:39) (95% - 100%)    Parameters below as of 14 Aug 2023 16:39  Patient On (Oxygen Delivery Method): room air        PHYSICAL EXAM:  GENERAL: NAD, well-groomed, well-developed  HEAD:  Atraumatic, Normocephalic  EYES: EOMI, PERRLA, conjunctiva and sclera clear  ENMT: No tonsillar erythema, exudates, or enlargement; Moist mucous membranes, Good dentition, No lesions  NECK: Supple, No JVD, Normal thyroid  NERVOUS SYSTEM:  Alert & Oriented X3, Good concentration; Motor Strength 5/5 B/L upper and lower extremities; DTRs 2+ intact and symmetric  CHEST/LUNG: Clear to auscultation bilaterally; No rales, rhonchi, wheezing, or rubs  HEART: Regular rate and rhythm; No murmurs, rubs, or gallops  ABDOMEN: Soft, Nontender, Nondistended; Bowel sounds present  EXTREMITIES:  2+ Peripheral Pulses, No clubbing, cyanosis, or edema  LYMPH: No lymphadenopathy noted  SKIN: No rashes or lesions    LABS:                        10.4   7.39  )-----------( 318      ( 14 Aug 2023 07:20 )             32.5     08-14    138  |  110<H>  |  8   ----------------------------<  86  4.2   |  25  |  0.80    Ca    8.3<L>      14 Aug 2023 07:20        Urinalysis Basic - ( 14 Aug 2023 07:20 )    Color: x / Appearance: x / SG: x / pH: x  Gluc: 86 mg/dL / Ketone: x  / Bili: x / Urobili: x   Blood: x / Protein: x / Nitrite: x   Leuk Esterase: x / RBC: x / WBC x   Sq Epi: x / Non Sq Epi: x / Bacteria: x      CAPILLARY BLOOD GLUCOSE          RADIOLOGY & ADDITIONAL TESTS:    08-13-23 @ 07:01  -  08-14-23 @ 07:00  --------------------------------------------------------  IN:    Lactated Ringers: 2400 mL    Oral Fluid: 170 mL  Total IN: 2570 mL    OUT:    Voided (mL): 3100 mL  Total OUT: 3100 mL    Total NET: -530 mL      08-14-23 @ 07:01  -  08-14-23 @ 21:15  --------------------------------------------------------  IN:    Oral Fluid: 720 mL  Total IN: 720 mL    OUT:    Voided (mL): 3700 mL  Total OUT: 3700 mL    Total NET: -2980 mL

## 2023-08-14 NOTE — PROGRESS NOTE ADULT - SUBJECTIVE AND OBJECTIVE BOX
Neurology Progress Note    S: Patient seen and examined. No new events overnight      Medications: MEDICATIONS  (STANDING):  diltiazem    Tablet 60 milliGRAM(s) Oral every 6 hours  enoxaparin Injectable 60 milliGRAM(s) SubCutaneous every 12 hours  lactated ringers. 1000 milliLiter(s) (100 mL/Hr) IV Continuous <Continuous>  lidocaine   4% Patch 1 Patch Transdermal every 24 hours  metoprolol tartrate 50 milliGRAM(s) Oral every 6 hours  nystatin Powder 1 Application(s) Topical two times a day  QUEtiapine 100 milliGRAM(s) Oral at bedtime  traZODone 50 milliGRAM(s) Oral at bedtime  valproic  acid Syrup 250 milliGRAM(s) Oral three times a day    MEDICATIONS  (PRN):  acetaminophen     Tablet .. 650 milliGRAM(s) Oral every 6 hours PRN Temp greater or equal to 38C (100.4F)  acetylcysteine 10%  Inhalation 4 milliLiter(s) Inhalation every 6 hours PRN dyspnea  levalbuterol Inhalation 0.63 milliGRAM(s) Inhalation every 6 hours PRN Shortness of breath/Wheezing  melatonin 3 milliGRAM(s) Oral at bedtime PRN Insomnia       Vitals:  Vital Signs Last 24 Hrs  T(C): 37 (14 Aug 2023 04:35), Max: 37.9 (13 Aug 2023 22:55)  T(F): 98.6 (14 Aug 2023 04:35), Max: 100.3 (13 Aug 2023 22:55)  HR: 110 (14 Aug 2023 04:35) (76 - 111)  BP: 154/89 (14 Aug 2023 04:35) (138/76 - 154/89)  BP(mean): --  RR: 18 (14 Aug 2023 04:35) (18 - 18)  SpO2: 95% (14 Aug 2023 04:35) (95% - 100%)    Parameters below as of 14 Aug 2023 04:35  Patient On (Oxygen Delivery Method): room air                 Neurological Exam:    Mental Status -lethargic opens eys does not follow commands   Cranial Nerves - PERRL, EOMI, VFF, V1-V3 intact, no gross facial asymmetry,    Motor Exam -   Moves UEs spontanesously LEs atrophy some contracture    Sensory    Intact to light touch and pinprick bilaterally    Reflexes UES hyperreflexia patellar trace Left ankle clonus at times right mute  Coordination: unable   Gait: deferred     I personally reviewed the below data/images/labs:    LABS:                          10.4   7.39  )-----------( 318      ( 14 Aug 2023 07:20 )             32.5     08-14    138  |  110<H>  |  8   ----------------------------<  86  4.2   |  25  |  0.80    Ca    8.3<L>      14 Aug 2023 07:20          Urinalysis Basic - ( 14 Aug 2023 07:20 )    Color: x / Appearance: x / SG: x / pH: x  Gluc: 86 mg/dL / Ketone: x  / Bili: x / Urobili: x   Blood: x / Protein: x / Nitrite: x   Leuk Esterase: x / RBC: x / WBC x   Sq Epi: x / Non Sq Epi: x / Bacteria: x        < from: CT Head No Cont (07.14.23 @ 14:35) >  IMPRESSION:    CT head: Markedly motion degraded exam. No gross evidence of acute   intracranial hemorrhage or mass effect.    CT cervical spine: No evidence for acute displaced fracture or   malalignment.    CT lumbar spine: No evidence for acute displaced fracture or malalignment.    --- End of Report ---       Neurology Progress Note    S: Patient seen and examined. No new events overnight      Medications: MEDICATIONS  (STANDING):  diltiazem    Tablet 60 milliGRAM(s) Oral every 6 hours  enoxaparin Injectable 60 milliGRAM(s) SubCutaneous every 12 hours  lactated ringers. 1000 milliLiter(s) (100 mL/Hr) IV Continuous <Continuous>  lidocaine   4% Patch 1 Patch Transdermal every 24 hours  metoprolol tartrate 50 milliGRAM(s) Oral every 6 hours  nystatin Powder 1 Application(s) Topical two times a day  QUEtiapine 100 milliGRAM(s) Oral at bedtime  traZODone 50 milliGRAM(s) Oral at bedtime  valproic  acid Syrup 250 milliGRAM(s) Oral three times a day    MEDICATIONS  (PRN):  acetaminophen     Tablet .. 650 milliGRAM(s) Oral every 6 hours PRN Temp greater or equal to 38C (100.4F)  acetylcysteine 10%  Inhalation 4 milliLiter(s) Inhalation every 6 hours PRN dyspnea  levalbuterol Inhalation 0.63 milliGRAM(s) Inhalation every 6 hours PRN Shortness of breath/Wheezing  melatonin 3 milliGRAM(s) Oral at bedtime PRN Insomnia       Vitals:  Vital Signs Last 24 Hrs  T(C): 37 (14 Aug 2023 04:35), Max: 37.9 (13 Aug 2023 22:55)  T(F): 98.6 (14 Aug 2023 04:35), Max: 100.3 (13 Aug 2023 22:55)  HR: 110 (14 Aug 2023 04:35) (76 - 111)  BP: 154/89 (14 Aug 2023 04:35) (138/76 - 154/89)  BP(mean): --  RR: 18 (14 Aug 2023 04:35) (18 - 18)  SpO2: 95% (14 Aug 2023 04:35) (95% - 100%)    Parameters below as of 14 Aug 2023 04:35  Patient On (Oxygen Delivery Method): room air                 Neurological Exam:    Mental Status Eyes open, eating lunch. Speech is sparse and mildly dysarthric. Follows simple commands.   Cranial Nerves - PERRL, EOMI, VFF, V1-V3 intact, no gross facial asymmetry,    Motor Exam -   Moves UEs spontanesously LEs atrophy some contracture    Sensory    Intact to light touch and pinprick bilaterally    Reflexes UES hyperreflexia patellar trace Left ankle clonus at times right mute  Coordination: unable   Gait: deferred     I personally reviewed the below data/images/labs:    LABS:                          10.4   7.39  )-----------( 318      ( 14 Aug 2023 07:20 )             32.5     08-14    138  |  110<H>  |  8   ----------------------------<  86  4.2   |  25  |  0.80    Ca    8.3<L>      14 Aug 2023 07:20          Urinalysis Basic - ( 14 Aug 2023 07:20 )    Color: x / Appearance: x / SG: x / pH: x  Gluc: 86 mg/dL / Ketone: x  / Bili: x / Urobili: x   Blood: x / Protein: x / Nitrite: x   Leuk Esterase: x / RBC: x / WBC x   Sq Epi: x / Non Sq Epi: x / Bacteria: x        < from: CT Head No Cont (07.14.23 @ 14:35) >  IMPRESSION:    CT head: Markedly motion degraded exam. No gross evidence of acute   intracranial hemorrhage or mass effect.    CT cervical spine: No evidence for acute displaced fracture or   malalignment.    CT lumbar spine: No evidence for acute displaced fracture or malalignment.    --- End of Report ---

## 2023-08-14 NOTE — CHART NOTE - NSCHARTNOTEFT_GEN_A_CORE
Pt seen for follow-up on medical floor, adm w VIKAS (resolved), rhabdomyolyses, DVI , aspiration PNA, hx TBI. B/L LE contractures.      Factors impacting intake: [ ] none [ ] nausea  [ ] vomiting [ ] diarrhea [ ] constipation  [ ]chewing problems [ ] swallowing issues  [ ] other:     Diet Prescription: 8/1 - Minced and Moist  Intake: < 50 %, total feed. Pt w/ Dementia.    Current Weight: 8/5 - 167.9 (76.2 kg) Admission WT (7/14) -139.7(63.5 kg)  % Weight Change - unable to determine accuracy of weight 2/2 to edema.    8/13 - Edema - 3+ ( L & R) Foot ; 8/7 - 2+/3+ 8/5 - 1+/2+3+      Pertinent Medications: MEDICATIONS  (STANDING):  diltiazem    Tablet 60 milliGRAM(s) Oral every 6 hours  enoxaparin Injectable 60 milliGRAM(s) SubCutaneous every 12 hours  lactated ringers. 1000 milliLiter(s) (100 mL/Hr) IV Continuous <Continuous>  lidocaine   4% Patch 1 Patch Transdermal every 24 hours  metoprolol tartrate 50 milliGRAM(s) Oral every 6 hours  nystatin Powder 1 Application(s) Topical two times a day  QUEtiapine 100 milliGRAM(s) Oral at bedtime  traZODone 50 milliGRAM(s) Oral at bedtime  valproic  acid Syrup 250 milliGRAM(s) Oral three times a day    MEDICATIONS  (PRN):  acetaminophen     Tablet .. 650 milliGRAM(s) Oral every 6 hours PRN Temp greater or equal to 38C (100.4F)  acetylcysteine 10%  Inhalation 4 milliLiter(s) Inhalation every 6 hours PRN dyspnea  levalbuterol Inhalation 0.63 milliGRAM(s) Inhalation every 6 hours PRN Shortness of breath/Wheezing  melatonin 3 milliGRAM(s) Oral at bedtime PRN Insomnia    Pertinent Labs: 08-14 Na138 mmol/L Glu 86 mg/dL K+ 4.2 mmol/L Cr  0.80 mg/dL BUN 8 mg/dL 08-08 Alb 1.4 g/dL<L>08-08 ALT 64 U/L AST 96 U/L<H> Alkaline Phosphatase 80 U/L     CAPILLARY BLOOD GLUCOSE        Skin: L Buttocks stage 2    Estimated Needs:   [x ] no change since previous assessment ( 7/17 )  [ ] recalculated:     Previous Nutrition Diagnosis:   [x]  Moderate Malnutrition in context of chronic illness  Etiology:	Inadequate energy/protein intake + increased needs related to TBI resulting in dementia & stage II pressure ulcers  Signs/Symptoms: Physical findings of mild fat depletion & muscle wasting as noted 07/17    GOAL:  Pt to consume >50% meals/supplements during LOS - not being met at this time      Nutrition Diagnosis is [x ] ongoing  [ ] resolved [ ] not applicable     New Nutrition Diagnosis: [x ] not applicable       Interventions:   Recommend - Continue current diet as Rx'd  [ ] Change Diet To:  [ ] Nutrition Supplement  [ ] Nutrition Support  [ ] Other:     Monitoring and Evaluation:   [x ] PO intake [ x ] Tolerance to diet prescription [ x ] weights [ x ] labs[ x ] follow up per protocol  [ ] other:

## 2023-08-14 NOTE — PROGRESS NOTE ADULT - SUBJECTIVE AND OBJECTIVE BOX
[facility]  Division of Infectious Diseases  [phone]    Name: ROJAS EH  Age: 68y  Gender: Male  MRN: [id number]    Interval History--  Notes reviewed.     Past Medical History--  TBI (traumatic brain injury)    HLD (hyperlipidemia)    HTN, age 0-17        For details regarding the patient's social history, family history, and other miscellaneous elements, please refer the initial infectious diseases consultation and/or the admitting history and physical examination for this admission.    Allergies    No Known Allergies    Intolerances        Medications--  Antibiotics:    Immunologic:    Other:  acetaminophen     Tablet .. PRN  acetylcysteine 10%  Inhalation PRN  diltiazem    Tablet  enoxaparin Injectable  lactated ringers.  levalbuterol Inhalation PRN  lidocaine   4% Patch  melatonin PRN  metoprolol tartrate  nystatin Powder  QUEtiapine  traZODone  valproic  acid Syrup      Review of Systems--  A 10-point review of systems was obtained.     Pertinent positives and negatives--  Constitutional: No fevers. No Chills. No Rigors.   Cardiovascular: No chest pain. No palpitations.  Respiratory: No shortness of breath. No cough.  Gastrointestinal: No nausea or vomiting. No diarrhea or constipation.   Psychiatric: Pleasant. Appropriate affect.    Review of systems otherwise negative except as previously noted.    Physical Examination--  Vital Signs: T(F): 98.7 (08-14-23 @ 11:48), Max: 100.3 (08-13-23 @ 22:55)  HR: 115 (08-14-23 @ 11:48)  BP: 171/80 (08-14-23 @ 11:48)  RR: 19 (08-14-23 @ 11:48)  SpO2: 100% (08-14-23 @ 11:48)  Wt(kg): --  General: Nontoxic-appearing Male in no acute distress.  HEENT: AT/NC. PERRL. EOMI. Anicteric. Conjunctiva pink and moist. Oropharynx clear. Dentition fair.  Neck: Not rigid. No sense of mass.  Nodes: None palpable.  Lungs: Clear bilaterally without rales, wheezing or rhonchi  Heart: Regular rate and rhythm. No Murmur. No rub. No gallop. No palpable thrill.  Abdomen: Bowel sounds present and normoactive. Soft. Nondistended. Nontender. No sense of mass. No organomegaly.  Back: No spinal tenderness. No costovertebral angle tenderness.   Extremities: No cyanosis or clubbing. No edema.   Skin: Warm. Dry. Good turgor. No rash. No vasculitic stigmata.  Psychiatric: Appropriate affect and mood for situation.         Laboratory Studies--  CBC                        10.4   7.39  )-----------( 318      ( 14 Aug 2023 07:20 )             32.5       Chemistries  08-14    138  |  110<H>  |  8   ----------------------------<  86  4.2   |  25  |  0.80    Ca    8.3<L>      14 Aug 2023 07:20        Culture Data           [facility]  Division of Infectious Diseases  [phone]    Name: ROJAS HE  Age: 68y  Gender: Male  MRN: [id number]    Interval History--  Notes reviewed. Resting comfortably. Still febrile.     Past Medical History--  TBI (traumatic brain injury)    HLD (hyperlipidemia)    HTN, age 0-17        For details regarding the patient's social history, family history, and other miscellaneous elements, please refer the initial infectious diseases consultation and/or the admitting history and physical examination for this admission.    Allergies    No Known Allergies    Intolerances        Medications--  Antibiotics:    Immunologic:    Other:  acetaminophen     Tablet .. PRN  acetylcysteine 10%  Inhalation PRN  diltiazem    Tablet  enoxaparin Injectable  lactated ringers.  levalbuterol Inhalation PRN  lidocaine   4% Patch  melatonin PRN  metoprolol tartrate  nystatin Powder  QUEtiapine  traZODone  valproic  acid Syrup      Review of Systems--  Review of systems unable secondary to clinical condition.      Physical Examination--  Vital Signs: T(F): 98.7 (08-14-23 @ 11:48), Max: 100.3 (08-13-23 @ 22:55)  HR: 115 (08-14-23 @ 11:48)  BP: 171/80 (08-14-23 @ 11:48)  RR: 19 (08-14-23 @ 11:48)  SpO2: 100% (08-14-23 @ 11:48)  Wt(kg): --  General: Chronically ill-appearing Male in no acute distress.  HEENT: AT/NC. Pupils/EOM unable secondary to patient compliance. Oropharynx/dentition unable secondary to patient compliance.   Neck: Increased tone not frankly rigid. No sense of mass.  Nodes: None palpable.  Lungs: Poor effort diminished BS bilaterally, no wheezes, no rhonchi  Heart: Tachycardic with RR, MRGT  Abdomen: Bowel sounds present. Soft. Nondistended. Nontender.  Extremities: No cyanosis or clubbing. No edema. Lower extremities contracted  Neuro: awake and alert, nods, does not follow commands.  Skin: Warm. Dry. Good turgor. No acute rash.   Psychiatric: Largely unable. Not agitated.       Laboratory Studies--  CBC                        10.4   7.39  )-----------( 318      ( 14 Aug 2023 07:20 )             32.5       Chemistries  08-14    138  |  110<H>  |  8   ----------------------------<  86  4.2   |  25  |  0.80    Ca    8.3<L>      14 Aug 2023 07:20        Culture Data

## 2023-08-14 NOTE — PROGRESS NOTE ADULT - ASSESSMENT
I doubt that tachycardia essentially since admission was on an infectious basis. Suspect the fever last night represents a superimposed event, with aspiration being most likely. It will be interesting to see whether tachycardia resolves, or not.   Certainly at risk for aspiration given poor mental status superimposed on baseline TBI.  No clear or convincing pneumonia on CT, does have plugging  VQ without PE, does have DVT but I do not think latter accounts for escalating leukocytosis.   RVP's neg  Elevated CK, presumed rhabdomyolysis why still fluctuating and elevated after ~1 week No troponin's sent but most recent ECG only reported as sinus tachycardia.   U/A without significant pyuria now or on admission  Abdominal exam and skin exam limited by patient's behavior, but no gross abdominal tenderness, skin/soft tissue infection, phlebitis    7/28: afebrile >24 hrs, tachycardic, WBC increased 18.61, Cr elevated 1.38, BCs and UC with no growth to date, covid 19 test is negative, CT chest - new RLL mucus plug, new b/l LLs atelectasis, MRSA PCR not detected, will continue with Zosyn for now.   + Right femoral DVT - on Lovenox.   Attending addendum:  I have reviewed all pertinent clinical information and agree with the NP's note.   continue zosyn through the weekend   follow all cultured  trend wbc and fever curve   aspiration precautions   frequent suctioning if necessary     7/31: pt is more awake and alert, more interactive, confused, no fevers since 7/29, WBC elevated 15.5, Cr normalized, LFTs better, MRSA PCR not detected, BCs and UC with no growth to date. Pt completed a course of ceftriaxone, now on zosyn day #6.   Pt's CK is trending up, cardiac enzymes should be obtained.   Attending addendum:  I have reviewed all pertinent clinical information and agree with the NP's note.   continue antibiotics   CK was improving but then starting climbing, asked for cardiac enzymes to be sent  suspended atorvastatin as he is on high dose   please trend CK levels    8/1: low grade fevers, tachycardic, NC, WBC better 12.77,Cr ok, LFTs better, BCs and UC with no growth to date, troponin x 1 negative, on Lovenox for right femoral DVT, Zosyn IV continued.  Attending Addendum--  Case reviewed with NP Aruna Guzman. Her note reviewed and modified as appropriate.   Still with intermittent low-grade fever though trend perhaps moderating  Some decline in WBC  CK still elevated as of the 29th- etiology? Seems too long to attribute to rhabdo from outpatient setting. MB fraction nor troponin impressive  Role of Abx TBD  Drug fever an exclusionary diagnosis, but none known to be new    8/2: continues having fevers, tachycardic, WBC normalized, Cr ok, CK is better 510 today, all prior BCs with no growth to date, will obtain two sets of surveillance BCs. Unclear if pt's fevers are infectious in nature, will continue Zosyn IV for now.   Attending Addendum--  Case reviewed with JOANNA Guzman. Her note reviewed and modified as appropriate.   Still febrile, higher grade  CK diminished  If fevers persist, will consider NM scan, but not clear that he will fit under the camera.  Role of Abx TBD    8/3: low grade temp yesterday late evening, no fevers since then, tachycardic, NC, WBC normalized, Cr ok, all BCs and UC with no growth to date, Zosyn IV continued for now. NM scan should be considered if the pt can fit, + contracted.   Attending Addendum--  Case reviewed with JOANNA Guzman. Her note reviewed and modified as appropriate.   Patient personally assessed and examined.  Seen earlier   Shivering w low grade fever earlier. Abx have not made much impact and cx unrevealing.   Still not clear why CK still elevate- no concern of active injury, no clear tenderness on abdominal exam  Seems too long for rhabdo in setting of normal renal function.    8/4: continues having fevers, tachycardic, RA, WBC increased 12.12, Cr ok, BCs with no growth to date, will continue with Zosyn for now. In favor of obtaining for indium scan, consider rheumatology workup.   Attending Addendum--  Case reviewed with JOANNA Guzman. Her note reviewed and modified as appropriate.   FUO.  NM scan at this point reasonable  Checking serologies re: autoimmune process, especially as persistent CK elevation not well explained at present (myositis?).  Role of abx TBD  I will be available via Teams for any issues that may arise over the weekend.    8/7: continues having low grade temps, tachycardic, RA, no new blood work, BCs remain with no growth to date, Zosyn IV continued for now, pending indium scan, consider rheumatology consult.  Attending Addendum--  Case reviewed with JOANNA Guzmna. Her note reviewed and modified as appropriate.   Patient personally assessed and examined.  Seems more alert  Intermittent low grade fever  Awaiting NM study, though not clear how useful it will be  Anti-RNP elevated, CK's variably elevated-- myositis?  JESICA pending  Rheum eval    8/8: pt is very contracted, exam is difficult, continues having fevers, RA, tachycardic, WBC increased 15.64. All BCs with no growth to date, Zosyn IV continued for now, indium scan is pending.   Attending Addendum--  Case reviewed with JOANNA Guzman. Her note reviewed and modified as appropriate.   Patient personally assessed and examined.  Little changed  Trend CBC  Awaiting NM scan  Rheum appreciated    8/9: no fevers within last 24 hrs, tachycardic, RA, WBC better 12.57, BCs with no growth to date, Cr ok, CT chest - large left chest hematoma vs dependent edema, + b/l pleural effusions, CAROL ? infiltrate, b/l LLs atelectasis. Indium scan is pending, Zosyn IV continued for now.   Attending Addendum--  Case reviewed with JOANNA Guzman. Her note reviewed and modified as appropriate.   Aldolase elevated, as espected.  JESICA + 1:1280  CT chest not overly impressive for infectious process. hematoma could cause fever but fever prolonged  Rheum follow up    08/10: afebrile for the last couple days, tachycardic, RA, wbc normalized, RVP negative, all BCs with no growth to date, completed longer course of abx, will continue monitoring off abx for now.   Attending Addendum--  Case reviewed with JOANNA Guzman. Her note reviewed and modified as appropriate.   Patient personally assessed and examined.  Afebrile.  More awake/alert  Feels "all right" no specific complaints.  Defer additional antibiotics for now    8/11: remains afebrile, tachycardic, RA, no leukocytosis, BCs remain with no growth to date, pt is being monitored off abx.   8/14: Intermittently febrile, exam unchanged. Still no clear or convincing evidence of infection on exam    Suggestions--  Defer abx      Joel Adamson MD  Attending Physician  [facility]  Division of Infectious Diseases  [phone]

## 2023-08-14 NOTE — PROGRESS NOTE ADULT - ASSESSMENT
68 years old male with h/o HTN, HLD, TBI dementia here after a fall On exam lethargic orineted x 0 general weakness LE atrophy some hyperrefleia  CTH, C and L reviewed     Impression: generalized weakness. TME rhabdo  aspiration   uti     limit polypharmacy  Can consider MRI brain and C-spine if no CI, non-urgent  outpt EMG  GOCs  -B12, folate, TSH   -In order to enhance patient's overall well-being and clinical course, please try avoiding benzodiazepines, anticholinergics, and antihistamines (Can cause worsening confusion/delirium). Additionally, continue reorientation, supportive care, maintaining regular sleep/wake cycle, and optimizing nutritional/medical factors.   Can follow up with Neurology, Dr. Rafael Jeffries or Dr. Macedo  at [phone]    Julianna Choi,   Vascular Neurology  68 years old male with h/o HTN, HLD, TBI dementia here after a fall On exam lethargic oriented x 0 general weakness LE atrophy some hyperreflexia  CTH, C and L reviewed     Impression: generalized weakness. TME rhabdo  aspiration   uti   RLE DVT on Lovenox    limit polypharmacy  Can consider MRI brain and C-spine if no CI, non-urgent, can defer for now in contracted state  outpt EMG  Planning to transition to DOAC for DVT as outpt   GOCs  -B12, folate, TSH   -In order to enhance patient's overall well-being and clinical course, please try avoiding benzodiazepines, anticholinergics, and antihistamines (Can cause worsening confusion/delirium). Additionally, continue reorientation, supportive care, maintaining regular sleep/wake cycle, and optimizing nutritional/medical factors.   Can follow up with Neurology, Dr. Rafael Jeffries or Dr. Macedo  at [phone]    Julianna Choi, DO  Vascular Neurology  68 years old male with h/o HTN, HLD, TBI dementia here after a fal with improvement in mental status since arrival. Has general weakness LE atrophy some hyperreflexia  CTH, C and L reviewed     Impression: generalized weakness. TME rhabdo  aspiration   uti   RLE DVT on Lovenox    limit polypharmacy  Can consider MRI brain and C-spine if no CI, non-urgent, can defer for now in contracted state  outpt EMG  Planning to transition to DOAC for DVT as outpt   GOCs  -B12, folate, TSH   -In order to enhance patient's overall well-being and clinical course, please try avoiding benzodiazepines, anticholinergics, and antihistamines (Can cause worsening confusion/delirium). Additionally, continue reorientation, supportive care, maintaining regular sleep/wake cycle, and optimizing nutritional/medical factors.   Can follow up with Neurology, Dr. Rafael Jeffries or Dr. Macedo  at [phone]    Julianna Choi, DO  Vascular Neurology  68 years old male with h/o HTN, HLD, TBI dementia here after a fal with improvement in mental status since arrival. Has general weakness LE atrophy some hyperreflexia  CTH, C and L reviewed     Impression: generalized weakness. TME rhabdo  aspiration   uti   RLE DVT on Lovenox    limit polypharmacy  Can consider MRI brain and C-spine if no CI, non-urgent, can defer for now in contracted state  outpt EMG  Planning to transition to DOAC for DVT as outpt   GOCs   -B12, folate, TSH v  -In order to enhance patient's overall well-being and clinical course, please try avoiding benzodiazepines, anticholinergics, and antihistamines (Can cause worsening confusion/delirium). Additionally, continue reorientation, supportive care, maintaining regular sleep/wake cycle, and optimizing nutritional/medical factors.   Can follow up with Neurology, Dr. Rafael Jeffries or Dr. Macedo  at [phone]    Julianna Choi, DO  Vascular Neurology

## 2023-08-14 NOTE — PROGRESS NOTE ADULT - ASSESSMENT
68M PMH HTN, HLD, TBI with resulting dementia presented to ED as a fall. Patient found with rhabdo and VIKAS, also with possible aspiration pneumonia, now afebrile, s/p a course of abx.  Seen by ID, rheumatology.  Rheumatology workup in progress.  Pt was unable to have Indium scan due to b/l LE contractures.  ? due to sacroilitis.  Started on NSAIDs.  Evaluated by Neuro.  CT head not suggestive of CVA.  Unable to have MRI Head / neck due to contractures.  s/p course of Zosyn, on regular PT.     LE Doppler : IMPRESSION: There is acute occlusive DVT affecting the right femoral vein with the proximal extent of the thrombus in the right common femoral vein.    Large left chest wall subcutaneous hematoma versus dependent edema.  New small bilateral pleural effusions.  Left upper lobe infiltrate versus motion artifact. Bilateral lower lobe passive atelectasis.      # Recurrent fevers, now afebrile - Elevated ESR, CRP, JESICA, antiDSDNA positive.  ? rheumatologic etiology.  Rheumatology consulted and workup in progress.    -continues to be tachycardic    # Aspiration Pneumonia / b/l Pleural Effusion, right sided mucous plugging, b/l atelectasis - ID Following.  s/p Zosyn.  CT c/a/p not indicative of acute infection.  ? fever from hematoma.  Per nuclear, pt too contracted to be positioned for indium scan.    As per ID- continue to monitor off antibiotics, continue aspiration precautions,   # ? L chest hematoma - no obvious ecchymosis of L chest wall.  H/H stable.  Monitor CBC.  # Tachycardia - HR remains elevated.  Continue Cardizem, BBlocker.  Tele showing sinus tachycardia.   VQ scan: low probability      # Impaired ambulation / LE contractures / Functional Quadriplegia / TBI - ? due to sacroilitis, underlying dementia -  Trial of NSAIDs.  PT consulted and recommending FREDY.  Neuro following.  CT head not suggestive of acute infarct.  Attempt MR Head / neck, however, pt contracted- consider if pt were to improve.  Outpatient EMG.      As per neuro---try avoiding benzodiazepines, anticholinergics, and antihistamines (Can cause worsening confusion/delirium). Additionally, continue reorientation, supportive care, maintaining regular sleep/wake cycle, and optimizing nutritional/medical factors"  -frequent turns, aspiration precautions    # RLE DVT - continue full dose Lovenox for now.  Transition to DOAC on discharge    # Traumatic brain injury / Dementia - supportive care.  Continue Valproic acid.      # VIKAS - resolved.    # Rhabdomyolysis - resolved.    # Essential HTN - Monitor BP.  Continue antihypertensives.    # Inpatient DVT Proph - on anticoagulation     #GOC- full code. Poor prognosis. Get palliative care consult on monday

## 2023-08-15 DIAGNOSIS — R50.9 FEVER, UNSPECIFIED: ICD-10-CM

## 2023-08-15 DIAGNOSIS — R53.2 FUNCTIONAL QUADRIPLEGIA: ICD-10-CM

## 2023-08-15 DIAGNOSIS — Z51.5 ENCOUNTER FOR PALLIATIVE CARE: ICD-10-CM

## 2023-08-15 DIAGNOSIS — I82.409 ACUTE EMBOLISM AND THROMBOSIS OF UNSPECIFIED DEEP VEINS OF UNSPECIFIED LOWER EXTREMITY: ICD-10-CM

## 2023-08-15 LAB
ALBUMIN SERPL ELPH-MCNC: 1.6 G/DL — LOW (ref 3.3–5)
ALP SERPL-CCNC: 68 U/L — SIGNIFICANT CHANGE UP (ref 40–120)
ALT FLD-CCNC: 45 U/L — SIGNIFICANT CHANGE UP (ref 12–78)
ANION GAP SERPL CALC-SCNC: 4 MMOL/L — LOW (ref 5–17)
AST SERPL-CCNC: 42 U/L — HIGH (ref 15–37)
BILIRUB SERPL-MCNC: 0.3 MG/DL — SIGNIFICANT CHANGE UP (ref 0.2–1.2)
BUN SERPL-MCNC: 10 MG/DL — SIGNIFICANT CHANGE UP (ref 7–23)
CALCIUM SERPL-MCNC: 8.4 MG/DL — LOW (ref 8.5–10.1)
CHLORIDE SERPL-SCNC: 105 MMOL/L — SIGNIFICANT CHANGE UP (ref 96–108)
CO2 SERPL-SCNC: 29 MMOL/L — SIGNIFICANT CHANGE UP (ref 22–31)
CREAT SERPL-MCNC: 0.83 MG/DL — SIGNIFICANT CHANGE UP (ref 0.5–1.3)
EGFR: 95 ML/MIN/1.73M2 — SIGNIFICANT CHANGE UP
FOLATE SERPL-MCNC: 7.3 NG/ML — SIGNIFICANT CHANGE UP
GLUCOSE SERPL-MCNC: 94 MG/DL — SIGNIFICANT CHANGE UP (ref 70–99)
HCT VFR BLD CALC: 31.9 % — LOW (ref 39–50)
HGB BLD-MCNC: 10.8 G/DL — LOW (ref 13–17)
HLA-B27 QL NAA+PROBE: SIGNIFICANT CHANGE UP
MAGNESIUM SERPL-MCNC: 2 MG/DL — SIGNIFICANT CHANGE UP (ref 1.6–2.6)
MCHC RBC-ENTMCNC: 31.4 PG — SIGNIFICANT CHANGE UP (ref 27–34)
MCHC RBC-ENTMCNC: 33.9 G/DL — SIGNIFICANT CHANGE UP (ref 32–36)
MCV RBC AUTO: 92.7 FL — SIGNIFICANT CHANGE UP (ref 80–100)
NRBC # BLD: 0 /100 WBCS — SIGNIFICANT CHANGE UP (ref 0–0)
PHOSPHATE SERPL-MCNC: 2.8 MG/DL — SIGNIFICANT CHANGE UP (ref 2.5–4.5)
PLATELET # BLD AUTO: 366 K/UL — SIGNIFICANT CHANGE UP (ref 150–400)
POTASSIUM SERPL-MCNC: 4.2 MMOL/L — SIGNIFICANT CHANGE UP (ref 3.5–5.3)
POTASSIUM SERPL-SCNC: 4.2 MMOL/L — SIGNIFICANT CHANGE UP (ref 3.5–5.3)
PROT SERPL-MCNC: 6 GM/DL — SIGNIFICANT CHANGE UP (ref 6–8.3)
RBC # BLD: 3.44 M/UL — LOW (ref 4.2–5.8)
RBC # FLD: 16.5 % — HIGH (ref 10.3–14.5)
SODIUM SERPL-SCNC: 138 MMOL/L — SIGNIFICANT CHANGE UP (ref 135–145)
TSH SERPL-MCNC: 3.14 UIU/ML — SIGNIFICANT CHANGE UP (ref 0.36–3.74)
VIT B12 SERPL-MCNC: 1050 PG/ML — SIGNIFICANT CHANGE UP (ref 232–1245)
WBC # BLD: 7.56 K/UL — SIGNIFICANT CHANGE UP (ref 3.8–10.5)
WBC # FLD AUTO: 7.56 K/UL — SIGNIFICANT CHANGE UP (ref 3.8–10.5)

## 2023-08-15 PROCEDURE — 99497 ADVNCD CARE PLAN 30 MIN: CPT | Mod: 25

## 2023-08-15 PROCEDURE — 99232 SBSQ HOSP IP/OBS MODERATE 35: CPT

## 2023-08-15 PROCEDURE — 99223 1ST HOSP IP/OBS HIGH 75: CPT

## 2023-08-15 RX ORDER — ACETAMINOPHEN 500 MG
650 TABLET ORAL EVERY 6 HOURS
Refills: 0 | Status: DISCONTINUED | OUTPATIENT
Start: 2023-08-15 | End: 2023-08-24

## 2023-08-15 RX ADMIN — LIDOCAINE 1 PATCH: 4 CREAM TOPICAL at 18:05

## 2023-08-15 RX ADMIN — NYSTATIN CREAM 1 APPLICATION(S): 100000 CREAM TOPICAL at 18:09

## 2023-08-15 RX ADMIN — LIDOCAINE 1 PATCH: 4 CREAM TOPICAL at 12:57

## 2023-08-15 RX ADMIN — Medication 50 MILLIGRAM(S): at 12:57

## 2023-08-15 RX ADMIN — SODIUM CHLORIDE 100 MILLILITER(S): 9 INJECTION, SOLUTION INTRAVENOUS at 13:00

## 2023-08-15 RX ADMIN — ENOXAPARIN SODIUM 60 MILLIGRAM(S): 100 INJECTION SUBCUTANEOUS at 06:30

## 2023-08-15 RX ADMIN — Medication 50 MILLIGRAM(S): at 18:09

## 2023-08-15 RX ADMIN — ENOXAPARIN SODIUM 60 MILLIGRAM(S): 100 INJECTION SUBCUTANEOUS at 18:09

## 2023-08-15 RX ADMIN — Medication 60 MILLIGRAM(S): at 18:09

## 2023-08-15 RX ADMIN — Medication 60 MILLIGRAM(S): at 06:30

## 2023-08-15 RX ADMIN — Medication 250 MILLIGRAM(S): at 21:46

## 2023-08-15 RX ADMIN — Medication 50 MILLIGRAM(S): at 21:47

## 2023-08-15 RX ADMIN — NYSTATIN CREAM 1 APPLICATION(S): 100000 CREAM TOPICAL at 06:30

## 2023-08-15 RX ADMIN — Medication 250 MILLIGRAM(S): at 06:30

## 2023-08-15 RX ADMIN — SODIUM CHLORIDE 100 MILLILITER(S): 9 INJECTION, SOLUTION INTRAVENOUS at 21:48

## 2023-08-15 RX ADMIN — Medication 50 MILLIGRAM(S): at 06:30

## 2023-08-15 RX ADMIN — LIDOCAINE 1 PATCH: 4 CREAM TOPICAL at 08:54

## 2023-08-15 RX ADMIN — QUETIAPINE FUMARATE 100 MILLIGRAM(S): 200 TABLET, FILM COATED ORAL at 21:48

## 2023-08-15 RX ADMIN — Medication 3 MILLIGRAM(S): at 21:47

## 2023-08-15 RX ADMIN — Medication 250 MILLIGRAM(S): at 16:13

## 2023-08-15 RX ADMIN — Medication 60 MILLIGRAM(S): at 12:57

## 2023-08-15 NOTE — CONSULT NOTE ADULT - PROBLEM SELECTOR RECOMMENDATION 6
Patient has 5 children, was living with his daughter,  who said she is patient's HCP.  3 of the 5 children have visited patient and said she keeps her siblings in the loop.  HCP on file with patient's PMD and daughter going to obtain copy.  Made aware of [facility] surrogacy laws in the absence of HCP.  In agreement that interventions such as CPR and intubation would cause more harm than benefit, plan to follow up with palliative care tomorrow.      Message sent to Dr. Frazier

## 2023-08-15 NOTE — PROGRESS NOTE ADULT - SUBJECTIVE AND OBJECTIVE BOX
[facility]  Division of Infectious Diseases  [phone]    Name: ROJAS HE  Age: 68y  Gender: Male  MRN: [id number]    Interval History--  Notes reviewed.     Past Medical History--  TBI (traumatic brain injury)    HLD (hyperlipidemia)    HTN, age 0-18        For details regarding the patient's social history, family history, and other miscellaneous elements, please refer the initial infectious diseases consultation and/or the admitting history and physical examination for this admission.    Allergies    No Known Allergies    Intolerances        Medications--  Antibiotics:    Immunologic:    Other:  acetaminophen     Tablet .. PRN  acetylcysteine 10%  Inhalation PRN  diltiazem    Tablet  enoxaparin Injectable  lactated ringers.  levalbuterol Inhalation PRN  lidocaine   4% Patch  melatonin PRN  metoprolol tartrate  nystatin Powder  QUEtiapine  traZODone  valproic  acid Syrup      Review of Systems--  A 10-point review of systems was obtained.     Pertinent positives and negatives--  Constitutional: No fevers. No Chills. No Rigors.   Cardiovascular: No chest pain. No palpitations.  Respiratory: No shortness of breath. No cough.  Gastrointestinal: No nausea or vomiting. No diarrhea or constipation.   Psychiatric: Pleasant. Appropriate affect.    Review of systems otherwise negative except as previously noted.    Physical Examination--  Vital Signs: T(F): 99.7 (08-15-23 @ 06:31), Max: 100.7 (08-14-23 @ 23:40)  HR: 115 (08-15-23 @ 06:31)  BP: 172/80 (08-15-23 @ 06:20)  RR: 18 (08-15-23 @ 06:20)  SpO2: 98% (08-15-23 @ 06:20)  Wt(kg): --  General: Nontoxic-appearing Male in no acute distress.  HEENT: AT/NC. PERRL. EOMI. Anicteric. Conjunctiva pink and moist. Oropharynx clear. Dentition fair.  Neck: Not rigid. No sense of mass.  Nodes: None palpable.  Lungs: Clear bilaterally without rales, wheezing or rhonchi  Heart: Regular rate and rhythm. No Murmur. No rub. No gallop. No palpable thrill.  Abdomen: Bowel sounds present and normoactive. Soft. Nondistended. Nontender. No sense of mass. No organomegaly.  Back: No spinal tenderness. No costovertebral angle tenderness.   Extremities: No cyanosis or clubbing. No edema.   Skin: Warm. Dry. Good turgor. No rash. No vasculitic stigmata.  Psychiatric: Appropriate affect and mood for situation.         Laboratory Studies--  CBC                        10.8   7.56  )-----------( 366      ( 15 Aug 2023 07:33 )             31.9       Chemistries  08-15    138  |  105  |  10  ----------------------------<  94  4.2   |  29  |  0.83    Ca    8.4<L>      15 Aug 2023 07:33  Phos  2.8     08-15  Mg     2.0     08-15    TPro  6.0  /  Alb  1.6<L>  /  TBili  0.3  /  DBili  x   /  AST  42<H>  /  ALT  45  /  AlkPhos  68  08-15      Culture Data           [facility]  Division of Infectious Diseases  [phone]    Name: ROJAS HE  Age: 68y  Gender: Male  MRN: [id number]    Interval History--  Notes reviewed. Seen earlier today. Remains febrile.  On cooling blanket with water temperature 40F and core temp 99.6      Past Medical History--  TBI (traumatic brain injury)    HLD (hyperlipidemia)    HTN, age 0-18        For details regarding the patient's social history, family history, and other miscellaneous elements, please refer the initial infectious diseases consultation and/or the admitting history and physical examination for this admission.    Allergies    No Known Allergies    Intolerances        Medications--  Antibiotics:    Immunologic:    Other:  acetaminophen     Tablet .. PRN  acetylcysteine 10%  Inhalation PRN  diltiazem    Tablet  enoxaparin Injectable  lactated ringers.  levalbuterol Inhalation PRN  lidocaine   4% Patch  melatonin PRN  metoprolol tartrate  nystatin Powder  QUEtiapine  traZODone  valproic  acid Syrup      Review of Systems--  Review of systems unable secondary to clinical condition.     Physical Examination--  Vital Signs: T(F): 99.7 (08-15-23 @ 06:31), Max: 100.7 (08-14-23 @ 23:40)  HR: 115 (08-15-23 @ 06:31)  BP: 172/80 (08-15-23 @ 06:20)  RR: 18 (08-15-23 @ 06:20)  SpO2: 98% (08-15-23 @ 06:20)  Wt(kg): --  General: Chronically ill-appearing Male in no acute distress.  HEENT: AT/NC. Pupils/EOM unable secondary to patient compliance. Oropharynx/dentition unable secondary to patient compliance.   Neck: Increased tone not frankly rigid. No sense of mass.  Nodes: None palpable.  Lungs: Poor effort diminished BS bilaterally, no wheezes, no rhonchi  Heart: Tachycardic with RR, MRGT  Abdomen: Bowel sounds present. Soft. Nondistended. Nontender.  Extremities: No cyanosis or clubbing. No edema. Lower extremities contracted  Skin: Warm. Dry. Good turgor. No acute rash.   Psychiatric: Largely unable. Not agitated.         Laboratory Studies--  CBC                        10.8   7.56  )-----------( 366      ( 15 Aug 2023 07:33 )             31.9       Chemistries  08-15    138  |  105  |  10  ----------------------------<  94  4.2   |  29  |  0.83    Ca    8.4<L>      15 Aug 2023 07:33  Phos  2.8     08-15  Mg     2.0     08-15    TPro  6.0  /  Alb  1.6<L>  /  TBili  0.3  /  DBili  x   /  AST  42<H>  /  ALT  45  /  AlkPhos  68  08-15      Culture Data  None

## 2023-08-15 NOTE — CONSULT NOTE ADULT - ASSESSMENT
68 year old male PMH of TBI, HLD and HTN presented to the ED s/p fall with rhabdo.  Patient now having fevers, possible aspiration related and is debilitated with lower extremity contractures.  Palliative care consulted for GOC.

## 2023-08-15 NOTE — PROGRESS NOTE ADULT - ASSESSMENT
68 years old male with h/o HTN, HLD, TBI dementia here after a fal with improvement in mental status since arrival. Has general weakness LE atrophy some hyperreflexia  CTH, C and L reviewed     Impression: generalized weakness. TME rhabdo  aspiration   uti   RLE DVT on Lovenox    limit polypharmacy  Can consider MRI brain and C-spine if no CI, non-urgent, can defer for now in contracted state  outpt EMG  Planning to transition to DOAC for DVT as outpt   GOCs   -B12, folate, TSH v  -In order to enhance patient's overall well-being and clinical course, please try avoiding benzodiazepines, anticholinergics, and antihistamines (Can cause worsening confusion/delirium). Additionally, continue reorientation, supportive care, maintaining regular sleep/wake cycle, and optimizing nutritional/medical factors.   Can follow up with Neurology, Dr. Rafael Jeffries or Dr. Macedo  at [phone]    Julianna Choi, DO  Vascular Neurology  68 years old male with h/o HTN, HLD, TBI dementia here after a fall with improvement in mental status since arrival. Has general weakness LE atrophy some hyperreflexia  CTH, C and L reviewed     Impression: generalized weakness, now contracted in all 4 extremities, TME, rhabdo  aspiration PNA  uti   RLE DVT on Lovenox    limit polypharmacy  Can consider MRI brain and C-spine if no CI, non-urgent, can defer for now in contracted state  Consider low dose baclofen for contractures  outpt EMG  Planning to transition to DOAC for DVT as outpt   GOCs   -B12, folate, TSH   -In order to enhance patient's overall well-being and clinical course, please try avoiding benzodiazepines, anticholinergics, and antihistamines (Can cause worsening confusion/delirium). Additionally, continue reorientation, supportive care, maintaining regular sleep/wake cycle, and optimizing nutritional/medical factors.   Can follow up with Neurology, Dr. Rafael Jeffries or Dr. Macedo  at [phone]    Julianna Choi,   Vascular Neurology

## 2023-08-15 NOTE — CONSULT NOTE ADULT - REASON FOR ADMISSION
VIKAS, rhabdomyolysis

## 2023-08-15 NOTE — CONSULT NOTE ADULT - PROBLEM SELECTOR RECOMMENDATION 5
dependent for care, likely will need placement as family unable to care for patient at home given worsening debility  turn and position q 2 hours  xray with possible sacrolitis, Tylenol PRN for pain, would consider adding 2.5mg of oxycodone q 6 hours PRN for moderate pain and 5mg oxycodone q 6 hours PRN for severe pain

## 2023-08-15 NOTE — PROGRESS NOTE ADULT - SUBJECTIVE AND OBJECTIVE BOX
Patient is a 68y old  Male who presents with a chief complaint of VIKAS, rhabdomyolysis (15 Aug 2023 15:19)      INTERVAL HPI/OVERNIGHT EVENTS:    MEDICATIONS  (STANDING):  diltiazem    Tablet 60 milliGRAM(s) Oral every 6 hours  enoxaparin Injectable 60 milliGRAM(s) SubCutaneous every 12 hours  lactated ringers. 1000 milliLiter(s) (100 mL/Hr) IV Continuous <Continuous>  lidocaine   4% Patch 1 Patch Transdermal every 24 hours  metoprolol tartrate 50 milliGRAM(s) Oral every 6 hours  nystatin Powder 1 Application(s) Topical two times a day  QUEtiapine 100 milliGRAM(s) Oral at bedtime  traZODone 50 milliGRAM(s) Oral at bedtime  valproic  acid Syrup 250 milliGRAM(s) Oral three times a day    MEDICATIONS  (PRN):  acetaminophen     Tablet .. 650 milliGRAM(s) Oral every 6 hours PRN Temp greater or equal to 38C (100.4F)  acetylcysteine 10%  Inhalation 4 milliLiter(s) Inhalation every 6 hours PRN dyspnea  levalbuterol Inhalation 0.63 milliGRAM(s) Inhalation every 6 hours PRN Shortness of breath/Wheezing  melatonin 3 milliGRAM(s) Oral at bedtime PRN Insomnia      Allergies    No Known Allergies    Intolerances        Vital Signs Last 24 Hrs  T(C): 37.4 (15 Aug 2023 16:51), Max: 38.2 (14 Aug 2023 23:40)  T(F): 99.4 (15 Aug 2023 16:51), Max: 100.7 (14 Aug 2023 23:40)  HR: 109 (15 Aug 2023 16:51) (109 - 139)  BP: 122/79 (15 Aug 2023 16:51) (122/79 - 172/80)  BP(mean): --  RR: 18 (15 Aug 2023 16:51) (18 - 19)  SpO2: 95% (15 Aug 2023 16:51) (95% - 98%)    Parameters below as of 15 Aug 2023 16:51  Patient On (Oxygen Delivery Method): room air        PHYSICAL EXAM:  GENERAL: NAD, well-groomed, well-developed  HEAD:  Atraumatic, Normocephalic  EYES: EOMI, PERRLA, conjunctiva and sclera clear  ENMT: No tonsillar erythema, exudates, or enlargement; Moist mucous membranes, Good dentition, No lesions  NECK: Supple, No JVD, Normal thyroid  NERVOUS SYSTEM:  Alert & Oriented X3, Good concentration; Motor Strength 5/5 B/L upper and lower extremities; DTRs 2+ intact and symmetric  CHEST/LUNG: Clear to auscultation bilaterally; No rales, rhonchi, wheezing, or rubs  HEART: Regular rate and rhythm; No murmurs, rubs, or gallops  ABDOMEN: Soft, Nontender, Nondistended; Bowel sounds present  EXTREMITIES:  2+ Peripheral Pulses, No clubbing, cyanosis, or edema  LYMPH: No lymphadenopathy noted  SKIN: No rashes or lesions    LABS:                        10.8   7.56  )-----------( 366      ( 15 Aug 2023 07:33 )             31.9     08-15    138  |  105  |  10  ----------------------------<  94  4.2   |  29  |  0.83    Ca    8.4<L>      15 Aug 2023 07:33  Phos  2.8     08-15  Mg     2.0     08-15    TPro  6.0  /  Alb  1.6<L>  /  TBili  0.3  /  DBili  x   /  AST  42<H>  /  ALT  45  /  AlkPhos  68  08-15      Urinalysis Basic - ( 15 Aug 2023 07:33 )    Color: x / Appearance: x / SG: x / pH: x  Gluc: 94 mg/dL / Ketone: x  / Bili: x / Urobili: x   Blood: x / Protein: x / Nitrite: x   Leuk Esterase: x / RBC: x / WBC x   Sq Epi: x / Non Sq Epi: x / Bacteria: x      CAPILLARY BLOOD GLUCOSE          RADIOLOGY & ADDITIONAL TESTS:    08-14-23 @ 07:01  -  08-15-23 @ 07:00  --------------------------------------------------------  IN:    Oral Fluid: 960 mL  Total IN: 960 mL    OUT:    Voided (mL): 4600 mL  Total OUT: 4600 mL    Total NET: -3640 mL      08-15-23 @ 07:01  -  08-15-23 @ 20:25  --------------------------------------------------------  IN:    Oral Fluid: 240 mL  Total IN: 240 mL    OUT:    Voided (mL): 1000 mL  Total OUT: 1000 mL    Total NET: -760 mL

## 2023-08-15 NOTE — CONSULT NOTE ADULT - SUBJECTIVE AND OBJECTIVE BOX
HPI:  Talked to patient's daughter Stacy Graves ( [phone] over the phone  68 years old male with h/o HTN, HLD, TBI resulting in dementia present to ED ? fall. As per daughter, patient was found on the floor. Patient is a poor history. Patient reported he felt weak and fell onto the floor. Denied any head trauma or LOC. No fever, chills. Patient denied any pain.  Slightly tachycardic, afebrile, sat well at RA. EKG with sinus tachy. No leukocytosis, plt 129, K 6, Cr 2.82, glucose 110, . CXR with no focal consolidation. CT head with no acute pathology. C/L spine with no fracture. CT pelsis with no acute displace fracture. Questionable mild widening of the bilateral sacroiliac joints with subchondral sclerosis and questionable erosions along the iliac side, right greater than left      SH: former smoker (14 Jul 2023 16:21)    PERTINENT PM/SXH:   TBI (traumatic brain injury)    HLD (hyperlipidemia)    HTN, age 0-18        FAMILY HISTORY:    ITEMS NOT CHECKED ARE NOT PRESENT    SOCIAL HISTORY:   Significant other/partner:  [ ]  Children: yes   [ ]  Anglican/Spirituality:  Substance hx:  [ ]   Tobacco hx:  [ ]   Alcohol hx: [ ]   Home Opioid hx:  [ ] I-Stop Reference No:  Living Situation: [ x]Home  [ ]Long term care  [ ]Rehab [ ]Other    ADVANCE DIRECTIVES:    DNR  MOLST  [ ]  Living Will  [ ]   DECISION MAKER(s):  [ ] Health Care Proxy(s)  [x ] Surrogate(s)  [ ] Guardian           Name(s): Phone Number(s):  [phone]    BASELINE (I)ADL(s) (prior to admission):  [place]: [ ]Total  [ ] Moderate [ ]Dependent    Allergies    No Known Allergies    Intolerances    MEDICATIONS  (STANDING):  diltiazem    Tablet 60 milliGRAM(s) Oral every 6 hours  enoxaparin Injectable 60 milliGRAM(s) SubCutaneous every 12 hours  lactated ringers. 1000 milliLiter(s) (100 mL/Hr) IV Continuous <Continuous>  lidocaine   4% Patch 1 Patch Transdermal every 24 hours  metoprolol tartrate 50 milliGRAM(s) Oral every 6 hours  nystatin Powder 1 Application(s) Topical two times a day  QUEtiapine 100 milliGRAM(s) Oral at bedtime  traZODone 50 milliGRAM(s) Oral at bedtime  valproic  acid Syrup 250 milliGRAM(s) Oral three times a day    MEDICATIONS  (PRN):  acetaminophen     Tablet .. 650 milliGRAM(s) Oral every 6 hours PRN Temp greater or equal to 38C (100.4F)  acetylcysteine 10%  Inhalation 4 milliLiter(s) Inhalation every 6 hours PRN dyspnea  levalbuterol Inhalation 0.63 milliGRAM(s) Inhalation every 6 hours PRN Shortness of breath/Wheezing  melatonin 3 milliGRAM(s) Oral at bedtime PRN Insomnia    PRESENT SYMPTOMS: [ x]Unable to obtain due to poor mentation   Source if other than patient:  [ ]Family   [ ]Team     Pain: [ ] yes [ x] no  QOL impact -   Location -                    Aggravating factors -  Quality -  Radiation -  Timing-  Severity (0-10 scale):  Minimal acceptable level (0-10 scale):     PAIN AD Score: 1    [email or web address]/[email or web address] (press ctrl +  left click to view)    Dyspnea:                           [ ]Mild [ ]Moderate [ ]Severe  Anxiety:                             [ ]Mild [ ]Moderate [ ]Severe  Fatigue:                             [ ]Mild [ ]Moderate [ ]Severe  Nausea:                             [ ]Mild [ ]Moderate [ ]Severe  Loss of appetite:              [ ]Mild [ ]Moderate [ ]Severe  Constipation:                    [ ]Mild [ ]Moderate [ ]Severe    Other Symptoms:  [ ]All other review of systems negative     Karnofsky Performance Score/Palliative Performance Status Version 2:         30%    http://npcrc.org/files/news/palliative_performance_scale_ppsv2.pdf  PHYSICAL EXAM:  Vital Signs Last 24 Hrs  T(C): 37.4 (15 Aug 2023 11:21), Max: 38.2 (14 Aug 2023 23:40)  T(F): 99.4 (15 Aug 2023 11:21), Max: 100.7 (14 Aug 2023 23:40)  HR: 121 (15 Aug 2023 11:21) (115 - 139)  BP: 146/80 (15 Aug 2023 11:21) (146/80 - 172/80)  BP(mean): --  RR: 19 (15 Aug 2023 11:21) (18 - 19)  SpO2: 95% (15 Aug 2023 11:21) (95% - 98%)    Parameters below as of 15 Aug 2023 11:21  Patient On (Oxygen Delivery Method): room air     I&O's Summary    14 Aug 2023 07:01  -  15 Aug 2023 07:00  --------------------------------------------------------  IN: 960 mL / OUT: 4600 mL / NET: -3640 mL    15 Aug 2023 07:01  -  15 Aug 2023 15:20  --------------------------------------------------------  IN: 240 mL / OUT: 1000 mL / NET: -760 mL    GENERAL:  [ ]Alert  [ ]Oriented x   [ x]Lethargic  [ ]Cachexia  [ ]Unarousable  [ x]Verbal  [ ]Non-Verbal  Behavioral:   [ ] Anxiety  [ ] Delirium [ ] Agitation [ ] Other  HEENT:  [ ]Normal   [x ]Dry mouth   [ ]ET Tube/Trach  [ ]Oral lesions  PULMONARY:   [ ]Clear [ ]Tachypnea  [ ]Audible excessive secretions   [ ]Rhonchi        [ ]Right [ ]Left [ ]Bilateral  [ ]Crackles        [ ]Right [ ]Left [ ]Bilateral  [ ]Wheezing     [ ]Right [ ]Left [ ]Bilateral  diminished breath sounds bilaterally    CARDIOVASCULAR:    [ ]Regular [ ]Irregular [x ]Tachy  [ ]Titi [ ]Murmur [ ]Other  GASTROINTESTINAL:  [x ]Soft  [ ]Distended   [ ]+BS  [x ]Non tender [ ]Tender  [ ]PEG [ ]OGT/ NGT  Last BM: 8/14/23 GENITOURINARY:  [ ]Normal [ x] Incontinent   [ ]Oliguria/Anuria   [ ]Alvarado  MUSCULOSKELETAL:   [ ]Normal   [ ]Weakness  [ x]Bed/Wheelchair bound [x ]Edema bilateral feet  NEUROLOGIC:   [ ]No focal deficits  [ x] Cognitive impairment  [ ] Dysphagia [ ]Dysarthria [ ] Paresis [ ]Other   SKIN:   [ ]Normal   [ ]Pressure ulcer(s)  [ ]Rash    CRITICAL CARE:  [ ] Shock Present  [ ]Septic [ ]Cardiogenic [ ]Neurologic [ ]Hypovolemic  [ ]  Vasopressors [ ]  Inotropes   [ ] Respiratory failure present [ ] mechanical ventilation [ ] non-invasive ventilatory support [ ] High flow  [ ] Acute  [ ] Chronic [ ] Hypoxic  [ ] Hypercarbic [ ] Other  [ ] Other organ failure     LABS:                        10.8   7.56  )-----------( 366      ( 15 Aug 2023 07:33 )             31.9   08-15    138  |  105  |  10  ----------------------------<  94  4.2   |  29  |  0.83    Ca    8.4<L>      15 Aug 2023 07:33  Phos  2.8     08-15  Mg     2.0     08-15    TPro  6.0  /  Alb  1.6<L>  /  TBili  0.3  /  DBili  x   /  AST  42<H>  /  ALT  45  /  AlkPhos  68  08-15      Urinalysis Basic - ( 15 Aug 2023 07:33 )    Color: x / Appearance: x / SG: x / pH: x  Gluc: 94 mg/dL / Ketone: x  / Bili: x / Urobili: x   Blood: x / Protein: x / Nitrite: x   Leuk Esterase: x / RBC: x / WBC x   Sq Epi: x / Non Sq Epi: x / Bacteria: x    Culture - Blood (07.26.23 @ 13:30)    Specimen Source: .Blood Blood-Peripheral   Culture Results:   No growth at 5 days    Culture - Blood (08.04.23 @ 18:30)    Specimen Source: .Blood Blood   Culture Results:   No growth at 5 days    Rheumatoid Factor Quant, Serum or Plasma in AM (08.05.23 @ 06:15)    Rheumatoid Factor Quant, Serum or Plasma: 11 IU/mL    Anti-Nuclear Antibody in AM (08.05.23 @ 06:15)    Anti Nuclear Factor Titer: 1:1280: Antinuclear AB (JESICA), IFA Method   JESICA Pattern: Speckled    Anti-Ribonuclear Protein (08.04.23 @ 12:15)    Anti-Ribonuclear Protein: 7.2: Fluorescent Bead Immunoassy                      Reference Ranges for RNP and SM:                      <1.0 AI (negative)                      > or =1.0 AI (positive)                      Reference values apply to all ages AI    HLA B27 Genotyping (08.08.23 @ 06:25)    HLA B27 Genotyping:   HLA B27 Genotype Final Report    Accession Number: [id number]  Date Collected: 08/08/2023  Date Received: 08/08/2023  Date Reported: 08/15/2023 11:17    Specimen: Blood  Indication: Screening    Test performed: Real-Time PCR assay for HLA-B27 genotyping    RESULTS:      NEGATIVE    INTERPRETATION:    The HLA-B27 allele was NOT detected in this individual. The test result predicts no increased risk  for inflammatory disorders including ankylosing spondylitis, reactive arthritis, and acute anterior  uveitis. However, a negative test result does not rule out ankylosing spondylitis, as 10% of  affected individuals lack this antigen.        COMMENTS:  DNA extracted from the specimen was genotyped for HLA-B27 using real-time PCR and fluorescent  probes.    Results of this DNA-based are highly accurate. However, diagnostic errors can occur due to rare  sequence variations. The analytical sensitivity and specificity are 99%.    This test does not rule out the B*27:06 and 27:09 alleles, which are not associated with  spondyloarthropathies. Certain rare alleles present in less than 1 percent of the population will  not be detected. Other rare, or uncharacterized alleles may occur which may lead to false positive  or false negative results.    Results should be combined with clinical information for the most accurate interpretation. Genetic  counseling and clinical correlation are recommended.    REFERENCES  1. Fanny HAIRSTON et al. Pathogenesis of ankylosingspondylitis — recent advances and future  directions. Nature Rev. 2017; 13:359-367.  2. MALLY Clements. HLA-B27 typing by a group-specific PCR amplification. Tissue Antigens. 1994; 43:  253-256.  3. Clara SANCHEZ, et al. Real-time PCR based HLA-B*27 screening directly in whole blood. HLA. 2019;  95:189–195.  4. Mariaelena CUBA et al. Rapid HLA-B27 screening with real-time TaqMan PCR: a clinical  validation in the [place] population. Clin Chem Lab Med 2011; 49:1979–1985.  5. Thaddeus Collado and Suman Moore. Development of HLA-B*57:01 Genotyping  Real-Time PCR with optimized Hydrolysis Probe Design. Journal of Molecular Diagnostics. 2017;  19:742-755.    This test was developed, and the performance characteristics determined by the [facility].  It has not been cleared or approved by the US Food and Drug administration.  This test and the [facility] are approved the [place] Dept. of Health  and certified under the CLIA 88 as qualified to perform high complexity clinical testing.  This  test can be used for clinical purposes.    This test was performed at [facility]. [address] [place], [place] [address] [address], Medical Director: Misty Pressley MD.    If you are medical practitioner and need assistance with the interpretation of the results, please  send your request to fax no: [phone]    Verified By: Nancy Guerin PhD, [facility]  (Electronic Signature)    Pathologist: Misty Pressley M.D.          RADIOLOGY & ADDITIONAL STUDIES:     < from: Xray Hand 3 Views, Bilateral (08.08.23 @ 16:14) >  IMPRESSION: Degeneration as above. 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  --- End of Report ---  < end of copied text >    < from: CT Chest No Cont (08.08.23 @ 15:21) >  IMPRESSION:  Large left chest wall subcutaneous hematoma versus dependent edema.  New small bilateral pleural effusions.  Left upper lobe infiltrate versus motion artifact. Bilateral lower lobe  passive atelectasis.  Limitations as above.  --- End of Report ---  < end of copied text >    < from: CT Head No Cont (08.08.23 @ 15:20) >  FINDINGS: Large areas of encephalomalacia and gliosis are again seen   involving the bifrontal lobes. Small areas of encephalomalacia and   gliosis are also seen involving the anterior temporal poles. Findings may   be related to remote sequela of prior contusion.  There is redemonstration of a chronic right-sided PCA distribution   infarct.  A chronic lacunar infarct is also seen within the right putamen.  There is no acute intracranial hemorrhage, mass effect, shift of midline   structures, herniation, or hydrocephalus. No abnormal extra-axial fluid   collections are seen.  There is diffuse cerebral volume loss with prominence of the sulci,   fissures, and cisternal spaces which is normal for the patient's age.   There is extensive patchy confluent periventricular white matter   hypoattenuation statistically compatible with microvascular type changes   given calcific atherosclerotic disease of the intracranial arteries.  The paranasal sinuses and tympanomastoid cavities are grossly clear. The   calvarium appears intact. There is evidence of right-sided cataract   removal. The left orbit appears unremarkable.  IMPRESSION: No acute intracranial findings.  Multiple similar-appearing chronic findings, as discussed.  --- End of Report ---  < end of copied text >    < from: Xray Hip 1 View, Right (08.05.23 @ 07:51) >  Single view of the proximal right femur shows no evidence of fracture or   hip dislocation.  IMPRESSION: Limited study. No acute bony pathology.  Thank you for this referral.  --- End of Report ---  < end of copied text >    < from: CT Abdomen and Pelvis w/ IV Cont (07.26.23 @ 12:26) >  IMPRESSION: New right lower lobe mucus plugging. New bilateral lower lobe   linear and platelike atelectasis.  --- End of Report ---  < end of copied text >    < from: US Duplex Venous Lower Ext Complete, Bilateral (07.26.23 @ 12:14) >  IMPRESSION: There is acute occlusive DVT affecting the right femoral vein   with the proximal extent of the thrombus in the right common femoral vein.  ---End of Report ---  < end of copied text >    < from: NM Pulmonary Ventilation/Perfusion Scan (07.19.23 @ 11:20) >  IMPRESSION: Very low probability of pulmonary embolus.  --- End of Report ---  < end of copied text >    < from: CT Chest No Cont (07.18.23 @ 18:03) >  IMPRESSION:  No pneumonia.  ---End of Report ---  < end of copied text >    < from: CT Pelvis Bony Only No Cont (07.14.23 @ 14:35) >  IMPRESSION:  1.  No acute displaced fracture.  2.  Questionable mild widening of the bilateral sacroiliac joints with   subchondral sclerosis and questionable erosions along the iliac side,   right greater than left. Clinically correlate for underlying metabolic   disease.  --- End of Report ---  < end of copied text >    < from: TTE Echo Complete w/o Contrast w/ Doppler (07.29.23 @ 09:46) >    Summary:   1. Left ventricular ejection fraction, by visual estimation, is 60 to   65%.   2. Technically difficult study.   3. Normal global left ventricular systolic function.   4. Normal left ventricular internal cavity size.   5. Spectral Doppler shows impaired relaxation pattern of left   ventricular myocardial filling (Grade I diastolic dysfunction).   6. Normal right ventricular size and function.   7. Normal left atrial size.   8. Normal right atrial size.   9. There is no evidence of pericardial effusion.  10. Trace mitral valve regurgitation.  11. Sclerotic aortic valve with normal opening.  12. Increased relative wall thickness with normal mass index consistent   with left ventricular concentric remodeling.  [id number] Allen Mckeon MD FACC, FASE, FACP  Electronically signed on 7/29/2023 at 5:41:04 PM  < end of copied text >    PROTEIN CALORIE MALNUTRITION PRESENT: [ x] Yes [ ] No  [x ] PPSV2 < or = to 30% [ ] significant weight loss  [ x] poor nutritional intake [x ] catabolic state [ ] anasarca     Artificial Nutrition [ ]     REFERRALS:   [ ]Chaplaincy  [ ] Hospice  [ ]Child Life  [ ]Social Work  [ ]Case management [ ]Holistic Therapy     Goals of Care Document:   Care Coordination Assessment 201 [C. Provider] (07-18-23 @ 09:49)   Progress Notes - Care Coordination [C. Provider] (08-15-23 @ 11:52)

## 2023-08-15 NOTE — PROGRESS NOTE ADULT - ASSESSMENT
I doubt that tachycardia essentially since admission was on an infectious basis. Suspect the fever last night represents a superimposed event, with aspiration being most likely. It will be interesting to see whether tachycardia resolves, or not.   Certainly at risk for aspiration given poor mental status superimposed on baseline TBI.  No clear or convincing pneumonia on CT, does have plugging  VQ without PE, does have DVT but I do not think latter accounts for escalating leukocytosis.   RVP's neg  Elevated CK, presumed rhabdomyolysis why still fluctuating and elevated after ~1 week No troponin's sent but most recent ECG only reported as sinus tachycardia.   U/A without significant pyuria now or on admission  Abdominal exam and skin exam limited by patient's behavior, but no gross abdominal tenderness, skin/soft tissue infection, phlebitis    7/28: afebrile >24 hrs, tachycardic, WBC increased 18.61, Cr elevated 1.38, BCs and UC with no growth to date, covid 19 test is negative, CT chest - new RLL mucus plug, new b/l LLs atelectasis, MRSA PCR not detected, will continue with Zosyn for now.   + Right femoral DVT - on Lovenox.   Attending addendum:  I have reviewed all pertinent clinical information and agree with the NP's note.   continue zosyn through the weekend   follow all cultured  trend wbc and fever curve   aspiration precautions   frequent suctioning if necessary     7/31: pt is more awake and alert, more interactive, confused, no fevers since 7/29, WBC elevated 15.5, Cr normalized, LFTs better, MRSA PCR not detected, BCs and UC with no growth to date. Pt completed a course of ceftriaxone, now on zosyn day #6.   Pt's CK is trending up, cardiac enzymes should be obtained.   Attending addendum:  I have reviewed all pertinent clinical information and agree with the NP's note.   continue antibiotics   CK was improving but then starting climbing, asked for cardiac enzymes to be sent  suspended atorvastatin as he is on high dose   please trend CK levels    8/1: low grade fevers, tachycardic, NC, WBC better 12.77,Cr ok, LFTs better, BCs and UC with no growth to date, troponin x 1 negative, on Lovenox for right femoral DVT, Zosyn IV continued.  Attending Addendum--  Case reviewed with NP Aruna Guzman. Her note reviewed and modified as appropriate.   Still with intermittent low-grade fever though trend perhaps moderating  Some decline in WBC  CK still elevated as of the 29th- etiology? Seems too long to attribute to rhabdo from outpatient setting. MB fraction nor troponin impressive  Role of Abx TBD  Drug fever an exclusionary diagnosis, but none known to be new    8/2: continues having fevers, tachycardic, WBC normalized, Cr ok, CK is better 510 today, all prior BCs with no growth to date, will obtain two sets of surveillance BCs. Unclear if pt's fevers are infectious in nature, will continue Zosyn IV for now.   Attending Addendum--  Case reviewed with JOANNA Guzman. Her note reviewed and modified as appropriate.   Still febrile, higher grade  CK diminished  If fevers persist, will consider NM scan, but not clear that he will fit under the camera.  Role of Abx TBD    8/3: low grade temp yesterday late evening, no fevers since then, tachycardic, NC, WBC normalized, Cr ok, all BCs and UC with no growth to date, Zosyn IV continued for now. NM scan should be considered if the pt can fit, + contracted.   Attending Addendum--  Case reviewed with JOANNA Guzman. Her note reviewed and modified as appropriate.   Patient personally assessed and examined.  Seen earlier   Shivering w low grade fever earlier. Abx have not made much impact and cx unrevealing.   Still not clear why CK still elevate- no concern of active injury, no clear tenderness on abdominal exam  Seems too long for rhabdo in setting of normal renal function.    8/4: continues having fevers, tachycardic, RA, WBC increased 12.12, Cr ok, BCs with no growth to date, will continue with Zosyn for now. In favor of obtaining for indium scan, consider rheumatology workup.   Attending Addendum--  Case reviewed with JOANNA Guzman. Her note reviewed and modified as appropriate.   FUO.  NM scan at this point reasonable  Checking serologies re: autoimmune process, especially as persistent CK elevation not well explained at present (myositis?).  Role of abx TBD  I will be available via Teams for any issues that may arise over the weekend.    8/7: continues having low grade temps, tachycardic, RA, no new blood work, BCs remain with no growth to date, Zosyn IV continued for now, pending indium scan, consider rheumatology consult.  Attending Addendum--  Case reviewed with JOANNA Guzman. Her note reviewed and modified as appropriate.   Patient personally assessed and examined.  Seems more alert  Intermittent low grade fever  Awaiting NM study, though not clear how useful it will be  Anti-RNP elevated, CK's variably elevated-- myositis?  JESICA pending  Rheum eval    8/8: pt is very contracted, exam is difficult, continues having fevers, RA, tachycardic, WBC increased 15.64. All BCs with no growth to date, Zosyn IV continued for now, indium scan is pending.   Attending Addendum--  Case reviewed with JOANNA Guzman. Her note reviewed and modified as appropriate.   Patient personally assessed and examined.  Little changed  Trend CBC  Awaiting NM scan  Rheum appreciated    8/9: no fevers within last 24 hrs, tachycardic, RA, WBC better 12.57, BCs with no growth to date, Cr ok, CT chest - large left chest hematoma vs dependent edema, + b/l pleural effusions, CAROL ? infiltrate, b/l LLs atelectasis. Indium scan is pending, Zosyn IV continued for now.   Attending Addendum--  Case reviewed with JOANNA Guzman. Her note reviewed and modified as appropriate.   Aldolase elevated, as espected.  JESICA + 1:1280  CT chest not overly impressive for infectious process. hematoma could cause fever but fever prolonged  Rheum follow up    08/10: afebrile for the last couple days, tachycardic, RA, wbc normalized, RVP negative, all BCs with no growth to date, completed longer course of abx, will continue monitoring off abx for now.   Attending Addendum--  Case reviewed with JOANNA Guzman. Her note reviewed and modified as appropriate.   Patient personally assessed and examined.  Afebrile.  More awake/alert  Feels "all right" no specific complaints.  Defer additional antibiotics for now    8/11: remains afebrile, tachycardic, RA, no leukocytosis, BCs remain with no growth to date, pt is being monitored off abx.   8/14: Intermittently febrile, exam unchanged. Still no clear or convincing evidence of infection on exam  8/15: Essentially unchanged. Reviewed with nursing cooling blanked indications/use/pitfalls.    Suggestions--  Defer abx  CK in am  Rheum follow up      Joel Adamson MD  Attending Physician  [facility]  Division of Infectious Diseases  [phone]

## 2023-08-15 NOTE — CONSULT NOTE ADULT - PROBLEM SELECTOR RECOMMENDATION 9
intermittently febrile, blood cultures have all been with no growth, rheumatology follow up appreciated, RNP-AB positive, HLA sent and negative   mucus plugs seen on imaging and h/o TBI, at risk for aspiration, HOBE and oral hygiene, on Mucomyst, chest PT to help mobilize secretions  tylenol PRN intermittently febrile, blood cultures have all been with no growth, rheumatology follow up appreciated, RNP-AB positive, HLA sent and negative  mucus plugs seen on imaging and h/o TBI, at risk for aspiration, HOBE and oral hygiene, on Mucomyst, chest PT to help mobilize secretions  tylenol PRN

## 2023-08-15 NOTE — CONSULT NOTE ADULT - TIME BILLING
review of chart, blood work, imaging, vitals, medications, direct patient care, discussion with hospitalist/ID
Evaluation of patient, review of medical records and collaboration with care team and family

## 2023-08-15 NOTE — PROGRESS NOTE ADULT - ASSESSMENT
68M PMH HTN, HLD, TBI with resulting dementia presented to ED as a fall. Patient found with rhabdo and VIKAS, also with possible aspiration pneumonia, now afebrile, s/p a course of abx.  Seen by ID, rheumatology.  Rheumatology workup in progress.  Pt was unable to have Indium scan due to b/l LE contractures.  ? due to sacroilitis.  Started on NSAIDs.  Evaluated by Neuro.  CT head not suggestive of CVA.  Unable to have MRI Head / neck due to contractures.  s/p course of Zosyn, on regular PT.     LE Doppler : IMPRESSION: There is acute occlusive DVT affecting the right femoral vein with the proximal extent of the thrombus in the right common femoral vein.    Large left chest wall subcutaneous hematoma versus dependent edema.  New small bilateral pleural effusions.  Left upper lobe infiltrate versus motion artifact. Bilateral lower lobe passive atelectasis.      # Recurrent fevers, now afebrile - Elevated ESR, CRP, JESICA, antiDSDNA positive.  ? rheumatologic etiology.  Rheumatology consulted and workup in progress.    -continues to be tachycardic    # Aspiration Pneumonia / b/l Pleural Effusion, right sided mucous plugging, b/l atelectasis - ID Following.  s/p Zosyn.  CT c/a/p not indicative of acute infection.  ? fever from hematoma.  Per nuclear, pt too contracted to be positioned for indium scan.    As per ID- continue to monitor off antibiotics, continue aspiration precautions,   # ? L chest hematoma - no obvious ecchymosis of L chest wall.  H/H stable.  Monitor CBC.  # Tachycardia - HR remains elevated.  Continue Cardizem, BBlocker.  Tele showing sinus tachycardia.   VQ scan: low probability      # Impaired ambulation / LE contractures / Functional Quadriplegia / TBI - ? due to sacroilitis, underlying dementia -  Trial of NSAIDs.  PT consulted and recommending FREDY.  Neuro following.  CT head not suggestive of acute infarct.  Attempt MR Head / neck, however, pt contracted- consider if pt were to improve.  Outpatient EMG.      As per neuro---try avoiding benzodiazepines, anticholinergics, and antihistamines (Can cause worsening confusion/delirium). Additionally, continue reorientation, supportive care, maintaining regular sleep/wake cycle, and optimizing nutritional/medical factors"  -frequent turns, aspiration precautions    # RLE DVT - continue full dose Lovenox for now.  Transition to DOAC on discharge    # Traumatic brain injury / Dementia - supportive care.  Continue Valproic acid.      # VIKAS - resolved.    # Rhabdomyolysis - resolved.    # Essential HTN - Monitor BP.  Continue antihypertensives.    # Inpatient DVT Proph - on anticoagulation     #GOC- full code. Poor prognosis. Get palliative care consulted.    I spoke w/ the pt's daughter today and informed her of the pt's poor prognosis.

## 2023-08-15 NOTE — CONSULT NOTE ADULT - CONVERSATION DETAILS
Spoke with patient's daughter,  [phone] via phone as she was not available to speak in person.  She said she is patient's HCP, patient has 5 kids.  She called the primary doctor's office while we were on the phone and was told they have a HCP on file.  Patient was living at home with her, able to ambulate to the bathroom and open his bed room door.  She acknowledges he was not the strongest, but said he has severely declined both physically and cognitively since he has been hospitalized.  He developed bilateral lower extremity contractures since being hospitalized.  She said patient needs to go to a facility as she is not able to care for him at home.  Discussed wishes regarding LST such as CPR and intubation and explained these interventions would cause more harm than benefit given his comorbid conditions and debility which she was in agreement with.  She is going to the primary doctor's office and going to get the HCP, palliative to speak with her tomorrow to follow up about MOLST and GOC.
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
## 2023-08-15 NOTE — CONSULT NOTE ADULT - PROBLEM SELECTOR RECOMMENDATION 3
bedbound with LE contractures, dependent for care, aspiration risk  promote sleep/wake cycles and family presence  on Seroquel, Trazadone and valproic acid had fallen, was on the floor, creatinine improved, initial CK >900, s/p IVF

## 2023-08-15 NOTE — PROGRESS NOTE ADULT - SUBJECTIVE AND OBJECTIVE BOX
Neurology Progress Note    S: Patient seen and examined. No new events overnight        Medications: MEDICATIONS  (STANDING):  diltiazem    Tablet 60 milliGRAM(s) Oral every 6 hours  enoxaparin Injectable 60 milliGRAM(s) SubCutaneous every 12 hours  lactated ringers. 1000 milliLiter(s) (100 mL/Hr) IV Continuous <Continuous>  lidocaine   4% Patch 1 Patch Transdermal every 24 hours  metoprolol tartrate 50 milliGRAM(s) Oral every 6 hours  nystatin Powder 1 Application(s) Topical two times a day  QUEtiapine 100 milliGRAM(s) Oral at bedtime  traZODone 50 milliGRAM(s) Oral at bedtime  valproic  acid Syrup 250 milliGRAM(s) Oral three times a day    MEDICATIONS  (PRN):  acetaminophen     Tablet .. 650 milliGRAM(s) Oral every 6 hours PRN Temp greater or equal to 38C (100.4F)  acetylcysteine 10%  Inhalation 4 milliLiter(s) Inhalation every 6 hours PRN dyspnea  levalbuterol Inhalation 0.63 milliGRAM(s) Inhalation every 6 hours PRN Shortness of breath/Wheezing  melatonin 3 milliGRAM(s) Oral at bedtime PRN Insomnia       Vitals:  Vital Signs Last 24 Hrs  T(C): 37.6 (15 Aug 2023 06:31), Max: 38.2 (14 Aug 2023 23:40)  T(F): 99.7 (15 Aug 2023 06:31), Max: 100.7 (14 Aug 2023 23:40)  HR: 115 (15 Aug 2023 06:31) (115 - 139)  BP: 172/80 (15 Aug 2023 06:20) (149/96 - 172/80)  BP(mean): --  RR: 18 (15 Aug 2023 06:20) (18 - 19)  SpO2: 98% (15 Aug 2023 06:20) (95% - 100%)    Parameters below as of 15 Aug 2023 06:20  Patient On (Oxygen Delivery Method): room air            Neurological Exam:    Mental Status Eyes open, eating lunch. Speech is sparse and mildly dysarthric. Follows simple commands.   Cranial Nerves - PERRL, EOMI, VFF, V1-V3 intact, no gross facial asymmetry,    Motor Exam -   Moves UEs spontanesously LEs atrophy some contracture    Sensory    Intact to light touch and pinprick bilaterally    Reflexes UES hyperreflexia patellar trace Left ankle clonus at times right mute  Coordination: unable   Gait: deferred     I personally reviewed the below data/images/labs:    LABS:                          10.8   7.56  )-----------( 366      ( 15 Aug 2023 07:33 )             31.9     08-15    138  |  105  |  10  ----------------------------<  94  4.2   |  29  |  0.83    Ca    8.4<L>      15 Aug 2023 07:33  Phos  2.8     08-15  Mg     2.0     08-15    TPro  6.0  /  Alb  1.6<L>  /  TBili  0.3  /  DBili  x   /  AST  42<H>  /  ALT  45  /  AlkPhos  68  08-15    LIVER FUNCTIONS - ( 15 Aug 2023 07:33 )  Alb: 1.6 g/dL / Pro: 6.0 gm/dL / ALK PHOS: 68 U/L / ALT: 45 U/L / AST: 42 U/L / GGT: x             Urinalysis Basic - ( 15 Aug 2023 07:33 )    Color: x / Appearance: x / SG: x / pH: x  Gluc: 94 mg/dL / Ketone: x  / Bili: x / Urobili: x   Blood: x / Protein: x / Nitrite: x   Leuk Esterase: x / RBC: x / WBC x   Sq Epi: x / Non Sq Epi: x / Bacteria: x          < from: CT Head No Cont (07.14.23 @ 14:35) >  IMPRESSION:    CT head: Markedly motion degraded exam. No gross evidence of acute   intracranial hemorrhage or mass effect.    CT cervical spine: No evidence for acute displaced fracture or   malalignment.    CT lumbar spine: No evidence for acute displaced fracture or malalignment.    --- End of Report ---

## 2023-08-15 NOTE — CONSULT NOTE ADULT - PROBLEM SELECTOR RECOMMENDATION 2
had fallen, was on the floor, creatinine improved, initial CK >900, s/p IVF US: There is acute occlusive DVT affecting the right femoral vein with the proximal extent of the thrombus in the right common femoral vein. on lovenox  V/Q scan with very low probability of PE

## 2023-08-16 LAB — CK SERPL-CCNC: 312 U/L — HIGH (ref 26–308)

## 2023-08-16 PROCEDURE — 99232 SBSQ HOSP IP/OBS MODERATE 35: CPT

## 2023-08-16 PROCEDURE — 99497 ADVNCD CARE PLAN 30 MIN: CPT

## 2023-08-16 RX ADMIN — Medication 250 MILLIGRAM(S): at 22:17

## 2023-08-16 RX ADMIN — ENOXAPARIN SODIUM 60 MILLIGRAM(S): 100 INJECTION SUBCUTANEOUS at 05:43

## 2023-08-16 RX ADMIN — Medication 50 MILLIGRAM(S): at 12:37

## 2023-08-16 RX ADMIN — Medication 250 MILLIGRAM(S): at 15:09

## 2023-08-16 RX ADMIN — Medication 60 MILLIGRAM(S): at 00:49

## 2023-08-16 RX ADMIN — LIDOCAINE 1 PATCH: 4 CREAM TOPICAL at 11:30

## 2023-08-16 RX ADMIN — LIDOCAINE 1 PATCH: 4 CREAM TOPICAL at 19:57

## 2023-08-16 RX ADMIN — Medication 60 MILLIGRAM(S): at 17:53

## 2023-08-16 RX ADMIN — Medication 50 MILLIGRAM(S): at 00:49

## 2023-08-16 RX ADMIN — NYSTATIN CREAM 1 APPLICATION(S): 100000 CREAM TOPICAL at 05:43

## 2023-08-16 RX ADMIN — Medication 50 MILLIGRAM(S): at 17:53

## 2023-08-16 RX ADMIN — ENOXAPARIN SODIUM 60 MILLIGRAM(S): 100 INJECTION SUBCUTANEOUS at 17:53

## 2023-08-16 RX ADMIN — Medication 60 MILLIGRAM(S): at 05:43

## 2023-08-16 RX ADMIN — Medication 250 MILLIGRAM(S): at 05:48

## 2023-08-16 RX ADMIN — Medication 60 MILLIGRAM(S): at 12:38

## 2023-08-16 RX ADMIN — QUETIAPINE FUMARATE 100 MILLIGRAM(S): 200 TABLET, FILM COATED ORAL at 22:17

## 2023-08-16 RX ADMIN — Medication 650 MILLIGRAM(S): at 00:48

## 2023-08-16 RX ADMIN — Medication 50 MILLIGRAM(S): at 05:43

## 2023-08-16 RX ADMIN — NYSTATIN CREAM 1 APPLICATION(S): 100000 CREAM TOPICAL at 17:53

## 2023-08-16 RX ADMIN — LIDOCAINE 1 PATCH: 4 CREAM TOPICAL at 23:42

## 2023-08-16 RX ADMIN — Medication 50 MILLIGRAM(S): at 22:17

## 2023-08-16 NOTE — PROGRESS NOTE ADULT - ASSESSMENT
I doubt that tachycardia essentially since admission was on an infectious basis. Suspect the fever last night represents a superimposed event, with aspiration being most likely. It will be interesting to see whether tachycardia resolves, or not.   Certainly at risk for aspiration given poor mental status superimposed on baseline TBI.  No clear or convincing pneumonia on CT, does have plugging  VQ without PE, does have DVT but I do not think latter accounts for escalating leukocytosis.   RVP's neg  Elevated CK, presumed rhabdomyolysis why still fluctuating and elevated after ~1 week No troponin's sent but most recent ECG only reported as sinus tachycardia.   U/A without significant pyuria now or on admission  Abdominal exam and skin exam limited by patient's behavior, but no gross abdominal tenderness, skin/soft tissue infection, phlebitis    7/28: afebrile >24 hrs, tachycardic, WBC increased 18.61, Cr elevated 1.38, BCs and UC with no growth to date, covid 19 test is negative, CT chest - new RLL mucus plug, new b/l LLs atelectasis, MRSA PCR not detected, will continue with Zosyn for now.   + Right femoral DVT - on Lovenox.   Attending addendum:  I have reviewed all pertinent clinical information and agree with the NP's note.   continue zosyn through the weekend   follow all cultured  trend wbc and fever curve   aspiration precautions   frequent suctioning if necessary     7/31: pt is more awake and alert, more interactive, confused, no fevers since 7/29, WBC elevated 15.5, Cr normalized, LFTs better, MRSA PCR not detected, BCs and UC with no growth to date. Pt completed a course of ceftriaxone, now on zosyn day #6.   Pt's CK is trending up, cardiac enzymes should be obtained.   Attending addendum:  I have reviewed all pertinent clinical information and agree with the NP's note.   continue antibiotics   CK was improving but then starting climbing, asked for cardiac enzymes to be sent  suspended atorvastatin as he is on high dose   please trend CK levels    8/1: low grade fevers, tachycardic, NC, WBC better 12.77,Cr ok, LFTs better, BCs and UC with no growth to date, troponin x 1 negative, on Lovenox for right femoral DVT, Zosyn IV continued.  Attending Addendum--  Case reviewed with NP Aruna Guzman. Her note reviewed and modified as appropriate.   Still with intermittent low-grade fever though trend perhaps moderating  Some decline in WBC  CK still elevated as of the 29th- etiology? Seems too long to attribute to rhabdo from outpatient setting. MB fraction nor troponin impressive  Role of Abx TBD  Drug fever an exclusionary diagnosis, but none known to be new    8/2: continues having fevers, tachycardic, WBC normalized, Cr ok, CK is better 510 today, all prior BCs with no growth to date, will obtain two sets of surveillance BCs. Unclear if pt's fevers are infectious in nature, will continue Zosyn IV for now.   Attending Addendum--  Case reviewed with JOANNA Guzman. Her note reviewed and modified as appropriate.   Still febrile, higher grade  CK diminished  If fevers persist, will consider NM scan, but not clear that he will fit under the camera.  Role of Abx TBD    8/3: low grade temp yesterday late evening, no fevers since then, tachycardic, NC, WBC normalized, Cr ok, all BCs and UC with no growth to date, Zosyn IV continued for now. NM scan should be considered if the pt can fit, + contracted.   Attending Addendum--  Case reviewed with JOANNA Guzman. Her note reviewed and modified as appropriate.   Patient personally assessed and examined.  Seen earlier   Shivering w low grade fever earlier. Abx have not made much impact and cx unrevealing.   Still not clear why CK still elevate- no concern of active injury, no clear tenderness on abdominal exam  Seems too long for rhabdo in setting of normal renal function.    8/4: continues having fevers, tachycardic, RA, WBC increased 12.12, Cr ok, BCs with no growth to date, will continue with Zosyn for now. In favor of obtaining for indium scan, consider rheumatology workup.   Attending Addendum--  Case reviewed with JOANNA Guzman. Her note reviewed and modified as appropriate.   FUO.  NM scan at this point reasonable  Checking serologies re: autoimmune process, especially as persistent CK elevation not well explained at present (myositis?).  Role of abx TBD  I will be available via Teams for any issues that may arise over the weekend.    8/7: continues having low grade temps, tachycardic, RA, no new blood work, BCs remain with no growth to date, Zosyn IV continued for now, pending indium scan, consider rheumatology consult.  Attending Addendum--  Case reviewed with JOANNA Guzman. Her note reviewed and modified as appropriate.   Patient personally assessed and examined.  Seems more alert  Intermittent low grade fever  Awaiting NM study, though not clear how useful it will be  Anti-RNP elevated, CK's variably elevated-- myositis?  JESICA pending  Rheum eval    8/8: pt is very contracted, exam is difficult, continues having fevers, RA, tachycardic, WBC increased 15.64. All BCs with no growth to date, Zosyn IV continued for now, indium scan is pending.   Attending Addendum--  Case reviewed with JOANNA Guzman. Her note reviewed and modified as appropriate.   Patient personally assessed and examined.  Little changed  Trend CBC  Awaiting NM scan  Rheum appreciated    8/9: no fevers within last 24 hrs, tachycardic, RA, WBC better 12.57, BCs with no growth to date, Cr ok, CT chest - large left chest hematoma vs dependent edema, + b/l pleural effusions, CAROL ? infiltrate, b/l LLs atelectasis. Indium scan is pending, Zosyn IV continued for now.   Attending Addendum--  Case reviewed with JOANNA Guzman. Her note reviewed and modified as appropriate.   Aldolase elevated, as espected.  JESICA + 1:1280  CT chest not overly impressive for infectious process. hematoma could cause fever but fever prolonged  Rheum follow up    08/10: afebrile for the last couple days, tachycardic, RA, wbc normalized, RVP negative, all BCs with no growth to date, completed longer course of abx, will continue monitoring off abx for now.   Attending Addendum--  Case reviewed with JOANNA Guzman. Her note reviewed and modified as appropriate.   Patient personally assessed and examined.  Afebrile.  More awake/alert  Feels "all right" no specific complaints.  Defer additional antibiotics for now    8/11: remains afebrile, tachycardic, RA, no leukocytosis, BCs remain with no growth to date, pt is being monitored off abx.   8/14: Intermittently febrile, exam unchanged. Still no clear or convincing evidence of infection on exam  8/15: Essentially unchanged. Reviewed with nursing cooling blanked indications/use/pitfalls.  8/16: Afebrile, WBC normal, unrevealing exam for infectious process.     Suggestions--  Defer abx  Rheum follow up      Joel Adamson MD  Attending Physician  [facility]  Division of Infectious Diseases  [phone]

## 2023-08-16 NOTE — PROGRESS NOTE ADULT - ASSESSMENT
68 years old male with h/o HTN, HLD, TBI dementia here after a fall with improvement in mental status since arrival. Has general weakness LE atrophy some hyperreflexia  CTH, C and L reviewed     Impression: generalized weakness, now contracted in all 4 extremities, TME, rhabdo  aspiration PNA  uti   RLE DVT on Lovenox      Can consider MRI brain and C-spine if no CI,, can defer for now in contracted state but may assist family with GOC discussion  Consider low dose baclofen for contractures if no contraindications   outpt EMG  Planning to transition to DOAC for DVT as outpt   GOC ongoing with Palliative   -In order to enhance patient's overall well-being and clinical course, please try avoiding benzodiazepines, anticholinergics, and antihistamines (Can cause worsening confusion/delirium). Additionally, continue reorientation, supportive care, maintaining regular sleep/wake cycle, and optimizing nutritional/medical factors.   Can follow up with Neurology, Dr. Rafael Jeffries or Dr. Macedo  at [phone]    Julianna Choi,   Vascular Neurology

## 2023-08-16 NOTE — PROGRESS NOTE ADULT - SUBJECTIVE AND OBJECTIVE BOX
[facility]  Division of Infectious Diseases  [phone]    Name: ROJAS HE  Age: 68y  Gender: Male  MRN: [id number]    Interval History--  Notes reviewed. Seen earlier today. Mental status remains poor. Afebrile thus far today. CK still abnormal but less than prior.      Past Medical History--  TBI (traumatic brain injury)    HLD (hyperlipidemia)    HTN, age 0-18        For details regarding the patient's social history, family history, and other miscellaneous elements, please refer the initial infectious diseases consultation and/or the admitting history and physical examination for this admission.    Allergies    No Known Allergies    Intolerances        Medications--  Antibiotics:    Immunologic:    Other:  acetaminophen     Tablet .. PRN  acetaminophen     Tablet .. PRN  acetylcysteine 10%  Inhalation PRN  diltiazem    Tablet  enoxaparin Injectable  levalbuterol Inhalation PRN  lidocaine   4% Patch  melatonin PRN  metoprolol tartrate  nystatin Powder  QUEtiapine  traZODone  valproic  acid Syrup      Review of Systems--  Review of systems unable secondary to clinical condition.       Physical Examination--  Vital Signs: T(F): 98.9 (08-16-23 @ 16:43), Max: 99.2 (08-16-23 @ 00:03)  HR: 120 (08-16-23 @ 16:43)  BP: 108/67 (08-16-23 @ 16:43)  RR: 18 (08-16-23 @ 16:43)  SpO2: 98% (08-16-23 @ 16:43)  Wt(kg): --  HEENT: AT/NC. Pupils/EOM unable secondary to patient compliance. Oropharynx/dentition unable secondary to patient compliance.   Neck: Increased tone not frankly rigid. No sense of mass.  Nodes: None palpable.  Lungs: Poor effort diminished BS bilaterally, no wheezes, no rhonchi  Heart: Tachycardic with RR, MRGT  Abdomen: Bowel sounds present. Soft. Nondistended. Nontender.  Extremities: No cyanosis or clubbing. No edema. Lower extremities contracted  Skin: Warm. Dry. Good turgor. No acute rash.   Psychiatric: Largely unable. Calm.       Laboratory Studies--  CBC                        10.8   7.56  )-----------( 366      ( 15 Aug 2023 07:33 )             31.9       Chemistries  08-15    138  |  105  |  10  ----------------------------<  94  4.2   |  29  |  0.83    Ca    8.4<L>      15 Aug 2023 07:33  Phos  2.8     08-15  Mg     2.0     08-15    TPro  6.0  /  Alb  1.6<L>  /  TBili  0.3  /  DBili  x   /  AST  42<H>  /  ALT  45  /  AlkPhos  68  08-15      Culture Data  No new data

## 2023-08-16 NOTE — PROGRESS NOTE ADULT - SUBJECTIVE AND OBJECTIVE BOX
Neurology Progress Note    S: Patient seen and examined. No new events overnight.       Medications: MEDICATIONS  (STANDING):  diltiazem    Tablet 60 milliGRAM(s) Oral every 6 hours  enoxaparin Injectable 60 milliGRAM(s) SubCutaneous every 12 hours  lidocaine   4% Patch 1 Patch Transdermal every 24 hours  metoprolol tartrate 50 milliGRAM(s) Oral every 6 hours  nystatin Powder 1 Application(s) Topical two times a day  QUEtiapine 100 milliGRAM(s) Oral at bedtime  traZODone 50 milliGRAM(s) Oral at bedtime  valproic  acid Syrup 250 milliGRAM(s) Oral three times a day    MEDICATIONS  (PRN):  acetaminophen     Tablet .. 650 milliGRAM(s) Oral every 6 hours PRN Mild Pain (1 - 3)  acetaminophen     Tablet .. 650 milliGRAM(s) Oral every 6 hours PRN Temp greater or equal to 38C (100.4F)  acetylcysteine 10%  Inhalation 4 milliLiter(s) Inhalation every 6 hours PRN dyspnea  levalbuterol Inhalation 0.63 milliGRAM(s) Inhalation every 6 hours PRN Shortness of breath/Wheezing  melatonin 3 milliGRAM(s) Oral at bedtime PRN Insomnia       Vitals:  Vital Signs Last 24 Hrs  T(C): 37.2 (16 Aug 2023 16:43), Max: 37.3 (16 Aug 2023 00:03)  T(F): 98.9 (16 Aug 2023 16:43), Max: 99.2 (16 Aug 2023 00:03)  HR: 121 (16 Aug 2023 17:51) (112 - 121)  BP: 113/73 (16 Aug 2023 17:51) (108/67 - 128/75)  BP(mean): --  RR: 17 (16 Aug 2023 17:51) (17 - 18)  SpO2: 99% (16 Aug 2023 17:51) (96% - 99%)    Parameters below as of 16 Aug 2023 17:51  Patient On (Oxygen Delivery Method): room air              Neurological Exam:    Mental Status Eyes open, eating lunch. Speech is sparse and mildly dysarthric. Follows simple commands.   Cranial Nerves - PERRL, EOMI, VFF, V1-V3 intact, no gross facial asymmetry,    Motor Exam -   Moves UEs spontanesously LEs atrophy some contracture    Sensory    Intact to light touch and pinprick bilaterally    Reflexes UES hyperreflexia patellar trace Left ankle clonus at times right mute  Coordination: unable   Gait: deferred     I personally reviewed the below data/images/labs:      LABS:                          10.8   7.56  )-----------( 366      ( 15 Aug 2023 07:33 )             31.9     08-15    138  |  105  |  10  ----------------------------<  94  4.2   |  29  |  0.83    Ca    8.4<L>      15 Aug 2023 07:33  Phos  2.8     08-15  Mg     2.0     08-15    TPro  6.0  /  Alb  1.6<L>  /  TBili  0.3  /  DBili  x   /  AST  42<H>  /  ALT  45  /  AlkPhos  68  08-15    LIVER FUNCTIONS - ( 15 Aug 2023 07:33 )  Alb: 1.6 g/dL / Pro: 6.0 gm/dL / ALK PHOS: 68 U/L / ALT: 45 U/L / AST: 42 U/L / GGT: x             Urinalysis Basic - ( 15 Aug 2023 07:33 )    Color: x / Appearance: x / SG: x / pH: x  Gluc: 94 mg/dL / Ketone: x  / Bili: x / Urobili: x   Blood: x / Protein: x / Nitrite: x   Leuk Esterase: x / RBC: x / WBC x   Sq Epi: x / Non Sq Epi: x / Bacteria: x                < from: CT Head No Cont (07.14.23 @ 14:35) >  IMPRESSION:    CT head: Markedly motion degraded exam. No gross evidence of acute   intracranial hemorrhage or mass effect.    CT cervical spine: No evidence for acute displaced fracture or   malalignment.    CT lumbar spine: No evidence for acute displaced fracture or malalignment.    --- End of Report ---

## 2023-08-16 NOTE — PROGRESS NOTE ADULT - CONVERSATION DETAILS
Spoke with patient's daughter,  via phone as she was not available to speak in person.  She obtained HCP from patient's primary doctor's office, listing her, Stacy Yang [phone].   Patient's daughter trying to determine discharge plan, considering taking patient home with hospice, but discussed HHA through hospice are based on needs and availability and not guaranteed.  Explained hospice will provide DME such as a hospital bed.  Discussed option of LTC with comfort vs. home with hospice and mentioned option of privately hiring HHA, but not within family's means.      Discussed MOLST form and MOLST drafted indicating DNR/I.  We discussed form can be more specific to communicate wishes regarding feeding tube, IVF and abx for example.  Discussed that patient's appetite has been inconsistent and not always good on this admission.  Explained that feeding tubes do not improve quality of life and patient's are still at risk for aspiration, infection, agitation.  Would recommend careful hand feeding when appropriate and the cessation of eating is a natural part of the dying process.  She will continue to think about her wishes regarding those things and said if she has to make a decision, she will choose what is best for her dad.

## 2023-08-16 NOTE — PROGRESS NOTE ADULT - SUBJECTIVE AND OBJECTIVE BOX
Patient is a 68y old  Male who presents with a chief complaint of VIKAS, rhabdomyolysis (16 Aug 2023 20:30)      INTERVAL HPI/OVERNIGHT EVENTS:    MEDICATIONS  (STANDING):  diltiazem    Tablet 60 milliGRAM(s) Oral every 6 hours  enoxaparin Injectable 60 milliGRAM(s) SubCutaneous every 12 hours  lidocaine   4% Patch 1 Patch Transdermal every 24 hours  metoprolol tartrate 50 milliGRAM(s) Oral every 6 hours  nystatin Powder 1 Application(s) Topical two times a day  QUEtiapine 100 milliGRAM(s) Oral at bedtime  traZODone 50 milliGRAM(s) Oral at bedtime  valproic  acid Syrup 250 milliGRAM(s) Oral three times a day    MEDICATIONS  (PRN):  acetaminophen     Tablet .. 650 milliGRAM(s) Oral every 6 hours PRN Mild Pain (1 - 3)  acetaminophen     Tablet .. 650 milliGRAM(s) Oral every 6 hours PRN Temp greater or equal to 38C (100.4F)  acetylcysteine 10%  Inhalation 4 milliLiter(s) Inhalation every 6 hours PRN dyspnea  levalbuterol Inhalation 0.63 milliGRAM(s) Inhalation every 6 hours PRN Shortness of breath/Wheezing  melatonin 3 milliGRAM(s) Oral at bedtime PRN Insomnia      Allergies    No Known Allergies    Intolerances        Vital Signs Last 24 Hrs  T(C): 37.2 (16 Aug 2023 16:43), Max: 37.3 (16 Aug 2023 00:03)  T(F): 98.9 (16 Aug 2023 16:43), Max: 99.2 (16 Aug 2023 00:03)  HR: 121 (16 Aug 2023 17:51) (112 - 121)  BP: 113/73 (16 Aug 2023 17:51) (108/67 - 128/75)  BP(mean): --  RR: 17 (16 Aug 2023 17:51) (17 - 18)  SpO2: 99% (16 Aug 2023 17:51) (96% - 99%)    Parameters below as of 16 Aug 2023 17:51  Patient On (Oxygen Delivery Method): room air        PHYSICAL EXAM:  GENERAL: NAD, well-groomed, well-developed  HEAD:  Atraumatic, Normocephalic  EYES: EOMI, PERRLA, conjunctiva and sclera clear  ENMT: No tonsillar erythema, exudates, or enlargement; Moist mucous membranes, Good dentition, No lesions  NECK: Supple, No JVD, Normal thyroid  NERVOUS SYSTEM:  Alert & Oriented X3, Good concentration; Motor Strength 5/5 B/L upper and lower extremities; DTRs 2+ intact and symmetric  CHEST/LUNG: Clear to auscultation bilaterally; No rales, rhonchi, wheezing, or rubs  HEART: Regular rate and rhythm; No murmurs, rubs, or gallops  ABDOMEN: Soft, Nontender, Nondistended; Bowel sounds present  EXTREMITIES:  2+ Peripheral Pulses, No clubbing, cyanosis, or edema  LYMPH: No lymphadenopathy noted  SKIN: No rashes or lesions    LABS:                        10.8   7.56  )-----------( 366      ( 15 Aug 2023 07:33 )             31.9     08-15    138  |  105  |  10  ----------------------------<  94  4.2   |  29  |  0.83    Ca    8.4<L>      15 Aug 2023 07:33  Phos  2.8     08-15  Mg     2.0     08-15    TPro  6.0  /  Alb  1.6<L>  /  TBili  0.3  /  DBili  x   /  AST  42<H>  /  ALT  45  /  AlkPhos  68  08-15      Urinalysis Basic - ( 15 Aug 2023 07:33 )    Color: x / Appearance: x / SG: x / pH: x  Gluc: 94 mg/dL / Ketone: x  / Bili: x / Urobili: x   Blood: x / Protein: x / Nitrite: x   Leuk Esterase: x / RBC: x / WBC x   Sq Epi: x / Non Sq Epi: x / Bacteria: x       RADIOLOGY & ADDITIONAL TESTS:    08-15-23 @ 07:01  -  08-16-23 @ 07:00  --------------------------------------------------------  IN:    Oral Fluid: 480 mL  Total IN: 480 mL    OUT:    Voided (mL): 1600 mL  Total OUT: 1600 mL    Total NET: -1120 mL      08-16-23 @ 07:01  -  08-16-23 @ 21:23  --------------------------------------------------------  IN:    Oral Fluid: 480 mL  Total IN: 480 mL    OUT:    Voided (mL): 1 mL  Total OUT: 1 mL    Total NET: 479 mL

## 2023-08-17 LAB
ALBUMIN SERPL ELPH-MCNC: 1.3 G/DL — LOW (ref 3.3–5)
ALP SERPL-CCNC: 55 U/L — SIGNIFICANT CHANGE UP (ref 40–120)
ALT FLD-CCNC: 29 U/L — SIGNIFICANT CHANGE UP (ref 12–78)
ANION GAP SERPL CALC-SCNC: 5 MMOL/L — SIGNIFICANT CHANGE UP (ref 5–17)
AST SERPL-CCNC: 30 U/L — SIGNIFICANT CHANGE UP (ref 15–37)
BILIRUB SERPL-MCNC: 0.2 MG/DL — SIGNIFICANT CHANGE UP (ref 0.2–1.2)
BUN SERPL-MCNC: 15 MG/DL — SIGNIFICANT CHANGE UP (ref 7–23)
CALCIUM SERPL-MCNC: 8 MG/DL — LOW (ref 8.5–10.1)
CHLORIDE SERPL-SCNC: 105 MMOL/L — SIGNIFICANT CHANGE UP (ref 96–108)
CO2 SERPL-SCNC: 28 MMOL/L — SIGNIFICANT CHANGE UP (ref 22–31)
CREAT SERPL-MCNC: 0.8 MG/DL — SIGNIFICANT CHANGE UP (ref 0.5–1.3)
EGFR: 96 ML/MIN/1.73M2 — SIGNIFICANT CHANGE UP
GLUCOSE SERPL-MCNC: 101 MG/DL — HIGH (ref 70–99)
HCT VFR BLD CALC: 27.7 % — LOW (ref 39–50)
HGB BLD-MCNC: 8.8 G/DL — LOW (ref 13–17)
MAGNESIUM SERPL-MCNC: 2.2 MG/DL — SIGNIFICANT CHANGE UP (ref 1.6–2.6)
MCHC RBC-ENTMCNC: 29.8 PG — SIGNIFICANT CHANGE UP (ref 27–34)
MCHC RBC-ENTMCNC: 31.8 G/DL — LOW (ref 32–36)
MCV RBC AUTO: 93.9 FL — SIGNIFICANT CHANGE UP (ref 80–100)
NRBC # BLD: 0 /100 WBCS — SIGNIFICANT CHANGE UP (ref 0–0)
PHOSPHATE SERPL-MCNC: 2.8 MG/DL — SIGNIFICANT CHANGE UP (ref 2.5–4.5)
PLATELET # BLD AUTO: 307 K/UL — SIGNIFICANT CHANGE UP (ref 150–400)
POTASSIUM SERPL-MCNC: 4.1 MMOL/L — SIGNIFICANT CHANGE UP (ref 3.5–5.3)
POTASSIUM SERPL-SCNC: 4.1 MMOL/L — SIGNIFICANT CHANGE UP (ref 3.5–5.3)
PROT SERPL-MCNC: 5.1 GM/DL — LOW (ref 6–8.3)
RBC # BLD: 2.95 M/UL — LOW (ref 4.2–5.8)
RBC # FLD: 15.9 % — HIGH (ref 10.3–14.5)
SODIUM SERPL-SCNC: 138 MMOL/L — SIGNIFICANT CHANGE UP (ref 135–145)
WBC # BLD: 6.95 K/UL — SIGNIFICANT CHANGE UP (ref 3.8–10.5)
WBC # FLD AUTO: 6.95 K/UL — SIGNIFICANT CHANGE UP (ref 3.8–10.5)

## 2023-08-17 PROCEDURE — 99232 SBSQ HOSP IP/OBS MODERATE 35: CPT

## 2023-08-17 RX ADMIN — NYSTATIN CREAM 1 APPLICATION(S): 100000 CREAM TOPICAL at 18:49

## 2023-08-17 RX ADMIN — Medication 50 MILLIGRAM(S): at 21:50

## 2023-08-17 RX ADMIN — Medication 50 MILLIGRAM(S): at 00:04

## 2023-08-17 RX ADMIN — LIDOCAINE 1 PATCH: 4 CREAM TOPICAL at 23:21

## 2023-08-17 RX ADMIN — Medication 650 MILLIGRAM(S): at 00:33

## 2023-08-17 RX ADMIN — Medication 50 MILLIGRAM(S): at 05:45

## 2023-08-17 RX ADMIN — LIDOCAINE 1 PATCH: 4 CREAM TOPICAL at 11:13

## 2023-08-17 RX ADMIN — Medication 50 MILLIGRAM(S): at 18:43

## 2023-08-17 RX ADMIN — Medication 60 MILLIGRAM(S): at 05:45

## 2023-08-17 RX ADMIN — LIDOCAINE 1 PATCH: 4 CREAM TOPICAL at 18:28

## 2023-08-17 RX ADMIN — Medication 60 MILLIGRAM(S): at 18:43

## 2023-08-17 RX ADMIN — NYSTATIN CREAM 1 APPLICATION(S): 100000 CREAM TOPICAL at 05:45

## 2023-08-17 RX ADMIN — Medication 50 MILLIGRAM(S): at 11:13

## 2023-08-17 RX ADMIN — Medication 60 MILLIGRAM(S): at 23:23

## 2023-08-17 RX ADMIN — Medication 250 MILLIGRAM(S): at 05:45

## 2023-08-17 RX ADMIN — Medication 250 MILLIGRAM(S): at 14:23

## 2023-08-17 RX ADMIN — Medication 650 MILLIGRAM(S): at 00:03

## 2023-08-17 RX ADMIN — Medication 60 MILLIGRAM(S): at 11:13

## 2023-08-17 RX ADMIN — ENOXAPARIN SODIUM 60 MILLIGRAM(S): 100 INJECTION SUBCUTANEOUS at 18:44

## 2023-08-17 RX ADMIN — ENOXAPARIN SODIUM 60 MILLIGRAM(S): 100 INJECTION SUBCUTANEOUS at 05:45

## 2023-08-17 RX ADMIN — Medication 250 MILLIGRAM(S): at 21:49

## 2023-08-17 RX ADMIN — Medication 50 MILLIGRAM(S): at 23:24

## 2023-08-17 RX ADMIN — QUETIAPINE FUMARATE 100 MILLIGRAM(S): 200 TABLET, FILM COATED ORAL at 21:50

## 2023-08-17 RX ADMIN — Medication 60 MILLIGRAM(S): at 00:04

## 2023-08-17 NOTE — PROGRESS NOTE ADULT - SUBJECTIVE AND OBJECTIVE BOX
Patient is a 68y old  Male who presents with a chief complaint of VIKAS, rhabdomyolysis (17 Aug 2023 12:05)      INTERVAL HPI/OVERNIGHT EVENTS:    MEDICATIONS  (STANDING):  diltiazem    Tablet 60 milliGRAM(s) Oral every 6 hours  enoxaparin Injectable 60 milliGRAM(s) SubCutaneous every 12 hours  lidocaine   4% Patch 1 Patch Transdermal every 24 hours  metoprolol tartrate 50 milliGRAM(s) Oral every 6 hours  nystatin Powder 1 Application(s) Topical two times a day  QUEtiapine 100 milliGRAM(s) Oral at bedtime  traZODone 50 milliGRAM(s) Oral at bedtime  valproic  acid Syrup 250 milliGRAM(s) Oral three times a day    MEDICATIONS  (PRN):  acetaminophen     Tablet .. 650 milliGRAM(s) Oral every 6 hours PRN Temp greater or equal to 38C (100.4F)  acetaminophen     Tablet .. 650 milliGRAM(s) Oral every 6 hours PRN Mild Pain (1 - 3)  acetylcysteine 10%  Inhalation 4 milliLiter(s) Inhalation every 6 hours PRN dyspnea  levalbuterol Inhalation 0.63 milliGRAM(s) Inhalation every 6 hours PRN Shortness of breath/Wheezing  melatonin 3 milliGRAM(s) Oral at bedtime PRN Insomnia      Allergies    No Known Allergies    Intolerances        Vital Signs Last 24 Hrs  T(C): 37.1 (17 Aug 2023 21:17), Max: 38.2 (17 Aug 2023 00:00)  T(F): 98.7 (17 Aug 2023 21:17), Max: 100.7 (17 Aug 2023 00:00)  HR: 114 (17 Aug 2023 21:17) (81 - 123)  BP: 124/84 (17 Aug 2023 21:17) (112/67 - 139/81)  BP(mean): --  RR: 16 (17 Aug 2023 21:17) (16 - 18)  SpO2: 96% (17 Aug 2023 21:17) (96% - 100%)    Parameters below as of 17 Aug 2023 21:17  Patient On (Oxygen Delivery Method): room air        PHYSICAL EXAM:  GENERAL: NAD, well-groomed, well-developed  HEAD:  Atraumatic, Normocephalic  EYES: EOMI, PERRLA, conjunctiva and sclera clear  ENMT: No tonsillar erythema, exudates, or enlargement; Moist mucous membranes, Good dentition, No lesions  NECK: Supple, No JVD, Normal thyroid  NERVOUS SYSTEM:  Alert & Oriented X3, Good concentration; Motor Strength 5/5 B/L upper and lower extremities; DTRs 2+ intact and symmetric  CHEST/LUNG: Clear to auscultation bilaterally; No rales, rhonchi, wheezing, or rubs  HEART: Regular rate and rhythm; No murmurs, rubs, or gallops  ABDOMEN: Soft, Nontender, Nondistended; Bowel sounds present  EXTREMITIES:  2+ Peripheral Pulses, No clubbing, cyanosis, or edema  LYMPH: No lymphadenopathy noted  SKIN: No rashes or lesions    LABS:                        8.8    6.95  )-----------( 307      ( 17 Aug 2023 07:33 )             27.7     08-17    138  |  105  |  15  ----------------------------<  101<H>  4.1   |  28  |  0.80    Ca    8.0<L>      17 Aug 2023 07:33  Phos  2.8     08-17  Mg     2.2     08-17    TPro  5.1<L>  /  Alb  1.3<L>  /  TBili  0.2  /  DBili  x   /  AST  30  /  ALT  29  /  AlkPhos  55  08-17      Urinalysis Basic - ( 17 Aug 2023 07:33 )    Color: x / Appearance: x / SG: x / pH: x  Gluc: 101 mg/dL / Ketone: x  / Bili: x / Urobili: x   Blood: x / Protein: x / Nitrite: x   Leuk Esterase: x / RBC: x / WBC x   Sq Epi: x / Non Sq Epi: x / Bacteria: x         RADIOLOGY & ADDITIONAL TESTS:    08-16-23 @ 07:01  -  08-17-23 @ 07:00  --------------------------------------------------------  IN:    Oral Fluid: 600 mL  Total IN: 600 mL    OUT:    Voided (mL): 1 mL  Total OUT: 1 mL    Total NET: 599 mL      08-17-23 @ 07:01  -  08-17-23 @ 21:50  --------------------------------------------------------  IN:    Oral Fluid: 240 mL  Total IN: 240 mL    OUT:  Total OUT: 0 mL    Total NET: 240 mL

## 2023-08-17 NOTE — PROGRESS NOTE ADULT - SUBJECTIVE AND OBJECTIVE BOX
Neurology Progress Note    S: Patient seen and examined. Febrile overnight. Still tachycardic     Medications: MEDICATIONS  (STANDING):  diltiazem    Tablet 60 milliGRAM(s) Oral every 6 hours  enoxaparin Injectable 60 milliGRAM(s) SubCutaneous every 12 hours  lidocaine   4% Patch 1 Patch Transdermal every 24 hours  metoprolol tartrate 50 milliGRAM(s) Oral every 6 hours  nystatin Powder 1 Application(s) Topical two times a day  QUEtiapine 100 milliGRAM(s) Oral at bedtime  traZODone 50 milliGRAM(s) Oral at bedtime  valproic  acid Syrup 250 milliGRAM(s) Oral three times a day    MEDICATIONS  (PRN):  acetaminophen     Tablet .. 650 milliGRAM(s) Oral every 6 hours PRN Mild Pain (1 - 3)  acetaminophen     Tablet .. 650 milliGRAM(s) Oral every 6 hours PRN Temp greater or equal to 38C (100.4F)  acetylcysteine 10%  Inhalation 4 milliLiter(s) Inhalation every 6 hours PRN dyspnea  levalbuterol Inhalation 0.63 milliGRAM(s) Inhalation every 6 hours PRN Shortness of breath/Wheezing  melatonin 3 milliGRAM(s) Oral at bedtime PRN Insomnia       Vitals:  Vital Signs Last 24 Hrs  T(C): 37.4 (17 Aug 2023 11:00), Max: 38.2 (17 Aug 2023 00:00)  T(F): 99.4 (17 Aug 2023 11:00), Max: 100.7 (17 Aug 2023 00:00)  HR: 120 (17 Aug 2023 11:00) (81 - 121)  BP: 112/67 (17 Aug 2023 11:00) (108/67 - 118/69)  BP(mean): --  RR: 18 (17 Aug 2023 11:00) (17 - 18)  SpO2: 98% (17 Aug 2023 11:00) (97% - 100%)    Parameters below as of 17 Aug 2023 11:00  Patient On (Oxygen Delivery Method): room air        Neurological Exam:    Mental Status Eyes open, eating lunch. Speech is sparse and mildly dysarthric. Follows simple commands.   Cranial Nerves - PERRL, EOMI, VFF, V1-V3 intact, no gross facial asymmetry,    Motor Exam -   Moves UEs spontanesously LEs atrophy some contracture    Sensory    Intact to light touch and pinprick bilaterally    Reflexes UES hyperreflexia patellar trace Left ankle clonus at times right mute  Coordination: unable   Gait: deferred     I personally reviewed the below data/images/labs:      LABS:                          8.8    6.95  )-----------( 307      ( 17 Aug 2023 07:33 )             27.7     08-17    138  |  105  |  15  ----------------------------<  101<H>  4.1   |  28  |  0.80    Ca    8.0<L>      17 Aug 2023 07:33  Phos  2.8     08-17  Mg     2.2     08-17    TPro  5.1<L>  /  Alb  1.3<L>  /  TBili  0.2  /  DBili  x   /  AST  30  /  ALT  29  /  AlkPhos  55  08-17    LIVER FUNCTIONS - ( 17 Aug 2023 07:33 )  Alb: 1.3 g/dL / Pro: 5.1 gm/dL / ALK PHOS: 55 U/L / ALT: 29 U/L / AST: 30 U/L / GGT: x             Urinalysis Basic - ( 17 Aug 2023 07:33 )    Color: x / Appearance: x / SG: x / pH: x  Gluc: 101 mg/dL / Ketone: x  / Bili: x / Urobili: x   Blood: x / Protein: x / Nitrite: x   Leuk Esterase: x / RBC: x / WBC x   Sq Epi: x / Non Sq Epi: x / Bacteria: x            < from: CT Head No Cont (07.14.23 @ 14:35) >  IMPRESSION:    CT head: Markedly motion degraded exam. No gross evidence of acute   intracranial hemorrhage or mass effect.    CT cervical spine: No evidence for acute displaced fracture or   malalignment.    CT lumbar spine: No evidence for acute displaced fracture or malalignment.    --- End of Report ---       Bilateral skin sparing mastectomy with unilateral sentinel lymph node biopsy and axillary lymphadenectomy; Bilateral mastectomy with insertion of tissue expander on both sides on 3/17/22; Exploration of right breast on 3/18/22/yes

## 2023-08-17 NOTE — PROGRESS NOTE ADULT - ASSESSMENT
68 years old male with h/o HTN, HLD, TBI dementia here after a fall with improvement in mental status since arrival. Has general weakness LE atrophy some hyperreflexia  CTH, C and L reviewed     Impression: generalized weakness, now contracted in all 4 extremities, TME, rhabdo  aspiration PNA  uti   RLE DVT on Lovenox      Can consider MRI brain and C-spine if no CI,, can defer for now in contracted state but may assist family with GOC discussion  Consider low dose baclofen for contractures if no contraindications   outpt EMG  Planning to transition to DOAC for DVT as outpt   Appreciate ID recs for ongoing fever, tachycardia. Consider PE thought prior V/Q scan neg. On AC already   On Zosyn, still spiking  GOC ongoing with Palliative   -In order to enhance patient's overall well-being and clinical course, please try avoiding benzodiazepines, anticholinergics, and antihistamines (Can cause worsening confusion/delirium). Additionally, continue reorientation, supportive care, maintaining regular sleep/wake cycle, and optimizing nutritional/medical factors.   Can follow up with Neurology, Dr. Rafael Jeffries or Dr. Macedo  at [phone]    Julianna Choi,   Vascular Neurology

## 2023-08-18 PROCEDURE — 99232 SBSQ HOSP IP/OBS MODERATE 35: CPT

## 2023-08-18 RX ADMIN — Medication 50 MILLIGRAM(S): at 21:59

## 2023-08-18 RX ADMIN — Medication 50 MILLIGRAM(S): at 05:54

## 2023-08-18 RX ADMIN — ENOXAPARIN SODIUM 60 MILLIGRAM(S): 100 INJECTION SUBCUTANEOUS at 18:08

## 2023-08-18 RX ADMIN — ENOXAPARIN SODIUM 60 MILLIGRAM(S): 100 INJECTION SUBCUTANEOUS at 05:53

## 2023-08-18 RX ADMIN — NYSTATIN CREAM 1 APPLICATION(S): 100000 CREAM TOPICAL at 18:08

## 2023-08-18 RX ADMIN — Medication 60 MILLIGRAM(S): at 23:31

## 2023-08-18 RX ADMIN — Medication 250 MILLIGRAM(S): at 15:07

## 2023-08-18 RX ADMIN — Medication 60 MILLIGRAM(S): at 11:50

## 2023-08-18 RX ADMIN — NYSTATIN CREAM 1 APPLICATION(S): 100000 CREAM TOPICAL at 05:53

## 2023-08-18 RX ADMIN — Medication 60 MILLIGRAM(S): at 05:54

## 2023-08-18 RX ADMIN — Medication 250 MILLIGRAM(S): at 05:54

## 2023-08-18 RX ADMIN — Medication 60 MILLIGRAM(S): at 18:08

## 2023-08-18 RX ADMIN — Medication 50 MILLIGRAM(S): at 23:31

## 2023-08-18 RX ADMIN — QUETIAPINE FUMARATE 100 MILLIGRAM(S): 200 TABLET, FILM COATED ORAL at 21:59

## 2023-08-18 RX ADMIN — Medication 250 MILLIGRAM(S): at 21:59

## 2023-08-18 RX ADMIN — Medication 50 MILLIGRAM(S): at 11:50

## 2023-08-18 RX ADMIN — Medication 50 MILLIGRAM(S): at 18:07

## 2023-08-18 NOTE — PROGRESS NOTE ADULT - ASSESSMENT
68 years old male with h/o HTN, HLD, TBI dementia here after a fall with improvement in mental status since arrival. Has general weakness LE atrophy some hyperreflexia  CTH, C and L reviewed     Impression: generalized weakness, now contracted in LE with improvement in UEs, TME, rhabdo  aspiration PNA, UTI- resolved   RLE DVT on Lovenox      Can consider MRI brain and C-spine if no CI,, can defer for now in contracted state but may assist family with GOC discussion  Consider low dose baclofen for contractures if no contraindications   outpt EMG  Planning to transition to DOAC for DVT as outpt   Appreciate ID recs for ongoing fever, tachycardia. Consider PE thought prior V/Q scan neg. On AC already   Off Zosyn per ID  GOC ongoing with Palliative   -In order to enhance patient's overall well-being and clinical course, please try avoiding benzodiazepines, anticholinergics, and antihistamines (Can cause worsening confusion/delirium). Additionally, continue reorientation, supportive care, maintaining regular sleep/wake cycle, and optimizing nutritional/medical factors.   Can follow up with Neurology, Dr. Rafael Jeffries or Dr. Macedo  at [phone]    Julianna Choi DO  Vascular Neurology   Office: [phone]

## 2023-08-18 NOTE — PROGRESS NOTE ADULT - ASSESSMENT
I doubt that tachycardia essentially since admission was on an infectious basis. Suspect the fever last night represents a superimposed event, with aspiration being most likely. It will be interesting to see whether tachycardia resolves, or not.   Certainly at risk for aspiration given poor mental status superimposed on baseline TBI.  No clear or convincing pneumonia on CT, does have plugging  VQ without PE, does have DVT but I do not think latter accounts for escalating leukocytosis.   RVP's neg  Elevated CK, presumed rhabdomyolysis why still fluctuating and elevated after ~1 week No troponin's sent but most recent ECG only reported as sinus tachycardia.   U/A without significant pyuria now or on admission  Abdominal exam and skin exam limited by patient's behavior, but no gross abdominal tenderness, skin/soft tissue infection, phlebitis    7/28: afebrile >24 hrs, tachycardic, WBC increased 18.61, Cr elevated 1.38, BCs and UC with no growth to date, covid 19 test is negative, CT chest - new RLL mucus plug, new b/l LLs atelectasis, MRSA PCR not detected, will continue with Zosyn for now.   + Right femoral DVT - on Lovenox.   Attending addendum:  I have reviewed all pertinent clinical information and agree with the NP's note.   continue zosyn through the weekend   follow all cultured  trend wbc and fever curve   aspiration precautions   frequent suctioning if necessary     7/31: pt is more awake and alert, more interactive, confused, no fevers since 7/29, WBC elevated 15.5, Cr normalized, LFTs better, MRSA PCR not detected, BCs and UC with no growth to date. Pt completed a course of ceftriaxone, now on zosyn day #6.   Pt's CK is trending up, cardiac enzymes should be obtained.   Attending addendum:  I have reviewed all pertinent clinical information and agree with the NP's note.   continue antibiotics   CK was improving but then starting climbing, asked for cardiac enzymes to be sent  suspended atorvastatin as he is on high dose   please trend CK levels    8/1: low grade fevers, tachycardic, NC, WBC better 12.77,Cr ok, LFTs better, BCs and UC with no growth to date, troponin x 1 negative, on Lovenox for right femoral DVT, Zosyn IV continued.  Attending Addendum--  Case reviewed with NP Aruna Guzman. Her note reviewed and modified as appropriate.   Still with intermittent low-grade fever though trend perhaps moderating  Some decline in WBC  CK still elevated as of the 29th- etiology? Seems too long to attribute to rhabdo from outpatient setting. MB fraction nor troponin impressive  Role of Abx TBD  Drug fever an exclusionary diagnosis, but none known to be new    8/2: continues having fevers, tachycardic, WBC normalized, Cr ok, CK is better 510 today, all prior BCs with no growth to date, will obtain two sets of surveillance BCs. Unclear if pt's fevers are infectious in nature, will continue Zosyn IV for now.   Attending Addendum--  Case reviewed with JOANNA Guzman. Her note reviewed and modified as appropriate.   Still febrile, higher grade  CK diminished  If fevers persist, will consider NM scan, but not clear that he will fit under the camera.  Role of Abx TBD    8/3: low grade temp yesterday late evening, no fevers since then, tachycardic, NC, WBC normalized, Cr ok, all BCs and UC with no growth to date, Zosyn IV continued for now. NM scan should be considered if the pt can fit, + contracted.   Attending Addendum--  Case reviewed with JOANNA Guzman. Her note reviewed and modified as appropriate.   Patient personally assessed and examined.  Seen earlier   Shivering w low grade fever earlier. Abx have not made much impact and cx unrevealing.   Still not clear why CK still elevate- no concern of active injury, no clear tenderness on abdominal exam  Seems too long for rhabdo in setting of normal renal function.    8/4: continues having fevers, tachycardic, RA, WBC increased 12.12, Cr ok, BCs with no growth to date, will continue with Zosyn for now. In favor of obtaining for indium scan, consider rheumatology workup.   Attending Addendum--  Case reviewed with JOANNA Guzman. Her note reviewed and modified as appropriate.   FUO.  NM scan at this point reasonable  Checking serologies re: autoimmune process, especially as persistent CK elevation not well explained at present (myositis?).  Role of abx TBD  I will be available via Teams for any issues that may arise over the weekend.    8/7: continues having low grade temps, tachycardic, RA, no new blood work, BCs remain with no growth to date, Zosyn IV continued for now, pending indium scan, consider rheumatology consult.  Attending Addendum--  Case reviewed with JOANNA Guzman. Her note reviewed and modified as appropriate.   Patient personally assessed and examined.  Seems more alert  Intermittent low grade fever  Awaiting NM study, though not clear how useful it will be  Anti-RNP elevated, CK's variably elevated-- myositis?  JESICA pending  Rheum eval    8/8: pt is very contracted, exam is difficult, continues having fevers, RA, tachycardic, WBC increased 15.64. All BCs with no growth to date, Zosyn IV continued for now, indium scan is pending.   Attending Addendum--  Case reviewed with JOANNA Guzmna. Her note reviewed and modified as appropriate.   Patient personally assessed and examined.  Little changed  Trend CBC  Awaiting NM scan  Rheum appreciated    8/9: no fevers within last 24 hrs, tachycardic, RA, WBC better 12.57, BCs with no growth to date, Cr ok, CT chest - large left chest hematoma vs dependent edema, + b/l pleural effusions, CAROL ? infiltrate, b/l LLs atelectasis. Indium scan is pending, Zosyn IV continued for now.   Attending Addendum--  Case reviewed with JOANNA Guzman. Her note reviewed and modified as appropriate.   Aldolase elevated, as espected.  JESICA + 1:1280  CT chest not overly impressive for infectious process. hematoma could cause fever but fever prolonged  Rheum follow up    08/10: afebrile for the last couple days, tachycardic, RA, wbc normalized, RVP negative, all BCs with no growth to date, completed longer course of abx, will continue monitoring off abx for now.   Attending Addendum--  Case reviewed with JOANNA Guzman. Her note reviewed and modified as appropriate.   Patient personally assessed and examined.  Afebrile.  More awake/alert  Feels "all right" no specific complaints.  Defer additional antibiotics for now    8/11: remains afebrile, tachycardic, RA, no leukocytosis, BCs remain with no growth to date, pt is being monitored off abx.   8/14: Intermittently febrile, exam unchanged. Still no clear or convincing evidence of infection on exam  8/15: Essentially unchanged. Reviewed with nursing cooling blanked indications/use/pitfalls.  8/16: Afebrile, WBC normal, unrevealing exam for infectious process.   8/18: Intermittent low-grade fever, no clear convincing evidence of infection on the basis of physical examination.  Patient is off antibiotics and has remained stable.  I do not see any role to restart at this juncture.Unfortunately, he will remain at high risk for complications–both infectious and not–indefinitely.  He is certainly at risk for aspiration and intermittent atelectasis/mucous plugging.  Threshold for empiric antibiotics in this gentleman should remain high.    Suggestions--  Defer abx    I'll sign off at this time.   Thank you for the courtesy of this referral.    Joel Adamson MD  Attending Physician  [facility]  Division of Infectous Diseases  [phone]

## 2023-08-18 NOTE — PROGRESS NOTE ADULT - SUBJECTIVE AND OBJECTIVE BOX
Patient is a 68y old  Male who presents with a chief complaint of VIKAS, rhabdomyolysis (18 Aug 2023 13:12)      INTERVAL HPI/OVERNIGHT EVENTS:    MEDICATIONS  (STANDING):  diltiazem    Tablet 60 milliGRAM(s) Oral every 6 hours  enoxaparin Injectable 60 milliGRAM(s) SubCutaneous every 12 hours  lidocaine   4% Patch 1 Patch Transdermal every 24 hours  metoprolol tartrate 50 milliGRAM(s) Oral every 6 hours  nystatin Powder 1 Application(s) Topical two times a day  QUEtiapine 100 milliGRAM(s) Oral at bedtime  traZODone 50 milliGRAM(s) Oral at bedtime  valproic  acid Syrup 250 milliGRAM(s) Oral three times a day    MEDICATIONS  (PRN):  acetaminophen     Tablet .. 650 milliGRAM(s) Oral every 6 hours PRN Temp greater or equal to 38C (100.4F)  acetaminophen     Tablet .. 650 milliGRAM(s) Oral every 6 hours PRN Mild Pain (1 - 3)  acetylcysteine 10%  Inhalation 4 milliLiter(s) Inhalation every 6 hours PRN dyspnea  levalbuterol Inhalation 0.63 milliGRAM(s) Inhalation every 6 hours PRN Shortness of breath/Wheezing  melatonin 3 milliGRAM(s) Oral at bedtime PRN Insomnia      Allergies    No Known Allergies    Intolerances        Vital Signs Last 24 Hrs  T(C): 37.1 (18 Aug 2023 12:17), Max: 37.1 (17 Aug 2023 21:17)  T(F): 98.7 (18 Aug 2023 12:17), Max: 98.7 (17 Aug 2023 21:17)  HR: 119 (18 Aug 2023 12:17) (104 - 124)  BP: 130/74 (18 Aug 2023 12:17) (124/84 - 135/78)  BP(mean): --  RR: 18 (18 Aug 2023 12:17) (16 - 18)  SpO2: 98% (18 Aug 2023 12:17) (95% - 98%)    Parameters below as of 18 Aug 2023 11:15  Patient On (Oxygen Delivery Method): room air        PHYSICAL EXAM:  GENERAL: NAD, well-groomed, well-developed  HEAD:  Atraumatic, Normocephalic  EYES: EOMI, PERRLA, conjunctiva and sclera clear  ENMT: No tonsillar erythema, exudates, or enlargement; Moist mucous membranes, Good dentition, No lesions  NECK: Supple, No JVD, Normal thyroid  NERVOUS SYSTEM:  Alert & Oriented X3, Good concentration; Motor Strength 5/5 B/L upper and lower extremities; DTRs 2+ intact and symmetric  CHEST/LUNG: Clear to auscultation bilaterally; No rales, rhonchi, wheezing, or rubs  HEART: Regular rate and rhythm; No murmurs, rubs, or gallops  ABDOMEN: Soft, Nontender, Nondistended; Bowel sounds present  EXTREMITIES:  2+ Peripheral Pulses, No clubbing, cyanosis, or edema  LYMPH: No lymphadenopathy noted  SKIN: No rashes or lesions    LABS:                        8.8    6.95  )-----------( 307      ( 17 Aug 2023 07:33 )             27.7     08-17    138  |  105  |  15  ----------------------------<  101<H>  4.1   |  28  |  0.80    Ca    8.0<L>      17 Aug 2023 07:33  Phos  2.8     08-17  Mg     2.2     08-17    TPro  5.1<L>  /  Alb  1.3<L>  /  TBili  0.2  /  DBili  x   /  AST  30  /  ALT  29  /  AlkPhos  55  08-17      Urinalysis Basic - ( 17 Aug 2023 07:33 )    Color: x / Appearance: x / SG: x / pH: x  Gluc: 101 mg/dL / Ketone: x  / Bili: x / Urobili: x   Blood: x / Protein: x / Nitrite: x   Leuk Esterase: x / RBC: x / WBC x   Sq Epi: x / Non Sq Epi: x / Bacteria: x      CAPILLARY BLOOD GLUCOSE          RADIOLOGY & ADDITIONAL TESTS:    08-17-23 @ 07:01  -  08-18-23 @ 07:00  --------------------------------------------------------  IN:    Oral Fluid: 340 mL  Total IN: 340 mL    OUT:  Total OUT: 0 mL    Total NET: 340 mL

## 2023-08-18 NOTE — PROGRESS NOTE ADULT - SUBJECTIVE AND OBJECTIVE BOX
Neurology Progress Note    S: Patient seen and examined. Febrile again overnight. Still tachycardic       Medications: MEDICATIONS  (STANDING):  diltiazem    Tablet 60 milliGRAM(s) Oral every 6 hours  enoxaparin Injectable 60 milliGRAM(s) SubCutaneous every 12 hours  lidocaine   4% Patch 1 Patch Transdermal every 24 hours  metoprolol tartrate 50 milliGRAM(s) Oral every 6 hours  nystatin Powder 1 Application(s) Topical two times a day  QUEtiapine 100 milliGRAM(s) Oral at bedtime  traZODone 50 milliGRAM(s) Oral at bedtime  valproic  acid Syrup 250 milliGRAM(s) Oral three times a day    MEDICATIONS  (PRN):  acetaminophen     Tablet .. 650 milliGRAM(s) Oral every 6 hours PRN Temp greater or equal to 38C (100.4F)  acetaminophen     Tablet .. 650 milliGRAM(s) Oral every 6 hours PRN Mild Pain (1 - 3)  acetylcysteine 10%  Inhalation 4 milliLiter(s) Inhalation every 6 hours PRN dyspnea  levalbuterol Inhalation 0.63 milliGRAM(s) Inhalation every 6 hours PRN Shortness of breath/Wheezing  melatonin 3 milliGRAM(s) Oral at bedtime PRN Insomnia       Vitals:  Vital Signs Last 24 Hrs  T(C): 36.7 (18 Aug 2023 05:26), Max: 37.4 (17 Aug 2023 11:00)  T(F): 98 (18 Aug 2023 05:26), Max: 99.4 (17 Aug 2023 11:00)  HR: 124 (18 Aug 2023 05:26) (104 - 124)  BP: 135/78 (18 Aug 2023 05:26) (112/67 - 139/81)  BP(mean): --  RR: 18 (18 Aug 2023 05:26) (16 - 18)  SpO2: 97% (18 Aug 2023 05:26) (95% - 98%)    Parameters below as of 18 Aug 2023 05:26  Patient On (Oxygen Delivery Method): room air          Neurological Exam:    Mental Status Eyes open, eating lunch. Speech is sparse and mildly dysarthric. Follows simple commands.   Cranial Nerves - PERRL, EOMI, VFF, V1-V3 intact, no gross facial asymmetry,    Motor Exam -   Moves UEs spontanesously, more movement antigravity today. LEs atrophy some contracture    Sensory    Intact to light touch and pinprick bilaterally    Reflexes UES hyperreflexia patellar trace Left ankle clonus at times right mute  Coordination: unable   Gait: deferred     I personally reviewed the below data/images/labs:        LABS:                          8.8    6.95  )-----------( 307      ( 17 Aug 2023 07:33 )             27.7     08-17    138  |  105  |  15  ----------------------------<  101<H>  4.1   |  28  |  0.80    Ca    8.0<L>      17 Aug 2023 07:33  Phos  2.8     08-17  Mg     2.2     08-17    TPro  5.1<L>  /  Alb  1.3<L>  /  TBili  0.2  /  DBili  x   /  AST  30  /  ALT  29  /  AlkPhos  55  08-17    LIVER FUNCTIONS - ( 17 Aug 2023 07:33 )  Alb: 1.3 g/dL / Pro: 5.1 gm/dL / ALK PHOS: 55 U/L / ALT: 29 U/L / AST: 30 U/L / GGT: x             Urinalysis Basic - ( 17 Aug 2023 07:33 )    Color: x / Appearance: x / SG: x / pH: x  Gluc: 101 mg/dL / Ketone: x  / Bili: x / Urobili: x   Blood: x / Protein: x / Nitrite: x   Leuk Esterase: x / RBC: x / WBC x   Sq Epi: x / Non Sq Epi: x / Bacteria: x              < from: CT Head No Cont (07.14.23 @ 14:35) >  IMPRESSION:    CT head: Markedly motion degraded exam. No gross evidence of acute   intracranial hemorrhage or mass effect.    CT cervical spine: No evidence for acute displaced fracture or   malalignment.    CT lumbar spine: No evidence for acute displaced fracture or malalignment.    --- End of Report ---

## 2023-08-18 NOTE — PROGRESS NOTE ADULT - ASSESSMENT
68M PMH HTN, HLD, TBI with resulting dementia presented to ED as a fall. Patient found with rhabdo and VIKAS, also with possible aspiration pneumonia, now afebrile, s/p a course of abx.  Seen by ID, rheumatology.  Rheumatology workup in progress.  Pt was unable to have Indium scan due to b/l LE contractures.  ? due to sacroilitis.  Started on NSAIDs.  Evaluated by Neuro.  CT head not suggestive of CVA.  Unable to have MRI Head / neck due to contractures.  s/p course of Zosyn, on regular PT.     LE Doppler : IMPRESSION: There is acute occlusive DVT affecting the right femoral vein with the proximal extent of the thrombus in the right common femoral vein.    Large left chest wall subcutaneous hematoma versus dependent edema.  New small bilateral pleural effusions.  Left upper lobe infiltrate versus motion artifact. Bilateral lower lobe passive atelectasis.      # Recurrent fevers, now afebrile - Elevated ESR, CRP, JESICA, antiDSDNA positive.  ? rheumatologic etiology.  Rheumatology consulted and workup in progress.    -continues to be tachycardic    # Aspiration Pneumonia / b/l Pleural Effusion, right sided mucous plugging, b/l atelectasis - ID Following.  s/p Zosyn.  CT c/a/p not indicative of acute infection.  ? fever from hematoma.  Per nuclear, pt too contracted to be positioned for indium scan.    As per ID- continue to monitor off antibiotics, continue aspiration precautions,   # ? L chest hematoma - no obvious ecchymosis of L chest wall.  H/H stable.  Monitor CBC.  # Tachycardia - HR remains elevated.  Continue Cardizem, BBlocker.  Tele showing sinus tachycardia.   VQ scan: low probability      # Impaired ambulation / LE contractures / Functional Quadriplegia / TBI - ? due to sacroilitis, underlying dementia -  Trial of NSAIDs.  PT consulted and recommending FREDY.  Neuro following.  CT head not suggestive of acute infarct.  Attempt MR Head / neck, however, pt contracted- consider if pt were to improve.  Outpatient EMG.      As per neuro---try avoiding benzodiazepines, anticholinergics, and antihistamines (Can cause worsening confusion/delirium). Additionally, continue reorientation, supportive care, maintaining regular sleep/wake cycle, and optimizing nutritional/medical factors"  -frequent turns, aspiration precautions    # RLE DVT - continue full dose Lovenox for now.  Transition to DOAC on discharge    # Traumatic brain injury / Dementia - supportive care.  Continue Valproic acid.      # VIKAS - resolved.    # Rhabdomyolysis - resolved.    # Essential HTN - Monitor BP.  Continue antihypertensives.    # Inpatient DVT Proph - on anticoagulation     #GOC- full code. Poor prognosis. Get palliative care consulted.    I spoke w/ the pt's daughter today and informed her of the pt's poor prognosis. Pt is medically stable and pending placement.

## 2023-08-18 NOTE — PROGRESS NOTE ADULT - SUBJECTIVE AND OBJECTIVE BOX
[facility]  Division of Infectious Diseases  [phone]    Name: ROJAS HE  Age: 68y  Gender: Male  MRN: [id number]    Interval History--  Notes reviewed. Seen earlier today.  Mental status remains poor.  Afebrile.    Past Medical History--  TBI (traumatic brain injury)    HLD (hyperlipidemia)    HTN, age 0-18        For details regarding the patient's social history, family history, and other miscellaneous elements, please refer the initial infectious diseases consultation and/or the admitting history and physical examination for this admission.    Allergies    No Known Allergies    Intolerances        Medications--  Antibiotics:    Immunologic:    Other:  acetaminophen     Tablet .. PRN  acetaminophen     Tablet .. PRN  acetylcysteine 10%  Inhalation PRN  diltiazem    Tablet  enoxaparin Injectable  levalbuterol Inhalation PRN  lidocaine   4% Patch  melatonin PRN  metoprolol tartrate  nystatin Powder  QUEtiapine  traZODone  valproic  acid Syrup      Review of Systems--  Review of systems unable secondary to clinical condition.     Physical Examination--  Vital Signs: T(F): 98.7 (08-18-23 @ 12:17), Max: 99.2 (08-17-23 @ 16:48)  HR: 119 (08-18-23 @ 12:17)  BP: 130/74 (08-18-23 @ 12:17)  RR: 18 (08-18-23 @ 12:17)  SpO2: 98% (08-18-23 @ 12:17)  Wt(kg): --  General: Nontoxic M NAD lying in bed  HEENT: AT/NC. Pupils/EOM unable secondary to patient compliance. Oropharynx/dentition unable secondary to patient compliance.   Neck: Increased tone not frankly rigid. No sense of mass.  Nodes: None palpable.  Lungs: Poor effort diminished BS bilaterally, no wheezes, no rhonchi  Heart: Tachycardic with RR, MRGT  Abdomen: Bowel sounds present. Soft. Nondistended. Nontender.  Extremities: No cyanosis or clubbing. No edema. Lower extremities contracted  Skin: Warm. Dry. Good turgor. No acute rash.   Psychiatric: Largely unable. Calm.     Laboratory Studies--  CBC                        8.8    6.95  )-----------( 307      ( 17 Aug 2023 07:33 )             27.7       Chemistries  08-17    138  |  105  |  15  ----------------------------<  101<H>  4.1   |  28  |  0.80    Ca    8.0<L>      17 Aug 2023 07:33  Phos  2.8     08-17  Mg     2.2     08-17    TPro  5.1<L>  /  Alb  1.3<L>  /  TBili  0.2  /  DBili  x   /  AST  30  /  ALT  29  /  AlkPhos  55  08-17      Culture Data

## 2023-08-19 LAB
HOMOCYSTEINE LEVEL: 10.1 UMOL/L — SIGNIFICANT CHANGE UP
METHYLMALONIC ACID LEVEL: 110 NMOL/L — SIGNIFICANT CHANGE UP (ref 87–318)

## 2023-08-19 PROCEDURE — 99232 SBSQ HOSP IP/OBS MODERATE 35: CPT

## 2023-08-19 RX ORDER — METOPROLOL TARTRATE 50 MG
75 TABLET ORAL EVERY 6 HOURS
Refills: 0 | Status: DISCONTINUED | OUTPATIENT
Start: 2023-08-19 | End: 2023-08-23

## 2023-08-19 RX ADMIN — ENOXAPARIN SODIUM 60 MILLIGRAM(S): 100 INJECTION SUBCUTANEOUS at 17:11

## 2023-08-19 RX ADMIN — ENOXAPARIN SODIUM 60 MILLIGRAM(S): 100 INJECTION SUBCUTANEOUS at 05:45

## 2023-08-19 RX ADMIN — Medication 250 MILLIGRAM(S): at 05:45

## 2023-08-19 RX ADMIN — NYSTATIN CREAM 1 APPLICATION(S): 100000 CREAM TOPICAL at 05:45

## 2023-08-19 RX ADMIN — Medication 250 MILLIGRAM(S): at 13:13

## 2023-08-19 RX ADMIN — LIDOCAINE 1 PATCH: 4 CREAM TOPICAL at 11:17

## 2023-08-19 RX ADMIN — Medication 60 MILLIGRAM(S): at 17:11

## 2023-08-19 RX ADMIN — NYSTATIN CREAM 1 APPLICATION(S): 100000 CREAM TOPICAL at 17:13

## 2023-08-19 RX ADMIN — Medication 50 MILLIGRAM(S): at 05:45

## 2023-08-19 RX ADMIN — Medication 60 MILLIGRAM(S): at 05:46

## 2023-08-19 RX ADMIN — LIDOCAINE 1 PATCH: 4 CREAM TOPICAL at 23:00

## 2023-08-19 RX ADMIN — Medication 250 MILLIGRAM(S): at 21:48

## 2023-08-19 RX ADMIN — Medication 60 MILLIGRAM(S): at 11:17

## 2023-08-19 RX ADMIN — Medication 50 MILLIGRAM(S): at 21:47

## 2023-08-19 RX ADMIN — QUETIAPINE FUMARATE 100 MILLIGRAM(S): 200 TABLET, FILM COATED ORAL at 21:47

## 2023-08-19 RX ADMIN — LIDOCAINE 1 PATCH: 4 CREAM TOPICAL at 21:54

## 2023-08-19 RX ADMIN — Medication 50 MILLIGRAM(S): at 11:17

## 2023-08-19 RX ADMIN — Medication 75 MILLIGRAM(S): at 17:10

## 2023-08-19 NOTE — PROGRESS NOTE ADULT - SUBJECTIVE AND OBJECTIVE BOX
Patient is a 68y old  Male who presents with a chief complaint of VIKAS, rhabdomyolysis (18 Aug 2023 20:15)      INTERVAL HPI/OVERNIGHT EVENTS:    MEDICATIONS  (STANDING):  diltiazem    Tablet 60 milliGRAM(s) Oral every 6 hours  enoxaparin Injectable 60 milliGRAM(s) SubCutaneous every 12 hours  lidocaine   4% Patch 1 Patch Transdermal every 24 hours  metoprolol tartrate 75 milliGRAM(s) Oral every 6 hours  nystatin Powder 1 Application(s) Topical two times a day  QUEtiapine 100 milliGRAM(s) Oral at bedtime  traZODone 50 milliGRAM(s) Oral at bedtime  valproic  acid Syrup 250 milliGRAM(s) Oral three times a day    MEDICATIONS  (PRN):  acetaminophen     Tablet .. 650 milliGRAM(s) Oral every 6 hours PRN Mild Pain (1 - 3)  acetaminophen     Tablet .. 650 milliGRAM(s) Oral every 6 hours PRN Temp greater or equal to 38C (100.4F)  acetylcysteine 10%  Inhalation 4 milliLiter(s) Inhalation every 6 hours PRN dyspnea  levalbuterol Inhalation 0.63 milliGRAM(s) Inhalation every 6 hours PRN Shortness of breath/Wheezing  melatonin 3 milliGRAM(s) Oral at bedtime PRN Insomnia      Allergies    No Known Allergies    Intolerances        Vital Signs Last 24 Hrs  T(C): 37 (19 Aug 2023 16:59), Max: 37 (19 Aug 2023 16:59)  T(F): 98.6 (19 Aug 2023 16:59), Max: 98.6 (19 Aug 2023 16:59)  HR: 109 (19 Aug 2023 16:59) (109 - 122)  BP: 101/62 (19 Aug 2023 16:59) (101/62 - 133/71)  BP(mean): --  RR: 18 (19 Aug 2023 16:59) (18 - 18)  SpO2: 98% (19 Aug 2023 16:59) (95% - 100%)    Parameters below as of 19 Aug 2023 16:59  Patient On (Oxygen Delivery Method): room air        PHYSICAL EXAM:  GENERAL: NAD, well-groomed, well-developed  HEAD:  Atraumatic, Normocephalic  EYES: EOMI, PERRLA, conjunctiva and sclera clear  ENMT: No tonsillar erythema, exudates, or enlargement; Moist mucous membranes, Good dentition, No lesions  NECK: Supple, No JVD, Normal thyroid  NERVOUS SYSTEM:  Alert & Oriented X3, Good concentration; Motor Strength 5/5 B/L upper and lower extremities; DTRs 2+ intact and symmetric  CHEST/LUNG: Clear to auscultation bilaterally; No rales, rhonchi, wheezing, or rubs  HEART: Regular rate and rhythm; No murmurs, rubs, or gallops  ABDOMEN: Soft, Nontender, Nondistended; Bowel sounds present  EXTREMITIES:  2+ Peripheral Pulses, No clubbing, cyanosis, or edema  LYMPH: No lymphadenopathy noted  SKIN: No rashes or lesions    LABS:       RADIOLOGY & ADDITIONAL TESTS:    08-18-23 @ 07:01  -  08-19-23 @ 07:00  --------------------------------------------------------  IN:    Oral Fluid: 100 mL  Total IN: 100 mL    OUT:  Total OUT: 0 mL    Total NET: 100 mL

## 2023-08-20 LAB
ALBUMIN SERPL ELPH-MCNC: 1.5 G/DL — LOW (ref 3.3–5)
ALP SERPL-CCNC: 57 U/L — SIGNIFICANT CHANGE UP (ref 40–120)
ALT FLD-CCNC: 30 U/L — SIGNIFICANT CHANGE UP (ref 12–78)
ANION GAP SERPL CALC-SCNC: 2 MMOL/L — LOW (ref 5–17)
AST SERPL-CCNC: 30 U/L — SIGNIFICANT CHANGE UP (ref 15–37)
BILIRUB SERPL-MCNC: 0.3 MG/DL — SIGNIFICANT CHANGE UP (ref 0.2–1.2)
BUN SERPL-MCNC: 14 MG/DL — SIGNIFICANT CHANGE UP (ref 7–23)
CALCIUM SERPL-MCNC: 8.4 MG/DL — LOW (ref 8.5–10.1)
CHLORIDE SERPL-SCNC: 103 MMOL/L — SIGNIFICANT CHANGE UP (ref 96–108)
CO2 SERPL-SCNC: 33 MMOL/L — HIGH (ref 22–31)
CREAT SERPL-MCNC: 0.84 MG/DL — SIGNIFICANT CHANGE UP (ref 0.5–1.3)
EGFR: 94 ML/MIN/1.73M2 — SIGNIFICANT CHANGE UP
GLUCOSE SERPL-MCNC: 99 MG/DL — SIGNIFICANT CHANGE UP (ref 70–99)
HCT VFR BLD CALC: 29.2 % — LOW (ref 39–50)
HGB BLD-MCNC: 9.5 G/DL — LOW (ref 13–17)
MAGNESIUM SERPL-MCNC: 2.3 MG/DL — SIGNIFICANT CHANGE UP (ref 1.6–2.6)
MCHC RBC-ENTMCNC: 30.4 PG — SIGNIFICANT CHANGE UP (ref 27–34)
MCHC RBC-ENTMCNC: 32.5 G/DL — SIGNIFICANT CHANGE UP (ref 32–36)
MCV RBC AUTO: 93.6 FL — SIGNIFICANT CHANGE UP (ref 80–100)
NRBC # BLD: 0 /100 WBCS — SIGNIFICANT CHANGE UP (ref 0–0)
PHOSPHATE SERPL-MCNC: 2.8 MG/DL — SIGNIFICANT CHANGE UP (ref 2.5–4.5)
PLATELET # BLD AUTO: 363 K/UL — SIGNIFICANT CHANGE UP (ref 150–400)
POTASSIUM SERPL-MCNC: 3.9 MMOL/L — SIGNIFICANT CHANGE UP (ref 3.5–5.3)
POTASSIUM SERPL-SCNC: 3.9 MMOL/L — SIGNIFICANT CHANGE UP (ref 3.5–5.3)
PROT SERPL-MCNC: 5.6 GM/DL — LOW (ref 6–8.3)
RBC # BLD: 3.12 M/UL — LOW (ref 4.2–5.8)
RBC # FLD: 15.7 % — HIGH (ref 10.3–14.5)
SODIUM SERPL-SCNC: 138 MMOL/L — SIGNIFICANT CHANGE UP (ref 135–145)
WBC # BLD: 6.42 K/UL — SIGNIFICANT CHANGE UP (ref 3.8–10.5)
WBC # FLD AUTO: 6.42 K/UL — SIGNIFICANT CHANGE UP (ref 3.8–10.5)

## 2023-08-20 PROCEDURE — 99232 SBSQ HOSP IP/OBS MODERATE 35: CPT

## 2023-08-20 RX ADMIN — Medication 250 MILLIGRAM(S): at 05:20

## 2023-08-20 RX ADMIN — QUETIAPINE FUMARATE 100 MILLIGRAM(S): 200 TABLET, FILM COATED ORAL at 21:58

## 2023-08-20 RX ADMIN — Medication 75 MILLIGRAM(S): at 05:21

## 2023-08-20 RX ADMIN — NYSTATIN CREAM 1 APPLICATION(S): 100000 CREAM TOPICAL at 17:06

## 2023-08-20 RX ADMIN — NYSTATIN CREAM 1 APPLICATION(S): 100000 CREAM TOPICAL at 05:32

## 2023-08-20 RX ADMIN — ENOXAPARIN SODIUM 60 MILLIGRAM(S): 100 INJECTION SUBCUTANEOUS at 05:22

## 2023-08-20 RX ADMIN — Medication 75 MILLIGRAM(S): at 01:02

## 2023-08-20 RX ADMIN — Medication 60 MILLIGRAM(S): at 01:02

## 2023-08-20 RX ADMIN — Medication 75 MILLIGRAM(S): at 11:19

## 2023-08-20 RX ADMIN — Medication 250 MILLIGRAM(S): at 21:58

## 2023-08-20 RX ADMIN — ENOXAPARIN SODIUM 60 MILLIGRAM(S): 100 INJECTION SUBCUTANEOUS at 17:05

## 2023-08-20 RX ADMIN — Medication 50 MILLIGRAM(S): at 21:59

## 2023-08-20 RX ADMIN — Medication 250 MILLIGRAM(S): at 13:20

## 2023-08-20 RX ADMIN — Medication 60 MILLIGRAM(S): at 11:19

## 2023-08-20 RX ADMIN — Medication 60 MILLIGRAM(S): at 05:22

## 2023-08-20 RX ADMIN — LIDOCAINE 1 PATCH: 4 CREAM TOPICAL at 11:19

## 2023-08-20 RX ADMIN — LIDOCAINE 1 PATCH: 4 CREAM TOPICAL at 21:56

## 2023-08-20 RX ADMIN — Medication 60 MILLIGRAM(S): at 17:05

## 2023-08-20 RX ADMIN — Medication 75 MILLIGRAM(S): at 17:05

## 2023-08-20 NOTE — PROGRESS NOTE ADULT - SUBJECTIVE AND OBJECTIVE BOX
Patient is a 68y old  Male who presents with a chief complaint of VIKAS, rhabdomyolysis (19 Aug 2023 18:30)      INTERVAL HPI/OVERNIGHT EVENTS:    MEDICATIONS  (STANDING):  diltiazem    Tablet 60 milliGRAM(s) Oral every 6 hours  enoxaparin Injectable 60 milliGRAM(s) SubCutaneous every 12 hours  lidocaine   4% Patch 1 Patch Transdermal every 24 hours  metoprolol tartrate 75 milliGRAM(s) Oral every 6 hours  nystatin Powder 1 Application(s) Topical two times a day  QUEtiapine 100 milliGRAM(s) Oral at bedtime  traZODone 50 milliGRAM(s) Oral at bedtime  valproic  acid Syrup 250 milliGRAM(s) Oral three times a day    MEDICATIONS  (PRN):  acetaminophen     Tablet .. 650 milliGRAM(s) Oral every 6 hours PRN Mild Pain (1 - 3)  acetaminophen     Tablet .. 650 milliGRAM(s) Oral every 6 hours PRN Temp greater or equal to 38C (100.4F)  acetylcysteine 10%  Inhalation 4 milliLiter(s) Inhalation every 6 hours PRN dyspnea  levalbuterol Inhalation 0.63 milliGRAM(s) Inhalation every 6 hours PRN Shortness of breath/Wheezing  melatonin 3 milliGRAM(s) Oral at bedtime PRN Insomnia      Allergies    No Known Allergies    Intolerances        Vital Signs Last 24 Hrs  T(C): 37.2 (20 Aug 2023 16:46), Max: 37.3 (20 Aug 2023 11:25)  T(F): 99 (20 Aug 2023 16:46), Max: 99.1 (20 Aug 2023 11:25)  HR: 111 (20 Aug 2023 16:46) (107 - 122)  BP: 149/86 (20 Aug 2023 16:46) (105/61 - 149/86)  BP(mean): --  RR: 18 (20 Aug 2023 16:46) (18 - 18)  SpO2: 99% (20 Aug 2023 16:46) (94% - 100%)    Parameters below as of 20 Aug 2023 16:46  Patient On (Oxygen Delivery Method): room air        PHYSICAL EXAM:  GENERAL: NAD, well-groomed, well-developed  HEAD:  Atraumatic, Normocephalic  EYES: EOMI, PERRLA, conjunctiva and sclera clear  ENMT: No tonsillar erythema, exudates, or enlargement; Moist mucous membranes, Good dentition, No lesions  NECK: Supple, No JVD, Normal thyroid  NERVOUS SYSTEM:  Alert & Oriented X3, Good concentration; Motor Strength 5/5 B/L upper and lower extremities; DTRs 2+ intact and symmetric  CHEST/LUNG: Clear to auscultation bilaterally; No rales, rhonchi, wheezing, or rubs  HEART: Regular rate and rhythm; No murmurs, rubs, or gallops  ABDOMEN: Soft, Nontender, Nondistended; Bowel sounds present  EXTREMITIES:  2+ Peripheral Pulses, No clubbing, cyanosis, or edema  LYMPH: No lymphadenopathy noted  SKIN: No rashes or lesions    LABS:                        9.5    6.42  )-----------( 363      ( 20 Aug 2023 06:05 )             29.2     08-20    138  |  103  |  14  ----------------------------<  99  3.9   |  33<H>  |  0.84    Ca    8.4<L>      20 Aug 2023 06:05  Phos  2.8     08-20  Mg     2.3     08-20    TPro  5.6<L>  /  Alb  1.5<L>  /  TBili  0.3  /  DBili  x   /  AST  30  /  ALT  30  /  AlkPhos  57  08-20      Urinalysis Basic - ( 20 Aug 2023 06:05 )    Color: x / Appearance: x / SG: x / pH: x  Gluc: 99 mg/dL / Ketone: x  / Bili: x / Urobili: x   Blood: x / Protein: x / Nitrite: x   Leuk Esterase: x / RBC: x / WBC x   Sq Epi: x / Non Sq Epi: x / Bacteria: x      CAPILLARY BLOOD GLUCOSE          RADIOLOGY & ADDITIONAL TESTS:

## 2023-08-21 PROCEDURE — 99232 SBSQ HOSP IP/OBS MODERATE 35: CPT

## 2023-08-21 RX ADMIN — Medication 250 MILLIGRAM(S): at 06:18

## 2023-08-21 RX ADMIN — LIDOCAINE 1 PATCH: 4 CREAM TOPICAL at 00:38

## 2023-08-21 RX ADMIN — ENOXAPARIN SODIUM 60 MILLIGRAM(S): 100 INJECTION SUBCUTANEOUS at 06:16

## 2023-08-21 RX ADMIN — LIDOCAINE 1 PATCH: 4 CREAM TOPICAL at 22:43

## 2023-08-21 RX ADMIN — NYSTATIN CREAM 1 APPLICATION(S): 100000 CREAM TOPICAL at 17:23

## 2023-08-21 RX ADMIN — Medication 60 MILLIGRAM(S): at 00:40

## 2023-08-21 RX ADMIN — ENOXAPARIN SODIUM 60 MILLIGRAM(S): 100 INJECTION SUBCUTANEOUS at 17:23

## 2023-08-21 RX ADMIN — Medication 250 MILLIGRAM(S): at 13:26

## 2023-08-21 RX ADMIN — Medication 250 MILLIGRAM(S): at 22:50

## 2023-08-21 RX ADMIN — Medication 75 MILLIGRAM(S): at 06:16

## 2023-08-21 RX ADMIN — Medication 75 MILLIGRAM(S): at 11:31

## 2023-08-21 RX ADMIN — Medication 60 MILLIGRAM(S): at 06:17

## 2023-08-21 RX ADMIN — Medication 60 MILLIGRAM(S): at 17:24

## 2023-08-21 RX ADMIN — Medication 60 MILLIGRAM(S): at 11:32

## 2023-08-21 RX ADMIN — NYSTATIN CREAM 1 APPLICATION(S): 100000 CREAM TOPICAL at 06:18

## 2023-08-21 RX ADMIN — LIDOCAINE 1 PATCH: 4 CREAM TOPICAL at 18:02

## 2023-08-21 RX ADMIN — QUETIAPINE FUMARATE 100 MILLIGRAM(S): 200 TABLET, FILM COATED ORAL at 22:50

## 2023-08-21 RX ADMIN — Medication 75 MILLIGRAM(S): at 17:23

## 2023-08-21 RX ADMIN — Medication 75 MILLIGRAM(S): at 00:40

## 2023-08-21 RX ADMIN — Medication 50 MILLIGRAM(S): at 22:49

## 2023-08-21 RX ADMIN — LIDOCAINE 1 PATCH: 4 CREAM TOPICAL at 11:31

## 2023-08-21 NOTE — PROGRESS NOTE ADULT - ASSESSMENT
68M PMH HTN, HLD, TBI with resulting dementia presented to ED as a fall. Patient found with rhabdo and VIKAS, also with possible aspiration pneumonia, now afebrile, s/p a course of abx.  Seen by ID, rheumatology.  Rheumatology workup in progress.  Pt was unable to have Indium scan due to b/l LE contractures.  ? due to sacroilitis.  Started on NSAIDs.  Evaluated by Neuro.  CT head not suggestive of CVA.  Unable to have MRI Head / neck due to contractures.  s/p course of Zosyn, on regular PT.     LE Doppler : IMPRESSION: There is acute occlusive DVT affecting the right femoral vein with the proximal extent of the thrombus in the right common femoral vein.    Large left chest wall subcutaneous hematoma versus dependent edema.  New small bilateral pleural effusions.  Left upper lobe infiltrate versus motion artifact. Bilateral lower lobe passive atelectasis.      # Recurrent fevers, now afebrile - Elevated ESR, CRP, JESICA, antiDSDNA positive.  ? rheumatologic etiology.  Rheumatology consulted and workup in progress.    -continues to be tachycardic    # Aspiration Pneumonia / b/l Pleural Effusion, right sided mucous plugging, b/l atelectasis - ID Following.  s/p Zosyn.  CT c/a/p not indicative of acute infection.  ? fever from hematoma.  Per nuclear, pt too contracted to be positioned for indium scan.    As per ID- continue to monitor off antibiotics, continue aspiration precautions,   # ? L chest hematoma - no obvious ecchymosis of L chest wall.  H/H stable.  Monitor CBC.  # Tachycardia - HR remains elevated.  Continue Cardizem, BBlocker.  Tele showing sinus tachycardia.   VQ scan: low probability      # Impaired ambulation / LE contractures / Functional Quadriplegia / TBI - ? due to sacroilitis, underlying dementia -  Trial of NSAIDs.  PT consulted and recommending FREDY.  Neuro following.  CT head not suggestive of acute infarct.  Attempt MR Head / neck, however, pt contracted- consider if pt were to improve.  Outpatient EMG.      As per neuro---try avoiding benzodiazepines, anticholinergics, and antihistamines (Can cause worsening confusion/delirium). Additionally, continue reorientation, supportive care, maintaining regular sleep/wake cycle, and optimizing nutritional/medical factors"  -frequent turns, aspiration precautions    # RLE DVT - continue full dose Lovenox for now.  Transition to DOAC on discharge    # Traumatic brain injury / Dementia - supportive care.  Continue Valproic acid.      # VIKAS - resolved.    # Rhabdomyolysis - resolved.    # Essential HTN - Monitor BP.  Continue antihypertensives.    # Inpatient DVT Proph - on anticoagulation     #GOC- full code. Poor prognosis. Get palliative care consulted.    I spoke w/ the pt's daughter today and informed her of the pt's poor prognosis. Pt is medically stable and pending placement.    69 yo M w/ history of HTN, HLD, TBI with resulting dementia presented status post fall and was admitted w/ rhabdomyolysis, VIKAS and sinus tachycardia. Pt's hospital course was complicated by aspiration pneumonia has been prolonged w/ persistent fevers that have resolved and ongoing sinus tachycardia. He has become contracted and no longer able to ambulate.     Recurrent fevers  - resolved     Aspiration Pneumonia / b/l Pleural Effusion, right sided mucous plugging, b/l atelectasis  -  s/p Zosyn    - follow up CT on 8/8 showed a large left chest wall subcutaneous hematoma versus dependent edema but  there was no obvious ecchymosis of L chest wall at that time as per prior notes and H/H was and still remains stable     Tachycardia  - HR remains elevated w/ patient in sinus tach on tele  - per cardio this may be his baseline in setting od autonomic dysfunction in setting on TBI  - c/w Cardizem and metoprolol   - VQ scan: low probability   - Echo showed EF 60-65% w/ grade I diastolic dysfunction, trace mitral valve regurgitation      Impaired ambulation / LE contractures / Functional Quadriplegia   - due to prolonged bedrest w/ underlying dementia   - CT head not suggestive of acute infarct   - cannot do MR Head / neck as pt is contracted     Severe protein calorie malnutrition  - albumin 1.5 w/ resultant anasarca  - c/w PO diet      As per neuro---try avoiding benzodiazepines, anticholinergics, and antihistamines (Can cause worsening confusion/delirium). Additionally, continue reorientation, supportive care, maintaining regular sleep/wake cycle, and optimizing nutritional/medical factors"  -frequent turns, aspiration precautions    RLE DVT  - US showed an acute occlusive DVT affecting the right femoral vein with the proximal extent of the thrombus in the right common femoral vein  - Lovenox changed to Elilquis      Hx of Traumatic brain injury / Dementia   - c/w Valproic acid, Seroquel and Trazadone     VIKAS  - resolved    Rhabdomyolysis  - resolved    HTN  - c/w Metoprolol and Cardizem     Prophylaxis:  DVT: Eliquis  GI: PO diet    Pt is medically stable and pending placement.

## 2023-08-21 NOTE — PROGRESS NOTE ADULT - SUBJECTIVE AND OBJECTIVE BOX
Patient is a 68y old  Male who presents with a chief complaint of VIKAS, rhabdomyolysis (20 Aug 2023 22:06)      INTERVAL HPI/OVERNIGHT EVENTS:    MEDICATIONS  (STANDING):  diltiazem    Tablet 60 milliGRAM(s) Oral every 6 hours  enoxaparin Injectable 60 milliGRAM(s) SubCutaneous every 12 hours  lidocaine   4% Patch 1 Patch Transdermal every 24 hours  metoprolol tartrate 75 milliGRAM(s) Oral every 6 hours  nystatin Powder 1 Application(s) Topical two times a day  QUEtiapine 100 milliGRAM(s) Oral at bedtime  traZODone 50 milliGRAM(s) Oral at bedtime  valproic  acid Syrup 250 milliGRAM(s) Oral three times a day    MEDICATIONS  (PRN):  acetaminophen     Tablet .. 650 milliGRAM(s) Oral every 6 hours PRN Mild Pain (1 - 3)  acetaminophen     Tablet .. 650 milliGRAM(s) Oral every 6 hours PRN Temp greater or equal to 38C (100.4F)  acetylcysteine 10%  Inhalation 4 milliLiter(s) Inhalation every 6 hours PRN dyspnea  levalbuterol Inhalation 0.63 milliGRAM(s) Inhalation every 6 hours PRN Shortness of breath/Wheezing  melatonin 3 milliGRAM(s) Oral at bedtime PRN Insomnia      Allergies    No Known Allergies    Intolerances        Vital Signs Last 24 Hrs  T(C): 36.4 (21 Aug 2023 16:35), Max: 37.6 (21 Aug 2023 10:53)  T(F): 97.5 (21 Aug 2023 16:35), Max: 99.7 (21 Aug 2023 10:53)  HR: 120 (21 Aug 2023 16:35) (119 - 125)  BP: 128/66 (21 Aug 2023 16:35) (128/66 - 145/74)  BP(mean): --  RR: 18 (21 Aug 2023 16:35) (18 - 18)  SpO2: 97% (21 Aug 2023 16:35) (96% - 99%)    Parameters below as of 21 Aug 2023 16:35  Patient On (Oxygen Delivery Method): room air        PHYSICAL EXAM:  GENERAL: NAD, well-groomed, well-developed  HEAD:  Atraumatic, Normocephalic  EYES: EOMI, PERRLA, conjunctiva and sclera clear  ENMT: No tonsillar erythema, exudates, or enlargement; Moist mucous membranes, Good dentition, No lesions  NECK: Supple, No JVD, Normal thyroid  NERVOUS SYSTEM:  Alert & Oriented X3, Good concentration; Motor Strength 5/5 B/L upper and lower extremities; DTRs 2+ intact and symmetric  CHEST/LUNG: Clear to auscultation bilaterally; No rales, rhonchi, wheezing, or rubs  HEART: Regular rate and rhythm; No murmurs, rubs, or gallops  ABDOMEN: Soft, Nontender, Nondistended; Bowel sounds present  EXTREMITIES:  2+ Peripheral Pulses, No clubbing, cyanosis, or edema  LYMPH: No lymphadenopathy noted  SKIN: No rashes or lesions    LABS:                        9.5    6.42  )-----------( 363      ( 20 Aug 2023 06:05 )             29.2     08-20    138  |  103  |  14  ----------------------------<  99  3.9   |  33<H>  |  0.84    Ca    8.4<L>      20 Aug 2023 06:05  Phos  2.8     08-20  Mg     2.3     08-20    TPro  5.6<L>  /  Alb  1.5<L>  /  TBili  0.3  /  DBili  x   /  AST  30  /  ALT  30  /  AlkPhos  57  08-20      Urinalysis Basic - ( 20 Aug 2023 06:05 )    Color: x / Appearance: x / SG: x / pH: x  Gluc: 99 mg/dL / Ketone: x  / Bili: x / Urobili: x   Blood: x / Protein: x / Nitrite: x   Leuk Esterase: x / RBC: x / WBC x   Sq Epi: x / Non Sq Epi: x / Bacteria: x         RADIOLOGY & ADDITIONAL TESTS:    08-20-23 @ 07:01  -  08-21-23 @ 07:00  --------------------------------------------------------  IN:  Total IN: 0 mL    OUT:    Voided (mL): 100 mL  Total OUT: 100 mL    Total NET: -100 mL       69 yo M w/ history of HTN, HLD, TBI with resulting dementia presented status post fall and was admitted w/ rhabdomyolysis, VIKAS and sinus tachycardia. Pt's hospital course was complicated by aspiration pneumonia has been prolonged w/ persistent fevers that have resolved and ongoing sinus tachycardia. He has become contracted and no longer able to ambulate. He is lying in bed in NAD.    MEDICATIONS  (STANDING):  diltiazem    Tablet 60 milliGRAM(s) Oral every 6 hours  enoxaparin Injectable 60 milliGRAM(s) SubCutaneous every 12 hours  lidocaine   4% Patch 1 Patch Transdermal every 24 hours  metoprolol tartrate 75 milliGRAM(s) Oral every 6 hours  nystatin Powder 1 Application(s) Topical two times a day  QUEtiapine 100 milliGRAM(s) Oral at bedtime  traZODone 50 milliGRAM(s) Oral at bedtime  valproic  acid Syrup 250 milliGRAM(s) Oral three times a day    MEDICATIONS  (PRN):  acetaminophen     Tablet .. 650 milliGRAM(s) Oral every 6 hours PRN Mild Pain (1 - 3)  acetaminophen     Tablet .. 650 milliGRAM(s) Oral every 6 hours PRN Temp greater or equal to 38C (100.4F)  acetylcysteine 10%  Inhalation 4 milliLiter(s) Inhalation every 6 hours PRN dyspnea  levalbuterol Inhalation 0.63 milliGRAM(s) Inhalation every 6 hours PRN Shortness of breath/Wheezing  melatonin 3 milliGRAM(s) Oral at bedtime PRN Insomnia      Allergies    No Known Allergies    Intolerances        Vital Signs Last 24 Hrs  T(C): 36.4 (21 Aug 2023 16:35), Max: 37.6 (21 Aug 2023 10:53)  T(F): 97.5 (21 Aug 2023 16:35), Max: 99.7 (21 Aug 2023 10:53)  HR: 120 (21 Aug 2023 16:35) (119 - 125)  BP: 128/66 (21 Aug 2023 16:35) (128/66 - 145/74)  BP(mean): --  RR: 18 (21 Aug 2023 16:35) (18 - 18)  SpO2: 97% (21 Aug 2023 16:35) (96% - 99%)    Parameters below as of 21 Aug 2023 16:35  Patient On (Oxygen Delivery Method): room air        PHYSICAL EXAM:  GENERAL: NAD, contracted.   HEAD:  Atraumatic, Normocephalic  EYES: EOMI    NECK: Supple   NERVOUS SYSTEM:  Alert   CHEST/LUNG: Clear to auscultation bilaterally; No rales, rhonchi, wheezing, or rubs  HEART: Regular rate and rhythm; No murmurs, rubs, or gallops  ABDOMEN: Soft, Nontender, Nondistended; Bowel sounds present  : scrotal edema  EXTREMITIES: bilateral LE edema       LABS:                        9.5    6.42  )-----------( 363      ( 20 Aug 2023 06:05 )             29.2     08-20    138  |  103  |  14  ----------------------------<  99  3.9   |  33<H>  |  0.84    Ca    8.4<L>      20 Aug 2023 06:05  Phos  2.8     08-20  Mg     2.3     08-20    TPro  5.6<L>  /  Alb  1.5<L>  /  TBili  0.3  /  DBili  x   /  AST  30  /  ALT  30  /  AlkPhos  57  08-20      Urinalysis Basic - ( 20 Aug 2023 06:05 )    Color: x / Appearance: x / SG: x / pH: x  Gluc: 99 mg/dL / Ketone: x  / Bili: x / Urobili: x   Blood: x / Protein: x / Nitrite: x   Leuk Esterase: x / RBC: x / WBC x   Sq Epi: x / Non Sq Epi: x / Bacteria: x         RADIOLOGY & ADDITIONAL TESTS:    08-20-23 @ 07:01  -  08-21-23 @ 07:00  --------------------------------------------------------  IN:  Total IN: 0 mL    OUT:    Voided (mL): 100 mL  Total OUT: 100 mL    Total NET: -100 mL

## 2023-08-22 PROCEDURE — 99232 SBSQ HOSP IP/OBS MODERATE 35: CPT

## 2023-08-22 RX ORDER — APIXABAN 2.5 MG/1
5 TABLET, FILM COATED ORAL EVERY 12 HOURS
Refills: 0 | Status: DISCONTINUED | OUTPATIENT
Start: 2023-08-22 | End: 2023-08-24

## 2023-08-22 RX ADMIN — Medication 75 MILLIGRAM(S): at 06:27

## 2023-08-22 RX ADMIN — QUETIAPINE FUMARATE 100 MILLIGRAM(S): 200 TABLET, FILM COATED ORAL at 22:16

## 2023-08-22 RX ADMIN — NYSTATIN CREAM 1 APPLICATION(S): 100000 CREAM TOPICAL at 18:43

## 2023-08-22 RX ADMIN — LIDOCAINE 1 PATCH: 4 CREAM TOPICAL at 12:37

## 2023-08-22 RX ADMIN — Medication 650 MILLIGRAM(S): at 06:27

## 2023-08-22 RX ADMIN — Medication 75 MILLIGRAM(S): at 18:43

## 2023-08-22 RX ADMIN — Medication 250 MILLIGRAM(S): at 22:16

## 2023-08-22 RX ADMIN — Medication 250 MILLIGRAM(S): at 06:26

## 2023-08-22 RX ADMIN — APIXABAN 5 MILLIGRAM(S): 2.5 TABLET, FILM COATED ORAL at 22:19

## 2023-08-22 RX ADMIN — NYSTATIN CREAM 1 APPLICATION(S): 100000 CREAM TOPICAL at 06:28

## 2023-08-22 RX ADMIN — Medication 75 MILLIGRAM(S): at 00:00

## 2023-08-22 RX ADMIN — Medication 60 MILLIGRAM(S): at 00:00

## 2023-08-22 RX ADMIN — Medication 250 MILLIGRAM(S): at 13:32

## 2023-08-22 RX ADMIN — LIDOCAINE 1 PATCH: 4 CREAM TOPICAL at 19:19

## 2023-08-22 RX ADMIN — Medication 75 MILLIGRAM(S): at 12:42

## 2023-08-22 RX ADMIN — Medication 50 MILLIGRAM(S): at 22:16

## 2023-08-22 RX ADMIN — ENOXAPARIN SODIUM 60 MILLIGRAM(S): 100 INJECTION SUBCUTANEOUS at 06:31

## 2023-08-22 RX ADMIN — Medication 60 MILLIGRAM(S): at 18:43

## 2023-08-22 RX ADMIN — Medication 60 MILLIGRAM(S): at 12:42

## 2023-08-22 RX ADMIN — Medication 60 MILLIGRAM(S): at 06:26

## 2023-08-22 NOTE — PROGRESS NOTE ADULT - ASSESSMENT
67 yo M w/ history of HTN, HLD, TBI with resulting dementia presented status post fall and was admitted w/ rhabdomyolysis, VIKAS and sinus tachycardia. Pt's hospital course was complicated by aspiration pneumonia has been prolonged w/ persistent fevers that have resolved and ongoing sinus tachycardia. He has become contracted and no longer able to ambulate.     Recurrent fevers  - resolved but recurred this morning - will watch for further fevers as patient remains at high risk for aspiration and intermittent atelectasis/mucous plugging as well as other complications- if patient has further fevers he will need full septic work up and ID will need to be called back.     Aspiration Pneumonia / b/l Pleural Effusion, right sided mucous plugging, b/l atelectasis  -  s/p Zosyn    - follow up CT on 8/8 showed a large left chest wall subcutaneous hematoma versus dependent edema but  there was no obvious ecchymosis of L chest wall at that time as per prior notes and H/H was and still remains stable     Tachycardia  - HR remains elevated w/ patient in sinus tach on tele  - per cardio this may be his baseline in setting od autonomic dysfunction in setting on TBI  - c/w Cardizem and metoprolol   - VQ scan: low probability   - Echo showed EF 60-65% w/ grade I diastolic dysfunction, trace mitral valve regurgitation      Impaired ambulation / LE contractures / Functional Quadriplegia   - due to prolonged bedrest w/ underlying dementia   - CT head not suggestive of acute infarct   - cannot do MR Head / neck as pt is contracted     Severe protein calorie malnutrition  - albumin 1.5 w/ resultant anasarca  - c/w PO diet         RLE DVT  - US showed an acute occlusive DVT affecting the right femoral vein with the proximal extent of the thrombus in the right common femoral vein  - Lovenox changed to Elilquis      Hx of Traumatic brain injury / Dementia   - c/w Valproic acid, Seroquel and Trazadone     VIKAS  - resolved    Rhabdomyolysis  - resolved    HTN  - c/w Metoprolol and Cardizem     Prophylaxis:  DVT: Eliquis  GI: PO diet    Pt is medically stable and pending placement.    67 yo M w/ history of HTN, HLD, TBI with resulting dementia presented status post fall and was admitted w/ rhabdomyolysis, VIKAS and sinus tachycardia. Pt's hospital course was complicated by RLE DVT & aspiration pneumonia has been prolonged w/ persistent fevers that have resolved and ongoing sinus tachycardia. He has become contracted and no longer able to ambulate.     Recurrent fevers  - resolved but recurred this morning - will watch for further fevers as patient remains at high risk for aspiration and intermittent atelectasis/mucous plugging as well as other complications- if patient has further fevers he will need full septic work up and ID will need to be called back.     Aspiration Pneumonia / b/l Pleural Effusion, right sided mucous plugging, b/l atelectasis  -  s/p Zosyn    - follow up CT on 8/8 showed a large left chest wall subcutaneous hematoma versus dependent edema but  there was no obvious ecchymosis of L chest wall at that time as per prior notes and H/H was and still remains stable     Tachycardia  - HR remains elevated w/ patient in sinus tach on tele  - per cardio this may be his baseline in setting od autonomic dysfunction in setting on TBI  - c/w Cardizem and metoprolol   - VQ scan: low probability   - Echo showed EF 60-65% w/ grade I diastolic dysfunction, trace mitral valve regurgitation      Impaired ambulation / LE contractures / Functional Quadriplegia   - due to prolonged bedrest w/ underlying dementia   - CT head not suggestive of acute infarct   - cannot do MR Head / neck as pt is contracted     Severe protein calorie malnutrition  - albumin 1.5 w/ resultant anasarca  - c/w PO diet       RLE DVT  - US showed an acute occlusive DVT affecting the right femoral vein with the proximal extent of the thrombus in the right common femoral vein  - Lovenox changed to Elilquis      Hx of Traumatic brain injury / Dementia   - c/w Valproic acid, Seroquel and Trazadone     VIKAS  - resolved    Rhabdomyolysis  - resolved    HTN  - c/w Metoprolol and Cardizem     Prophylaxis:  DVT: Eliquis  GI: PO diet    Pt is medically stable and pending placement.

## 2023-08-22 NOTE — CHART NOTE - NSCHARTNOTESELECT_GEN_ALL_CORE
Event Note
Nutrition Services
Nutrition Services
Event Note
Nutrition Services
PT Screen

## 2023-08-22 NOTE — CHART NOTE - NSCHARTNOTEFT_GEN_A_CORE
Pt with PMH HTN, HLD, TBI with resulting dementia, presented s/p fall & found rhabdomyolysis (resolved) and VIKAS (resolved), also with possible aspiration pneumonia. Also with RLE DVT, Impaired ambulation, ? sacrolitis,  underlying dementia.  Palliative care following; prognosis appearspoor; medically stable pending placement.    Factors impacting intake: [ ] none [ ] nausea  [ ] vomiting [ ] diarrhea [ ] constipation  [ ]chewing problems [ ] swallowing issues  [ ] other:     Diet Prescription: Diet, Pureed:   Supplement Feeding Modality:  Oral  Ensure Plus High Protein Cans or Servings Per Day:  2       Frequency:  Two Times a day (08-16-23 @ 12:16)    Intake:     Current Weight:   % Weight Change    Pertinent Medications: MEDICATIONS  (STANDING):  diltiazem    Tablet 60 milliGRAM(s) Oral every 6 hours  enoxaparin Injectable 60 milliGRAM(s) SubCutaneous every 12 hours  lidocaine   4% Patch 1 Patch Transdermal every 24 hours  metoprolol tartrate 75 milliGRAM(s) Oral every 6 hours  nystatin Powder 1 Application(s) Topical two times a day  QUEtiapine 100 milliGRAM(s) Oral at bedtime  traZODone 50 milliGRAM(s) Oral at bedtime  valproic  acid Syrup 250 milliGRAM(s) Oral three times a day    MEDICATIONS  (PRN):  acetaminophen     Tablet .. 650 milliGRAM(s) Oral every 6 hours PRN Mild Pain (1 - 3)  acetaminophen     Tablet .. 650 milliGRAM(s) Oral every 6 hours PRN Temp greater or equal to 38C (100.4F)  acetylcysteine 10%  Inhalation 4 milliLiter(s) Inhalation every 6 hours PRN dyspnea  levalbuterol Inhalation 0.63 milliGRAM(s) Inhalation every 6 hours PRN Shortness of breath/Wheezing  melatonin 3 milliGRAM(s) Oral at bedtime PRN Insomnia    Pertinent Labs: 08-20 Na138 mmol/L Glu 99 mg/dL K+ 3.9 mmol/L Cr  0.84 mg/dL BUN 14 mg/dL 08-20 Phos 2.8 mg/dL 08-20 Alb 1.5 g/dL<L>    Skin:     Estimated Needs:   [ ] no change since previous assessment  [ ] recalculated:     Previous Nutrition Diagnosis:   [ ] Inadequate Energy Intake [ ]Inadequate Oral Intake [ ] Excessive Energy Intake   [ ] Underweight [ ] Increased Nutrient Needs [ ] Overweight/Obesity   [ ] Altered GI Function [ ] Unintended Weight Loss [ ] Food & Nutrition Related Knowledge Deficit [ ] Malnutrition     Nutrition Diagnosis is [ ] ongoing  [ ] resolved [ ] not applicable     New Nutrition Diagnosis: [ ] not applicable       Interventions:   Recommend  [ ] Change Diet To:  [ ] Nutrition Supplement  [ ] Nutrition Support  [ ] Other:     Monitoring and Evaluation:   [ ] PO intake [ x ] Tolerance to diet prescription [ x ] weights [ x ] labs[ x ] follow up per protocol  [ ] other: Pt with PMH HTN, HLD, TBI with resulting dementia, presented s/p fall & found rhabdomyolysis (resolved) and VIKAS (resolved), also with possible aspiration pneumonia. Also with RLE DVT, impaired ambulation, ? sacrolitis, underlying dementia.  Diet consistency changed from minced and moist to pureed; pt tolerating and managing well.  Prognosis appears poor. Palliative care following. Pt approved for home hospice, however daughter unable to care for pt at home and now requesting long term placement at SNF. Medically stable pending placement. DNR/DNI.    Factors impacting intake: [ ] none [ ] nausea  [ ] vomiting [ ] diarrhea [ ] constipation  [x]chewing problems [x] swallowing issues [x] other: variable appetite; difficulty feeding self    Diet Prescription: Diet, Pureed:   Supplement Feeding Modality:  Oral  Ensure Plus High Protein Cans or Servings Per Day:  2       Frequency:  Two Times a day (08-16-23 @ 12:16)    Intake: variable appetite and intake; 0-100% of meals/supplements; requires total feeding assistance    Current Weight: 76.2 kg (08/05), 65.2 kg (07/20), 63.5 kg (07/14)  % Weight Change: unable to determine accuracy of wt secondary to edema    3+ edema generalized b/l feet    Pertinent Medications: MEDICATIONS  (STANDING):  diltiazem    Tablet 60 milliGRAM(s) Oral every 6 hours  enoxaparin Injectable 60 milliGRAM(s) SubCutaneous every 12 hours  lidocaine   4% Patch 1 Patch Transdermal every 24 hours  metoprolol tartrate 75 milliGRAM(s) Oral every 6 hours  nystatin Powder 1 Application(s) Topical two times a day  QUEtiapine 100 milliGRAM(s) Oral at bedtime  traZODone 50 milliGRAM(s) Oral at bedtime  valproic  acid Syrup 250 milliGRAM(s) Oral three times a day    MEDICATIONS  (PRN):  acetaminophen     Tablet .. 650 milliGRAM(s) Oral every 6 hours PRN Mild Pain (1 - 3)  acetaminophen     Tablet .. 650 milliGRAM(s) Oral every 6 hours PRN Temp greater or equal to 38C (100.4F)  acetylcysteine 10%  Inhalation 4 milliLiter(s) Inhalation every 6 hours PRN dyspnea  levalbuterol Inhalation 0.63 milliGRAM(s) Inhalation every 6 hours PRN Shortness of breath/Wheezing  melatonin 3 milliGRAM(s) Oral at bedtime PRN Insomnia    Pertinent Labs: 08-20 Na138 mmol/L Glu 99 mg/dL K+ 3.9 mmol/L Cr  0.84 mg/dL BUN 14 mg/dL 08-20 Phos 2.8 mg/dL 08-20 Alb 1.5 g/dL<L>    Skin: stage II pressure ulcer L buttocks; suspected DTI x 4    Estimated Needs:   [x] no change since previous assessment on 07/17  [ ] recalculated:     Previous Nutrition Diagnosis:   [x]  Moderate Malnutrition in context of chronic illness  Etiology:	Inadequate energy/protein intake + increased needs related to TBI resulting in dementia & stage II pressure ulcers  Signs/Symptoms: Physical findings of mild fat depletion & muscle wasting as noted 07/17    GOAL:  Pt to consume >50% meals/supplements during LOS - not consistently met    Nutrition Diagnosis is [x] ongoing  [ ] resolved [ ] not applicable     New Nutrition Diagnosis: [x] not applicable       Interventions:   Recommend  [x] Continue with current diet rx as noted  [ ] Change Diet To:  [ ] Nutrition Supplement  [ ] Nutrition Support  [ ] Other:     Monitoring and Evaluation:   [ x ] PO intake [ x ] Tolerance to diet prescription [ x ] weights [ x ] labs[ x ] follow up per protocol  [ ] other: Pt with PMH HTN, HLD, TBI with resulting dementia, presented s/p fall & found rhabdomyolysis (resolved) and VIKAS (resolved), also with possible aspiration pneumonia. Also with RLE DVT, impaired ambulation, ? sacrolitis, underlying dementia.  Diet consistency changed from minced and moist to pureed; pt tolerating and managing well.  Prognosis appears poor. Palliative care following. Pt approved for home hospice, however daughter unable to care for pt at home and now requesting long term placement at SNF. Medically stable pending placement. DNR/DNI.    Factors impacting intake: [ ] none [ ] nausea  [ ] vomiting [ ] diarrhea [ ] constipation  [x]chewing problems [x] swallowing issues [x] other: variable appetite; difficulty feeding self    Diet Prescription: Diet, Pureed:   Supplement Feeding Modality:  Oral  Ensure Plus High Protein Cans or Servings Per Day:  2       Frequency:  Two Times a day (08-16-23 @ 12:16)    Intake: variable and inconsistent appetite and intake; 0-100% of meals/supplements; requires total feeding assistance    Current Weight: 76.2 kg (08/05), 65.2 kg (07/20), 63.5 kg (07/14)  % Weight Change: unable to determine accuracy of wt secondary to edema    3+ edema generalized b/l feet    Pertinent Medications: MEDICATIONS  (STANDING):  diltiazem    Tablet 60 milliGRAM(s) Oral every 6 hours  enoxaparin Injectable 60 milliGRAM(s) SubCutaneous every 12 hours  lidocaine   4% Patch 1 Patch Transdermal every 24 hours  metoprolol tartrate 75 milliGRAM(s) Oral every 6 hours  nystatin Powder 1 Application(s) Topical two times a day  QUEtiapine 100 milliGRAM(s) Oral at bedtime  traZODone 50 milliGRAM(s) Oral at bedtime  valproic  acid Syrup 250 milliGRAM(s) Oral three times a day    MEDICATIONS  (PRN):  acetaminophen     Tablet .. 650 milliGRAM(s) Oral every 6 hours PRN Mild Pain (1 - 3)  acetaminophen     Tablet .. 650 milliGRAM(s) Oral every 6 hours PRN Temp greater or equal to 38C (100.4F)  acetylcysteine 10%  Inhalation 4 milliLiter(s) Inhalation every 6 hours PRN dyspnea  levalbuterol Inhalation 0.63 milliGRAM(s) Inhalation every 6 hours PRN Shortness of breath/Wheezing  melatonin 3 milliGRAM(s) Oral at bedtime PRN Insomnia    Pertinent Labs: 08-20 Na138 mmol/L Glu 99 mg/dL K+ 3.9 mmol/L Cr  0.84 mg/dL BUN 14 mg/dL 08-20 Phos 2.8 mg/dL 08-20 Alb 1.5 g/dL<L>    Skin: stage II pressure ulcer L buttocks; suspected DTI x 4    Estimated Needs:   [x] no change since previous assessment on 07/17  [ ] recalculated:     Previous Nutrition Diagnosis:   [x]  Moderate Malnutrition in context of chronic illness  Etiology:	Inadequate energy/protein intake + increased needs related to TBI resulting in dementia & stage II pressure ulcers  Signs/Symptoms: Physical findings of mild fat depletion & muscle wasting as noted 07/17    GOAL:  Pt to consume >50% meals/supplements during LOS - not consistently met    Nutrition Diagnosis is [x] ongoing  [ ] resolved [ ] not applicable     New Nutrition Diagnosis: [x] not applicable       Interventions:   Recommend  [x] Continue with current diet rx as noted  [ ] Change Diet To:  [ ] Nutrition Supplement  [ ] Nutrition Support  [ ] Other:     Monitoring and Evaluation:   [ x ] PO intake [ x ] Tolerance to diet prescription [ x ] weights [ x ] labs[ x ] follow up per protocol  [ ] other:

## 2023-08-22 NOTE — PROGRESS NOTE ADULT - SUBJECTIVE AND OBJECTIVE BOX
69 yo M w/ history of HTN, HLD, TBI with resulting dementia presented status post fall and was admitted w/ rhabdomyolysis, VIKAS and sinus tachycardia. Pt's hospital course was complicated by aspiration pneumonia has been prolonged w/ persistent fevers that have resolved and ongoing sinus tachycardia. He has become contracted and no longer able to ambulate. He is lying in bed in NAD.      MEDICATIONS  (STANDING):  diltiazem    Tablet 60 milliGRAM(s) Oral every 6 hours  lidocaine   4% Patch 1 Patch Transdermal every 24 hours  metoprolol tartrate 75 milliGRAM(s) Oral every 6 hours  nystatin Powder 1 Application(s) Topical two times a day  QUEtiapine 100 milliGRAM(s) Oral at bedtime  traZODone 50 milliGRAM(s) Oral at bedtime  valproic  acid Syrup 250 milliGRAM(s) Oral three times a day    MEDICATIONS  (PRN):  acetaminophen     Tablet .. 650 milliGRAM(s) Oral every 6 hours PRN Mild Pain (1 - 3)  acetaminophen     Tablet .. 650 milliGRAM(s) Oral every 6 hours PRN Temp greater or equal to 38C (100.4F)  acetylcysteine 10%  Inhalation 4 milliLiter(s) Inhalation every 6 hours PRN dyspnea  levalbuterol Inhalation 0.63 milliGRAM(s) Inhalation every 6 hours PRN Shortness of breath/Wheezing  melatonin 3 milliGRAM(s) Oral at bedtime PRN Insomnia      Allergies    No Known Allergies    Intolerances        Vital Signs Last 24 Hrs  T(C): 36.6 (22 Aug 2023 17:06), Max: 38.1 (22 Aug 2023 05:25)  T(F): 97.9 (22 Aug 2023 17:06), Max: 100.6 (22 Aug 2023 05:25)  HR: 100 (22 Aug 2023 17:06) (100 - 128)  BP: 128/85 (22 Aug 2023 17:06) (122/78 - 130/73)   RR: 18 (22 Aug 2023 17:06) (18 - 18)  SpO2: 99% (22 Aug 2023 17:06) (95% - 99%)    Parameters below as of 22 Aug 2023 17:06  Patient On (Oxygen Delivery Method): room air        PHYSICAL EXAM:  GENERAL: NAD, contracted.   HEAD:  Atraumatic, Normocephalic  EYES: EOMI    NECK: Supple   NERVOUS SYSTEM:  Alert   CHEST/LUNG: Clear to auscultation bilaterally; No rales, rhonchi, wheezing, or rubs  HEART: Regular rate and rhythm; No murmurs, rubs, or gallops  ABDOMEN: Soft, Nontender, Nondistended; Bowel sounds present  : scrotal edema  EXTREMITIES: bilateral LE edema    LABS:         RADIOLOGY & ADDITIONAL TESTS:

## 2023-08-23 LAB
ANION GAP SERPL CALC-SCNC: 3 MMOL/L — LOW (ref 5–17)
BUN SERPL-MCNC: 13 MG/DL — SIGNIFICANT CHANGE UP (ref 7–23)
CALCIUM SERPL-MCNC: 8.8 MG/DL — SIGNIFICANT CHANGE UP (ref 8.5–10.1)
CHLORIDE SERPL-SCNC: 103 MMOL/L — SIGNIFICANT CHANGE UP (ref 96–108)
CO2 SERPL-SCNC: 33 MMOL/L — HIGH (ref 22–31)
CREAT SERPL-MCNC: 0.73 MG/DL — SIGNIFICANT CHANGE UP (ref 0.5–1.3)
EGFR: 98 ML/MIN/1.73M2 — SIGNIFICANT CHANGE UP
GLUCOSE BLDC GLUCOMTR-MCNC: 114 MG/DL — HIGH (ref 70–99)
GLUCOSE SERPL-MCNC: 93 MG/DL — SIGNIFICANT CHANGE UP (ref 70–99)
HCT VFR BLD CALC: 30.6 % — LOW (ref 39–50)
HGB BLD-MCNC: 9.6 G/DL — LOW (ref 13–17)
MAGNESIUM SERPL-MCNC: 2.3 MG/DL — SIGNIFICANT CHANGE UP (ref 1.6–2.6)
MCHC RBC-ENTMCNC: 29.7 PG — SIGNIFICANT CHANGE UP (ref 27–34)
MCHC RBC-ENTMCNC: 31.4 G/DL — LOW (ref 32–36)
MCV RBC AUTO: 94.7 FL — SIGNIFICANT CHANGE UP (ref 80–100)
NRBC # BLD: 0 /100 WBCS — SIGNIFICANT CHANGE UP (ref 0–0)
PHOSPHATE SERPL-MCNC: 3.3 MG/DL — SIGNIFICANT CHANGE UP (ref 2.5–4.5)
PLATELET # BLD AUTO: 404 K/UL — HIGH (ref 150–400)
POTASSIUM SERPL-MCNC: 3.9 MMOL/L — SIGNIFICANT CHANGE UP (ref 3.5–5.3)
POTASSIUM SERPL-SCNC: 3.9 MMOL/L — SIGNIFICANT CHANGE UP (ref 3.5–5.3)
RBC # BLD: 3.23 M/UL — LOW (ref 4.2–5.8)
RBC # FLD: 15.3 % — HIGH (ref 10.3–14.5)
SODIUM SERPL-SCNC: 139 MMOL/L — SIGNIFICANT CHANGE UP (ref 135–145)
WBC # BLD: 6.87 K/UL — SIGNIFICANT CHANGE UP (ref 3.8–10.5)
WBC # FLD AUTO: 6.87 K/UL — SIGNIFICANT CHANGE UP (ref 3.8–10.5)

## 2023-08-23 PROCEDURE — 99232 SBSQ HOSP IP/OBS MODERATE 35: CPT

## 2023-08-23 RX ORDER — DILTIAZEM HCL 120 MG
240 CAPSULE, EXT RELEASE 24 HR ORAL ONCE
Refills: 0 | Status: COMPLETED | OUTPATIENT
Start: 2023-08-23 | End: 2023-08-23

## 2023-08-23 RX ORDER — DILTIAZEM HCL 120 MG
240 CAPSULE, EXT RELEASE 24 HR ORAL DAILY
Refills: 0 | Status: DISCONTINUED | OUTPATIENT
Start: 2023-08-23 | End: 2023-08-24

## 2023-08-23 RX ORDER — METOPROLOL TARTRATE 50 MG
200 TABLET ORAL ONCE
Refills: 0 | Status: COMPLETED | OUTPATIENT
Start: 2023-08-23 | End: 2023-08-23

## 2023-08-23 RX ORDER — METOPROLOL TARTRATE 50 MG
200 TABLET ORAL DAILY
Refills: 0 | Status: DISCONTINUED | OUTPATIENT
Start: 2023-08-23 | End: 2023-08-24

## 2023-08-23 RX ADMIN — Medication 200 MILLIGRAM(S): at 15:31

## 2023-08-23 RX ADMIN — LIDOCAINE 1 PATCH: 4 CREAM TOPICAL at 12:10

## 2023-08-23 RX ADMIN — Medication 50 MILLIGRAM(S): at 21:50

## 2023-08-23 RX ADMIN — Medication 240 MILLIGRAM(S): at 15:27

## 2023-08-23 RX ADMIN — Medication 250 MILLIGRAM(S): at 15:04

## 2023-08-23 RX ADMIN — Medication 250 MILLIGRAM(S): at 06:56

## 2023-08-23 RX ADMIN — LIDOCAINE 1 PATCH: 4 CREAM TOPICAL at 00:02

## 2023-08-23 RX ADMIN — Medication 75 MILLIGRAM(S): at 06:56

## 2023-08-23 RX ADMIN — APIXABAN 5 MILLIGRAM(S): 2.5 TABLET, FILM COATED ORAL at 12:12

## 2023-08-23 RX ADMIN — APIXABAN 5 MILLIGRAM(S): 2.5 TABLET, FILM COATED ORAL at 21:48

## 2023-08-23 RX ADMIN — Medication 75 MILLIGRAM(S): at 00:03

## 2023-08-23 RX ADMIN — NYSTATIN CREAM 1 APPLICATION(S): 100000 CREAM TOPICAL at 17:37

## 2023-08-23 RX ADMIN — QUETIAPINE FUMARATE 100 MILLIGRAM(S): 200 TABLET, FILM COATED ORAL at 21:50

## 2023-08-23 RX ADMIN — Medication 250 MILLIGRAM(S): at 21:51

## 2023-08-23 RX ADMIN — LIDOCAINE 1 PATCH: 4 CREAM TOPICAL at 20:00

## 2023-08-23 RX ADMIN — Medication 60 MILLIGRAM(S): at 06:56

## 2023-08-23 RX ADMIN — Medication 60 MILLIGRAM(S): at 00:03

## 2023-08-23 RX ADMIN — NYSTATIN CREAM 1 APPLICATION(S): 100000 CREAM TOPICAL at 06:55

## 2023-08-23 NOTE — PROGRESS NOTE ADULT - SUBJECTIVE AND OBJECTIVE BOX
CHIEF COMPLAINT: Follow up of fever and persistent tachycardia  no v/d reported  no active gross bleeding       PHYSICAL EXAM:    GENERAL: Elderly malnourished   CHEST/LUNG:  No wheezing, no crackles   HEART: Regular rate and rhythm; tachycardia  ABDOMEN: Soft, Nontender, Nondistended; Bowel sounds present  EXTREMITIES:  No clubbing, cyanosis. contracted limbs   Psychiatry:  Has dementia       OBJECTIVE DATA:   Vital Signs Last 24 Hrs  T(C): 37.2 (23 Aug 2023 13:57), Max: 37.2 (23 Aug 2023 13:57)  T(F): 99 (23 Aug 2023 13:57), Max: 99 (23 Aug 2023 13:57)  HR: 130 (23 Aug 2023 13:57) (100 - 132)  BP: 103/66 (23 Aug 2023 13:57) (103/66 - 150/86)  BP(mean): --  RR: 18 (23 Aug 2023 13:57) (16 - 18)  SpO2: 96% (23 Aug 2023 13:57) (95% - 99%)    Parameters below as of 23 Aug 2023 13:57  Patient On (Oxygen Delivery Method): room air               Daily     Daily   LABS:                        9.6    6.87  )-----------( 404      ( 23 Aug 2023 06:22 )             30.6             08-23    139  |  103  |  13  ----------------------------<  93  3.9   |  33<H>  |  0.73    Ca    8.8      23 Aug 2023 06:22  Phos  3.3     08-23  Mg     2.3     08-23                  Urinalysis Basic - ( 23 Aug 2023 06:22 )    Color: x / Appearance: x / SG: x / pH: x  Gluc: 93 mg/dL / Ketone: x  / Bili: x / Urobili: x   Blood: x / Protein: x / Nitrite: x   Leuk Esterase: x / RBC: x / WBC x   Sq Epi: x / Non Sq Epi: x / Bacteria: x            CAPILLARY BLOOD GLUCOSE      POCT Blood Glucose.: 114 mg/dL (23 Aug 2023 11:26)      MEDICATIONS  (STANDING):  apixaban 5 milliGRAM(s) Oral every 12 hours  diltiazem    milliGRAM(s) Oral once  diltiazem    milliGRAM(s) Oral daily  lidocaine   4% Patch 1 Patch Transdermal every 24 hours  metoprolol succinate  milliGRAM(s) Oral daily  metoprolol succinate  milliGRAM(s) Oral once  nystatin Powder 1 Application(s) Topical two times a day  QUEtiapine 100 milliGRAM(s) Oral at bedtime  traZODone 50 milliGRAM(s) Oral at bedtime  valproic  acid Syrup 250 milliGRAM(s) Oral three times a day    MEDICATIONS  (PRN):  acetaminophen     Tablet .. 650 milliGRAM(s) Oral every 6 hours PRN Temp greater or equal to 38C (100.4F)  acetaminophen     Tablet .. 650 milliGRAM(s) Oral every 6 hours PRN Mild Pain (1 - 3)  acetylcysteine 10%  Inhalation 4 milliLiter(s) Inhalation every 6 hours PRN dyspnea  levalbuterol Inhalation 0.63 milliGRAM(s) Inhalation every 6 hours PRN Shortness of breath/Wheezing  melatonin 3 milliGRAM(s) Oral at bedtime PRN Insomnia

## 2023-08-23 NOTE — PROGRESS NOTE ADULT - PROVIDER SPECIALTY LIST ADULT
Cardiology
Hospitalist
Hospitalist
Infectious Disease
Infectious Disease
Neurology
Neurology
Hospitalist
Infectious Disease
Internal Medicine
Cardiology
Hospitalist
Infectious Disease
Internal Medicine
Neurology
Pulmonology
Rheumatology
Cardiology
Hospitalist
Infectious Disease
Infectious Disease
Internal Medicine
Neurology
Hospitalist
Internal Medicine
Hospitalist
Internal Medicine
Palliative Care
Internal Medicine

## 2023-08-23 NOTE — PROGRESS NOTE ADULT - NUTRITIONAL ASSESSMENT
This patient has been assessed with a concern for Malnutrition and has been determined to have a diagnosis/diagnoses of Moderate protein-calorie malnutrition.    This patient is being managed with:   Diet Minced and Moist-  Entered: Jul 18 2023  9:58AM  
This patient has been assessed with a concern for Malnutrition and has been determined to have a diagnosis/diagnoses of Moderate protein-calorie malnutrition.    This patient is being managed with:   Diet Minced and Moist-  Entered: Jul 18 2023  9:58AM  
This patient has been assessed with a concern for Malnutrition and has been determined to have a diagnosis/diagnoses of Moderate protein-calorie malnutrition.    This patient is being managed with:   Diet Minced and Moist-  Supplement Feeding Modality:  Oral  Ensure Plus High Protein Cans or Servings Per Day:  1       Frequency:  Two Times a day  Entered: Aug  1 2023 11:58AM  
This patient has been assessed with a concern for Malnutrition and has been determined to have a diagnosis/diagnoses of Moderate protein-calorie malnutrition.    This patient is being managed with:   Diet Minced and Moist-  Supplement Feeding Modality:  Oral  Ensure Plus High Protein Cans or Servings Per Day:  1       Frequency:  Two Times a day  Entered: Aug  1 2023 11:58AM  
This patient has been assessed with a concern for Malnutrition and has been determined to have a diagnosis/diagnoses of Moderate protein-calorie malnutrition.    This patient is being managed with:   Diet Minced and Moist-  Entered: Jul 18 2023  9:58AM  
This patient has been assessed with a concern for Malnutrition and has been determined to have a diagnosis/diagnoses of Moderate protein-calorie malnutrition.    This patient is being managed with:   Diet Minced and Moist-  Supplement Feeding Modality:  Oral  Ensure Plus High Protein Cans or Servings Per Day:  1       Frequency:  Two Times a day  Entered: Aug  1 2023 11:58AM  
This patient has been assessed with a concern for Malnutrition and has been determined to have a diagnosis/diagnoses of Moderate protein-calorie malnutrition.    This patient is being managed with:   Diet Minced and Moist-  Supplement Feeding Modality:  Oral  Ensure Plus High Protein Cans or Servings Per Day:  1       Frequency:  Two Times a day  Entered: Aug  1 2023 11:58AM  
This patient has been assessed with a concern for Malnutrition and has been determined to have a diagnosis/diagnoses of Moderate protein-calorie malnutrition.    This patient is being managed with:   Diet Pureed-  Supplement Feeding Modality:  Oral  Ensure Plus High Protein Cans or Servings Per Day:  2       Frequency:  Two Times a day  Entered: Aug 16 2023 12:16PM  
This patient has been assessed with a concern for Malnutrition and has been determined to have a diagnosis/diagnoses of Moderate protein-calorie malnutrition.    This patient is being managed with:   Diet Minced and Moist-  Entered: Jul 18 2023  9:58AM  
This patient has been assessed with a concern for Malnutrition and has been determined to have a diagnosis/diagnoses of Moderate protein-calorie malnutrition.    This patient is being managed with:   Diet Minced and Moist-  Entered: Jul 18 2023  9:58AM  
This patient has been assessed with a concern for Malnutrition and has been determined to have a diagnosis/diagnoses of Moderate protein-calorie malnutrition.    This patient is being managed with:   Diet Minced and Moist-  Supplement Feeding Modality:  Oral  Ensure Plus High Protein Cans or Servings Per Day:  1       Frequency:  Two Times a day  Entered: Aug  1 2023 11:58AM  
This patient has been assessed with a concern for Malnutrition and has been determined to have a diagnosis/diagnoses of Moderate protein-calorie malnutrition.    This patient is being managed with:   Diet Pureed-  Supplement Feeding Modality:  Oral  Ensure Plus High Protein Cans or Servings Per Day:  2       Frequency:  Two Times a day  Entered: Aug 16 2023 12:16PM  
This patient has been assessed with a concern for Malnutrition and has been determined to have a diagnosis/diagnoses of Moderate protein-calorie malnutrition.    This patient is being managed with:   Diet Pureed-  Supplement Feeding Modality:  Oral  Ensure Plus High Protein Cans or Servings Per Day:  2       Frequency:  Two Times a day  Entered: Aug 16 2023 12:16PM  
This patient has been assessed with a concern for Malnutrition and has been determined to have a diagnosis/diagnoses of Moderate protein-calorie malnutrition.    This patient is being managed with:   Diet Minced and Moist-  Entered: Jul 18 2023  9:58AM  
This patient has been assessed with a concern for Malnutrition and has been determined to have a diagnosis/diagnoses of Moderate protein-calorie malnutrition.    This patient is being managed with:   Diet Minced and Moist-  Supplement Feeding Modality:  Oral  Ensure Plus High Protein Cans or Servings Per Day:  1       Frequency:  Two Times a day  Entered: Aug  1 2023 11:58AM  
This patient has been assessed with a concern for Malnutrition and has been determined to have a diagnosis/diagnoses of Moderate protein-calorie malnutrition.    This patient is being managed with:   Diet Pureed-  Supplement Feeding Modality:  Oral  Ensure Plus High Protein Cans or Servings Per Day:  2       Frequency:  Two Times a day  Entered: Aug 16 2023 12:16PM  
This patient has been assessed with a concern for Malnutrition and has been determined to have a diagnosis/diagnoses of Moderate protein-calorie malnutrition.    This patient is being managed with:   Diet Minced and Moist-  Entered: Jul 18 2023  9:58AM  
This patient has been assessed with a concern for Malnutrition and has been determined to have a diagnosis/diagnoses of Moderate protein-calorie malnutrition.    This patient is being managed with:   Diet Minced and Moist-  Supplement Feeding Modality:  Oral  Ensure Plus High Protein Cans or Servings Per Day:  1       Frequency:  Two Times a day  Entered: Aug  1 2023 11:58AM    Diet Minced and Moist-  Entered: Jul 18 2023  9:58AM    The following pending diet order is being considered for treatment of Moderate protein-calorie malnutrition:null
This patient has been assessed with a concern for Malnutrition and has been determined to have a diagnosis/diagnoses of Moderate protein-calorie malnutrition.    This patient is being managed with:   Diet Minced and Moist-  Supplement Feeding Modality:  Oral  Ensure Plus High Protein Cans or Servings Per Day:  1       Frequency:  Two Times a day  Entered: Aug  1 2023 11:58AM  
This patient has been assessed with a concern for Malnutrition and has been determined to have a diagnosis/diagnoses of Moderate protein-calorie malnutrition.    This patient is being managed with:   Diet Easy to Chew-  DASH/TLC {Sodium & Cholesterol Restricted}  Supplement Feeding Modality:  Oral  Ensure Plus High Protein Cans or Servings Per Day:  1       Frequency:  Daily  Entered: Jul 17 2023 11:55AM

## 2023-08-23 NOTE — PROGRESS NOTE ADULT - ASSESSMENT
69 yo M w/ history of HTN, HLD, TBI with resulting dementia presented status post fall and was admitted w/ rhabdomyolysis, VIKAS and sinus tachycardia. Pt's hospital course was complicated by RLE DVT & aspiration pneumonia has been prolonged w/ persistent fevers that have resolved and ongoing sinus tachycardia. He has become contracted and no longer able to ambulate.     Recurrent fevers  - no fever today. h/s recurrent fevers. likely from micro-aspirations. monitor closely for fever. no antibiotic at this time. called and discussed with daughter on phone.     Aspiration Pneumonia / b/l Pleural Effusion, right sided mucous plugging, b/l atelectasis  -  s/p Zosyn    - follow up CT on 8/8 showed a large left chest wall subcutaneous hematoma versus dependent edema but  there was no obvious ecchymosis of L chest wall at that time as per prior notes and H/H was and still remains stable     Tachycardia  - HR remains elevated w/ patient in sinus tach on tele  - per cardio this may be his baseline in setting od autonomic dysfunction in setting on TBI  - Change metoprolol and cardizem to once a day. Monitor.   - VQ scan: low probability   - Echo showed EF 60-65% w/ grade I diastolic dysfunction, trace mitral valve regurgitation      Impaired ambulation / LE contractures / Functional Quadriplegia   - due to prolonged bedrest w/ underlying dementia   - CT head not suggestive of acute infarct   - cannot do MR Head / neck as pt is contracted     Severe protein calorie malnutrition  - albumin 1.5 w/ resultant anasarca  - c/w PO diet       RLE DVT  - US showed an acute occlusive DVT affecting the right femoral vein with the proximal extent of the thrombus in the right common femoral vein  - Cont Elilquis      Hx of Traumatic brain injury / Dementia   - c/w Valproic acid, Seroquel and Trazadone     VIKAS  - resolved    Acute Rhabdomyolysis  - resolved    HTN  - c/w Metoprolol and Cardizem     Moderate PCM: ont current diet     Prophylaxis:  DVT: Eliquis  GI: PO diet    Dispo: Monitor for fever and tachycardia. Discussed with daughter and care manager>> Possible dc ready tomorrow for rehab.

## 2023-08-23 NOTE — PROGRESS NOTE ADULT - REASON FOR ADMISSION
VIKAS, rhabdomyolysis

## 2023-08-24 ENCOUNTER — TRANSCRIPTION ENCOUNTER (OUTPATIENT)
Age: 68
End: 2023-08-24

## 2023-08-24 VITALS
TEMPERATURE: 99 F | OXYGEN SATURATION: 100 % | SYSTOLIC BLOOD PRESSURE: 117 MMHG | DIASTOLIC BLOOD PRESSURE: 72 MMHG | RESPIRATION RATE: 18 BRPM | HEART RATE: 111 BPM

## 2023-08-24 PROCEDURE — 99239 HOSP IP/OBS DSCHRG MGMT >30: CPT

## 2023-08-24 RX ORDER — TRAZODONE HCL 50 MG
50 TABLET ORAL
Qty: 0 | Refills: 0 | DISCHARGE

## 2023-08-24 RX ORDER — METOPROLOL TARTRATE 50 MG
1 TABLET ORAL
Qty: 0 | Refills: 0 | DISCHARGE
Start: 2023-08-24

## 2023-08-24 RX ORDER — RAMIPRIL 5 MG
1 CAPSULE ORAL
Refills: 0 | DISCHARGE

## 2023-08-24 RX ORDER — DILTIAZEM HCL 120 MG
1 CAPSULE, EXT RELEASE 24 HR ORAL
Qty: 0 | Refills: 0 | DISCHARGE
Start: 2023-08-24

## 2023-08-24 RX ORDER — AMLODIPINE BESYLATE 2.5 MG/1
1 TABLET ORAL
Refills: 0 | DISCHARGE

## 2023-08-24 RX ORDER — LANOLIN ALCOHOL/MO/W.PET/CERES
1 CREAM (GRAM) TOPICAL
Qty: 0 | Refills: 0 | DISCHARGE
Start: 2023-08-24

## 2023-08-24 RX ORDER — ACETAMINOPHEN 500 MG
2 TABLET ORAL
Qty: 0 | Refills: 0 | DISCHARGE
Start: 2023-08-24

## 2023-08-24 RX ORDER — APIXABAN 2.5 MG/1
1 TABLET, FILM COATED ORAL
Qty: 0 | Refills: 0 | DISCHARGE
Start: 2023-08-24

## 2023-08-24 RX ADMIN — NYSTATIN CREAM 1 APPLICATION(S): 100000 CREAM TOPICAL at 05:22

## 2023-08-24 RX ADMIN — NYSTATIN CREAM 1 APPLICATION(S): 100000 CREAM TOPICAL at 17:16

## 2023-08-24 RX ADMIN — APIXABAN 5 MILLIGRAM(S): 2.5 TABLET, FILM COATED ORAL at 11:22

## 2023-08-24 RX ADMIN — Medication 200 MILLIGRAM(S): at 05:27

## 2023-08-24 RX ADMIN — Medication 240 MILLIGRAM(S): at 05:25

## 2023-08-24 RX ADMIN — LIDOCAINE 1 PATCH: 4 CREAM TOPICAL at 11:26

## 2023-08-24 RX ADMIN — Medication 250 MILLIGRAM(S): at 13:07

## 2023-08-24 RX ADMIN — Medication 250 MILLIGRAM(S): at 05:22

## 2023-08-24 NOTE — DISCHARGE NOTE NURSING/CASE MANAGEMENT/SOCIAL WORK - PATIENT PORTAL LINK FT
You can access the FollowMyHealth Patient Portal offered by [facility] by registering at the following website: [email or web address]. By joining [employer]’s FollowMyHealth portal, you will also be able to view your health information using other applications (apps) compatible with our system.

## 2023-08-28 DIAGNOSIS — Z87.891 PERSONAL HISTORY OF NICOTINE DEPENDENCE: ICD-10-CM

## 2023-08-28 DIAGNOSIS — F03.90 UNSPECIFIED DEMENTIA WITHOUT BEHAVIORAL DISTURBANCE: ICD-10-CM

## 2023-08-28 DIAGNOSIS — Z87.820 PERSONAL HISTORY OF TRAUMATIC BRAIN INJURY: ICD-10-CM

## 2023-08-28 DIAGNOSIS — N17.9 ACUTE KIDNEY FAILURE, UNSPECIFIED: ICD-10-CM

## 2023-08-28 DIAGNOSIS — I10 ESSENTIAL (PRIMARY) HYPERTENSION: ICD-10-CM
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
## 2023-08-28 DIAGNOSIS — Z66 DO NOT RESUSCITATE: ICD-10-CM

## 2023-08-28 DIAGNOSIS — J98.11 ATELECTASIS: ICD-10-CM

## 2023-08-28 DIAGNOSIS — R00.0 TACHYCARDIA, UNSPECIFIED: ICD-10-CM

## 2023-08-28 DIAGNOSIS — I82.411 ACUTE EMBOLISM AND THROMBOSIS OF RIGHT FEMORAL VEIN: ICD-10-CM
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
## 2023-08-28 DIAGNOSIS — A41.9 SEPSIS, UNSPECIFIED ORGANISM: ICD-10-CM

## 2023-08-28 DIAGNOSIS — R53.2 FUNCTIONAL QUADRIPLEGIA: ICD-10-CM

## 2023-08-28 DIAGNOSIS — E44.0 MODERATE PROTEIN-CALORIE MALNUTRITION: ICD-10-CM

## 2023-08-28 DIAGNOSIS — E78.5 HYPERLIPIDEMIA, UNSPECIFIED: ICD-10-CM

## 2023-08-28 DIAGNOSIS — E87.5 HYPERKALEMIA: ICD-10-CM

## 2023-08-28 DIAGNOSIS — J69.0 PNEUMONITIS DUE TO INHALATION OF FOOD AND VOMIT: ICD-10-CM

## 2023-08-28 DIAGNOSIS — T79.6XXA TRAUMATIC ISCHEMIA OF MUSCLE, INITIAL ENCOUNTER: ICD-10-CM

## 2023-08-28 DIAGNOSIS — N39.0 URINARY TRACT INFECTION, SITE NOT SPECIFIED: ICD-10-CM

## 2023-11-19 NOTE — ED ADULT TRIAGE NOTE - PAIN RATING/NUMBER SCALE (0-10): ACTIVITY
56yo male with pmh of cataracts surgery, HTN, DM presents with HA for a week - neuro intact, but has blurry vision, will do CTH and pain control 6 (moderate pain)

## 2024-11-15 NOTE — ED ADULT TRIAGE NOTE - ACCOMPANIED BY
How to Take Your Medication Before Surgery  Preoperative Medication Instructions   Antiplatelet or Anticoagulation Medication Instructions   - Patient is on no antiplatelet or anticoagulation medications.    Additional Medication Instructions  Take all scheduled medications on the day of surgery EXCEPT for modifications listed below:   - Herbal medications and vitamins: DO NOT TAKE 14 days prior to surgery.   - ibuprofen (Advil, Motrin): DO NOT TAKE 1 day before surgery.        Patient Education   Preparing for Your Surgery  For Adults  Getting started  In most cases, a nurse will call to review your health history and instructions. They will give you an arrival time based on your scheduled surgery time. Please be ready to share:  Your doctor's clinic name and phone number  Your medical, surgical, and anesthesia history  A list of allergies and sensitivities  A list of medicines, including herbal treatments and over-the-counter drugs  Whether the patient has a legal guardian (ask how to send us the papers in advance)  Note: You may not receive a call if you were seen at our PAC (Preoperative Assessment Center).  Please tell us if you're pregnant--or if there's any chance you might be pregnant. Some surgeries may injure a fetus (unborn baby), so they require a pregnancy test. Surgeries that are safe for a fetus don't always need a test, and you can choose whether to have one.   Preparing for surgery  Within 10 to 30 days of surgery: Have a pre-op exam (sometimes called an H&P, or History and Physical). This can be done at a clinic or pre-operative center.  If you're having a , you may not need this exam. Talk to your care team.  At your pre-op exam, talk to your care team about all medicines you take. (This includes CBD oil and any drugs, such as THC, marijuana, and other forms of cannabis.) If you need to stop any medicine before surgery, ask when to start taking it again.  This is for your safety. Many  medicines and drugs can make you bleed too much during surgery. Some change how well surgery (anesthesia) drugs work.  Call your insurance company to let them know you're having surgery. (If you don't have insurance, call [phone].)  Call your clinic if there's any change in your health. This includes a scrape or scratch near the surgery site, or any signs of a cold (sore throat, runny nose, cough, rash, fever).  Eating and drinking guidelines  For your safety: Unless your surgeon tells you otherwise, follow the guidelines below.  Eat and drink as normal until 8 hours before you arrive for surgery. After that, no food or milk. You can spit out gum when you arrive.  Drink clear liquids until 2 hours before you arrive. These are liquids you can see through, like water, Gatorade, and Propel Water. They also include plain black coffee and tea (no cream or milk).  No alcohol for 24 hours before you arrive. The night before surgery, stop any drinks that contain THC.  If your care team tells you to take medicine on the morning of surgery, it's okay to take it with a sip of water. No other medicines or drugs are allowed (including CBD oil)--follow your care team's instructions.  If you have questions the day of surgery, call your hospital or surgery center.   Preventing infection  Shower or bathe the night before and the morning of surgery. Follow the instructions your clinic gave you. (If no instructions, use regular soap.)  Don't shave or clip hair near your surgery site. We'll remove the hair if needed.  Don't smoke or vape the morning of surgery. No chewing tobacco for 6 hours before you arrive. A nicotine patch is okay. You may spit out nicotine gum when you arrive.  For some surgeries, the surgeon will tell you to fully quit smoking and nicotine.  We will make every effort to keep you safe from infection. We will:  Clean our hands often with soap and water (or an alcohol-based hand rub).  Clean the skin at your  surgery site with a special soap that kills germs.  Give you a special gown to keep you warm. (Cold raises the risk of infection.)  Wear hair covers, masks, gowns, and gloves during surgery.  Give antibiotic medicine, if prescribed. Not all surgeries need this medicine.  What to bring on the day of surgery  Photo ID and insurance card  Copy of your health care directive, if you have one  Glasses and hearing aids (bring cases)  You can't wear contacts during surgery  Inhaler and eye drops, if you use them (tell us about these when you arrive)  CPAP machine or breathing device, if you use them  A few personal items, if spending the night  If you have . . .  A pacemaker, ICD (cardiac defibrillator), or other implant: Bring the ID card.  An implanted stimulator: Bring the remote control.  A legal guardian: Bring a copy of the certified (court-stamped) guardianship papers.  Please remove any jewelry, including body piercings. Leave jewelry and other valuables at home.  If you're going home the day of surgery  You must have a responsible adult drive you home. They should stay with you overnight as well.  If you don't have someone to stay with you, and you aren't safe to go home alone, we may keep you overnight. Insurance often won't pay for this.  After surgery  If it's hard to control your pain or you need more pain medicine, please call your surgeon's office.  Questions?   If you have any questions for your care team, list them here:   ____________________________________________________________________________________________________________________________________________________________________________________________________________________________________________________________  For informational purposes only. Not to replace the advice of your health care provider. Copyright   2003, 2019 [facility]. All rights reserved. Clinically reviewed by Real Lawson MD. [employer] [id number] - REV 08/24.      Self

## 2025-01-14 NOTE — PROGRESS NOTE ADULT - ASSESSMENT
68M PMH HTN, HLD, TBI with resulting dementia presented to ED as a fall. Patient found with rhabdo and VIKAS, also with possible aspiration pneumonia, remains febrile.   LE Doppler : IMPRESSION: There is acute occlusive DVT affecting the right femoral vein with the proximal extent of the thrombus in the right common femoral vein.  < from: CT Chest No Cont (08.08.23 @ 15:21) >  Large left chest wall subcutaneous hematoma versus dependent edema.  New small bilateral pleural effusions.  Left upper lobe infiltrate versus motion artifact. Bilateral lower lobe passive atelectasis.   Limitations as above.  < end of copied text >    # Recurrent fevers - Elevated ESR, CRP.  ? rheumatologic etiology.  Rheumatology consulted.    # Aspiration Pneumonia / b/l Pleural Effusion - ID Following.  Resumed Zosyn per their recommendations.  F/u cultures, fever curve.  Monitor CBC.  Fever persists.  Indium scan.  Incentive Spirometry.  Pulmonary input requested.    # ? L chest hematoma - no obvious ecchymosis of L chest wall.  Despite CT findings, exam favoring dependent edema rather than hematoma.  Hgb stable.  Monitor H/H.    # Tachycardia - HR remains elevated.  Continue Cardizem, BBlocker.  Tele showing sinus tachycardia.   Increased cardizem.   # Impaired ambulation. PT consulted and recommending FREDY - More contracted.  ? due to sacroilitis.  Trial of NSAIDs.  Neuro followup.  CT head not suggestive of acute infarct.  Will attempt MR Head / neck.  # RLE DVT - continue full dose Lovenox for now.  Transition to DOAC on discharge  # Traumatic brain injury / Dementia - supportive care.  Continue Valproic acid.  # VIKAS - resolved.  # Rhabdomyolysis - resolved.  # Essential HTN - Monitor BP.  Continue antihypertensives.  # Inpatient DVT Proph - on anticoagulation     Plan of care d/w daughter  [phone] 8/8   68M PMH HTN, HLD, TBI with resulting dementia presented to ED as a fall. Patient found with rhabdo and VIKAS, also with possible aspiration pneumonia, remains febrile.   LE Doppler : IMPRESSION: There is acute occlusive DVT affecting the right femoral vein with the proximal extent of the thrombus in the right common femoral vein.  < from: CT Chest No Cont (08.08.23 @ 15:21) >  Large left chest wall subcutaneous hematoma versus dependent edema.  New small bilateral pleural effusions.  Left upper lobe infiltrate versus motion artifact. Bilateral lower lobe passive atelectasis.   Limitations as above.  < end of copied text >    # Recurrent fevers - Elevated ESR, CRP.  ? rheumatologic etiology.  Rheumatology consulted.    # Aspiration Pneumonia / b/l Pleural Effusion - ID Following.  Resumed Zosyn per their recommendations.  F/u cultures, fever curve.  Monitor CBC.  Fever persists.  Indium scan.  Incentive Spirometry.  Pulmonary input requested.    # ? L chest hematoma - no obvious ecchymosis of L chest wall.  Despite CT findings, exam favoring dependent edema rather than hematoma.  Hgb stable.  Monitor H/H.    # Tachycardia - HR remains elevated.  Continue Cardizem, BBlocker.  Tele showing sinus tachycardia.   Increased cardizem.   # Impaired ambulation. PT consulted and recommending FREDY - More contracted.  ? due to sacroilitis.  Trial of NSAIDs.  Neuro followup.  CT head not suggestive of acute infarct.  Will attempt MR Head / neck.  # RLE DVT - continue full dose Lovenox for now.  Transition to DOAC on discharge  # Traumatic brain injury / Dementia - supportive care.  Continue Valproic acid.  # VIKAS - resolved.  # Rhabdomyolysis - resolved.  # Essential HTN - Monitor BP.  Continue antihypertensives.  # Inpatient DVT Proph - on anticoagulation     Plan of care d/w daughter  [phone] 8/8 - confirms that patient does not have any metallic implants.    Moderate Acute Suicide Risk

## 2025-04-19 NOTE — PROGRESS NOTE ADULT - NS ATTEND BILL GEN_ALL_CORE
Follow up with your primary care provider - Ayden - for any persistent concerns    Return to ED for signs of wound infection, vomiting, lethargy, worsening symptoms  
Attending to bill

## 2025-05-07 NOTE — PROGRESS NOTE ADULT - SUBJECTIVE AND OBJECTIVE BOX
Patient: ROJAS HE [id number] 68y Male                            Hospitalist Attending Note    Denies complaints.   Still febrile.    ____________________PHYSICAL EXAM:  GENERAL:  NAD, awake, confused.   HEENT: NCAT  CARDIOVASCULAR:  S1, S2  LUNGS: CTAB  ABDOMEN:  soft, (-) tenderness, (-) distension, (+) bowel sounds, (-) guarding, (-) rebound (-) rigidity  EXTREMITIES:  no cyanosis / clubbing / edema.   NEURO: b/l contracted. Sensation grossly intact.   ____________________        VITALS:  Vital Signs Last 24 Hrs  T(C): 37.4 (09 Aug 2023 16:45), Max: 37.6 (08 Aug 2023 20:43)  T(F): 99.4 (09 Aug 2023 16:45), Max: 99.6 (08 Aug 2023 20:43)  HR: 112 (09 Aug 2023 16:45) (112 - 120)  BP: 128/74 (09 Aug 2023 16:45) (105/63 - 135/73)  BP(mean): --  RR: 18 (09 Aug 2023 16:45) (18 - 19)  SpO2: 95% (09 Aug 2023 16:45) (92% - 96%)    Parameters below as of 09 Aug 2023 16:45  Patient On (Oxygen Delivery Method): room air     Daily     Daily   CAPILLARY BLOOD GLUCOSE        I&O's Summary    08 Aug 2023 07:01  -  09 Aug 2023 07:00  --------------------------------------------------------  IN: 1400 mL / OUT: 0 mL / NET: 1400 mL    09 Aug 2023 07:01  -  09 Aug 2023 17:13  --------------------------------------------------------  IN: 200 mL / OUT: 0 mL / NET: 200 mL        LABS:                        8.0    x     )-----------( x        ( 09 Aug 2023 10:15 )             24.2     08-09    141  |  106  |  15  ----------------------------<  122<H>  3.9   |  30  |  0.89    Ca    8.2<L>      09 Aug 2023 06:42    TPro  5.6<L>  /  Alb  1.4<L>  /  TBili  0.3  /  DBili  x   /  AST  96<H>  /  ALT  64  /  AlkPhos  80  08-08      LIVER FUNCTIONS - ( 08 Aug 2023 06:25 )  Alb: 1.4 g/dL / Pro: 5.6 gm/dL / ALK PHOS: 80 U/L / ALT: 64 U/L / AST: 96 U/L / GGT: x           Urinalysis Basic - ( 09 Aug 2023 06:42 )    Color: x / Appearance: x / SG: x / pH: x  Gluc: 122 mg/dL / Ketone: x  / Bili: x / Urobili: x   Blood: x / Protein: x / Nitrite: x   Leuk Esterase: x / RBC: x / WBC x   Sq Epi: x / Non Sq Epi: x / Bacteria: x              MEDICATIONS:  acetaminophen     Tablet .. 650 milliGRAM(s) Oral every 6 hours PRN  acetylcysteine 10%  Inhalation 4 milliLiter(s) Inhalation every 6 hours PRN  diltiazem    Tablet 60 milliGRAM(s) Oral every 8 hours  enoxaparin Injectable 60 milliGRAM(s) SubCutaneous every 12 hours  ibuprofen  Tablet. 400 milliGRAM(s) Oral three times a day  lactated ringers. 1000 milliLiter(s) IV Continuous <Continuous>  levalbuterol Inhalation 0.63 milliGRAM(s) Inhalation every 6 hours PRN  lidocaine   4% Patch 1 Patch Transdermal every 24 hours  melatonin 3 milliGRAM(s) Oral at bedtime PRN  metoprolol tartrate 50 milliGRAM(s) Oral every 6 hours  nystatin Powder 1 Application(s) Topical two times a day  oxyCODONE    IR 5 milliGRAM(s) Oral every 4 hours PRN  oxyCODONE  ER Tablet 10 milliGRAM(s) Oral every 12 hours  piperacillin/tazobactam IVPB.. 3.375 Gram(s) IV Intermittent every 8 hours  QUEtiapine 100 milliGRAM(s) Oral at bedtime  traZODone 50 milliGRAM(s) Oral at bedtime  valproic  acid Syrup 250 milliGRAM(s) Oral three times a day     108